# Patient Record
Sex: FEMALE | Race: WHITE | NOT HISPANIC OR LATINO | Employment: OTHER | URBAN - NONMETROPOLITAN AREA
[De-identification: names, ages, dates, MRNs, and addresses within clinical notes are randomized per-mention and may not be internally consistent; named-entity substitution may affect disease eponyms.]

---

## 2017-01-01 ENCOUNTER — APPOINTMENT (EMERGENCY)
Dept: CT IMAGING | Facility: HOSPITAL | Age: 77
DRG: 304 | End: 2017-01-01
Payer: MEDICARE

## 2017-01-01 ENCOUNTER — HOSPITAL ENCOUNTER (OUTPATIENT)
Dept: INFUSION CENTER | Facility: HOSPITAL | Age: 77
Discharge: HOME | End: 2017-11-24
Payer: MEDICARE

## 2017-01-01 ENCOUNTER — HOSPITAL ENCOUNTER (INPATIENT)
Facility: HOSPITAL | Age: 77
LOS: 3 days | Discharge: NURSING, REHAB OR LONG-TERM CARE | DRG: 304 | End: 2017-12-08
Attending: FAMILY MEDICINE | Admitting: FAMILY MEDICINE
Payer: MEDICARE

## 2017-01-01 ENCOUNTER — APPOINTMENT (OUTPATIENT)
Dept: MRI IMAGING | Facility: HOSPITAL | Age: 77
DRG: 092 | End: 2017-01-01
Payer: MEDICARE

## 2017-01-01 ENCOUNTER — APPOINTMENT (OUTPATIENT)
Dept: LAB | Facility: HOSPITAL | Age: 77
End: 2017-01-01
Payer: MEDICARE

## 2017-01-01 ENCOUNTER — APPOINTMENT (INPATIENT)
Dept: PHYSICAL THERAPY | Facility: HOSPITAL | Age: 77
DRG: 871 | End: 2017-01-01
Payer: MEDICARE

## 2017-01-01 ENCOUNTER — ANESTHESIA (OUTPATIENT)
Dept: PERIOP | Facility: HOSPITAL | Age: 77
End: 2017-01-01
Payer: MEDICARE

## 2017-01-01 ENCOUNTER — ALLSCRIPTS OFFICE VISIT (OUTPATIENT)
Dept: OTHER | Facility: OTHER | Age: 77
End: 2017-01-01

## 2017-01-01 ENCOUNTER — APPOINTMENT (EMERGENCY)
Dept: RADIOLOGY | Facility: HOSPITAL | Age: 77
End: 2017-01-01
Payer: MEDICARE

## 2017-01-01 ENCOUNTER — HOSPITAL ENCOUNTER (OUTPATIENT)
Dept: RADIOLOGY | Facility: HOSPITAL | Age: 77
Setting detail: OUTPATIENT SURGERY
Discharge: HOME | End: 2017-11-08
Payer: MEDICARE

## 2017-01-01 ENCOUNTER — APPOINTMENT (INPATIENT)
Dept: RADIOLOGY | Facility: HOSPITAL | Age: 77
DRG: 871 | End: 2017-01-01
Payer: MEDICARE

## 2017-01-01 ENCOUNTER — TRANSCRIBE ORDERS (OUTPATIENT)
Dept: ADMINISTRATIVE | Facility: HOSPITAL | Age: 77
End: 2017-01-01

## 2017-01-01 ENCOUNTER — HOSPITAL ENCOUNTER (OUTPATIENT)
Dept: INFUSION CENTER | Facility: HOSPITAL | Age: 77
Discharge: HOME | End: 2017-12-07
Payer: MEDICARE

## 2017-01-01 ENCOUNTER — APPOINTMENT (INPATIENT)
Dept: NON INVASIVE DIAGNOSTICS | Facility: HOSPITAL | Age: 77
DRG: 871 | End: 2017-01-01
Payer: MEDICARE

## 2017-01-01 ENCOUNTER — APPOINTMENT (OUTPATIENT)
Dept: RADIOLOGY | Facility: HOSPITAL | Age: 77
End: 2017-01-01
Payer: MEDICARE

## 2017-01-01 ENCOUNTER — APPOINTMENT (OUTPATIENT)
Dept: NON INVASIVE DIAGNOSTICS | Facility: HOSPITAL | Age: 77
DRG: 092 | End: 2017-01-01
Payer: MEDICARE

## 2017-01-01 ENCOUNTER — HOSPITAL ENCOUNTER (OUTPATIENT)
Facility: HOSPITAL | Age: 77
Setting detail: OUTPATIENT SURGERY
End: 2017-11-08
Attending: SURGERY | Admitting: SURGERY
Payer: MEDICARE

## 2017-01-01 ENCOUNTER — APPOINTMENT (EMERGENCY)
Dept: RADIOLOGY | Facility: HOSPITAL | Age: 77
DRG: 092 | End: 2017-01-01
Payer: MEDICARE

## 2017-01-01 ENCOUNTER — APPOINTMENT (EMERGENCY)
Dept: RADIOLOGY | Facility: HOSPITAL | Age: 77
DRG: 304 | End: 2017-01-01
Payer: MEDICARE

## 2017-01-01 ENCOUNTER — APPOINTMENT (EMERGENCY)
Dept: CT IMAGING | Facility: HOSPITAL | Age: 77
End: 2017-01-01
Payer: MEDICARE

## 2017-01-01 ENCOUNTER — HOSPITAL ENCOUNTER (INPATIENT)
Facility: HOSPITAL | Age: 77
LOS: 16 days | Discharge: NURSING, REHAB OR LONG-TERM CARE | DRG: 871 | End: 2017-08-28
Attending: INTERNAL MEDICINE | Admitting: INTERNAL MEDICINE
Payer: MEDICARE

## 2017-01-01 ENCOUNTER — ANESTHESIA EVENT (INPATIENT)
Dept: GASTROENTEROLOGY | Facility: HOSPITAL | Age: 77
DRG: 871 | End: 2017-01-01
Payer: MEDICARE

## 2017-01-01 ENCOUNTER — HOSPITAL ENCOUNTER (OUTPATIENT)
Dept: INFUSION CENTER | Facility: HOSPITAL | Age: 77
Discharge: HOME | End: 2017-09-25
Payer: MEDICARE

## 2017-01-01 ENCOUNTER — HOSPITAL ENCOUNTER (OUTPATIENT)
Dept: RADIOLOGY | Facility: HOSPITAL | Age: 77
Discharge: HOME | End: 2017-11-06
Attending: SURGERY
Payer: COMMERCIAL

## 2017-01-01 ENCOUNTER — APPOINTMENT (EMERGENCY)
Dept: CT IMAGING | Facility: HOSPITAL | Age: 77
DRG: 092 | End: 2017-01-01
Payer: MEDICARE

## 2017-01-01 ENCOUNTER — HOSPITAL ENCOUNTER (OUTPATIENT)
Dept: INFUSION CENTER | Facility: HOSPITAL | Age: 77
Discharge: HOME | End: 2017-09-11
Payer: MEDICARE

## 2017-01-01 ENCOUNTER — HOSPITAL ENCOUNTER (INPATIENT)
Facility: HOSPITAL | Age: 77
LOS: 7 days | Discharge: NURSING, REHAB OR LONG-TERM CARE | DRG: 356 | End: 2018-01-05
Attending: INTERNAL MEDICINE | Admitting: HOSPITALIST
Payer: MEDICARE

## 2017-01-01 ENCOUNTER — HOSPITAL ENCOUNTER (OUTPATIENT)
Dept: INFUSION CENTER | Facility: HOSPITAL | Age: 77
Discharge: HOME | End: 2017-10-09
Payer: COMMERCIAL

## 2017-01-01 ENCOUNTER — HOSPITAL ENCOUNTER (EMERGENCY)
Facility: HOSPITAL | Age: 77
End: 2017-12-29
Attending: EMERGENCY MEDICINE
Payer: MEDICARE

## 2017-01-01 ENCOUNTER — ANESTHESIA (OUTPATIENT)
Dept: GASTROENTEROLOGY | Facility: HOSPITAL | Age: 77
End: 2017-01-01

## 2017-01-01 ENCOUNTER — HOSPITAL ENCOUNTER (OUTPATIENT)
Dept: INFUSION CENTER | Facility: HOSPITAL | Age: 77
Discharge: HOME | End: 2017-11-09
Payer: MEDICARE

## 2017-01-01 ENCOUNTER — APPOINTMENT (INPATIENT)
Dept: NEUROLOGY | Facility: HOSPITAL | Age: 77
DRG: 092 | End: 2017-01-01
Payer: MEDICARE

## 2017-01-01 ENCOUNTER — GENERIC CONVERSION - ENCOUNTER (OUTPATIENT)
Dept: OTHER | Facility: OTHER | Age: 77
End: 2017-01-01

## 2017-01-01 ENCOUNTER — ANESTHESIA EVENT (OUTPATIENT)
Dept: PERIOP | Facility: HOSPITAL | Age: 77
End: 2017-01-01
Payer: MEDICARE

## 2017-01-01 ENCOUNTER — ANESTHESIA EVENT (OUTPATIENT)
Dept: GASTROENTEROLOGY | Facility: HOSPITAL | Age: 77
End: 2017-01-01

## 2017-01-01 ENCOUNTER — APPOINTMENT (OUTPATIENT)
Dept: ULTRASOUND IMAGING | Facility: HOSPITAL | Age: 77
DRG: 092 | End: 2017-01-01
Payer: MEDICARE

## 2017-01-01 ENCOUNTER — TELEPHONE (OUTPATIENT)
Dept: INFUSION CENTER | Facility: HOSPITAL | Age: 77
End: 2017-01-01

## 2017-01-01 ENCOUNTER — HOSPITAL ENCOUNTER (EMERGENCY)
Facility: HOSPITAL | Age: 77
Discharge: NURSING, REHAB OR LONG-TERM CARE | End: 2017-09-25
Attending: EMERGENCY MEDICINE | Admitting: EMERGENCY MEDICINE
Payer: MEDICARE

## 2017-01-01 ENCOUNTER — HOSPITAL ENCOUNTER (INPATIENT)
Facility: HOSPITAL | Age: 77
LOS: 5 days | Discharge: NURSING, REHAB OR LONG-TERM CARE | DRG: 092 | End: 2017-10-23
Attending: EMERGENCY MEDICINE | Admitting: FAMILY MEDICINE
Payer: MEDICARE

## 2017-01-01 ENCOUNTER — ANESTHESIA (INPATIENT)
Dept: GASTROENTEROLOGY | Facility: HOSPITAL | Age: 77
DRG: 871 | End: 2017-01-01
Payer: MEDICARE

## 2017-01-01 ENCOUNTER — HOSPITAL ENCOUNTER (OUTPATIENT)
Dept: INFUSION CENTER | Facility: HOSPITAL | Age: 77
Discharge: HOME | End: 2017-12-23
Payer: MEDICARE

## 2017-01-01 ENCOUNTER — HOSPITAL ENCOUNTER (EMERGENCY)
Facility: HOSPITAL | Age: 77
End: 2017-08-12
Attending: EMERGENCY MEDICINE
Payer: MEDICARE

## 2017-01-01 ENCOUNTER — APPOINTMENT (EMERGENCY)
Dept: ULTRASOUND IMAGING | Facility: HOSPITAL | Age: 77
End: 2017-01-01
Payer: MEDICARE

## 2017-01-01 VITALS
SYSTOLIC BLOOD PRESSURE: 138 MMHG | OXYGEN SATURATION: 95 % | BODY MASS INDEX: 43.94 KG/M2 | WEIGHT: 195.99 LBS | HEART RATE: 109 BPM | RESPIRATION RATE: 18 BRPM | DIASTOLIC BLOOD PRESSURE: 84 MMHG | TEMPERATURE: 101.1 F

## 2017-01-01 VITALS
DIASTOLIC BLOOD PRESSURE: 72 MMHG | RESPIRATION RATE: 18 BRPM | TEMPERATURE: 98 F | BODY MASS INDEX: 43.42 KG/M2 | HEART RATE: 72 BPM | SYSTOLIC BLOOD PRESSURE: 154 MMHG | WEIGHT: 193 LBS | OXYGEN SATURATION: 97 %

## 2017-01-01 VITALS
RESPIRATION RATE: 18 BRPM | SYSTOLIC BLOOD PRESSURE: 122 MMHG | DIASTOLIC BLOOD PRESSURE: 78 MMHG | OXYGEN SATURATION: 95 % | HEIGHT: 56 IN | HEART RATE: 69 BPM | WEIGHT: 188.93 LBS | BODY MASS INDEX: 42.5 KG/M2 | TEMPERATURE: 97.6 F

## 2017-01-01 VITALS
RESPIRATION RATE: 16 BRPM | TEMPERATURE: 97.4 F | SYSTOLIC BLOOD PRESSURE: 137 MMHG | HEIGHT: 56 IN | OXYGEN SATURATION: 98 % | WEIGHT: 181.66 LBS | BODY MASS INDEX: 40.86 KG/M2 | HEART RATE: 63 BPM | DIASTOLIC BLOOD PRESSURE: 76 MMHG

## 2017-01-01 VITALS
WEIGHT: 188.93 LBS | TEMPERATURE: 96.6 F | OXYGEN SATURATION: 96 % | DIASTOLIC BLOOD PRESSURE: 77 MMHG | BODY MASS INDEX: 36.9 KG/M2 | SYSTOLIC BLOOD PRESSURE: 157 MMHG | HEART RATE: 67 BPM | RESPIRATION RATE: 18 BRPM

## 2017-01-01 VITALS
TEMPERATURE: 97.1 F | SYSTOLIC BLOOD PRESSURE: 124 MMHG | HEIGHT: 56 IN | DIASTOLIC BLOOD PRESSURE: 52 MMHG | BODY MASS INDEX: 41.36 KG/M2 | RESPIRATION RATE: 18 BRPM | HEART RATE: 57 BPM | WEIGHT: 183.86 LBS

## 2017-01-01 VITALS
HEART RATE: 60 BPM | OXYGEN SATURATION: 99 % | RESPIRATION RATE: 22 BRPM | SYSTOLIC BLOOD PRESSURE: 155 MMHG | DIASTOLIC BLOOD PRESSURE: 67 MMHG | TEMPERATURE: 98.5 F | WEIGHT: 195.11 LBS | HEIGHT: 55 IN | BODY MASS INDEX: 45.15 KG/M2

## 2017-01-01 VITALS
HEART RATE: 82 BPM | SYSTOLIC BLOOD PRESSURE: 130 MMHG | BODY MASS INDEX: 41.81 KG/M2 | WEIGHT: 185.85 LBS | TEMPERATURE: 98.6 F | RESPIRATION RATE: 18 BRPM | DIASTOLIC BLOOD PRESSURE: 88 MMHG | HEIGHT: 56 IN

## 2017-01-01 VITALS
OXYGEN SATURATION: 93 % | DIASTOLIC BLOOD PRESSURE: 70 MMHG | TEMPERATURE: 98.5 F | RESPIRATION RATE: 24 BRPM | SYSTOLIC BLOOD PRESSURE: 140 MMHG | HEIGHT: 56 IN | HEART RATE: 65 BPM | WEIGHT: 196.87 LBS | BODY MASS INDEX: 44.29 KG/M2

## 2017-01-01 VITALS
WEIGHT: 193.12 LBS | SYSTOLIC BLOOD PRESSURE: 133 MMHG | RESPIRATION RATE: 20 BRPM | TEMPERATURE: 98.6 F | OXYGEN SATURATION: 95 % | HEIGHT: 56 IN | DIASTOLIC BLOOD PRESSURE: 60 MMHG | HEART RATE: 70 BPM | BODY MASS INDEX: 43.44 KG/M2

## 2017-01-01 VITALS
OXYGEN SATURATION: 94 % | DIASTOLIC BLOOD PRESSURE: 68 MMHG | BODY MASS INDEX: 42.85 KG/M2 | RESPIRATION RATE: 16 BRPM | HEIGHT: 56 IN | TEMPERATURE: 96.8 F | SYSTOLIC BLOOD PRESSURE: 162 MMHG | HEART RATE: 72 BPM | WEIGHT: 190.48 LBS

## 2017-01-01 VITALS
BODY MASS INDEX: 36.49 KG/M2 | HEIGHT: 60 IN | OXYGEN SATURATION: 93 % | DIASTOLIC BLOOD PRESSURE: 78 MMHG | WEIGHT: 185.85 LBS | RESPIRATION RATE: 18 BRPM | HEART RATE: 92 BPM | SYSTOLIC BLOOD PRESSURE: 160 MMHG | TEMPERATURE: 97.6 F

## 2017-01-01 VITALS
WEIGHT: 174.6 LBS | RESPIRATION RATE: 18 BRPM | BODY MASS INDEX: 40.41 KG/M2 | SYSTOLIC BLOOD PRESSURE: 172 MMHG | HEART RATE: 86 BPM | OXYGEN SATURATION: 98 % | TEMPERATURE: 98.6 F | DIASTOLIC BLOOD PRESSURE: 64 MMHG | HEIGHT: 55 IN

## 2017-01-01 DIAGNOSIS — E11.65 TYPE 2 DIABETES MELLITUS WITH HYPERGLYCEMIA, WITH LONG-TERM CURRENT USE OF INSULIN (HCC): ICD-10-CM

## 2017-01-01 DIAGNOSIS — C22.1 INTRAHEPATIC BILE DUCT CARCINOMA (HCC): ICD-10-CM

## 2017-01-01 DIAGNOSIS — R78.81 BACTEREMIA: ICD-10-CM

## 2017-01-01 DIAGNOSIS — I16.0 HYPERTENSIVE URGENCY: Primary | ICD-10-CM

## 2017-01-01 DIAGNOSIS — C25.9 MALIGNANT NEOPLASM OF PANCREAS (HCC): ICD-10-CM

## 2017-01-01 DIAGNOSIS — C25.9 PANCREATIC CANCER (HCC): ICD-10-CM

## 2017-01-01 DIAGNOSIS — E03.9 UNSPECIFIED HYPOTHYROIDISM: ICD-10-CM

## 2017-01-01 DIAGNOSIS — I82.4Z3 DVT, LOWER EXTREMITY, DISTAL, ACUTE, BILATERAL (HCC): ICD-10-CM

## 2017-01-01 DIAGNOSIS — I10 HYPERTENSION: ICD-10-CM

## 2017-01-01 DIAGNOSIS — I87.8 POOR VENOUS ACCESS: ICD-10-CM

## 2017-01-01 DIAGNOSIS — Z79.01 LONG TERM (CURRENT) USE OF ANTICOAGULANTS: ICD-10-CM

## 2017-01-01 DIAGNOSIS — R47.01 APHASIA: ICD-10-CM

## 2017-01-01 DIAGNOSIS — I10 ESSENTIAL HYPERTENSION, MALIGNANT: ICD-10-CM

## 2017-01-01 DIAGNOSIS — K62.5 RECTAL BLEEDING: Primary | ICD-10-CM

## 2017-01-01 DIAGNOSIS — R73.9 HYPERGLYCEMIA: ICD-10-CM

## 2017-01-01 DIAGNOSIS — R41.82 ALTERED MENTAL STATUS: ICD-10-CM

## 2017-01-01 DIAGNOSIS — I21.4 NSTEMI, INITIAL EPISODE OF CARE (HCC): Primary | ICD-10-CM

## 2017-01-01 DIAGNOSIS — R19.5 HEME POSITIVE STOOL: ICD-10-CM

## 2017-01-01 DIAGNOSIS — C77.2 PANCREATIC CARCINOMA METASTATIC TO INTRA-ABDOMINAL LYMPH NODE (HCC): ICD-10-CM

## 2017-01-01 DIAGNOSIS — C78.7 SECONDARY MALIGNANT NEOPLASM OF LIVER AND INTRAHEPATIC BILE DUCT (HCC): ICD-10-CM

## 2017-01-01 DIAGNOSIS — Z79.4 TYPE 2 DIABETES MELLITUS WITH HYPERGLYCEMIA, WITH LONG-TERM CURRENT USE OF INSULIN (HCC): ICD-10-CM

## 2017-01-01 DIAGNOSIS — N28.9 ACUTE RENAL DISEASE: ICD-10-CM

## 2017-01-01 DIAGNOSIS — G47.33 OSA (OBSTRUCTIVE SLEEP APNEA): ICD-10-CM

## 2017-01-01 DIAGNOSIS — E55.9 VITAMIN D DEFICIENCY DISEASE: ICD-10-CM

## 2017-01-01 DIAGNOSIS — Z79.01 LONG TERM (CURRENT) USE OF ANTICOAGULANTS: Primary | ICD-10-CM

## 2017-01-01 DIAGNOSIS — C25.9 PANCREATIC CANCER METASTASIZED TO LIVER (HCC): ICD-10-CM

## 2017-01-01 DIAGNOSIS — I82.403 ACUTE DEEP VEIN THROMBOSIS (DVT) OF BOTH LOWER EXTREMITIES (HCC): ICD-10-CM

## 2017-01-01 DIAGNOSIS — Z87.19 HISTORY OF MELENA: ICD-10-CM

## 2017-01-01 DIAGNOSIS — R41.82 ALTERED MENTAL STATUS, UNSPECIFIED ALTERED MENTAL STATUS TYPE: ICD-10-CM

## 2017-01-01 DIAGNOSIS — C78.6 SECONDARY MALIGNANT NEOPLASM OF RETROPERITONEUM AND PERITONEUM (HCC): ICD-10-CM

## 2017-01-01 DIAGNOSIS — E13.8 DIABETES MELLITUS OF OTHER TYPE WITH COMPLICATION: ICD-10-CM

## 2017-01-01 DIAGNOSIS — D69.6 THROMBOCYTOPENIA (HCC): ICD-10-CM

## 2017-01-01 DIAGNOSIS — E78.5 HYPERLIPIDEMIA: ICD-10-CM

## 2017-01-01 DIAGNOSIS — C25.0 PANCREATIC MALIGNANCY SYNDROME (HCC): ICD-10-CM

## 2017-01-01 DIAGNOSIS — I26.99 ACUTE PULMONARY EMBOLISM (HCC): ICD-10-CM

## 2017-01-01 DIAGNOSIS — I63.9 CVA (CEREBRAL VASCULAR ACCIDENT) (HCC): Primary | ICD-10-CM

## 2017-01-01 DIAGNOSIS — C25.9 PANCREATIC CARCINOMA METASTATIC TO INTRA-ABDOMINAL LYMPH NODE (HCC): ICD-10-CM

## 2017-01-01 DIAGNOSIS — I26.99 PULMONARY EMBOLISM (HCC): ICD-10-CM

## 2017-01-01 DIAGNOSIS — R10.9 ABDOMINAL PAIN: Primary | ICD-10-CM

## 2017-01-01 DIAGNOSIS — I21.4 NSTEMI (NON-ST ELEVATED MYOCARDIAL INFARCTION) (HCC): Primary | ICD-10-CM

## 2017-01-01 DIAGNOSIS — C78.7 PANCREATIC CANCER METASTASIZED TO LIVER (HCC): ICD-10-CM

## 2017-01-01 LAB
25(OH)D3 SERPL-MCNC: 38.5 NG/ML (ref 30–100)
ALBUMIN SERPL BCP-MCNC: 2.1 G/DL (ref 3.5–5)
ALBUMIN SERPL BCP-MCNC: 2.1 G/DL (ref 3.5–5)
ALBUMIN SERPL BCP-MCNC: 2.3 G/DL (ref 3.5–5)
ALBUMIN SERPL BCP-MCNC: 2.4 G/DL (ref 3.5–5)
ALBUMIN SERPL BCP-MCNC: 2.6 G/DL (ref 3.5–5)
ALBUMIN SERPL BCP-MCNC: 2.7 G/DL (ref 3.5–5)
ALBUMIN SERPL BCP-MCNC: 2.7 G/DL (ref 3.5–5)
ALBUMIN SERPL BCP-MCNC: 2.8 G/DL (ref 3.5–5)
ALBUMIN SERPL BCP-MCNC: 2.8 G/DL (ref 3.5–5)
ALBUMIN SERPL BCP-MCNC: 2.9 G/DL (ref 3.5–5)
ALBUMIN SERPL BCP-MCNC: 3 G/DL (ref 3.5–5)
ALBUMIN SERPL BCP-MCNC: 3.2 G/DL (ref 3.5–5)
ALP SERPL-CCNC: 109 U/L (ref 46–116)
ALP SERPL-CCNC: 111 U/L (ref 46–116)
ALP SERPL-CCNC: 121 U/L (ref 46–116)
ALP SERPL-CCNC: 122 U/L (ref 46–116)
ALP SERPL-CCNC: 135 U/L (ref 46–116)
ALP SERPL-CCNC: 145 U/L (ref 46–116)
ALP SERPL-CCNC: 145 U/L (ref 46–116)
ALP SERPL-CCNC: 150 U/L (ref 46–116)
ALP SERPL-CCNC: 156 U/L (ref 46–116)
ALP SERPL-CCNC: 169 U/L (ref 46–116)
ALP SERPL-CCNC: 174 U/L (ref 46–116)
ALP SERPL-CCNC: 174 U/L (ref 46–116)
ALP SERPL-CCNC: 187 U/L (ref 46–116)
ALP SERPL-CCNC: 205 U/L (ref 46–116)
ALP SERPL-CCNC: 213 U/L (ref 46–116)
ALP SERPL-CCNC: 240 U/L (ref 46–116)
ALP SERPL-CCNC: 243 U/L (ref 46–116)
ALP SERPL-CCNC: 95 U/L (ref 46–116)
ALT SERPL W P-5'-P-CCNC: 15 U/L (ref 12–78)
ALT SERPL W P-5'-P-CCNC: 19 U/L (ref 12–78)
ALT SERPL W P-5'-P-CCNC: 22 U/L (ref 12–78)
ALT SERPL W P-5'-P-CCNC: 23 U/L (ref 12–78)
ALT SERPL W P-5'-P-CCNC: 23 U/L (ref 12–78)
ALT SERPL W P-5'-P-CCNC: 24 U/L (ref 12–78)
ALT SERPL W P-5'-P-CCNC: 28 U/L (ref 12–78)
ALT SERPL W P-5'-P-CCNC: 29 U/L (ref 12–78)
ALT SERPL W P-5'-P-CCNC: 31 U/L (ref 12–78)
ALT SERPL W P-5'-P-CCNC: 32 U/L (ref 12–78)
ALT SERPL W P-5'-P-CCNC: 33 U/L (ref 12–78)
ALT SERPL W P-5'-P-CCNC: 36 U/L (ref 12–78)
ALT SERPL W P-5'-P-CCNC: 37 U/L (ref 12–78)
ALT SERPL W P-5'-P-CCNC: 38 U/L (ref 12–78)
ALT SERPL W P-5'-P-CCNC: 40 U/L (ref 12–78)
ALT SERPL W P-5'-P-CCNC: 63 U/L (ref 12–78)
AMMONIA PLAS-SCNC: 10 UMOL/L (ref 11–35)
AMMONIA PLAS-SCNC: <10 UMOL/L (ref 11–35)
ANION GAP SERPL CALCULATED.3IONS-SCNC: 10 MMOL/L (ref 4–13)
ANION GAP SERPL CALCULATED.3IONS-SCNC: 10 MMOL/L (ref 4–13)
ANION GAP SERPL CALCULATED.3IONS-SCNC: 11 MMOL/L (ref 4–13)
ANION GAP SERPL CALCULATED.3IONS-SCNC: 2 MMOL/L (ref 4–13)
ANION GAP SERPL CALCULATED.3IONS-SCNC: 3 MMOL/L (ref 4–13)
ANION GAP SERPL CALCULATED.3IONS-SCNC: 4 MMOL/L (ref 4–13)
ANION GAP SERPL CALCULATED.3IONS-SCNC: 5 MMOL/L (ref 4–13)
ANION GAP SERPL CALCULATED.3IONS-SCNC: 6 MMOL/L (ref 4–13)
ANION GAP SERPL CALCULATED.3IONS-SCNC: 6 MMOL/L (ref 4–13)
ANION GAP SERPL CALCULATED.3IONS-SCNC: 7 MMOL/L (ref 4–13)
ANION GAP SERPL CALCULATED.3IONS-SCNC: 8 MMOL/L (ref 4–13)
ANION GAP SERPL CALCULATED.3IONS-SCNC: 9 MMOL/L (ref 4–13)
ANISOCYTOSIS BLD QL SMEAR: PRESENT
APTT PPP: 107 SECONDS (ref 23–35)
APTT PPP: 123 SECONDS (ref 23–35)
APTT PPP: 26 SECONDS (ref 23–35)
APTT PPP: 28 SECONDS (ref 23–35)
APTT PPP: 28 SECONDS (ref 23–35)
APTT PPP: 29 SECONDS (ref 23–35)
APTT PPP: 29 SECONDS (ref 23–35)
APTT PPP: 31 SECONDS (ref 23–35)
APTT PPP: 43 SECONDS (ref 23–35)
APTT PPP: 49 SECONDS (ref 23–35)
APTT PPP: 55 SECONDS (ref 23–35)
APTT PPP: 61 SECONDS (ref 23–35)
APTT PPP: 61 SECONDS (ref 23–35)
APTT PPP: 62 SECONDS (ref 23–35)
APTT PPP: 66 SECONDS (ref 23–35)
APTT PPP: 67 SECONDS (ref 23–35)
APTT PPP: 94 SECONDS (ref 23–35)
APTT PPP: 98 SECONDS (ref 23–35)
APTT PPP: >210 SECONDS (ref 23–35)
ARTERIAL PATENCY WRIST A: YES
AST SERPL W P-5'-P-CCNC: 16 U/L (ref 5–45)
AST SERPL W P-5'-P-CCNC: 21 U/L (ref 5–45)
AST SERPL W P-5'-P-CCNC: 22 U/L (ref 5–45)
AST SERPL W P-5'-P-CCNC: 25 U/L (ref 5–45)
AST SERPL W P-5'-P-CCNC: 26 U/L (ref 5–45)
AST SERPL W P-5'-P-CCNC: 28 U/L (ref 5–45)
AST SERPL W P-5'-P-CCNC: 29 U/L (ref 5–45)
AST SERPL W P-5'-P-CCNC: 38 U/L (ref 5–45)
AST SERPL W P-5'-P-CCNC: 38 U/L (ref 5–45)
AST SERPL W P-5'-P-CCNC: 40 U/L (ref 5–45)
AST SERPL W P-5'-P-CCNC: 47 U/L (ref 5–45)
AST SERPL W P-5'-P-CCNC: 50 U/L (ref 5–45)
AST SERPL W P-5'-P-CCNC: 51 U/L (ref 5–45)
AST SERPL W P-5'-P-CCNC: 68 U/L (ref 5–45)
ATRIAL RATE: 101 BPM
ATRIAL RATE: 136 BPM
ATRIAL RATE: 90 BPM
ATRIAL RATE: 90 BPM
BACTERIA BLD CULT: NORMAL
BACTERIA UR CULT: NORMAL
BACTERIA UR CULT: NORMAL
BACTERIA UR QL AUTO: ABNORMAL /HPF
BACTERIA UR QL AUTO: ABNORMAL /HPF
BACTERIA UR QL AUTO: NORMAL /HPF
BACTERIA UR QL AUTO: NORMAL /HPF
BASE EXCESS BLDA CALC-SCNC: 4.9 MMOL/L
BASE EXCESS BLDA CALC-SCNC: 5.1 MMOL/L
BASE EXCESS BLDA CALC-SCNC: 8.7 MMOL/L
BASOPHILS # BLD AUTO: 0.01 THOUSANDS/ΜL (ref 0–0.1)
BASOPHILS # BLD AUTO: 0.02 THOUSANDS/ΜL (ref 0–0.1)
BASOPHILS # BLD AUTO: 0.03 THOUSANDS/ΜL (ref 0–0.1)
BASOPHILS # BLD AUTO: 0.04 THOUSANDS/ΜL (ref 0–0.1)
BASOPHILS # BLD AUTO: 0.05 THOUSANDS/ΜL (ref 0–0.1)
BASOPHILS # BLD MANUAL: 0 THOUSAND/UL (ref 0–0.1)
BASOPHILS # BLD MANUAL: 0 THOUSAND/UL (ref 0–0.1)
BASOPHILS NFR BLD AUTO: 0 % (ref 0–1)
BASOPHILS NFR BLD AUTO: 1 % (ref 0–1)
BASOPHILS NFR MAR MANUAL: 0 % (ref 0–1)
BASOPHILS NFR MAR MANUAL: 0 % (ref 0–1)
BILIRUB SERPL-MCNC: 0.3 MG/DL (ref 0.2–1)
BILIRUB SERPL-MCNC: 0.31 MG/DL (ref 0.2–1)
BILIRUB SERPL-MCNC: 0.38 MG/DL (ref 0.2–1)
BILIRUB SERPL-MCNC: 0.4 MG/DL (ref 0.2–1)
BILIRUB SERPL-MCNC: 0.4 MG/DL (ref 0.2–1)
BILIRUB SERPL-MCNC: 0.42 MG/DL (ref 0.2–1)
BILIRUB SERPL-MCNC: 0.5 MG/DL (ref 0.2–1)
BILIRUB SERPL-MCNC: 0.53 MG/DL (ref 0.2–1)
BILIRUB SERPL-MCNC: 0.58 MG/DL (ref 0.2–1)
BILIRUB SERPL-MCNC: 0.72 MG/DL (ref 0.2–1)
BILIRUB SERPL-MCNC: 0.74 MG/DL (ref 0.2–1)
BILIRUB SERPL-MCNC: 0.94 MG/DL (ref 0.2–1)
BILIRUB SERPL-MCNC: 1.6 MG/DL (ref 0.2–1)
BILIRUB UR QL STRIP: NEGATIVE
BUN SERPL-MCNC: 10 MG/DL (ref 5–25)
BUN SERPL-MCNC: 11 MG/DL (ref 5–25)
BUN SERPL-MCNC: 11 MG/DL (ref 5–25)
BUN SERPL-MCNC: 12 MG/DL (ref 5–25)
BUN SERPL-MCNC: 13 MG/DL (ref 5–25)
BUN SERPL-MCNC: 14 MG/DL (ref 5–25)
BUN SERPL-MCNC: 15 MG/DL (ref 5–25)
BUN SERPL-MCNC: 16 MG/DL (ref 5–25)
BUN SERPL-MCNC: 17 MG/DL (ref 5–25)
BUN SERPL-MCNC: 18 MG/DL (ref 5–25)
BUN SERPL-MCNC: 19 MG/DL (ref 5–25)
BUN SERPL-MCNC: 7 MG/DL (ref 5–25)
BUN SERPL-MCNC: 8 MG/DL (ref 5–25)
BUN SERPL-MCNC: 9 MG/DL (ref 5–25)
BURR CELLS BLD QL SMEAR: PRESENT
C DIFF TOX GENS STL QL NAA+PROBE: NORMAL
CALCIUM SERPL-MCNC: 7.5 MG/DL (ref 8.3–10.1)
CALCIUM SERPL-MCNC: 7.5 MG/DL (ref 8.3–10.1)
CALCIUM SERPL-MCNC: 7.6 MG/DL (ref 8.3–10.1)
CALCIUM SERPL-MCNC: 7.7 MG/DL (ref 8.3–10.1)
CALCIUM SERPL-MCNC: 7.8 MG/DL (ref 8.3–10.1)
CALCIUM SERPL-MCNC: 7.9 MG/DL (ref 8.3–10.1)
CALCIUM SERPL-MCNC: 7.9 MG/DL (ref 8.3–10.1)
CALCIUM SERPL-MCNC: 8 MG/DL (ref 8.3–10.1)
CALCIUM SERPL-MCNC: 8.1 MG/DL (ref 8.3–10.1)
CALCIUM SERPL-MCNC: 8.2 MG/DL (ref 8.3–10.1)
CALCIUM SERPL-MCNC: 8.3 MG/DL (ref 8.3–10.1)
CALCIUM SERPL-MCNC: 8.3 MG/DL (ref 8.3–10.1)
CALCIUM SERPL-MCNC: 8.4 MG/DL (ref 8.3–10.1)
CALCIUM SERPL-MCNC: 8.4 MG/DL (ref 8.3–10.1)
CALCIUM SERPL-MCNC: 8.5 MG/DL (ref 8.3–10.1)
CALCIUM SERPL-MCNC: 8.6 MG/DL (ref 8.3–10.1)
CALCIUM SERPL-MCNC: 8.7 MG/DL (ref 8.3–10.1)
CALCIUM SERPL-MCNC: 8.7 MG/DL (ref 8.3–10.1)
CALCIUM SERPL-MCNC: 8.8 MG/DL (ref 8.3–10.1)
CALCIUM SERPL-MCNC: 8.9 MG/DL (ref 8.3–10.1)
CALCIUM SERPL-MCNC: 8.9 MG/DL (ref 8.3–10.1)
CALCIUM SERPL-MCNC: 9 MG/DL (ref 8.3–10.1)
CALCIUM SERPL-MCNC: 9.1 MG/DL (ref 8.3–10.1)
CHLORIDE SERPL-SCNC: 100 MMOL/L (ref 100–108)
CHLORIDE SERPL-SCNC: 101 MMOL/L (ref 100–108)
CHLORIDE SERPL-SCNC: 102 MMOL/L (ref 100–108)
CHLORIDE SERPL-SCNC: 103 MMOL/L (ref 100–108)
CHLORIDE SERPL-SCNC: 104 MMOL/L (ref 100–108)
CHLORIDE SERPL-SCNC: 105 MMOL/L (ref 100–108)
CHLORIDE SERPL-SCNC: 106 MMOL/L (ref 100–108)
CHLORIDE SERPL-SCNC: 106 MMOL/L (ref 100–108)
CHLORIDE SERPL-SCNC: 95 MMOL/L (ref 100–108)
CHLORIDE SERPL-SCNC: 95 MMOL/L (ref 100–108)
CHLORIDE SERPL-SCNC: 98 MMOL/L (ref 100–108)
CHLORIDE SERPL-SCNC: 99 MMOL/L (ref 100–108)
CHLORIDE SERPL-SCNC: 99 MMOL/L (ref 100–108)
CHOLEST SERPL-MCNC: 133 MG/DL (ref 50–200)
CHOLEST SERPL-MCNC: 145 MG/DL (ref 50–200)
CHOLEST SERPL-MCNC: 177 MG/DL (ref 50–200)
CLARITY UR: ABNORMAL
CLARITY UR: ABNORMAL
CLARITY UR: CLEAR
CLARITY UR: CLEAR
CO2 SERPL-SCNC: 25 MMOL/L (ref 21–32)
CO2 SERPL-SCNC: 25 MMOL/L (ref 21–32)
CO2 SERPL-SCNC: 26 MMOL/L (ref 21–32)
CO2 SERPL-SCNC: 27 MMOL/L (ref 21–32)
CO2 SERPL-SCNC: 27 MMOL/L (ref 21–32)
CO2 SERPL-SCNC: 28 MMOL/L (ref 21–32)
CO2 SERPL-SCNC: 29 MMOL/L (ref 21–32)
CO2 SERPL-SCNC: 30 MMOL/L (ref 21–32)
CO2 SERPL-SCNC: 31 MMOL/L (ref 21–32)
CO2 SERPL-SCNC: 32 MMOL/L (ref 21–32)
CO2 SERPL-SCNC: 33 MMOL/L (ref 21–32)
CO2 SERPL-SCNC: 34 MMOL/L (ref 21–32)
CO2 SERPL-SCNC: 35 MMOL/L (ref 21–32)
CO2 SERPL-SCNC: 36 MMOL/L (ref 21–32)
COLOR UR: YELLOW
CREAT SERPL-MCNC: 0.56 MG/DL (ref 0.6–1.3)
CREAT SERPL-MCNC: 0.62 MG/DL (ref 0.6–1.3)
CREAT SERPL-MCNC: 0.63 MG/DL (ref 0.6–1.3)
CREAT SERPL-MCNC: 0.68 MG/DL (ref 0.6–1.3)
CREAT SERPL-MCNC: 0.69 MG/DL (ref 0.6–1.3)
CREAT SERPL-MCNC: 0.7 MG/DL (ref 0.6–1.3)
CREAT SERPL-MCNC: 0.74 MG/DL (ref 0.6–1.3)
CREAT SERPL-MCNC: 0.75 MG/DL (ref 0.6–1.3)
CREAT SERPL-MCNC: 0.76 MG/DL (ref 0.6–1.3)
CREAT SERPL-MCNC: 0.77 MG/DL (ref 0.6–1.3)
CREAT SERPL-MCNC: 0.78 MG/DL (ref 0.6–1.3)
CREAT SERPL-MCNC: 0.79 MG/DL (ref 0.6–1.3)
CREAT SERPL-MCNC: 0.81 MG/DL (ref 0.6–1.3)
CREAT SERPL-MCNC: 0.82 MG/DL (ref 0.6–1.3)
CREAT SERPL-MCNC: 0.83 MG/DL (ref 0.6–1.3)
CREAT SERPL-MCNC: 0.84 MG/DL (ref 0.6–1.3)
CREAT SERPL-MCNC: 0.85 MG/DL (ref 0.6–1.3)
CREAT SERPL-MCNC: 0.88 MG/DL (ref 0.6–1.3)
CREAT SERPL-MCNC: 0.89 MG/DL (ref 0.6–1.3)
CREAT SERPL-MCNC: 0.9 MG/DL (ref 0.6–1.3)
CREAT SERPL-MCNC: 0.91 MG/DL (ref 0.6–1.3)
CREAT SERPL-MCNC: 0.92 MG/DL (ref 0.6–1.3)
CREAT SERPL-MCNC: 0.96 MG/DL (ref 0.6–1.3)
CREAT SERPL-MCNC: 1 MG/DL (ref 0.6–1.3)
CREAT SERPL-MCNC: 1.01 MG/DL (ref 0.6–1.3)
CREAT SERPL-MCNC: 1.02 MG/DL (ref 0.6–1.3)
CREAT SERPL-MCNC: 1.14 MG/DL (ref 0.6–1.3)
CREAT SERPL-MCNC: 1.2 MG/DL (ref 0.6–1.3)
CREAT SERPL-MCNC: 1.66 MG/DL (ref 0.6–1.3)
CRP SERPL QL: 10.7 MG/L
DACRYOCYTES BLD QL SMEAR: PRESENT
EOSINOPHIL # BLD AUTO: 0.04 THOUSAND/ΜL (ref 0–0.61)
EOSINOPHIL # BLD AUTO: 0.06 THOUSAND/ΜL (ref 0–0.61)
EOSINOPHIL # BLD AUTO: 0.07 THOUSAND/ΜL (ref 0–0.61)
EOSINOPHIL # BLD AUTO: 0.11 THOUSAND/ΜL (ref 0–0.61)
EOSINOPHIL # BLD AUTO: 0.12 THOUSAND/ΜL (ref 0–0.61)
EOSINOPHIL # BLD AUTO: 0.14 THOUSAND/ΜL (ref 0–0.61)
EOSINOPHIL # BLD AUTO: 0.15 THOUSAND/ΜL (ref 0–0.61)
EOSINOPHIL # BLD AUTO: 0.17 THOUSAND/ΜL (ref 0–0.61)
EOSINOPHIL # BLD AUTO: 0.22 THOUSAND/ΜL (ref 0–0.61)
EOSINOPHIL # BLD AUTO: 0.24 THOUSAND/ΜL (ref 0–0.61)
EOSINOPHIL # BLD AUTO: 0.25 THOUSAND/ΜL (ref 0–0.61)
EOSINOPHIL # BLD AUTO: 0.27 THOUSAND/ΜL (ref 0–0.61)
EOSINOPHIL # BLD AUTO: 0.29 THOUSAND/ΜL (ref 0–0.61)
EOSINOPHIL # BLD AUTO: 0.33 THOUSAND/ΜL (ref 0–0.61)
EOSINOPHIL # BLD AUTO: 0.35 THOUSAND/ΜL (ref 0–0.61)
EOSINOPHIL # BLD AUTO: 0.36 THOUSAND/ΜL (ref 0–0.61)
EOSINOPHIL # BLD AUTO: 0.37 THOUSAND/ΜL (ref 0–0.61)
EOSINOPHIL # BLD MANUAL: 0 THOUSAND/UL (ref 0–0.4)
EOSINOPHIL # BLD MANUAL: 0.14 THOUSAND/UL (ref 0–0.4)
EOSINOPHIL NFR BLD AUTO: 0 % (ref 0–6)
EOSINOPHIL NFR BLD AUTO: 1 % (ref 0–6)
EOSINOPHIL NFR BLD AUTO: 1 % (ref 0–6)
EOSINOPHIL NFR BLD AUTO: 2 % (ref 0–6)
EOSINOPHIL NFR BLD AUTO: 3 % (ref 0–6)
EOSINOPHIL NFR BLD AUTO: 4 % (ref 0–6)
EOSINOPHIL NFR BLD MANUAL: 0 % (ref 0–6)
EOSINOPHIL NFR BLD MANUAL: 2 % (ref 0–6)
ERYTHROCYTE [DISTWIDTH] IN BLOOD BY AUTOMATED COUNT: 15.5 % (ref 11.6–15.1)
ERYTHROCYTE [DISTWIDTH] IN BLOOD BY AUTOMATED COUNT: 15.6 % (ref 11.6–15.1)
ERYTHROCYTE [DISTWIDTH] IN BLOOD BY AUTOMATED COUNT: 15.7 % (ref 11.6–15.1)
ERYTHROCYTE [DISTWIDTH] IN BLOOD BY AUTOMATED COUNT: 16.1 % (ref 11.6–15.1)
ERYTHROCYTE [DISTWIDTH] IN BLOOD BY AUTOMATED COUNT: 16.2 % (ref 11.6–15.1)
ERYTHROCYTE [DISTWIDTH] IN BLOOD BY AUTOMATED COUNT: 16.2 % (ref 11.6–15.1)
ERYTHROCYTE [DISTWIDTH] IN BLOOD BY AUTOMATED COUNT: 16.3 % (ref 11.6–15.1)
ERYTHROCYTE [DISTWIDTH] IN BLOOD BY AUTOMATED COUNT: 16.4 % (ref 11.6–15.1)
ERYTHROCYTE [DISTWIDTH] IN BLOOD BY AUTOMATED COUNT: 16.4 % (ref 11.6–15.1)
ERYTHROCYTE [DISTWIDTH] IN BLOOD BY AUTOMATED COUNT: 16.5 % (ref 11.6–15.1)
ERYTHROCYTE [DISTWIDTH] IN BLOOD BY AUTOMATED COUNT: 16.6 % (ref 11.6–15.1)
ERYTHROCYTE [DISTWIDTH] IN BLOOD BY AUTOMATED COUNT: 16.7 % (ref 11.6–15.1)
ERYTHROCYTE [DISTWIDTH] IN BLOOD BY AUTOMATED COUNT: 16.7 % (ref 11.6–15.1)
ERYTHROCYTE [DISTWIDTH] IN BLOOD BY AUTOMATED COUNT: 16.8 % (ref 11.6–15.1)
ERYTHROCYTE [DISTWIDTH] IN BLOOD BY AUTOMATED COUNT: 16.9 % (ref 11.6–15.1)
ERYTHROCYTE [DISTWIDTH] IN BLOOD BY AUTOMATED COUNT: 17 % (ref 11.6–15.1)
ERYTHROCYTE [DISTWIDTH] IN BLOOD BY AUTOMATED COUNT: 17.1 % (ref 11.6–15.1)
ERYTHROCYTE [DISTWIDTH] IN BLOOD BY AUTOMATED COUNT: 17.2 % (ref 11.6–15.1)
ERYTHROCYTE [DISTWIDTH] IN BLOOD BY AUTOMATED COUNT: 17.4 % (ref 11.6–15.1)
ERYTHROCYTE [DISTWIDTH] IN BLOOD BY AUTOMATED COUNT: 17.6 % (ref 11.6–15.1)
ERYTHROCYTE [DISTWIDTH] IN BLOOD BY AUTOMATED COUNT: 18 % (ref 11.6–15.1)
EST. AVERAGE GLUCOSE BLD GHB EST-MCNC: 169 MG/DL
EST. AVERAGE GLUCOSE BLD GHB EST-MCNC: 177 MG/DL
EST. AVERAGE GLUCOSE BLD GHB EST-MCNC: 269 MG/DL
EST. AVERAGE GLUCOSE BLD GHB EST-MCNC: 295 MG/DL
GFR SERPL CREATININE-BSD FRML MDRD: 30 ML/MIN/1.73SQ M
GFR SERPL CREATININE-BSD FRML MDRD: 44 ML/MIN/1.73SQ M
GFR SERPL CREATININE-BSD FRML MDRD: 47 ML/MIN/1.73SQ M
GFR SERPL CREATININE-BSD FRML MDRD: 54 ML/MIN/1.73SQ M
GFR SERPL CREATININE-BSD FRML MDRD: 55 ML/MIN/1.73SQ M
GFR SERPL CREATININE-BSD FRML MDRD: 55 ML/MIN/1.73SQ M
GFR SERPL CREATININE-BSD FRML MDRD: 56.8 ML/MIN/1.73SQ M
GFR SERPL CREATININE-BSD FRML MDRD: 61 ML/MIN/1.73SQ M
GFR SERPL CREATININE-BSD FRML MDRD: 62 ML/MIN/1.73SQ M
GFR SERPL CREATININE-BSD FRML MDRD: 63 ML/MIN/1.73SQ M
GFR SERPL CREATININE-BSD FRML MDRD: 64 ML/MIN/1.73SQ M
GFR SERPL CREATININE-BSD FRML MDRD: 64 ML/MIN/1.73SQ M
GFR SERPL CREATININE-BSD FRML MDRD: 67 ML/MIN/1.73SQ M
GFR SERPL CREATININE-BSD FRML MDRD: 68 ML/MIN/1.73SQ M
GFR SERPL CREATININE-BSD FRML MDRD: 69 ML/MIN/1.73SQ M
GFR SERPL CREATININE-BSD FRML MDRD: 70 ML/MIN/1.73SQ M
GFR SERPL CREATININE-BSD FRML MDRD: 71 ML/MIN/1.73SQ M
GFR SERPL CREATININE-BSD FRML MDRD: 73 ML/MIN/1.73SQ M
GFR SERPL CREATININE-BSD FRML MDRD: 75 ML/MIN/1.73SQ M
GFR SERPL CREATININE-BSD FRML MDRD: 76 ML/MIN/1.73SQ M
GFR SERPL CREATININE-BSD FRML MDRD: 77 ML/MIN/1.73SQ M
GFR SERPL CREATININE-BSD FRML MDRD: 78 ML/MIN/1.73SQ M
GFR SERPL CREATININE-BSD FRML MDRD: 80 ML/MIN/1.73SQ M
GFR SERPL CREATININE-BSD FRML MDRD: 85 ML/MIN/1.73SQ M
GFR SERPL CREATININE-BSD FRML MDRD: 85 ML/MIN/1.73SQ M
GFR SERPL CREATININE-BSD FRML MDRD: 86 ML/MIN/1.73SQ M
GFR SERPL CREATININE-BSD FRML MDRD: 88 ML/MIN/1.73SQ M
GFR SERPL CREATININE-BSD FRML MDRD: 88 ML/MIN/1.73SQ M
GFR SERPL CREATININE-BSD FRML MDRD: 92 ML/MIN/1.73SQ M
GLUCOSE P FAST SERPL-MCNC: 218 MG/DL (ref 65–99)
GLUCOSE P FAST SERPL-MCNC: 279 MG/DL (ref 65–99)
GLUCOSE SERPL-MCNC: 100 MG/DL (ref 65–140)
GLUCOSE SERPL-MCNC: 101 MG/DL (ref 65–140)
GLUCOSE SERPL-MCNC: 104 MG/DL (ref 65–140)
GLUCOSE SERPL-MCNC: 106 MG/DL (ref 65–140)
GLUCOSE SERPL-MCNC: 107 MG/DL (ref 65–140)
GLUCOSE SERPL-MCNC: 110 MG/DL (ref 65–140)
GLUCOSE SERPL-MCNC: 112 MG/DL (ref 65–140)
GLUCOSE SERPL-MCNC: 113 MG/DL (ref 65–140)
GLUCOSE SERPL-MCNC: 117 MG/DL (ref 65–140)
GLUCOSE SERPL-MCNC: 124 MG/DL (ref 65–140)
GLUCOSE SERPL-MCNC: 126 MG/DL (ref 65–140)
GLUCOSE SERPL-MCNC: 127 MG/DL (ref 65–140)
GLUCOSE SERPL-MCNC: 128 MG/DL (ref 65–140)
GLUCOSE SERPL-MCNC: 128 MG/DL (ref 65–140)
GLUCOSE SERPL-MCNC: 129 MG/DL (ref 65–140)
GLUCOSE SERPL-MCNC: 132 MG/DL (ref 65–140)
GLUCOSE SERPL-MCNC: 133 MG/DL (ref 65–140)
GLUCOSE SERPL-MCNC: 134 MG/DL (ref 65–140)
GLUCOSE SERPL-MCNC: 139 MG/DL (ref 65–140)
GLUCOSE SERPL-MCNC: 144 MG/DL (ref 65–140)
GLUCOSE SERPL-MCNC: 145 MG/DL (ref 65–140)
GLUCOSE SERPL-MCNC: 145 MG/DL (ref 65–140)
GLUCOSE SERPL-MCNC: 146 MG/DL (ref 65–140)
GLUCOSE SERPL-MCNC: 147 MG/DL (ref 65–140)
GLUCOSE SERPL-MCNC: 148 MG/DL (ref 65–140)
GLUCOSE SERPL-MCNC: 151 MG/DL (ref 65–140)
GLUCOSE SERPL-MCNC: 151 MG/DL (ref 65–140)
GLUCOSE SERPL-MCNC: 153 MG/DL (ref 65–140)
GLUCOSE SERPL-MCNC: 153 MG/DL (ref 65–140)
GLUCOSE SERPL-MCNC: 154 MG/DL (ref 65–140)
GLUCOSE SERPL-MCNC: 156 MG/DL (ref 65–140)
GLUCOSE SERPL-MCNC: 159 MG/DL (ref 65–140)
GLUCOSE SERPL-MCNC: 159 MG/DL (ref 65–140)
GLUCOSE SERPL-MCNC: 160 MG/DL (ref 65–140)
GLUCOSE SERPL-MCNC: 162 MG/DL (ref 65–140)
GLUCOSE SERPL-MCNC: 162 MG/DL (ref 65–140)
GLUCOSE SERPL-MCNC: 166 MG/DL (ref 65–140)
GLUCOSE SERPL-MCNC: 168 MG/DL (ref 65–140)
GLUCOSE SERPL-MCNC: 168 MG/DL (ref 65–140)
GLUCOSE SERPL-MCNC: 170 MG/DL (ref 65–140)
GLUCOSE SERPL-MCNC: 170 MG/DL (ref 65–140)
GLUCOSE SERPL-MCNC: 177 MG/DL (ref 65–140)
GLUCOSE SERPL-MCNC: 185 MG/DL (ref 65–140)
GLUCOSE SERPL-MCNC: 186 MG/DL (ref 65–140)
GLUCOSE SERPL-MCNC: 189 MG/DL (ref 65–140)
GLUCOSE SERPL-MCNC: 191 MG/DL (ref 65–140)
GLUCOSE SERPL-MCNC: 191 MG/DL (ref 65–140)
GLUCOSE SERPL-MCNC: 193 MG/DL (ref 65–140)
GLUCOSE SERPL-MCNC: 194 MG/DL (ref 65–140)
GLUCOSE SERPL-MCNC: 195 MG/DL (ref 65–140)
GLUCOSE SERPL-MCNC: 196 MG/DL (ref 65–140)
GLUCOSE SERPL-MCNC: 198 MG/DL (ref 65–140)
GLUCOSE SERPL-MCNC: 201 MG/DL (ref 65–140)
GLUCOSE SERPL-MCNC: 204 MG/DL (ref 65–140)
GLUCOSE SERPL-MCNC: 206 MG/DL (ref 65–140)
GLUCOSE SERPL-MCNC: 207 MG/DL (ref 65–140)
GLUCOSE SERPL-MCNC: 208 MG/DL (ref 65–140)
GLUCOSE SERPL-MCNC: 208 MG/DL (ref 65–140)
GLUCOSE SERPL-MCNC: 209 MG/DL (ref 65–140)
GLUCOSE SERPL-MCNC: 209 MG/DL (ref 65–140)
GLUCOSE SERPL-MCNC: 210 MG/DL (ref 65–140)
GLUCOSE SERPL-MCNC: 211 MG/DL (ref 65–140)
GLUCOSE SERPL-MCNC: 215 MG/DL (ref 65–140)
GLUCOSE SERPL-MCNC: 216 MG/DL (ref 65–140)
GLUCOSE SERPL-MCNC: 216 MG/DL (ref 65–140)
GLUCOSE SERPL-MCNC: 218 MG/DL (ref 65–140)
GLUCOSE SERPL-MCNC: 220 MG/DL (ref 65–140)
GLUCOSE SERPL-MCNC: 225 MG/DL (ref 65–140)
GLUCOSE SERPL-MCNC: 225 MG/DL (ref 65–140)
GLUCOSE SERPL-MCNC: 226 MG/DL (ref 65–140)
GLUCOSE SERPL-MCNC: 228 MG/DL (ref 65–140)
GLUCOSE SERPL-MCNC: 229 MG/DL (ref 65–140)
GLUCOSE SERPL-MCNC: 230 MG/DL (ref 65–140)
GLUCOSE SERPL-MCNC: 231 MG/DL (ref 65–140)
GLUCOSE SERPL-MCNC: 231 MG/DL (ref 65–140)
GLUCOSE SERPL-MCNC: 233 MG/DL (ref 65–140)
GLUCOSE SERPL-MCNC: 235 MG/DL (ref 65–140)
GLUCOSE SERPL-MCNC: 238 MG/DL (ref 65–140)
GLUCOSE SERPL-MCNC: 242 MG/DL (ref 65–140)
GLUCOSE SERPL-MCNC: 245 MG/DL (ref 65–140)
GLUCOSE SERPL-MCNC: 246 MG/DL (ref 65–140)
GLUCOSE SERPL-MCNC: 247 MG/DL (ref 65–140)
GLUCOSE SERPL-MCNC: 250 MG/DL (ref 65–140)
GLUCOSE SERPL-MCNC: 252 MG/DL (ref 65–140)
GLUCOSE SERPL-MCNC: 252 MG/DL (ref 65–140)
GLUCOSE SERPL-MCNC: 254 MG/DL (ref 65–140)
GLUCOSE SERPL-MCNC: 255 MG/DL (ref 65–140)
GLUCOSE SERPL-MCNC: 255 MG/DL (ref 65–140)
GLUCOSE SERPL-MCNC: 257 MG/DL (ref 65–140)
GLUCOSE SERPL-MCNC: 260 MG/DL (ref 65–140)
GLUCOSE SERPL-MCNC: 262 MG/DL (ref 65–140)
GLUCOSE SERPL-MCNC: 266 MG/DL (ref 65–140)
GLUCOSE SERPL-MCNC: 267 MG/DL (ref 65–140)
GLUCOSE SERPL-MCNC: 271 MG/DL (ref 65–140)
GLUCOSE SERPL-MCNC: 273 MG/DL (ref 65–140)
GLUCOSE SERPL-MCNC: 274 MG/DL (ref 65–140)
GLUCOSE SERPL-MCNC: 276 MG/DL (ref 65–140)
GLUCOSE SERPL-MCNC: 276 MG/DL (ref 65–140)
GLUCOSE SERPL-MCNC: 277 MG/DL (ref 65–140)
GLUCOSE SERPL-MCNC: 279 MG/DL (ref 65–140)
GLUCOSE SERPL-MCNC: 279 MG/DL (ref 65–140)
GLUCOSE SERPL-MCNC: 281 MG/DL (ref 65–140)
GLUCOSE SERPL-MCNC: 282 MG/DL (ref 65–140)
GLUCOSE SERPL-MCNC: 286 MG/DL (ref 65–140)
GLUCOSE SERPL-MCNC: 286 MG/DL (ref 65–140)
GLUCOSE SERPL-MCNC: 290 MG/DL (ref 65–140)
GLUCOSE SERPL-MCNC: 295 MG/DL (ref 65–140)
GLUCOSE SERPL-MCNC: 297 MG/DL (ref 65–140)
GLUCOSE SERPL-MCNC: 298 MG/DL (ref 65–140)
GLUCOSE SERPL-MCNC: 299 MG/DL (ref 65–140)
GLUCOSE SERPL-MCNC: 303 MG/DL (ref 65–140)
GLUCOSE SERPL-MCNC: 312 MG/DL (ref 65–140)
GLUCOSE SERPL-MCNC: 316 MG/DL (ref 65–140)
GLUCOSE SERPL-MCNC: 318 MG/DL (ref 65–140)
GLUCOSE SERPL-MCNC: 323 MG/DL (ref 65–140)
GLUCOSE SERPL-MCNC: 325 MG/DL (ref 65–140)
GLUCOSE SERPL-MCNC: 328 MG/DL (ref 65–140)
GLUCOSE SERPL-MCNC: 330 MG/DL (ref 65–140)
GLUCOSE SERPL-MCNC: 332 MG/DL (ref 65–140)
GLUCOSE SERPL-MCNC: 334 MG/DL (ref 65–140)
GLUCOSE SERPL-MCNC: 337 MG/DL (ref 65–140)
GLUCOSE SERPL-MCNC: 338 MG/DL (ref 65–140)
GLUCOSE SERPL-MCNC: 342 MG/DL (ref 65–140)
GLUCOSE SERPL-MCNC: 358 MG/DL (ref 65–140)
GLUCOSE SERPL-MCNC: 359 MG/DL (ref 65–140)
GLUCOSE SERPL-MCNC: 360 MG/DL (ref 65–140)
GLUCOSE SERPL-MCNC: 370 MG/DL (ref 65–140)
GLUCOSE SERPL-MCNC: 374 MG/DL (ref 65–140)
GLUCOSE SERPL-MCNC: 391 MG/DL (ref 65–140)
GLUCOSE SERPL-MCNC: 41 MG/DL (ref 65–140)
GLUCOSE SERPL-MCNC: 422 MG/DL (ref 65–140)
GLUCOSE SERPL-MCNC: 434 MG/DL (ref 65–140)
GLUCOSE SERPL-MCNC: 449 MG/DL (ref 65–140)
GLUCOSE SERPL-MCNC: 473 MG/DL (ref 65–140)
GLUCOSE SERPL-MCNC: 534 MG/DL (ref 65–140)
GLUCOSE SERPL-MCNC: 54 MG/DL (ref 65–140)
GLUCOSE SERPL-MCNC: 55 MG/DL (ref 65–140)
GLUCOSE SERPL-MCNC: 56 MG/DL (ref 65–140)
GLUCOSE SERPL-MCNC: 57 MG/DL (ref 65–140)
GLUCOSE SERPL-MCNC: 58 MG/DL (ref 65–140)
GLUCOSE SERPL-MCNC: 61 MG/DL (ref 65–140)
GLUCOSE SERPL-MCNC: 65 MG/DL (ref 65–140)
GLUCOSE SERPL-MCNC: 65 MG/DL (ref 65–140)
GLUCOSE SERPL-MCNC: 66 MG/DL (ref 65–140)
GLUCOSE SERPL-MCNC: 70 MG/DL (ref 65–140)
GLUCOSE SERPL-MCNC: 73 MG/DL (ref 65–140)
GLUCOSE SERPL-MCNC: 74 MG/DL (ref 65–140)
GLUCOSE SERPL-MCNC: 77 MG/DL (ref 65–140)
GLUCOSE SERPL-MCNC: 79 MG/DL (ref 65–140)
GLUCOSE SERPL-MCNC: 83 MG/DL (ref 65–140)
GLUCOSE SERPL-MCNC: 87 MG/DL (ref 65–140)
GLUCOSE SERPL-MCNC: 89 MG/DL (ref 65–140)
GLUCOSE SERPL-MCNC: 92 MG/DL (ref 65–140)
GLUCOSE SERPL-MCNC: 92 MG/DL (ref 65–140)
GLUCOSE SERPL-MCNC: 94 MG/DL (ref 65–140)
GLUCOSE SERPL-MCNC: 94 MG/DL (ref 65–140)
GLUCOSE UR STRIP-MCNC: ABNORMAL MG/DL
GLUCOSE UR STRIP-MCNC: NEGATIVE MG/DL
GRAM STN SPEC: NORMAL
GRAM STN SPEC: NORMAL
HBA1C MFR BLD: 11 % (ref 4.2–6.3)
HBA1C MFR BLD: 11.9 % (ref 4.2–6.3)
HBA1C MFR BLD: 7.5 % (ref 4.2–6.3)
HBA1C MFR BLD: 7.8 % (ref 4.2–6.3)
HCO3 BLDA-SCNC: 29.4 MMOL/L (ref 22–28)
HCO3 BLDA-SCNC: 29.6 MMOL/L (ref 22–28)
HCO3 BLDA-SCNC: 33.9 MMOL/L (ref 22–28)
HCT VFR BLD AUTO: 29.8 % (ref 34.8–46.1)
HCT VFR BLD AUTO: 32.9 % (ref 34.8–46.1)
HCT VFR BLD AUTO: 33.1 % (ref 34.8–46.1)
HCT VFR BLD AUTO: 33.2 % (ref 34.8–46.1)
HCT VFR BLD AUTO: 33.5 % (ref 34.8–46.1)
HCT VFR BLD AUTO: 33.6 % (ref 34.8–46.1)
HCT VFR BLD AUTO: 33.7 % (ref 34.8–46.1)
HCT VFR BLD AUTO: 34 % (ref 34.8–46.1)
HCT VFR BLD AUTO: 34.1 % (ref 34.8–46.1)
HCT VFR BLD AUTO: 34.2 % (ref 34.8–46.1)
HCT VFR BLD AUTO: 34.3 % (ref 34.8–46.1)
HCT VFR BLD AUTO: 34.6 % (ref 34.8–46.1)
HCT VFR BLD AUTO: 35.1 % (ref 34.8–46.1)
HCT VFR BLD AUTO: 35.2 % (ref 34.8–46.1)
HCT VFR BLD AUTO: 35.2 % (ref 34.8–46.1)
HCT VFR BLD AUTO: 35.7 % (ref 34.8–46.1)
HCT VFR BLD AUTO: 35.9 % (ref 34.8–46.1)
HCT VFR BLD AUTO: 36 % (ref 34.8–46.1)
HCT VFR BLD AUTO: 36.5 % (ref 34.8–46.1)
HCT VFR BLD AUTO: 36.8 % (ref 34.8–46.1)
HCT VFR BLD AUTO: 37.7 % (ref 34.8–46.1)
HCT VFR BLD AUTO: 38.5 % (ref 34.8–46.1)
HCT VFR BLD AUTO: 38.6 % (ref 34.8–46.1)
HCT VFR BLD AUTO: 38.6 % (ref 34.8–46.1)
HCT VFR BLD AUTO: 39 % (ref 34.8–46.1)
HCT VFR BLD AUTO: 39.4 % (ref 34.8–46.1)
HCT VFR BLD AUTO: 39.5 % (ref 34.8–46.1)
HCT VFR BLD AUTO: 40 % (ref 34.8–46.1)
HCT VFR BLD AUTO: 41.1 % (ref 34.8–46.1)
HCT VFR BLD AUTO: 42.4 % (ref 34.8–46.1)
HCT VFR BLD AUTO: 42.6 % (ref 34.8–46.1)
HDLC SERPL-MCNC: 45 MG/DL (ref 40–60)
HDLC SERPL-MCNC: 46 MG/DL (ref 40–60)
HGB BLD-MCNC: 10 G/DL (ref 11.5–15.4)
HGB BLD-MCNC: 10.1 G/DL (ref 11.5–15.4)
HGB BLD-MCNC: 10.2 G/DL (ref 11.5–15.4)
HGB BLD-MCNC: 10.2 G/DL (ref 11.5–15.4)
HGB BLD-MCNC: 10.3 G/DL (ref 11.5–15.4)
HGB BLD-MCNC: 10.4 G/DL (ref 11.5–15.4)
HGB BLD-MCNC: 10.5 G/DL (ref 11.5–15.4)
HGB BLD-MCNC: 10.8 G/DL (ref 11.5–15.4)
HGB BLD-MCNC: 10.9 G/DL (ref 11.5–15.4)
HGB BLD-MCNC: 10.9 G/DL (ref 11.5–15.4)
HGB BLD-MCNC: 11 G/DL (ref 11.5–15.4)
HGB BLD-MCNC: 11.1 G/DL (ref 11.5–15.4)
HGB BLD-MCNC: 11.6 G/DL (ref 11.5–15.4)
HGB BLD-MCNC: 11.6 G/DL (ref 11.5–15.4)
HGB BLD-MCNC: 11.7 G/DL (ref 11.5–15.4)
HGB BLD-MCNC: 11.7 G/DL (ref 11.5–15.4)
HGB BLD-MCNC: 11.9 G/DL (ref 11.5–15.4)
HGB BLD-MCNC: 12 G/DL (ref 11.5–15.4)
HGB BLD-MCNC: 12.1 G/DL (ref 11.5–15.4)
HGB BLD-MCNC: 12.1 G/DL (ref 11.5–15.4)
HGB BLD-MCNC: 12.4 G/DL (ref 11.5–15.4)
HGB BLD-MCNC: 13.1 G/DL (ref 11.5–15.4)
HGB BLD-MCNC: 13.1 G/DL (ref 11.5–15.4)
HGB BLD-MCNC: 13.4 G/DL (ref 11.5–15.4)
HGB BLD-MCNC: 9.1 G/DL (ref 11.5–15.4)
HGB UR QL STRIP.AUTO: ABNORMAL
HGB UR QL STRIP.AUTO: ABNORMAL
HGB UR QL STRIP.AUTO: NEGATIVE
HGB UR QL STRIP.AUTO: NEGATIVE
HOLD SPECIMEN: NORMAL
INR PPP: 1.01 (ref 0.86–1.16)
INR PPP: 1.04 (ref 0.86–1.16)
INR PPP: 1.06 (ref 0.86–1.16)
INR PPP: 1.1 (ref 0.86–1.16)
INR PPP: 1.12 (ref 0.86–1.16)
INR PPP: 1.16 (ref 0.86–1.16)
INR PPP: 1.17 (ref 0.86–1.16)
INR PPP: 1.17 (ref 0.86–1.16)
INR PPP: 1.19 (ref 0.86–1.16)
INR PPP: 1.24 (ref 0.86–1.16)
INR PPP: 1.24 (ref 0.86–1.16)
INR PPP: 1.25 (ref 0.86–1.16)
INR PPP: 1.28 (ref 0.86–1.16)
INR PPP: 1.31 (ref 0.86–1.16)
INR PPP: 1.4 (ref 0.86–1.16)
INR PPP: 1.64 (ref 0.86–1.16)
INR PPP: 1.87 (ref 0.86–1.16)
INR PPP: 1.98 (ref 0.86–1.16)
INR PPP: 2.08 (ref 0.86–1.16)
INR PPP: 2.19 (ref 0.86–1.16)
INR PPP: 2.22 (ref 0.86–1.16)
INR PPP: 3.07 (ref 0.86–1.16)
KETONES UR STRIP-MCNC: ABNORMAL MG/DL
KETONES UR STRIP-MCNC: ABNORMAL MG/DL
KETONES UR STRIP-MCNC: NEGATIVE MG/DL
KETONES UR STRIP-MCNC: NEGATIVE MG/DL
LACTATE SERPL-SCNC: 0.9 MMOL/L (ref 0.5–2)
LACTATE SERPL-SCNC: 1 MMOL/L (ref 0.5–2)
LACTATE SERPL-SCNC: 1.6 MMOL/L (ref 0.5–2)
LACTATE SERPL-SCNC: 1.6 MMOL/L (ref 0.5–2)
LACTATE SERPL-SCNC: 1.8 MMOL/L (ref 0.5–2)
LACTATE SERPL-SCNC: 2 MMOL/L (ref 0.5–2)
LACTATE SERPL-SCNC: 2 MMOL/L (ref 0.5–2)
LACTATE SERPL-SCNC: 2.6 MMOL/L (ref 0.5–2)
LACTATE SERPL-SCNC: 3.1 MMOL/L (ref 0.5–2)
LACTATE SERPL-SCNC: 4.2 MMOL/L (ref 0.5–2)
LACTATE SERPL-SCNC: 4.7 MMOL/L (ref 0.5–2)
LDLC SERPL CALC-MCNC: 110 MG/DL (ref 0–100)
LDLC SERPL CALC-MCNC: 79 MG/DL (ref 0–100)
LDLC SERPL DIRECT ASSAY-MCNC: 62 MG/DL (ref 0–100)
LEUKOCYTE ESTERASE UR QL STRIP: ABNORMAL
LEUKOCYTE ESTERASE UR QL STRIP: NEGATIVE
LG PLATELETS BLD QL SMEAR: PRESENT
LIPASE SERPL-CCNC: 97 U/L (ref 73–393)
LYMPHOCYTES # BLD AUTO: 0.73 THOUSANDS/ΜL (ref 0.6–4.47)
LYMPHOCYTES # BLD AUTO: 0.8 THOUSANDS/ΜL (ref 0.6–4.47)
LYMPHOCYTES # BLD AUTO: 0.91 THOUSANDS/ΜL (ref 0.6–4.47)
LYMPHOCYTES # BLD AUTO: 0.92 THOUSANDS/ΜL (ref 0.6–4.47)
LYMPHOCYTES # BLD AUTO: 1.04 THOUSANDS/ΜL (ref 0.6–4.47)
LYMPHOCYTES # BLD AUTO: 1.09 THOUSANDS/ΜL (ref 0.6–4.47)
LYMPHOCYTES # BLD AUTO: 1.11 THOUSANDS/ΜL (ref 0.6–4.47)
LYMPHOCYTES # BLD AUTO: 1.11 THOUSANDS/ΜL (ref 0.6–4.47)
LYMPHOCYTES # BLD AUTO: 1.19 THOUSANDS/ΜL (ref 0.6–4.47)
LYMPHOCYTES # BLD AUTO: 1.31 THOUSANDS/ΜL (ref 0.6–4.47)
LYMPHOCYTES # BLD AUTO: 1.35 THOUSANDS/ΜL (ref 0.6–4.47)
LYMPHOCYTES # BLD AUTO: 1.41 THOUSAND/UL (ref 0.6–4.47)
LYMPHOCYTES # BLD AUTO: 1.45 THOUSANDS/ΜL (ref 0.6–4.47)
LYMPHOCYTES # BLD AUTO: 1.49 THOUSANDS/ΜL (ref 0.6–4.47)
LYMPHOCYTES # BLD AUTO: 1.55 THOUSANDS/ΜL (ref 0.6–4.47)
LYMPHOCYTES # BLD AUTO: 1.57 THOUSANDS/ΜL (ref 0.6–4.47)
LYMPHOCYTES # BLD AUTO: 1.63 THOUSANDS/ΜL (ref 0.6–4.47)
LYMPHOCYTES # BLD AUTO: 1.7 THOUSANDS/ΜL (ref 0.6–4.47)
LYMPHOCYTES # BLD AUTO: 1.76 THOUSAND/UL (ref 0.6–4.47)
LYMPHOCYTES # BLD AUTO: 1.93 THOUSANDS/ΜL (ref 0.6–4.47)
LYMPHOCYTES # BLD AUTO: 1.98 THOUSANDS/ΜL (ref 0.6–4.47)
LYMPHOCYTES # BLD AUTO: 10 % (ref 14–44)
LYMPHOCYTES # BLD AUTO: 2.13 THOUSANDS/ΜL (ref 0.6–4.47)
LYMPHOCYTES # BLD AUTO: 2.33 THOUSANDS/ΜL (ref 0.6–4.47)
LYMPHOCYTES # BLD AUTO: 20 % (ref 14–44)
LYMPHOCYTES NFR BLD AUTO: 11 % (ref 14–44)
LYMPHOCYTES NFR BLD AUTO: 12 % (ref 14–44)
LYMPHOCYTES NFR BLD AUTO: 13 % (ref 14–44)
LYMPHOCYTES NFR BLD AUTO: 13 % (ref 14–44)
LYMPHOCYTES NFR BLD AUTO: 14 % (ref 14–44)
LYMPHOCYTES NFR BLD AUTO: 15 % (ref 14–44)
LYMPHOCYTES NFR BLD AUTO: 17 % (ref 14–44)
LYMPHOCYTES NFR BLD AUTO: 17 % (ref 14–44)
LYMPHOCYTES NFR BLD AUTO: 18 % (ref 14–44)
LYMPHOCYTES NFR BLD AUTO: 20 % (ref 14–44)
LYMPHOCYTES NFR BLD AUTO: 21 % (ref 14–44)
LYMPHOCYTES NFR BLD AUTO: 21 % (ref 14–44)
LYMPHOCYTES NFR BLD AUTO: 22 % (ref 14–44)
LYMPHOCYTES NFR BLD AUTO: 22 % (ref 14–44)
LYMPHOCYTES NFR BLD AUTO: 24 % (ref 14–44)
LYMPHOCYTES NFR BLD AUTO: 26 % (ref 14–44)
LYMPHOCYTES NFR BLD AUTO: 8 % (ref 14–44)
MAGNESIUM SERPL-MCNC: 1.9 MG/DL (ref 1.6–2.6)
MAGNESIUM SERPL-MCNC: 1.9 MG/DL (ref 1.6–2.6)
MAGNESIUM SERPL-MCNC: 2 MG/DL (ref 1.6–2.6)
MAGNESIUM SERPL-MCNC: 2.1 MG/DL (ref 1.6–2.6)
MAGNESIUM SERPL-MCNC: 2.2 MG/DL (ref 1.6–2.6)
MAGNESIUM SERPL-MCNC: 2.2 MG/DL (ref 1.6–2.6)
MAGNESIUM SERPL-MCNC: 2.4 MG/DL (ref 1.6–2.6)
MCH RBC QN AUTO: 27.6 PG (ref 26.8–34.3)
MCH RBC QN AUTO: 27.7 PG (ref 26.8–34.3)
MCH RBC QN AUTO: 27.7 PG (ref 26.8–34.3)
MCH RBC QN AUTO: 27.8 PG (ref 26.8–34.3)
MCH RBC QN AUTO: 28 PG (ref 26.8–34.3)
MCH RBC QN AUTO: 28.1 PG (ref 26.8–34.3)
MCH RBC QN AUTO: 28.4 PG (ref 26.8–34.3)
MCH RBC QN AUTO: 28.7 PG (ref 26.8–34.3)
MCH RBC QN AUTO: 28.9 PG (ref 26.8–34.3)
MCH RBC QN AUTO: 29 PG (ref 26.8–34.3)
MCH RBC QN AUTO: 29 PG (ref 26.8–34.3)
MCH RBC QN AUTO: 29.1 PG (ref 26.8–34.3)
MCH RBC QN AUTO: 29.1 PG (ref 26.8–34.3)
MCH RBC QN AUTO: 29.2 PG (ref 26.8–34.3)
MCH RBC QN AUTO: 29.3 PG (ref 26.8–34.3)
MCH RBC QN AUTO: 29.3 PG (ref 26.8–34.3)
MCH RBC QN AUTO: 29.5 PG (ref 26.8–34.3)
MCH RBC QN AUTO: 29.5 PG (ref 26.8–34.3)
MCH RBC QN AUTO: 29.6 PG (ref 26.8–34.3)
MCH RBC QN AUTO: 29.7 PG (ref 26.8–34.3)
MCH RBC QN AUTO: 29.7 PG (ref 26.8–34.3)
MCH RBC QN AUTO: 29.8 PG (ref 26.8–34.3)
MCH RBC QN AUTO: 29.8 PG (ref 26.8–34.3)
MCH RBC QN AUTO: 29.9 PG (ref 26.8–34.3)
MCH RBC QN AUTO: 30 PG (ref 26.8–34.3)
MCHC RBC AUTO-ENTMCNC: 29.5 G/DL (ref 31.4–37.4)
MCHC RBC AUTO-ENTMCNC: 29.6 G/DL (ref 31.4–37.4)
MCHC RBC AUTO-ENTMCNC: 30.1 G/DL (ref 31.4–37.4)
MCHC RBC AUTO-ENTMCNC: 30.1 G/DL (ref 31.4–37.4)
MCHC RBC AUTO-ENTMCNC: 30.2 G/DL (ref 31.4–37.4)
MCHC RBC AUTO-ENTMCNC: 30.3 G/DL (ref 31.4–37.4)
MCHC RBC AUTO-ENTMCNC: 30.4 G/DL (ref 31.4–37.4)
MCHC RBC AUTO-ENTMCNC: 30.5 G/DL (ref 31.4–37.4)
MCHC RBC AUTO-ENTMCNC: 30.5 G/DL (ref 31.4–37.4)
MCHC RBC AUTO-ENTMCNC: 30.6 G/DL (ref 31.4–37.4)
MCHC RBC AUTO-ENTMCNC: 30.7 G/DL (ref 31.4–37.4)
MCHC RBC AUTO-ENTMCNC: 30.7 G/DL (ref 31.4–37.4)
MCHC RBC AUTO-ENTMCNC: 30.8 G/DL (ref 31.4–37.4)
MCHC RBC AUTO-ENTMCNC: 30.9 G/DL (ref 31.4–37.4)
MCHC RBC AUTO-ENTMCNC: 31 G/DL (ref 31.4–37.4)
MCHC RBC AUTO-ENTMCNC: 31 G/DL (ref 31.4–37.4)
MCHC RBC AUTO-ENTMCNC: 31.3 G/DL (ref 31.4–37.4)
MCHC RBC AUTO-ENTMCNC: 31.5 G/DL (ref 31.4–37.4)
MCHC RBC AUTO-ENTMCNC: 31.6 G/DL (ref 31.4–37.4)
MCHC RBC AUTO-ENTMCNC: 31.6 G/DL (ref 31.4–37.4)
MCHC RBC AUTO-ENTMCNC: 31.9 G/DL (ref 31.4–37.4)
MCHC RBC AUTO-ENTMCNC: 32.1 G/DL (ref 31.4–37.4)
MCHC RBC AUTO-ENTMCNC: 32.4 G/DL (ref 31.4–37.4)
MCHC RBC AUTO-ENTMCNC: 32.6 G/DL (ref 31.4–37.4)
MCV RBC AUTO: 90 FL (ref 82–98)
MCV RBC AUTO: 90 FL (ref 82–98)
MCV RBC AUTO: 91 FL (ref 82–98)
MCV RBC AUTO: 92 FL (ref 82–98)
MCV RBC AUTO: 93 FL (ref 82–98)
MCV RBC AUTO: 94 FL (ref 82–98)
MCV RBC AUTO: 96 FL (ref 82–98)
MCV RBC AUTO: 97 FL (ref 82–98)
MCV RBC AUTO: 98 FL (ref 82–98)
MONOCYTES # BLD AUTO: 0.18 THOUSAND/UL (ref 0–1.22)
MONOCYTES # BLD AUTO: 0.19 THOUSAND/ΜL (ref 0.17–1.22)
MONOCYTES # BLD AUTO: 0.28 THOUSAND/UL (ref 0–1.22)
MONOCYTES # BLD AUTO: 0.28 THOUSAND/ΜL (ref 0.17–1.22)
MONOCYTES # BLD AUTO: 0.36 THOUSAND/ΜL (ref 0.17–1.22)
MONOCYTES # BLD AUTO: 0.38 THOUSAND/ΜL (ref 0.17–1.22)
MONOCYTES # BLD AUTO: 0.39 THOUSAND/ΜL (ref 0.17–1.22)
MONOCYTES # BLD AUTO: 0.41 THOUSAND/ΜL (ref 0.17–1.22)
MONOCYTES # BLD AUTO: 0.42 THOUSAND/ΜL (ref 0.17–1.22)
MONOCYTES # BLD AUTO: 0.45 THOUSAND/ΜL (ref 0.17–1.22)
MONOCYTES # BLD AUTO: 0.47 THOUSAND/ΜL (ref 0.17–1.22)
MONOCYTES # BLD AUTO: 0.48 THOUSAND/ΜL (ref 0.17–1.22)
MONOCYTES # BLD AUTO: 0.49 THOUSAND/ΜL (ref 0.17–1.22)
MONOCYTES # BLD AUTO: 0.51 THOUSAND/ΜL (ref 0.17–1.22)
MONOCYTES # BLD AUTO: 0.55 THOUSAND/ΜL (ref 0.17–1.22)
MONOCYTES # BLD AUTO: 0.57 THOUSAND/ΜL (ref 0.17–1.22)
MONOCYTES # BLD AUTO: 0.57 THOUSAND/ΜL (ref 0.17–1.22)
MONOCYTES # BLD AUTO: 0.58 THOUSAND/ΜL (ref 0.17–1.22)
MONOCYTES # BLD AUTO: 0.63 THOUSAND/ΜL (ref 0.17–1.22)
MONOCYTES # BLD AUTO: 0.71 THOUSAND/ΜL (ref 0.17–1.22)
MONOCYTES NFR BLD AUTO: 10 % (ref 4–12)
MONOCYTES NFR BLD AUTO: 2 % (ref 4–12)
MONOCYTES NFR BLD AUTO: 4 % (ref 4–12)
MONOCYTES NFR BLD AUTO: 5 % (ref 4–12)
MONOCYTES NFR BLD AUTO: 6 % (ref 4–12)
MONOCYTES NFR BLD AUTO: 7 % (ref 4–12)
MONOCYTES NFR BLD AUTO: 7 % (ref 4–12)
MONOCYTES NFR BLD AUTO: 9 % (ref 4–12)
MONOCYTES NFR BLD: 1 % (ref 4–12)
MONOCYTES NFR BLD: 4 % (ref 4–12)
MRSA NOSE QL CULT: NORMAL
MRSA NOSE QL CULT: NORMAL
NEUTROPHILS # BLD AUTO: 3.08 THOUSANDS/ΜL (ref 1.85–7.62)
NEUTROPHILS # BLD AUTO: 3.41 THOUSANDS/ΜL (ref 1.85–7.62)
NEUTROPHILS # BLD AUTO: 4.13 THOUSANDS/ΜL (ref 1.85–7.62)
NEUTROPHILS # BLD AUTO: 4.61 THOUSANDS/ΜL (ref 1.85–7.62)
NEUTROPHILS # BLD AUTO: 4.74 THOUSANDS/ΜL (ref 1.85–7.62)
NEUTROPHILS # BLD AUTO: 4.85 THOUSANDS/ΜL (ref 1.85–7.62)
NEUTROPHILS # BLD AUTO: 4.91 THOUSANDS/ΜL (ref 1.85–7.62)
NEUTROPHILS # BLD AUTO: 5.36 THOUSANDS/ΜL (ref 1.85–7.62)
NEUTROPHILS # BLD AUTO: 5.53 THOUSANDS/ΜL (ref 1.85–7.62)
NEUTROPHILS # BLD AUTO: 5.61 THOUSANDS/ΜL (ref 1.85–7.62)
NEUTROPHILS # BLD AUTO: 5.8 THOUSANDS/ΜL (ref 1.85–7.62)
NEUTROPHILS # BLD AUTO: 5.84 THOUSANDS/ΜL (ref 1.85–7.62)
NEUTROPHILS # BLD AUTO: 6.34 THOUSANDS/ΜL (ref 1.85–7.62)
NEUTROPHILS # BLD AUTO: 6.63 THOUSANDS/ΜL (ref 1.85–7.62)
NEUTROPHILS # BLD AUTO: 6.72 THOUSANDS/ΜL (ref 1.85–7.62)
NEUTROPHILS # BLD AUTO: 7.31 THOUSANDS/ΜL (ref 1.85–7.62)
NEUTROPHILS # BLD AUTO: 7.34 THOUSANDS/ΜL (ref 1.85–7.62)
NEUTROPHILS # BLD AUTO: 7.79 THOUSANDS/ΜL (ref 1.85–7.62)
NEUTROPHILS # BLD AUTO: 7.93 THOUSANDS/ΜL (ref 1.85–7.62)
NEUTROPHILS # BLD AUTO: 8.4 THOUSANDS/ΜL (ref 1.85–7.62)
NEUTROPHILS # BLD AUTO: 8.87 THOUSANDS/ΜL (ref 1.85–7.62)
NEUTROPHILS # BLD MANUAL: 15.62 THOUSAND/UL (ref 1.85–7.62)
NEUTROPHILS # BLD MANUAL: 4.86 THOUSAND/UL (ref 1.85–7.62)
NEUTS BAND NFR BLD MANUAL: 1 % (ref 0–8)
NEUTS BAND NFR BLD MANUAL: 2 % (ref 0–8)
NEUTS HYPERSEG BLD QL SMEAR: PRESENT
NEUTS SEG NFR BLD AUTO: 61 % (ref 43–75)
NEUTS SEG NFR BLD AUTO: 65 % (ref 43–75)
NEUTS SEG NFR BLD AUTO: 66 % (ref 43–75)
NEUTS SEG NFR BLD AUTO: 68 % (ref 43–75)
NEUTS SEG NFR BLD AUTO: 68 % (ref 43–75)
NEUTS SEG NFR BLD AUTO: 69 % (ref 43–75)
NEUTS SEG NFR BLD AUTO: 70 % (ref 43–75)
NEUTS SEG NFR BLD AUTO: 74 % (ref 43–75)
NEUTS SEG NFR BLD AUTO: 74 % (ref 43–75)
NEUTS SEG NFR BLD AUTO: 75 % (ref 43–75)
NEUTS SEG NFR BLD AUTO: 76 % (ref 43–75)
NEUTS SEG NFR BLD AUTO: 76 % (ref 43–75)
NEUTS SEG NFR BLD AUTO: 78 % (ref 43–75)
NEUTS SEG NFR BLD AUTO: 80 % (ref 43–75)
NEUTS SEG NFR BLD AUTO: 82 % (ref 43–75)
NEUTS SEG NFR BLD AUTO: 85 % (ref 43–75)
NEUTS SEG NFR BLD AUTO: 87 % (ref 43–75)
NEUTS SEG NFR BLD AUTO: 89 % (ref 43–75)
NITRITE UR QL STRIP: NEGATIVE
NITRITE UR QL STRIP: POSITIVE
NON VENT CPAP EPAP: 10
NON VENT CPAP EPAP: 10
NON VENT CPAP: ABNORMAL
NON VENT CPAP: ABNORMAL
NON VENT TYPE- NRB: ABNORMAL
NON-SQ EPI CELLS URNS QL MICRO: ABNORMAL /HPF
NON-SQ EPI CELLS URNS QL MICRO: ABNORMAL /HPF
NON-SQ EPI CELLS URNS QL MICRO: NORMAL /HPF
NON-SQ EPI CELLS URNS QL MICRO: NORMAL /HPF
NRBC BLD AUTO-RTO: 0 /100 WBCS
NT-PROBNP SERPL-MCNC: 8087 PG/ML
O2 CT BLDA-SCNC: 14.3 ML/DL (ref 16–23)
O2 CT BLDA-SCNC: 16 ML/DL (ref 16–23)
O2 CT BLDA-SCNC: 16.9 ML/DL (ref 16–23)
OXYHGB MFR BLDA: 95.9 % (ref 94–97)
OXYHGB MFR BLDA: 96 % (ref 94–97)
OXYHGB MFR BLDA: 98.9 % (ref 94–97)
P AXIS: 22 DEGREES
P AXIS: 46 DEGREES
P AXIS: 81 DEGREES
P AXIS: 93 DEGREES
PCO2 BLDA: 42.2 MM HG (ref 36–44)
PCO2 BLDA: 43.9 MM HG (ref 36–44)
PCO2 BLDA: 49.9 MM HG (ref 36–44)
PH BLDA: 7.45 [PH] (ref 7.35–7.45)
PH BLDA: 7.45 [PH] (ref 7.35–7.45)
PH BLDA: 7.46 [PH] (ref 7.35–7.45)
PH UR STRIP.AUTO: 5.5 [PH] (ref 4.5–8)
PH UR STRIP.AUTO: 5.5 [PH] (ref 4.5–8)
PH UR STRIP.AUTO: 7 [PH] (ref 4.5–8)
PH UR STRIP.AUTO: 7 [PH] (ref 4.5–8)
PHOSPHATE SERPL-MCNC: 1.8 MG/DL (ref 2.3–4.1)
PHOSPHATE SERPL-MCNC: 1.9 MG/DL (ref 2.3–4.1)
PHOSPHATE SERPL-MCNC: 2.1 MG/DL (ref 2.3–4.1)
PHOSPHATE SERPL-MCNC: 2.2 MG/DL (ref 2.3–4.1)
PHOSPHATE SERPL-MCNC: 2.8 MG/DL (ref 2.3–4.1)
PHOSPHATE SERPL-MCNC: 3 MG/DL (ref 2.3–4.1)
PHOSPHATE SERPL-MCNC: 3.2 MG/DL (ref 2.3–4.1)
PHOSPHATE SERPL-MCNC: 3.4 MG/DL (ref 2.3–4.1)
PHOSPHATE SERPL-MCNC: 4.1 MG/DL (ref 2.3–4.1)
PLATELET # BLD AUTO: 101 THOUSANDS/UL (ref 149–390)
PLATELET # BLD AUTO: 107 THOUSANDS/UL (ref 149–390)
PLATELET # BLD AUTO: 120 THOUSANDS/UL (ref 149–390)
PLATELET # BLD AUTO: 161 THOUSANDS/UL (ref 149–390)
PLATELET # BLD AUTO: 174 THOUSANDS/UL (ref 149–390)
PLATELET # BLD AUTO: 179 THOUSANDS/UL (ref 149–390)
PLATELET # BLD AUTO: 186 THOUSANDS/UL (ref 149–390)
PLATELET # BLD AUTO: 188 THOUSANDS/UL (ref 149–390)
PLATELET # BLD AUTO: 198 THOUSANDS/UL (ref 149–390)
PLATELET # BLD AUTO: 226 THOUSANDS/UL (ref 149–390)
PLATELET # BLD AUTO: 230 THOUSANDS/UL (ref 149–390)
PLATELET # BLD AUTO: 230 THOUSANDS/UL (ref 149–390)
PLATELET # BLD AUTO: 233 THOUSANDS/UL (ref 149–390)
PLATELET # BLD AUTO: 233 THOUSANDS/UL (ref 149–390)
PLATELET # BLD AUTO: 238 THOUSANDS/UL (ref 149–390)
PLATELET # BLD AUTO: 239 THOUSANDS/UL (ref 149–390)
PLATELET # BLD AUTO: 241 THOUSANDS/UL (ref 149–390)
PLATELET # BLD AUTO: 241 THOUSANDS/UL (ref 149–390)
PLATELET # BLD AUTO: 242 THOUSANDS/UL (ref 149–390)
PLATELET # BLD AUTO: 254 THOUSANDS/UL (ref 149–390)
PLATELET # BLD AUTO: 273 THOUSANDS/UL (ref 149–390)
PLATELET # BLD AUTO: 278 THOUSANDS/UL (ref 149–390)
PLATELET # BLD AUTO: 282 THOUSANDS/UL (ref 149–390)
PLATELET # BLD AUTO: 284 THOUSANDS/UL (ref 149–390)
PLATELET # BLD AUTO: 307 THOUSANDS/UL (ref 149–390)
PLATELET # BLD AUTO: 340 THOUSANDS/UL (ref 149–390)
PLATELET # BLD AUTO: 63 THOUSANDS/UL (ref 149–390)
PLATELET # BLD AUTO: 65 THOUSANDS/UL (ref 149–390)
PLATELET # BLD AUTO: 67 THOUSANDS/UL (ref 149–390)
PLATELET # BLD AUTO: 84 THOUSANDS/UL (ref 149–390)
PLATELET BLD QL SMEAR: ABNORMAL
PLATELET BLD QL SMEAR: ADEQUATE
PMV BLD AUTO: 10 FL (ref 8.9–12.7)
PMV BLD AUTO: 10.1 FL (ref 8.9–12.7)
PMV BLD AUTO: 10.2 FL (ref 8.9–12.7)
PMV BLD AUTO: 10.2 FL (ref 8.9–12.7)
PMV BLD AUTO: 10.3 FL (ref 8.9–12.7)
PMV BLD AUTO: 10.4 FL (ref 8.9–12.7)
PMV BLD AUTO: 10.6 FL (ref 8.9–12.7)
PMV BLD AUTO: 11 FL (ref 8.9–12.7)
PMV BLD AUTO: 11.4 FL (ref 8.9–12.7)
PMV BLD AUTO: 9.2 FL (ref 8.9–12.7)
PMV BLD AUTO: 9.5 FL (ref 8.9–12.7)
PMV BLD AUTO: 9.6 FL (ref 8.9–12.7)
PMV BLD AUTO: 9.6 FL (ref 8.9–12.7)
PMV BLD AUTO: 9.7 FL (ref 8.9–12.7)
PMV BLD AUTO: 9.8 FL (ref 8.9–12.7)
PMV BLD AUTO: 9.9 FL (ref 8.9–12.7)
PO2 BLDA: 253.7 MM HG (ref 75–129)
PO2 BLDA: 88.2 MM HG (ref 75–129)
PO2 BLDA: 94.5 MM HG (ref 75–129)
POLYCHROMASIA BLD QL SMEAR: PRESENT
POTASSIUM SERPL-SCNC: 3.5 MMOL/L (ref 3.5–5.3)
POTASSIUM SERPL-SCNC: 3.6 MMOL/L (ref 3.5–5.3)
POTASSIUM SERPL-SCNC: 3.7 MMOL/L (ref 3.5–5.3)
POTASSIUM SERPL-SCNC: 3.8 MMOL/L (ref 3.5–5.3)
POTASSIUM SERPL-SCNC: 3.9 MMOL/L (ref 3.5–5.3)
POTASSIUM SERPL-SCNC: 4 MMOL/L (ref 3.5–5.3)
POTASSIUM SERPL-SCNC: 4.1 MMOL/L (ref 3.5–5.3)
POTASSIUM SERPL-SCNC: 4.2 MMOL/L (ref 3.5–5.3)
POTASSIUM SERPL-SCNC: 4.3 MMOL/L (ref 3.5–5.3)
POTASSIUM SERPL-SCNC: 4.4 MMOL/L (ref 3.5–5.3)
POTASSIUM SERPL-SCNC: 4.5 MMOL/L (ref 3.5–5.3)
POTASSIUM SERPL-SCNC: 4.5 MMOL/L (ref 3.5–5.3)
POTASSIUM SERPL-SCNC: 4.7 MMOL/L (ref 3.5–5.3)
POTASSIUM SERPL-SCNC: 4.7 MMOL/L (ref 3.5–5.3)
POTASSIUM SERPL-SCNC: 5.4 MMOL/L (ref 3.5–5.3)
PR INTERVAL: 136 MS
PR INTERVAL: 156 MS
PR INTERVAL: 182 MS
PR INTERVAL: 206 MS
PROT SERPL-MCNC: 6.1 G/DL (ref 6.4–8.2)
PROT SERPL-MCNC: 6.2 G/DL (ref 6.4–8.2)
PROT SERPL-MCNC: 6.3 G/DL (ref 6.4–8.2)
PROT SERPL-MCNC: 6.3 G/DL (ref 6.4–8.2)
PROT SERPL-MCNC: 6.4 G/DL (ref 6.4–8.2)
PROT SERPL-MCNC: 6.5 G/DL (ref 6.4–8.2)
PROT SERPL-MCNC: 6.6 G/DL (ref 6.4–8.2)
PROT SERPL-MCNC: 6.7 G/DL (ref 6.4–8.2)
PROT SERPL-MCNC: 7.1 G/DL (ref 6.4–8.2)
PROT SERPL-MCNC: 7.2 G/DL (ref 6.4–8.2)
PROT SERPL-MCNC: 7.2 G/DL (ref 6.4–8.2)
PROT SERPL-MCNC: 7.3 G/DL (ref 6.4–8.2)
PROT SERPL-MCNC: 7.3 G/DL (ref 6.4–8.2)
PROT SERPL-MCNC: 7.4 G/DL (ref 6.4–8.2)
PROT SERPL-MCNC: 8 G/DL (ref 6.4–8.2)
PROT UR STRIP-MCNC: ABNORMAL MG/DL
PROT UR STRIP-MCNC: NEGATIVE MG/DL
PROTHROMBIN TIME: 13.2 SECONDS (ref 12.1–14.4)
PROTHROMBIN TIME: 13.5 SECONDS (ref 12.1–14.4)
PROTHROMBIN TIME: 13.7 SECONDS (ref 12.1–14.4)
PROTHROMBIN TIME: 14.1 SECONDS (ref 12.1–14.4)
PROTHROMBIN TIME: 14.3 SECONDS (ref 12.1–14.4)
PROTHROMBIN TIME: 14.8 SECONDS (ref 12.1–14.4)
PROTHROMBIN TIME: 15 SECONDS (ref 12.1–14.4)
PROTHROMBIN TIME: 15 SECONDS (ref 12.1–14.4)
PROTHROMBIN TIME: 15.2 SECONDS (ref 12.1–14.4)
PROTHROMBIN TIME: 15.7 SECONDS (ref 12.1–14.4)
PROTHROMBIN TIME: 15.7 SECONDS (ref 12.1–14.4)
PROTHROMBIN TIME: 15.8 SECONDS (ref 12.1–14.4)
PROTHROMBIN TIME: 16.1 SECONDS (ref 12.1–14.4)
PROTHROMBIN TIME: 16.4 SECONDS (ref 12.1–14.4)
PROTHROMBIN TIME: 17.2 SECONDS (ref 12.1–14.4)
PROTHROMBIN TIME: 19.5 SECONDS (ref 12.1–14.4)
PROTHROMBIN TIME: 21.5 SECONDS (ref 12.1–14.4)
PROTHROMBIN TIME: 22.5 SECONDS (ref 12.1–14.4)
PROTHROMBIN TIME: 23.6 SECONDS (ref 12.1–14.4)
PROTHROMBIN TIME: 23.6 SECONDS (ref 12–14.3)
PROTHROMBIN TIME: 24.6 SECONDS (ref 12.1–14.4)
PROTHROMBIN TIME: 31.9 SECONDS (ref 12.1–14.4)
QRS AXIS: -56 DEGREES
QRS AXIS: -62 DEGREES
QRSD INTERVAL: 124 MS
QRSD INTERVAL: 128 MS
QRSD INTERVAL: 130 MS
QRSD INTERVAL: 130 MS
QT INTERVAL: 302 MS
QT INTERVAL: 400 MS
QT INTERVAL: 408 MS
QT INTERVAL: 418 MS
QTC INTERVAL: 454 MS
QTC INTERVAL: 499 MS
QTC INTERVAL: 511 MS
QTC INTERVAL: 518 MS
RBC # BLD AUTO: 3.12 MILLION/UL (ref 3.81–5.12)
RBC # BLD AUTO: 3.44 MILLION/UL (ref 3.81–5.12)
RBC # BLD AUTO: 3.46 MILLION/UL (ref 3.81–5.12)
RBC # BLD AUTO: 3.51 MILLION/UL (ref 3.81–5.12)
RBC # BLD AUTO: 3.53 MILLION/UL (ref 3.81–5.12)
RBC # BLD AUTO: 3.57 MILLION/UL (ref 3.81–5.12)
RBC # BLD AUTO: 3.63 MILLION/UL (ref 3.81–5.12)
RBC # BLD AUTO: 3.63 MILLION/UL (ref 3.81–5.12)
RBC # BLD AUTO: 3.67 MILLION/UL (ref 3.81–5.12)
RBC # BLD AUTO: 3.69 MILLION/UL (ref 3.81–5.12)
RBC # BLD AUTO: 3.7 MILLION/UL (ref 3.81–5.12)
RBC # BLD AUTO: 3.72 MILLION/UL (ref 3.81–5.12)
RBC # BLD AUTO: 3.72 MILLION/UL (ref 3.81–5.12)
RBC # BLD AUTO: 3.73 MILLION/UL (ref 3.81–5.12)
RBC # BLD AUTO: 3.75 MILLION/UL (ref 3.81–5.12)
RBC # BLD AUTO: 3.76 MILLION/UL (ref 3.81–5.12)
RBC # BLD AUTO: 3.87 MILLION/UL (ref 3.81–5.12)
RBC # BLD AUTO: 3.93 MILLION/UL (ref 3.81–5.12)
RBC # BLD AUTO: 3.97 MILLION/UL (ref 3.81–5.12)
RBC # BLD AUTO: 4.07 MILLION/UL (ref 3.81–5.12)
RBC # BLD AUTO: 4.18 MILLION/UL (ref 3.81–5.12)
RBC # BLD AUTO: 4.21 MILLION/UL (ref 3.81–5.12)
RBC # BLD AUTO: 4.25 MILLION/UL (ref 3.81–5.12)
RBC # BLD AUTO: 4.29 MILLION/UL (ref 3.81–5.12)
RBC # BLD AUTO: 4.36 MILLION/UL (ref 3.81–5.12)
RBC # BLD AUTO: 4.38 MILLION/UL (ref 3.81–5.12)
RBC # BLD AUTO: 4.49 MILLION/UL (ref 3.81–5.12)
RBC # BLD AUTO: 4.73 MILLION/UL (ref 3.81–5.12)
RBC #/AREA URNS AUTO: ABNORMAL /HPF
RBC #/AREA URNS AUTO: ABNORMAL /HPF
RBC #/AREA URNS AUTO: NORMAL /HPF
RBC #/AREA URNS AUTO: NORMAL /HPF
RBC MORPH BLD: PRESENT
ROULEAUX BLD QL SMEAR: PRESENT
SCHISTOCYTES BLD QL SMEAR: PRESENT
SODIUM SERPL-SCNC: 132 MMOL/L (ref 136–145)
SODIUM SERPL-SCNC: 134 MMOL/L (ref 136–145)
SODIUM SERPL-SCNC: 134 MMOL/L (ref 136–145)
SODIUM SERPL-SCNC: 135 MMOL/L (ref 136–145)
SODIUM SERPL-SCNC: 136 MMOL/L (ref 136–145)
SODIUM SERPL-SCNC: 137 MMOL/L (ref 136–145)
SODIUM SERPL-SCNC: 138 MMOL/L (ref 136–145)
SODIUM SERPL-SCNC: 139 MMOL/L (ref 136–145)
SODIUM SERPL-SCNC: 140 MMOL/L (ref 136–145)
SODIUM SERPL-SCNC: 141 MMOL/L (ref 136–145)
SODIUM SERPL-SCNC: 142 MMOL/L (ref 136–145)
SODIUM SERPL-SCNC: 143 MMOL/L (ref 136–145)
SODIUM SERPL-SCNC: 143 MMOL/L (ref 136–145)
SP GR UR STRIP.AUTO: 1.01 (ref 1–1.03)
SP GR UR STRIP.AUTO: 1.02 (ref 1–1.03)
SP GR UR STRIP.AUTO: <=1.005 (ref 1–1.03)
SP GR UR STRIP.AUTO: >=1.03 (ref 1–1.03)
SPECIMEN SOURCE: ABNORMAL
T WAVE AXIS: 109 DEGREES
T WAVE AXIS: 109 DEGREES
T WAVE AXIS: 112 DEGREES
T WAVE AXIS: 81 DEGREES
TOTAL CELLS COUNTED SPEC: 100
TOTAL CELLS COUNTED SPEC: 100
TRIGL SERPL-MCNC: 105 MG/DL
TRIGL SERPL-MCNC: 107 MG/DL
TROPONIN I SERPL-MCNC: 0.04 NG/ML
TROPONIN I SERPL-MCNC: 0.04 NG/ML
TROPONIN I SERPL-MCNC: 3.71 NG/ML
TROPONIN I SERPL-MCNC: 4.82 NG/ML
TROPONIN I SERPL-MCNC: 5.23 NG/ML
TSH SERPL DL<=0.05 MIU/L-ACNC: 0.77 UIU/ML (ref 0.36–3.74)
TSH SERPL DL<=0.05 MIU/L-ACNC: 1.29 UIU/ML (ref 0.36–3.74)
TSH SERPL DL<=0.05 MIU/L-ACNC: 2.83 UIU/ML (ref 0.36–3.74)
UROBILINOGEN UR QL STRIP.AUTO: 0.2 E.U./DL
VARIANT LYMPHS # BLD AUTO: 5 %
VENT CPAP FIO2: 30 %
VENT CPAP FIO2: 40 %
VENTRICULAR RATE: 101 BPM
VENTRICULAR RATE: 136 BPM
VENTRICULAR RATE: 90 BPM
VENTRICULAR RATE: 90 BPM
WBC # BLD AUTO: 10.13 THOUSAND/UL (ref 4.31–10.16)
WBC # BLD AUTO: 10.14 THOUSAND/UL (ref 4.31–10.16)
WBC # BLD AUTO: 10.58 THOUSAND/UL (ref 4.31–10.16)
WBC # BLD AUTO: 11.63 THOUSAND/UL (ref 4.31–10.16)
WBC # BLD AUTO: 13.74 THOUSAND/UL (ref 4.31–10.16)
WBC # BLD AUTO: 17.55 THOUSAND/UL (ref 4.31–10.16)
WBC # BLD AUTO: 5.02 THOUSAND/UL (ref 4.31–10.16)
WBC # BLD AUTO: 5.07 THOUSAND/UL (ref 4.31–10.16)
WBC # BLD AUTO: 6.15 THOUSAND/UL (ref 4.31–10.16)
WBC # BLD AUTO: 6.28 THOUSAND/UL (ref 4.31–10.16)
WBC # BLD AUTO: 6.3 THOUSAND/UL (ref 4.31–10.16)
WBC # BLD AUTO: 6.5 THOUSAND/UL (ref 4.31–10.16)
WBC # BLD AUTO: 6.64 THOUSAND/UL (ref 4.31–10.16)
WBC # BLD AUTO: 6.97 THOUSAND/UL (ref 4.31–10.16)
WBC # BLD AUTO: 7.03 THOUSAND/UL (ref 4.31–10.16)
WBC # BLD AUTO: 7.04 THOUSAND/UL (ref 4.31–10.16)
WBC # BLD AUTO: 7.05 THOUSAND/UL (ref 4.31–10.16)
WBC # BLD AUTO: 7.05 THOUSAND/UL (ref 4.31–10.16)
WBC # BLD AUTO: 7.8 THOUSAND/UL (ref 4.31–10.16)
WBC # BLD AUTO: 7.84 THOUSAND/UL (ref 4.31–10.16)
WBC # BLD AUTO: 8.2 THOUSAND/UL (ref 4.31–10.16)
WBC # BLD AUTO: 8.36 THOUSAND/UL (ref 4.31–10.16)
WBC # BLD AUTO: 8.43 THOUSAND/UL (ref 4.31–10.16)
WBC # BLD AUTO: 8.83 THOUSAND/UL (ref 4.31–10.16)
WBC # BLD AUTO: 8.94 THOUSAND/UL (ref 4.31–10.16)
WBC # BLD AUTO: 9.04 THOUSAND/UL (ref 4.31–10.16)
WBC # BLD AUTO: 9.09 THOUSAND/UL (ref 4.31–10.16)
WBC # BLD AUTO: 9.12 THOUSAND/UL (ref 4.31–10.16)
WBC # BLD AUTO: 9.75 THOUSAND/UL (ref 4.31–10.16)
WBC # BLD AUTO: 9.87 THOUSAND/UL (ref 4.31–10.16)
WBC #/AREA URNS AUTO: ABNORMAL /HPF
WBC #/AREA URNS AUTO: ABNORMAL /HPF
WBC #/AREA URNS AUTO: NORMAL /HPF
WBC #/AREA URNS AUTO: NORMAL /HPF

## 2017-01-01 PROCEDURE — 97116 GAIT TRAINING THERAPY: CPT

## 2017-01-01 PROCEDURE — 82948 REAGENT STRIP/BLOOD GLUCOSE: CPT

## 2017-01-01 PROCEDURE — 87040 BLOOD CULTURE FOR BACTERIA: CPT | Performed by: FAMILY MEDICINE

## 2017-01-01 PROCEDURE — G8978 MOBILITY CURRENT STATUS: HCPCS

## 2017-01-01 PROCEDURE — 97535 SELF CARE MNGMENT TRAINING: CPT

## 2017-01-01 PROCEDURE — 85610 PROTHROMBIN TIME: CPT | Performed by: HOSPITALIST

## 2017-01-01 PROCEDURE — 94660 CPAP INITIATION&MGMT: CPT

## 2017-01-01 PROCEDURE — 83036 HEMOGLOBIN GLYCOSYLATED A1C: CPT | Performed by: FAMILY MEDICINE

## 2017-01-01 PROCEDURE — 80053 COMPREHEN METABOLIC PANEL: CPT | Performed by: INTERNAL MEDICINE

## 2017-01-01 PROCEDURE — 74177 CT ABD & PELVIS W/CONTRAST: CPT

## 2017-01-01 PROCEDURE — 87086 URINE CULTURE/COLONY COUNT: CPT | Performed by: PHYSICIAN ASSISTANT

## 2017-01-01 PROCEDURE — 85730 THROMBOPLASTIN TIME PARTIAL: CPT | Performed by: EMERGENCY MEDICINE

## 2017-01-01 PROCEDURE — 96365 THER/PROPH/DIAG IV INF INIT: CPT

## 2017-01-01 PROCEDURE — 82140 ASSAY OF AMMONIA: CPT | Performed by: FAMILY MEDICINE

## 2017-01-01 PROCEDURE — G8979 MOBILITY GOAL STATUS: HCPCS

## 2017-01-01 PROCEDURE — 87077 CULTURE AEROBIC IDENTIFY: CPT | Performed by: EMERGENCY MEDICINE

## 2017-01-01 PROCEDURE — 83605 ASSAY OF LACTIC ACID: CPT | Performed by: EMERGENCY MEDICINE

## 2017-01-01 PROCEDURE — C9113 INJ PANTOPRAZOLE SODIUM, VIA: HCPCS | Performed by: HOSPITALIST

## 2017-01-01 PROCEDURE — 85007 BL SMEAR W/DIFF WBC COUNT: CPT | Performed by: EMERGENCY MEDICINE

## 2017-01-01 PROCEDURE — 85014 HEMATOCRIT: CPT | Performed by: INTERNAL MEDICINE

## 2017-01-01 PROCEDURE — A9585 GADOBUTROL INJECTION: HCPCS | Performed by: HOSPITALIST

## 2017-01-01 PROCEDURE — G8998 SWALLOW D/C STATUS: HCPCS | Performed by: SPEECH-LANGUAGE PATHOLOGIST

## 2017-01-01 PROCEDURE — 85027 COMPLETE CBC AUTOMATED: CPT | Performed by: HOSPITALIST

## 2017-01-01 PROCEDURE — 81001 URINALYSIS AUTO W/SCOPE: CPT | Performed by: EMERGENCY MEDICINE

## 2017-01-01 PROCEDURE — 70450 CT HEAD/BRAIN W/O DYE: CPT | Performed by: FAMILY MEDICINE

## 2017-01-01 PROCEDURE — 85610 PROTHROMBIN TIME: CPT

## 2017-01-01 PROCEDURE — 71010 HB CHEST X-RAY 1 VIEW FRONTAL (PORTABLE): CPT

## 2017-01-01 PROCEDURE — 85027 COMPLETE CBC AUTOMATED: CPT | Performed by: INTERNAL MEDICINE

## 2017-01-01 PROCEDURE — 84100 ASSAY OF PHOSPHORUS: CPT | Performed by: INTERNAL MEDICINE

## 2017-01-01 PROCEDURE — 71020 HB CHEST X-RAY 2VW FRONTAL&LATL: CPT

## 2017-01-01 PROCEDURE — 97163 PT EVAL HIGH COMPLEX 45 MIN: CPT

## 2017-01-01 PROCEDURE — 85610 PROTHROMBIN TIME: CPT | Performed by: FAMILY MEDICINE

## 2017-01-01 PROCEDURE — 82140 ASSAY OF AMMONIA: CPT | Performed by: PHYSICIAN ASSISTANT

## 2017-01-01 PROCEDURE — 70544 MR ANGIOGRAPHY HEAD W/O DYE: CPT

## 2017-01-01 PROCEDURE — 99285 EMERGENCY DEPT VISIT HI MDM: CPT

## 2017-01-01 PROCEDURE — 80053 COMPREHEN METABOLIC PANEL: CPT | Performed by: FAMILY MEDICINE

## 2017-01-01 PROCEDURE — 85610 PROTHROMBIN TIME: CPT | Performed by: EMERGENCY MEDICINE

## 2017-01-01 PROCEDURE — 85730 THROMBOPLASTIN TIME PARTIAL: CPT | Performed by: HOSPITALIST

## 2017-01-01 PROCEDURE — 93306 TTE W/DOPPLER COMPLETE: CPT

## 2017-01-01 PROCEDURE — 85018 HEMOGLOBIN: CPT | Performed by: INTERNAL MEDICINE

## 2017-01-01 PROCEDURE — C9113 INJ PANTOPRAZOLE SODIUM, VIA: HCPCS | Performed by: INTERNAL MEDICINE

## 2017-01-01 PROCEDURE — 96367 TX/PROPH/DG ADDL SEQ IV INF: CPT

## 2017-01-01 PROCEDURE — 97530 THERAPEUTIC ACTIVITIES: CPT

## 2017-01-01 PROCEDURE — 97116 GAIT TRAINING THERAPY: CPT | Performed by: PHYSICAL THERAPIST

## 2017-01-01 PROCEDURE — 92610 EVALUATE SWALLOWING FUNCTION: CPT | Performed by: SPEECH-LANGUAGE PATHOLOGIST

## 2017-01-01 PROCEDURE — 80053 COMPREHEN METABOLIC PANEL: CPT | Performed by: EMERGENCY MEDICINE

## 2017-01-01 PROCEDURE — 97110 THERAPEUTIC EXERCISES: CPT

## 2017-01-01 PROCEDURE — 85025 COMPLETE CBC W/AUTO DIFF WBC: CPT | Performed by: PHYSICIAN ASSISTANT

## 2017-01-01 PROCEDURE — 83735 ASSAY OF MAGNESIUM: CPT | Performed by: INTERNAL MEDICINE

## 2017-01-01 PROCEDURE — 94760 N-INVAS EAR/PLS OXIMETRY 1: CPT

## 2017-01-01 PROCEDURE — 93005 ELECTROCARDIOGRAM TRACING: CPT

## 2017-01-01 PROCEDURE — 80053 COMPREHEN METABOLIC PANEL: CPT | Performed by: HOSPITALIST

## 2017-01-01 PROCEDURE — 97163 PT EVAL HIGH COMPLEX 45 MIN: CPT | Performed by: PHYSICAL THERAPIST

## 2017-01-01 PROCEDURE — 96417 CHEMO IV INFUS EACH ADDL SEQ: CPT

## 2017-01-01 PROCEDURE — 96375 TX/PRO/DX INJ NEW DRUG ADDON: CPT

## 2017-01-01 PROCEDURE — 97167 OT EVAL HIGH COMPLEX 60 MIN: CPT

## 2017-01-01 PROCEDURE — 84443 ASSAY THYROID STIM HORMONE: CPT | Performed by: PHYSICIAN ASSISTANT

## 2017-01-01 PROCEDURE — 83690 ASSAY OF LIPASE: CPT | Performed by: EMERGENCY MEDICINE

## 2017-01-01 PROCEDURE — 36600 WITHDRAWAL OF ARTERIAL BLOOD: CPT

## 2017-01-01 PROCEDURE — 84484 ASSAY OF TROPONIN QUANT: CPT | Performed by: INTERNAL MEDICINE

## 2017-01-01 PROCEDURE — 83605 ASSAY OF LACTIC ACID: CPT | Performed by: FAMILY MEDICINE

## 2017-01-01 PROCEDURE — 96413 CHEMO IV INFUSION 1 HR: CPT

## 2017-01-01 PROCEDURE — 70553 MRI BRAIN STEM W/O & W/DYE: CPT

## 2017-01-01 PROCEDURE — 36415 COLL VENOUS BLD VENIPUNCTURE: CPT | Performed by: EMERGENCY MEDICINE

## 2017-01-01 PROCEDURE — 85025 COMPLETE CBC W/AUTO DIFF WBC: CPT

## 2017-01-01 PROCEDURE — 85025 COMPLETE CBC W/AUTO DIFF WBC: CPT | Performed by: INTERNAL MEDICINE

## 2017-01-01 PROCEDURE — 93005 ELECTROCARDIOGRAM TRACING: CPT | Performed by: EMERGENCY MEDICINE

## 2017-01-01 PROCEDURE — 85730 THROMBOPLASTIN TIME PARTIAL: CPT | Performed by: INTERNAL MEDICINE

## 2017-01-01 PROCEDURE — 0DB98ZX EXCISION OF DUODENUM, VIA NATURAL OR ARTIFICIAL OPENING ENDOSCOPIC, DIAGNOSTIC: ICD-10-PCS | Performed by: INTERNAL MEDICINE

## 2017-01-01 PROCEDURE — 80048 BASIC METABOLIC PNL TOTAL CA: CPT | Performed by: INTERNAL MEDICINE

## 2017-01-01 PROCEDURE — 96415 CHEMO IV INFUSION ADDL HR: CPT

## 2017-01-01 PROCEDURE — 80048 BASIC METABOLIC PNL TOTAL CA: CPT | Performed by: HOSPITALIST

## 2017-01-01 PROCEDURE — 83605 ASSAY OF LACTIC ACID: CPT | Performed by: PHYSICIAN ASSISTANT

## 2017-01-01 PROCEDURE — 83605 ASSAY OF LACTIC ACID: CPT | Performed by: INTERNAL MEDICINE

## 2017-01-01 PROCEDURE — 36415 COLL VENOUS BLD VENIPUNCTURE: CPT

## 2017-01-01 PROCEDURE — 93970 EXTREMITY STUDY: CPT

## 2017-01-01 PROCEDURE — 85025 COMPLETE CBC W/AUTO DIFF WBC: CPT | Performed by: HOSPITALIST

## 2017-01-01 PROCEDURE — 94640 AIRWAY INHALATION TREATMENT: CPT

## 2017-01-01 PROCEDURE — 87040 BLOOD CULTURE FOR BACTERIA: CPT | Performed by: EMERGENCY MEDICINE

## 2017-01-01 PROCEDURE — 85027 COMPLETE CBC AUTOMATED: CPT | Performed by: FAMILY MEDICINE

## 2017-01-01 PROCEDURE — 83880 ASSAY OF NATRIURETIC PEPTIDE: CPT | Performed by: PHYSICIAN ASSISTANT

## 2017-01-01 PROCEDURE — G8988 SELF CARE GOAL STATUS: HCPCS

## 2017-01-01 PROCEDURE — 82465 ASSAY BLD/SERUM CHOLESTEROL: CPT

## 2017-01-01 PROCEDURE — 85610 PROTHROMBIN TIME: CPT | Performed by: INTERNAL MEDICINE

## 2017-01-01 PROCEDURE — 83735 ASSAY OF MAGNESIUM: CPT | Performed by: EMERGENCY MEDICINE

## 2017-01-01 PROCEDURE — 80048 BASIC METABOLIC PNL TOTAL CA: CPT | Performed by: FAMILY MEDICINE

## 2017-01-01 PROCEDURE — 77001 FLUOROGUIDE FOR VEIN DEVICE: CPT

## 2017-01-01 PROCEDURE — 84484 ASSAY OF TROPONIN QUANT: CPT | Performed by: EMERGENCY MEDICINE

## 2017-01-01 PROCEDURE — 86140 C-REACTIVE PROTEIN: CPT | Performed by: HOSPITALIST

## 2017-01-01 PROCEDURE — 85025 COMPLETE CBC W/AUTO DIFF WBC: CPT | Performed by: FAMILY MEDICINE

## 2017-01-01 PROCEDURE — 85027 COMPLETE CBC AUTOMATED: CPT | Performed by: EMERGENCY MEDICINE

## 2017-01-01 PROCEDURE — 82805 BLOOD GASES W/O2 SATURATION: CPT | Performed by: NURSE PRACTITIONER

## 2017-01-01 PROCEDURE — 85730 THROMBOPLASTIN TIME PARTIAL: CPT | Performed by: FAMILY MEDICINE

## 2017-01-01 PROCEDURE — 88305 TISSUE EXAM BY PATHOLOGIST: CPT | Performed by: INTERNAL MEDICINE

## 2017-01-01 PROCEDURE — 87493 C DIFF AMPLIFIED PROBE: CPT | Performed by: HOSPITALIST

## 2017-01-01 PROCEDURE — 94762 N-INVAS EAR/PLS OXIMTRY CONT: CPT

## 2017-01-01 PROCEDURE — 93880 EXTRACRANIAL BILAT STUDY: CPT

## 2017-01-01 PROCEDURE — 71250 CT THORAX DX C-: CPT

## 2017-01-01 PROCEDURE — 84443 ASSAY THYROID STIM HORMONE: CPT

## 2017-01-01 PROCEDURE — 71275 CT ANGIOGRAPHY CHEST: CPT

## 2017-01-01 PROCEDURE — G8979 MOBILITY GOAL STATUS: HCPCS | Performed by: PHYSICAL THERAPIST

## 2017-01-01 PROCEDURE — G8997 SWALLOW GOAL STATUS: HCPCS | Performed by: SPEECH-LANGUAGE PATHOLOGIST

## 2017-01-01 PROCEDURE — 83721 ASSAY OF BLOOD LIPOPROTEIN: CPT

## 2017-01-01 PROCEDURE — 83036 HEMOGLOBIN GLYCOSYLATED A1C: CPT

## 2017-01-01 PROCEDURE — G8978 MOBILITY CURRENT STATUS: HCPCS | Performed by: PHYSICAL THERAPIST

## 2017-01-01 PROCEDURE — 80061 LIPID PANEL: CPT | Performed by: HOSPITALIST

## 2017-01-01 PROCEDURE — 70450 CT HEAD/BRAIN W/O DYE: CPT

## 2017-01-01 PROCEDURE — 83735 ASSAY OF MAGNESIUM: CPT | Performed by: HOSPITALIST

## 2017-01-01 PROCEDURE — 93005 ELECTROCARDIOGRAM TRACING: CPT | Performed by: INTERNAL MEDICINE

## 2017-01-01 PROCEDURE — 83605 ASSAY OF LACTIC ACID: CPT | Performed by: NURSE PRACTITIONER

## 2017-01-01 PROCEDURE — 80053 COMPREHEN METABOLIC PANEL: CPT

## 2017-01-01 PROCEDURE — 82805 BLOOD GASES W/O2 SATURATION: CPT | Performed by: FAMILY MEDICINE

## 2017-01-01 PROCEDURE — 84443 ASSAY THYROID STIM HORMONE: CPT | Performed by: INTERNAL MEDICINE

## 2017-01-01 PROCEDURE — G8996 SWALLOW CURRENT STATUS: HCPCS | Performed by: SPEECH-LANGUAGE PATHOLOGIST

## 2017-01-01 PROCEDURE — 81001 URINALYSIS AUTO W/SCOPE: CPT | Performed by: FAMILY MEDICINE

## 2017-01-01 PROCEDURE — 87186 SC STD MICRODIL/AGAR DIL: CPT | Performed by: EMERGENCY MEDICINE

## 2017-01-01 PROCEDURE — 80053 COMPREHEN METABOLIC PANEL: CPT | Performed by: PHYSICIAN ASSISTANT

## 2017-01-01 PROCEDURE — 71020 HB CHEST X-RAY 2VW FRONTAL&LATL: CPT | Performed by: FAMILY MEDICINE

## 2017-01-01 PROCEDURE — 96366 THER/PROPH/DIAG IV INF ADDON: CPT

## 2017-01-01 PROCEDURE — 71010 HB CHEST X-RAY 1 VIEW FRONTAL: CPT

## 2017-01-01 PROCEDURE — 96372 THER/PROPH/DIAG INJ SC/IM: CPT

## 2017-01-01 PROCEDURE — 95816 EEG AWAKE AND DROWSY: CPT

## 2017-01-01 PROCEDURE — 87081 CULTURE SCREEN ONLY: CPT | Performed by: HOSPITALIST

## 2017-01-01 PROCEDURE — 82306 VITAMIN D 25 HYDROXY: CPT

## 2017-01-01 PROCEDURE — 83036 HEMOGLOBIN GLYCOSYLATED A1C: CPT | Performed by: HOSPITALIST

## 2017-01-01 PROCEDURE — 84484 ASSAY OF TROPONIN QUANT: CPT | Performed by: FAMILY MEDICINE

## 2017-01-01 PROCEDURE — 83735 ASSAY OF MAGNESIUM: CPT | Performed by: FAMILY MEDICINE

## 2017-01-01 PROCEDURE — 96361 HYDRATE IV INFUSION ADD-ON: CPT

## 2017-01-01 PROCEDURE — 82272 OCCULT BLD FECES 1-3 TESTS: CPT

## 2017-01-01 PROCEDURE — 93005 ELECTROCARDIOGRAM TRACING: CPT | Performed by: FAMILY MEDICINE

## 2017-01-01 PROCEDURE — G8987 SELF CARE CURRENT STATUS: HCPCS

## 2017-01-01 PROCEDURE — 80048 BASIC METABOLIC PNL TOTAL CA: CPT | Performed by: PHYSICIAN ASSISTANT

## 2017-01-01 PROCEDURE — 88342 IMHCHEM/IMCYTCHM 1ST ANTB: CPT | Performed by: INTERNAL MEDICINE

## 2017-01-01 PROCEDURE — 84100 ASSAY OF PHOSPHORUS: CPT | Performed by: HOSPITALIST

## 2017-01-01 PROCEDURE — 85025 COMPLETE CBC W/AUTO DIFF WBC: CPT | Performed by: EMERGENCY MEDICINE

## 2017-01-01 PROCEDURE — 87086 URINE CULTURE/COLONY COUNT: CPT | Performed by: INTERNAL MEDICINE

## 2017-01-01 PROCEDURE — 80061 LIPID PANEL: CPT | Performed by: FAMILY MEDICINE

## 2017-01-01 PROCEDURE — 96374 THER/PROPH/DIAG INJ IV PUSH: CPT

## 2017-01-01 PROCEDURE — 87040 BLOOD CULTURE FOR BACTERIA: CPT | Performed by: INTERNAL MEDICINE

## 2017-01-01 PROCEDURE — 87081 CULTURE SCREEN ONLY: CPT | Performed by: FAMILY MEDICINE

## 2017-01-01 PROCEDURE — C8929 TTE W OR WO FOL WCON,DOPPLER: HCPCS

## 2017-01-01 PROCEDURE — 83036 HEMOGLOBIN GLYCOSYLATED A1C: CPT | Performed by: INTERNAL MEDICINE

## 2017-01-01 PROCEDURE — C1788 PORT, INDWELLING, IMP: HCPCS | Performed by: SURGERY

## 2017-01-01 PROCEDURE — 84100 ASSAY OF PHOSPHORUS: CPT | Performed by: EMERGENCY MEDICINE

## 2017-01-01 DEVICE — 8F PLASTIC DIGNITY® MID-SIZED CT PORT W/SILICONE FILLED SUTURE HOLES W/ATTACHABLE CHRONOFLEX® POLYURETHANE CATHETER
Type: IMPLANTABLE DEVICE | Site: CHEST  WALL | Status: FUNCTIONAL
Brand: DIGNITY® MID-SIZED CT PORT

## 2017-01-01 RX ORDER — DEXTROSE MONOHYDRATE 25 G/50ML
25 INJECTION, SOLUTION INTRAVENOUS AS NEEDED
Status: DISCONTINUED | OUTPATIENT
Start: 2017-01-01 | End: 2017-01-01 | Stop reason: HOSPADM

## 2017-01-01 RX ORDER — OLANZAPINE 10 MG/1
10 INJECTION, POWDER, LYOPHILIZED, FOR SOLUTION INTRAMUSCULAR ONCE
Status: COMPLETED | OUTPATIENT
Start: 2017-01-01 | End: 2017-01-01

## 2017-01-01 RX ORDER — HYDRALAZINE HYDROCHLORIDE 50 MG/1
50 TABLET, FILM COATED ORAL 3 TIMES DAILY
Qty: 90 TABLET | Refills: 0 | Status: SHIPPED | OUTPATIENT
Start: 2017-01-01

## 2017-01-01 RX ORDER — WARFARIN SODIUM 7.5 MG/1
7.5 TABLET ORAL
Status: DISCONTINUED | OUTPATIENT
Start: 2017-01-01 | End: 2017-01-01 | Stop reason: HOSPADM

## 2017-01-01 RX ORDER — PANTOPRAZOLE SODIUM 40 MG/1
40 TABLET, DELAYED RELEASE ORAL
Status: DISCONTINUED | OUTPATIENT
Start: 2017-01-01 | End: 2017-01-01 | Stop reason: HOSPADM

## 2017-01-01 RX ORDER — ALPRAZOLAM 0.5 MG/1
0.5 TABLET ORAL 3 TIMES DAILY PRN
Qty: 30 TABLET | Refills: 0 | Status: SHIPPED | OUTPATIENT
Start: 2017-01-01 | End: 2017-01-01 | Stop reason: HOSPADM

## 2017-01-01 RX ORDER — PREDNISONE 1 MG/1
3 TABLET ORAL
Status: DISCONTINUED | OUTPATIENT
Start: 2017-01-01 | End: 2017-01-01 | Stop reason: HOSPADM

## 2017-01-01 RX ORDER — DEXTROSE MONOHYDRATE 50 MG/ML
20 INJECTION, SOLUTION INTRAVENOUS CONTINUOUS
Status: DISPENSED | OUTPATIENT
Start: 2017-01-01 | End: 2017-01-01

## 2017-01-01 RX ORDER — FLUOXETINE HYDROCHLORIDE 20 MG/1
20 CAPSULE ORAL DAILY
Status: DISCONTINUED | OUTPATIENT
Start: 2017-01-01 | End: 2017-01-01 | Stop reason: HOSPADM

## 2017-01-01 RX ORDER — SODIUM CHLORIDE 9 MG/ML
20 INJECTION, SOLUTION INTRAVENOUS CONTINUOUS
Status: DISCONTINUED | OUTPATIENT
Start: 2017-01-01 | End: 2017-01-01 | Stop reason: HOSPADM

## 2017-01-01 RX ORDER — OXYBUTYNIN CHLORIDE 5 MG/1
5 TABLET, EXTENDED RELEASE ORAL DAILY
Refills: 0
Start: 2017-01-01 | End: 2017-01-01 | Stop reason: HOSPADM

## 2017-01-01 RX ORDER — ACETAMINOPHEN 650 MG/1
SUPPOSITORY RECTAL
Status: COMPLETED
Start: 2017-01-01 | End: 2017-01-01

## 2017-01-01 RX ORDER — DONEPEZIL HYDROCHLORIDE 5 MG/1
5 TABLET, FILM COATED ORAL
Qty: 30 TABLET | Refills: 0 | Status: SHIPPED | OUTPATIENT
Start: 2017-01-01

## 2017-01-01 RX ORDER — DEXTROSE AND SODIUM CHLORIDE 5; .9 G/100ML; G/100ML
75 INJECTION, SOLUTION INTRAVENOUS CONTINUOUS
Status: DISCONTINUED | OUTPATIENT
Start: 2017-01-01 | End: 2017-01-01

## 2017-01-01 RX ORDER — EPHEDRINE SULFATE 50 MG/ML
INJECTION, SOLUTION INTRAVENOUS AS NEEDED
Status: DISCONTINUED | OUTPATIENT
Start: 2017-01-01 | End: 2017-01-01 | Stop reason: SURG

## 2017-01-01 RX ORDER — DEXTROSE AND SODIUM CHLORIDE 5; .9 G/100ML; G/100ML
125 INJECTION, SOLUTION INTRAVENOUS CONTINUOUS
Status: DISCONTINUED | OUTPATIENT
Start: 2017-01-01 | End: 2017-01-01 | Stop reason: HOSPADM

## 2017-01-01 RX ORDER — FUROSEMIDE 10 MG/ML
20 INJECTION INTRAMUSCULAR; INTRAVENOUS ONCE
Status: COMPLETED | OUTPATIENT
Start: 2017-01-01 | End: 2017-01-01

## 2017-01-01 RX ORDER — FUROSEMIDE 10 MG/ML
40 INJECTION INTRAMUSCULAR; INTRAVENOUS ONCE
Status: COMPLETED | OUTPATIENT
Start: 2017-01-01 | End: 2017-01-01

## 2017-01-01 RX ORDER — WARFARIN SODIUM 5 MG/1
7.5 TABLET ORAL
Refills: 0
Start: 2017-01-01 | End: 2017-01-01 | Stop reason: ALTCHOICE

## 2017-01-01 RX ORDER — IBUPROFEN 800 MG/1
800 TABLET ORAL EVERY 8 HOURS PRN
Status: DISCONTINUED | OUTPATIENT
Start: 2017-01-01 | End: 2017-01-01 | Stop reason: HOSPADM

## 2017-01-01 RX ORDER — BISACODYL 10 MG
10 SUPPOSITORY, RECTAL RECTAL DAILY PRN
Status: DISCONTINUED | OUTPATIENT
Start: 2017-01-01 | End: 2017-01-01 | Stop reason: HOSPADM

## 2017-01-01 RX ORDER — DEXAMETHASONE SODIUM PHOSPHATE 4 MG/ML
4 INJECTION, SOLUTION INTRA-ARTICULAR; INTRALESIONAL; INTRAMUSCULAR; INTRAVENOUS; SOFT TISSUE ONCE AS NEEDED
Status: COMPLETED | OUTPATIENT
Start: 2017-01-01 | End: 2017-01-01

## 2017-01-01 RX ORDER — WARFARIN SODIUM 5 MG/1
5 TABLET ORAL
Status: DISCONTINUED | OUTPATIENT
Start: 2017-01-01 | End: 2017-01-01

## 2017-01-01 RX ORDER — ALBUMIN (HUMAN) 12.5 G/50ML
12.5 SOLUTION INTRAVENOUS ONCE
Status: COMPLETED | OUTPATIENT
Start: 2017-01-01 | End: 2017-01-01

## 2017-01-01 RX ORDER — ACETAMINOPHEN 325 MG/1
650 TABLET ORAL EVERY 6 HOURS PRN
Qty: 30 TABLET | Refills: 0
Start: 2017-01-01

## 2017-01-01 RX ORDER — DOCUSATE SODIUM 100 MG/1
100 CAPSULE, LIQUID FILLED ORAL 2 TIMES DAILY
Status: DISCONTINUED | OUTPATIENT
Start: 2017-01-01 | End: 2017-01-01 | Stop reason: HOSPADM

## 2017-01-01 RX ORDER — FERROUS SULFATE 325(65) MG
325 TABLET ORAL 2 TIMES DAILY WITH MEALS
Status: DISCONTINUED | OUTPATIENT
Start: 2017-01-01 | End: 2017-01-01

## 2017-01-01 RX ORDER — PREDNISONE 1 MG/1
1 TABLET ORAL 3 TIMES DAILY
Status: DISCONTINUED | OUTPATIENT
Start: 2017-01-01 | End: 2018-01-01 | Stop reason: HOSPADM

## 2017-01-01 RX ORDER — DEXTROSE MONOHYDRATE 25 G/50ML
25 INJECTION, SOLUTION INTRAVENOUS ONCE
Status: COMPLETED | OUTPATIENT
Start: 2017-01-01 | End: 2017-01-01

## 2017-01-01 RX ORDER — DEXTROSE MONOHYDRATE 25 G/50ML
INJECTION, SOLUTION INTRAVENOUS
Status: DISPENSED
Start: 2017-01-01 | End: 2017-01-01

## 2017-01-01 RX ORDER — SODIUM CHLORIDE 9 MG/ML
100 INJECTION, SOLUTION INTRAVENOUS CONTINUOUS
Status: DISCONTINUED | OUTPATIENT
Start: 2017-01-01 | End: 2017-01-01

## 2017-01-01 RX ORDER — INSULIN GLARGINE 100 [IU]/ML
60 INJECTION, SOLUTION SUBCUTANEOUS EVERY MORNING
Status: DISCONTINUED | OUTPATIENT
Start: 2017-01-01 | End: 2017-01-01

## 2017-01-01 RX ORDER — INSULIN GLARGINE 100 [IU]/ML
25 INJECTION, SOLUTION SUBCUTANEOUS EVERY 12 HOURS SCHEDULED
Status: DISCONTINUED | OUTPATIENT
Start: 2017-01-01 | End: 2017-01-01

## 2017-01-01 RX ORDER — FLUCONAZOLE 150 MG/1
150 TABLET ORAL ONCE
Status: COMPLETED | OUTPATIENT
Start: 2017-01-01 | End: 2017-01-01

## 2017-01-01 RX ORDER — ASPIRIN 81 MG/1
81 TABLET ORAL DAILY
Refills: 0
Start: 2017-01-01 | End: 2017-01-01 | Stop reason: HOSPADM

## 2017-01-01 RX ORDER — PANTOPRAZOLE SODIUM 40 MG/1
40 INJECTION, POWDER, FOR SOLUTION INTRAVENOUS EVERY 12 HOURS SCHEDULED
Status: DISCONTINUED | OUTPATIENT
Start: 2017-01-01 | End: 2017-01-01

## 2017-01-01 RX ORDER — ASPIRIN 81 MG/1
324 TABLET, CHEWABLE ORAL ONCE
Status: COMPLETED | OUTPATIENT
Start: 2017-01-01 | End: 2017-01-01

## 2017-01-01 RX ORDER — INSULIN GLARGINE 100 [IU]/ML
50 INJECTION, SOLUTION SUBCUTANEOUS EVERY MORNING
Status: DISCONTINUED | OUTPATIENT
Start: 2017-01-01 | End: 2017-01-01

## 2017-01-01 RX ORDER — FLUCONAZOLE 100 MG/1
100 TABLET ORAL ONCE
Status: DISCONTINUED | OUTPATIENT
Start: 2017-01-01 | End: 2017-01-01

## 2017-01-01 RX ORDER — ASPIRIN 81 MG/1
81 TABLET ORAL DAILY
COMMUNITY

## 2017-01-01 RX ORDER — ALBUMIN (HUMAN) 12.5 G/50ML
25 SOLUTION INTRAVENOUS ONCE
Status: COMPLETED | OUTPATIENT
Start: 2017-01-01 | End: 2017-01-01

## 2017-01-01 RX ORDER — SODIUM CHLORIDE, SODIUM LACTATE, POTASSIUM CHLORIDE, CALCIUM CHLORIDE 600; 310; 30; 20 MG/100ML; MG/100ML; MG/100ML; MG/100ML
125 INJECTION, SOLUTION INTRAVENOUS CONTINUOUS
Status: DISCONTINUED | OUTPATIENT
Start: 2017-01-01 | End: 2017-01-01 | Stop reason: HOSPADM

## 2017-01-01 RX ORDER — DEXTROSE MONOHYDRATE 25 G/50ML
50 INJECTION, SOLUTION INTRAVENOUS ONCE
Status: COMPLETED | OUTPATIENT
Start: 2017-01-01 | End: 2017-01-01

## 2017-01-01 RX ORDER — PREDNISONE 1 MG/1
3 TABLET ORAL DAILY
Refills: 0
Start: 2017-01-01

## 2017-01-01 RX ORDER — ALPRAZOLAM 0.5 MG/1
0.5 TABLET ORAL 3 TIMES DAILY PRN
Status: DISCONTINUED | OUTPATIENT
Start: 2017-01-01 | End: 2017-01-01 | Stop reason: HOSPADM

## 2017-01-01 RX ORDER — OXYBUTYNIN CHLORIDE 5 MG/1
5 TABLET, EXTENDED RELEASE ORAL DAILY
Status: ON HOLD | COMMUNITY
End: 2017-01-01 | Stop reason: SDUPTHER

## 2017-01-01 RX ORDER — DONEPEZIL HYDROCHLORIDE 5 MG/1
5 TABLET, FILM COATED ORAL
Status: DISCONTINUED | OUTPATIENT
Start: 2017-01-01 | End: 2017-01-01 | Stop reason: HOSPADM

## 2017-01-01 RX ORDER — CEPHALEXIN 250 MG/1
500 CAPSULE ORAL 4 TIMES DAILY
COMMUNITY
End: 2017-01-01 | Stop reason: ALTCHOICE

## 2017-01-01 RX ORDER — MULTIVITAMIN
1 TABLET ORAL DAILY
COMMUNITY

## 2017-01-01 RX ORDER — INSULIN GLARGINE 100 [IU]/ML
70 INJECTION, SOLUTION SUBCUTANEOUS EVERY MORNING
Status: DISCONTINUED | OUTPATIENT
Start: 2017-01-01 | End: 2017-01-01

## 2017-01-01 RX ORDER — PROPOFOL 10 MG/ML
INJECTION, EMULSION INTRAVENOUS AS NEEDED
Status: DISCONTINUED | OUTPATIENT
Start: 2017-01-01 | End: 2017-01-01 | Stop reason: SURG

## 2017-01-01 RX ORDER — ONDANSETRON 2 MG/ML
4 INJECTION INTRAMUSCULAR; INTRAVENOUS EVERY 8 HOURS PRN
Status: DISCONTINUED | OUTPATIENT
Start: 2017-01-01 | End: 2017-01-01 | Stop reason: HOSPADM

## 2017-01-01 RX ORDER — HEPARIN SODIUM 5000 [USP'U]/ML
5000 INJECTION, SOLUTION INTRAVENOUS; SUBCUTANEOUS ONCE
Status: COMPLETED | OUTPATIENT
Start: 2017-01-01 | End: 2017-01-01

## 2017-01-01 RX ORDER — HYDROCODONE BITARTRATE AND ACETAMINOPHEN 5; 325 MG/1; MG/1
1 TABLET ORAL EVERY 6 HOURS PRN
Qty: 12 TABLET | Refills: 0 | Status: SHIPPED | OUTPATIENT
Start: 2017-01-01 | End: 2017-01-01

## 2017-01-01 RX ORDER — HEPARIN SODIUM 5000 [USP'U]/ML
INJECTION, SOLUTION INTRAVENOUS; SUBCUTANEOUS AS NEEDED
Status: DISCONTINUED | OUTPATIENT
Start: 2017-01-01 | End: 2017-01-01 | Stop reason: HOSPADM

## 2017-01-01 RX ORDER — OLMESARTAN MEDOXOMIL 20 MG/1
40 TABLET ORAL DAILY
Status: DISCONTINUED | OUTPATIENT
Start: 2017-01-01 | End: 2017-01-01

## 2017-01-01 RX ORDER — ACETAMINOPHEN 325 MG/1
650 TABLET ORAL EVERY 6 HOURS PRN
Status: DISCONTINUED | OUTPATIENT
Start: 2017-01-01 | End: 2017-01-01 | Stop reason: HOSPADM

## 2017-01-01 RX ORDER — ASPIRIN 81 MG/1
81 TABLET ORAL DAILY
Status: DISCONTINUED | OUTPATIENT
Start: 2017-01-01 | End: 2018-01-01 | Stop reason: HOSPADM

## 2017-01-01 RX ORDER — ALPRAZOLAM 0.25 MG/1
0.25 TABLET ORAL EVERY 8 HOURS PRN
Status: DISCONTINUED | OUTPATIENT
Start: 2017-01-01 | End: 2018-01-01 | Stop reason: HOSPADM

## 2017-01-01 RX ORDER — ALBUTEROL SULFATE 90 UG/1
2 AEROSOL, METERED RESPIRATORY (INHALATION) EVERY 4 HOURS PRN
Status: DISCONTINUED | OUTPATIENT
Start: 2017-01-01 | End: 2017-01-01 | Stop reason: HOSPADM

## 2017-01-01 RX ORDER — VANCOMYCIN HYDROCHLORIDE 1 G/200ML
12.5 INJECTION, SOLUTION INTRAVENOUS EVERY 12 HOURS
Status: DISCONTINUED | OUTPATIENT
Start: 2017-01-01 | End: 2017-01-01

## 2017-01-01 RX ORDER — BISACODYL 10 MG
10 SUPPOSITORY, RECTAL RECTAL DAILY PRN
COMMUNITY

## 2017-01-01 RX ORDER — INSULIN GLARGINE 100 [IU]/ML
30 INJECTION, SOLUTION SUBCUTANEOUS EVERY MORNING
Qty: 10 ML | Refills: 0 | Status: SHIPPED | OUTPATIENT
Start: 2017-01-01

## 2017-01-01 RX ORDER — DEXTROSE MONOHYDRATE 50 MG/ML
20 INJECTION, SOLUTION INTRAVENOUS CONTINUOUS
Status: DISCONTINUED | OUTPATIENT
Start: 2017-01-01 | End: 2017-01-01 | Stop reason: HOSPADM

## 2017-01-01 RX ORDER — CYPROHEPTADINE HYDROCHLORIDE 4 MG/1
4 TABLET ORAL
Status: DISCONTINUED | OUTPATIENT
Start: 2017-01-01 | End: 2017-01-01 | Stop reason: HOSPADM

## 2017-01-01 RX ORDER — ACETAMINOPHEN 325 MG/1
650 TABLET ORAL ONCE
Status: COMPLETED | OUTPATIENT
Start: 2017-01-01 | End: 2017-01-01

## 2017-01-01 RX ORDER — LORAZEPAM 2 MG/ML
0.5 INJECTION INTRAMUSCULAR ONCE
Status: COMPLETED | OUTPATIENT
Start: 2017-01-01 | End: 2017-01-01

## 2017-01-01 RX ORDER — SODIUM CHLORIDE 9 MG/ML
20 INJECTION, SOLUTION INTRAVENOUS CONTINUOUS
Status: DISPENSED | OUTPATIENT
Start: 2017-01-01 | End: 2017-01-01

## 2017-01-01 RX ORDER — DEXTROSE MONOHYDRATE 50 MG/ML
20 INJECTION, SOLUTION INTRAVENOUS CONTINUOUS
Status: DISCONTINUED | OUTPATIENT
Start: 2017-01-01 | End: 2017-01-01

## 2017-01-01 RX ORDER — ALPRAZOLAM 0.25 MG/1
0.25 TABLET ORAL 3 TIMES DAILY PRN
Qty: 20 TABLET | Refills: 0 | Status: SHIPPED | OUTPATIENT
Start: 2017-01-01 | End: 2018-01-01

## 2017-01-01 RX ORDER — DIAZEPAM 2 MG/1
2 TABLET ORAL 3 TIMES DAILY
Status: DISCONTINUED | OUTPATIENT
Start: 2017-01-01 | End: 2017-01-01

## 2017-01-01 RX ORDER — INSULIN GLARGINE 100 [IU]/ML
38 INJECTION, SOLUTION SUBCUTANEOUS EVERY 12 HOURS SCHEDULED
Status: DISCONTINUED | OUTPATIENT
Start: 2017-01-01 | End: 2017-01-01

## 2017-01-01 RX ORDER — HYDROCODONE BITARTRATE AND ACETAMINOPHEN 5; 325 MG/1; MG/1
1 TABLET ORAL EVERY 6 HOURS PRN
Status: DISCONTINUED | OUTPATIENT
Start: 2017-01-01 | End: 2017-01-01 | Stop reason: HOSPADM

## 2017-01-01 RX ORDER — FLUOXETINE HYDROCHLORIDE 20 MG/1
20 CAPSULE ORAL DAILY
Status: DISCONTINUED | OUTPATIENT
Start: 2017-01-01 | End: 2018-01-01 | Stop reason: HOSPADM

## 2017-01-01 RX ORDER — HEPARIN SODIUM 1000 [USP'U]/ML
4000 INJECTION, SOLUTION INTRAVENOUS; SUBCUTANEOUS AS NEEDED
Status: DISCONTINUED | OUTPATIENT
Start: 2017-01-01 | End: 2017-01-01

## 2017-01-01 RX ORDER — ALPRAZOLAM 0.25 MG/1
0.25 TABLET ORAL 2 TIMES DAILY
Status: DISCONTINUED | OUTPATIENT
Start: 2017-01-01 | End: 2017-01-01 | Stop reason: HOSPADM

## 2017-01-01 RX ORDER — HALOPERIDOL 5 MG/ML
5 INJECTION INTRAMUSCULAR ONCE
Status: COMPLETED | OUTPATIENT
Start: 2017-01-01 | End: 2017-01-01

## 2017-01-01 RX ORDER — SODIUM CHLORIDE, SODIUM LACTATE, POTASSIUM CHLORIDE, CALCIUM CHLORIDE 600; 310; 30; 20 MG/100ML; MG/100ML; MG/100ML; MG/100ML
50 INJECTION, SOLUTION INTRAVENOUS CONTINUOUS
Status: DISCONTINUED | OUTPATIENT
Start: 2017-01-01 | End: 2017-01-01 | Stop reason: HOSPADM

## 2017-01-01 RX ORDER — MIRTAZAPINE 15 MG/1
15 TABLET, FILM COATED ORAL
Refills: 0
Start: 2017-01-01 | End: 2017-01-01

## 2017-01-01 RX ORDER — FLUCONAZOLE 100 MG/1
100 TABLET ORAL EVERY 24 HOURS
Qty: 2 TABLET | Refills: 0
Start: 2017-01-01 | End: 2017-01-01

## 2017-01-01 RX ORDER — ALPRAZOLAM 0.25 MG/1
0.25 TABLET ORAL 3 TIMES DAILY PRN
Status: DISCONTINUED | OUTPATIENT
Start: 2017-01-01 | End: 2017-01-01

## 2017-01-01 RX ORDER — SODIUM CHLORIDE 9 MG/ML
50 INJECTION, SOLUTION INTRAVENOUS CONTINUOUS
Status: DISCONTINUED | OUTPATIENT
Start: 2017-01-01 | End: 2017-01-01 | Stop reason: HOSPADM

## 2017-01-01 RX ORDER — OXAZEPAM 15 MG/1
15 CAPSULE ORAL EVERY 8 HOURS SCHEDULED
Status: DISCONTINUED | OUTPATIENT
Start: 2017-01-01 | End: 2017-01-01

## 2017-01-01 RX ORDER — FERROUS SULFATE 325(65) MG
325 TABLET ORAL 2 TIMES DAILY WITH MEALS
COMMUNITY

## 2017-01-01 RX ORDER — LIDOCAINE HYDROCHLORIDE 10 MG/ML
INJECTION, SOLUTION INFILTRATION; PERINEURAL AS NEEDED
Status: DISCONTINUED | OUTPATIENT
Start: 2017-01-01 | End: 2017-01-01 | Stop reason: SURG

## 2017-01-01 RX ORDER — LABETALOL HYDROCHLORIDE 5 MG/ML
10 INJECTION, SOLUTION INTRAVENOUS ONCE
Status: COMPLETED | OUTPATIENT
Start: 2017-01-01 | End: 2017-01-01

## 2017-01-01 RX ORDER — ASPIRIN 81 MG/1
81 TABLET, CHEWABLE ORAL DAILY
Status: DISCONTINUED | OUTPATIENT
Start: 2017-01-01 | End: 2017-01-01

## 2017-01-01 RX ORDER — ASPIRIN 81 MG/1
81 TABLET ORAL DAILY
Status: DISCONTINUED | OUTPATIENT
Start: 2017-01-01 | End: 2017-01-01

## 2017-01-01 RX ORDER — SACCHAROMYCES BOULARDII 250 MG
250 CAPSULE ORAL 2 TIMES DAILY
Status: DISCONTINUED | OUTPATIENT
Start: 2017-01-01 | End: 2017-01-01 | Stop reason: HOSPADM

## 2017-01-01 RX ORDER — SODIUM CHLORIDE 9 MG/ML
75 INJECTION, SOLUTION INTRAVENOUS CONTINUOUS
Status: DISCONTINUED | OUTPATIENT
Start: 2017-01-01 | End: 2017-01-01

## 2017-01-01 RX ORDER — DOCUSATE SODIUM 100 MG/1
100 CAPSULE, LIQUID FILLED ORAL 2 TIMES DAILY
Status: DISCONTINUED | OUTPATIENT
Start: 2017-01-01 | End: 2018-01-01 | Stop reason: HOSPADM

## 2017-01-01 RX ORDER — ALBUTEROL SULFATE 2.5 MG/3ML
2.5 SOLUTION RESPIRATORY (INHALATION) EVERY 4 HOURS PRN
Status: DISCONTINUED | OUTPATIENT
Start: 2017-01-01 | End: 2017-01-01

## 2017-01-01 RX ORDER — VANCOMYCIN HYDROCHLORIDE 1 G/200ML
10 INJECTION, SOLUTION INTRAVENOUS ONCE
Status: COMPLETED | OUTPATIENT
Start: 2017-01-01 | End: 2017-01-01

## 2017-01-01 RX ORDER — ALBUTEROL SULFATE 2.5 MG/3ML
2.5 SOLUTION RESPIRATORY (INHALATION) EVERY 4 HOURS PRN
COMMUNITY

## 2017-01-01 RX ORDER — ONDANSETRON 2 MG/ML
INJECTION INTRAMUSCULAR; INTRAVENOUS AS NEEDED
Status: DISCONTINUED | OUTPATIENT
Start: 2017-01-01 | End: 2017-01-01 | Stop reason: SURG

## 2017-01-01 RX ORDER — INSULIN GLARGINE 100 [IU]/ML
30 INJECTION, SOLUTION SUBCUTANEOUS EVERY 12 HOURS SCHEDULED
Status: DISCONTINUED | OUTPATIENT
Start: 2017-01-01 | End: 2017-01-01

## 2017-01-01 RX ORDER — FERROUS SULFATE 325(65) MG
325 TABLET ORAL 2 TIMES DAILY WITH MEALS
Status: DISCONTINUED | OUTPATIENT
Start: 2017-01-01 | End: 2018-01-01 | Stop reason: HOSPADM

## 2017-01-01 RX ORDER — HEPARIN SODIUM 10000 [USP'U]/100ML
3-30 INJECTION, SOLUTION INTRAVENOUS
Status: DISCONTINUED | OUTPATIENT
Start: 2017-01-01 | End: 2018-01-01

## 2017-01-01 RX ORDER — ALBUTEROL SULFATE 90 UG/1
2 AEROSOL, METERED RESPIRATORY (INHALATION) EVERY 4 HOURS PRN
Refills: 0 | Status: ON HOLD
Start: 2017-01-01 | End: 2017-01-01 | Stop reason: ALTCHOICE

## 2017-01-01 RX ORDER — FENTANYL CITRATE 50 UG/ML
INJECTION, SOLUTION INTRAMUSCULAR; INTRAVENOUS AS NEEDED
Status: DISCONTINUED | OUTPATIENT
Start: 2017-01-01 | End: 2017-01-01 | Stop reason: SURG

## 2017-01-01 RX ORDER — OXAZEPAM 10 MG
10 CAPSULE ORAL EVERY 8 HOURS SCHEDULED
Qty: 9 CAPSULE | Refills: 0 | Status: SHIPPED | OUTPATIENT
Start: 2017-01-01 | End: 2017-01-01 | Stop reason: HOSPADM

## 2017-01-01 RX ORDER — METOPROLOL TARTRATE 50 MG/1
50 TABLET, FILM COATED ORAL EVERY 12 HOURS SCHEDULED
Status: DISCONTINUED | OUTPATIENT
Start: 2017-01-01 | End: 2018-01-01 | Stop reason: HOSPADM

## 2017-01-01 RX ORDER — INSULIN GLARGINE 100 [IU]/ML
15 INJECTION, SOLUTION SUBCUTANEOUS EVERY MORNING
Status: DISCONTINUED | OUTPATIENT
Start: 2017-01-01 | End: 2017-01-01 | Stop reason: HOSPADM

## 2017-01-01 RX ORDER — HEPARIN SODIUM 10000 [USP'U]/100ML
3-30 INJECTION, SOLUTION INTRAVENOUS
Status: DISCONTINUED | OUTPATIENT
Start: 2017-01-01 | End: 2017-01-01 | Stop reason: HOSPADM

## 2017-01-01 RX ORDER — MINERAL OIL AND PETROLATUM 150; 830 MG/G; MG/G
OINTMENT OPHTHALMIC
Status: DISCONTINUED | OUTPATIENT
Start: 2017-01-01 | End: 2018-01-01 | Stop reason: HOSPADM

## 2017-01-01 RX ORDER — FLUCONAZOLE 2 MG/ML
200 INJECTION, SOLUTION INTRAVENOUS EVERY 24 HOURS
Status: DISCONTINUED | OUTPATIENT
Start: 2017-01-01 | End: 2017-01-01

## 2017-01-01 RX ORDER — PROPOFOL 10 MG/ML
INJECTION, EMULSION INTRAVENOUS CONTINUOUS PRN
Status: DISCONTINUED | OUTPATIENT
Start: 2017-01-01 | End: 2017-01-01 | Stop reason: SURG

## 2017-01-01 RX ORDER — METOPROLOL TARTRATE 50 MG/1
50 TABLET, FILM COATED ORAL EVERY 12 HOURS SCHEDULED
Status: DISCONTINUED | OUTPATIENT
Start: 2017-01-01 | End: 2017-01-01 | Stop reason: HOSPADM

## 2017-01-01 RX ORDER — MIRTAZAPINE 15 MG/1
15 TABLET, FILM COATED ORAL
Status: DISCONTINUED | OUTPATIENT
Start: 2017-01-01 | End: 2017-01-01 | Stop reason: HOSPADM

## 2017-01-01 RX ORDER — INSULIN GLARGINE 100 [IU]/ML
35 INJECTION, SOLUTION SUBCUTANEOUS EVERY MORNING
Status: DISCONTINUED | OUTPATIENT
Start: 2017-01-01 | End: 2017-01-01

## 2017-01-01 RX ORDER — FLUOXETINE 20 MG/1
20 TABLET, FILM COATED ORAL DAILY
COMMUNITY

## 2017-01-01 RX ORDER — SODIUM CHLORIDE 9 MG/ML
20 INJECTION, SOLUTION INTRAVENOUS CONTINUOUS
Status: DISCONTINUED | OUTPATIENT
Start: 2017-01-01 | End: 2017-01-01

## 2017-01-01 RX ORDER — PROMETHAZINE HYDROCHLORIDE 25 MG/ML
25 INJECTION, SOLUTION INTRAMUSCULAR; INTRAVENOUS EVERY 6 HOURS PRN
Status: DISCONTINUED | OUTPATIENT
Start: 2017-01-01 | End: 2018-01-01 | Stop reason: HOSPADM

## 2017-01-01 RX ORDER — SODIUM CHLORIDE 9 MG/ML
125 INJECTION, SOLUTION INTRAVENOUS CONTINUOUS
Status: DISCONTINUED | OUTPATIENT
Start: 2017-01-01 | End: 2017-01-01 | Stop reason: HOSPADM

## 2017-01-01 RX ORDER — PREDNISONE 1 MG/1
1 TABLET ORAL 3 TIMES DAILY
Status: DISCONTINUED | OUTPATIENT
Start: 2017-01-01 | End: 2017-01-01 | Stop reason: HOSPADM

## 2017-01-01 RX ORDER — PRAVASTATIN SODIUM 20 MG
20 TABLET ORAL
Status: DISCONTINUED | OUTPATIENT
Start: 2017-01-01 | End: 2017-01-01 | Stop reason: HOSPADM

## 2017-01-01 RX ORDER — OXAZEPAM 10 MG
10 CAPSULE ORAL EVERY 8 HOURS SCHEDULED
Status: DISCONTINUED | OUTPATIENT
Start: 2017-01-01 | End: 2017-01-01 | Stop reason: HOSPADM

## 2017-01-01 RX ORDER — ALPRAZOLAM 0.25 MG/1
0.25 TABLET ORAL 3 TIMES DAILY PRN
Status: DISCONTINUED | OUTPATIENT
Start: 2017-01-01 | End: 2017-01-01 | Stop reason: HOSPADM

## 2017-01-01 RX ORDER — HYDROCODONE BITARTRATE AND ACETAMINOPHEN 5; 325 MG/1; MG/1
1 TABLET ORAL EVERY 6 HOURS PRN
Status: ON HOLD | COMMUNITY
End: 2017-01-01 | Stop reason: ALTCHOICE

## 2017-01-01 RX ORDER — DEXTROSE MONOHYDRATE 25 G/50ML
INJECTION, SOLUTION INTRAVENOUS
Status: COMPLETED
Start: 2017-01-01 | End: 2017-01-01

## 2017-01-01 RX ORDER — ONDANSETRON 2 MG/ML
4 INJECTION INTRAMUSCULAR; INTRAVENOUS EVERY 6 HOURS PRN
Status: DISCONTINUED | OUTPATIENT
Start: 2017-01-01 | End: 2018-01-01 | Stop reason: HOSPADM

## 2017-01-01 RX ORDER — PANTOPRAZOLE SODIUM 40 MG/1
40 INJECTION, POWDER, FOR SOLUTION INTRAVENOUS EVERY 12 HOURS SCHEDULED
Status: DISCONTINUED | OUTPATIENT
Start: 2017-01-01 | End: 2018-01-01

## 2017-01-01 RX ORDER — POLYVINYL ALCOHOL 14 MG/ML
1 SOLUTION/ DROPS OPHTHALMIC
Status: DISCONTINUED | OUTPATIENT
Start: 2017-01-01 | End: 2017-01-01 | Stop reason: HOSPADM

## 2017-01-01 RX ORDER — SODIUM CHLORIDE 9 MG/ML
INJECTION, SOLUTION INTRAVENOUS AS NEEDED
Status: DISCONTINUED | OUTPATIENT
Start: 2017-01-01 | End: 2017-01-01 | Stop reason: HOSPADM

## 2017-01-01 RX ORDER — FLUCONAZOLE 100 MG/1
100 TABLET ORAL EVERY 24 HOURS
Qty: 1 TABLET | Refills: 0
Start: 2017-01-01 | End: 2017-01-01

## 2017-01-01 RX ORDER — INSULIN GLARGINE 100 [IU]/ML
60 INJECTION, SOLUTION SUBCUTANEOUS EVERY MORNING
Status: DISCONTINUED | OUTPATIENT
Start: 2017-01-01 | End: 2017-01-01 | Stop reason: HOSPADM

## 2017-01-01 RX ORDER — FENTANYL CITRATE/PF 50 MCG/ML
25 SYRINGE (ML) INJECTION
Status: DISCONTINUED | OUTPATIENT
Start: 2017-01-01 | End: 2017-01-01 | Stop reason: HOSPADM

## 2017-01-01 RX ORDER — FLUCONAZOLE 100 MG/1
100 TABLET ORAL EVERY 24 HOURS
Status: DISCONTINUED | OUTPATIENT
Start: 2017-01-01 | End: 2017-01-01 | Stop reason: HOSPADM

## 2017-01-01 RX ORDER — HEPARIN SODIUM 10000 [USP'U]/100ML
3-20 INJECTION, SOLUTION INTRAVENOUS
Status: DISCONTINUED | OUTPATIENT
Start: 2017-01-01 | End: 2017-01-01

## 2017-01-01 RX ORDER — HEPARIN SODIUM 10000 [USP'U]/100ML
3-20 INJECTION, SOLUTION INTRAVENOUS
Status: DISCONTINUED | OUTPATIENT
Start: 2017-01-01 | End: 2017-01-01 | Stop reason: HOSPADM

## 2017-01-01 RX ORDER — HYDRALAZINE HYDROCHLORIDE 50 MG/1
50 TABLET, FILM COATED ORAL 3 TIMES DAILY
Status: DISCONTINUED | OUTPATIENT
Start: 2017-01-01 | End: 2018-01-01 | Stop reason: HOSPADM

## 2017-01-01 RX ORDER — LABETALOL HYDROCHLORIDE 5 MG/ML
10 INJECTION, SOLUTION INTRAVENOUS EVERY 4 HOURS PRN
Status: DISCONTINUED | OUTPATIENT
Start: 2017-01-01 | End: 2017-01-01 | Stop reason: HOSPADM

## 2017-01-01 RX ORDER — BUPIVACAINE HYDROCHLORIDE 5 MG/ML
INJECTION, SOLUTION PERINEURAL AS NEEDED
Status: DISCONTINUED | OUTPATIENT
Start: 2017-01-01 | End: 2017-01-01 | Stop reason: HOSPADM

## 2017-01-01 RX ORDER — POTASSIUM CHLORIDE 20 MEQ/1
20 TABLET, EXTENDED RELEASE ORAL 2 TIMES DAILY WITH MEALS
Status: DISCONTINUED | OUTPATIENT
Start: 2017-01-01 | End: 2017-01-01

## 2017-01-01 RX ORDER — CYPROHEPTADINE HYDROCHLORIDE 4 MG/1
4 TABLET ORAL
Status: DISCONTINUED | OUTPATIENT
Start: 2017-01-01 | End: 2018-01-01 | Stop reason: HOSPADM

## 2017-01-01 RX ORDER — DOCUSATE SODIUM 100 MG/1
100 CAPSULE, LIQUID FILLED ORAL 2 TIMES DAILY PRN
Status: DISCONTINUED | OUTPATIENT
Start: 2017-01-01 | End: 2017-01-01 | Stop reason: HOSPADM

## 2017-01-01 RX ORDER — PREDNISONE 1 MG/1
3 TABLET ORAL DAILY
Status: DISCONTINUED | OUTPATIENT
Start: 2017-01-01 | End: 2017-01-01 | Stop reason: HOSPADM

## 2017-01-01 RX ORDER — MINERAL OIL AND PETROLATUM 150; 830 MG/G; MG/G
OINTMENT OPHTHALMIC
Status: DISCONTINUED | OUTPATIENT
Start: 2017-01-01 | End: 2017-01-01

## 2017-01-01 RX ORDER — INSULIN GLARGINE 100 [IU]/ML
40 INJECTION, SOLUTION SUBCUTANEOUS EVERY MORNING
Status: DISCONTINUED | OUTPATIENT
Start: 2017-01-01 | End: 2017-01-01

## 2017-01-01 RX ORDER — CYPROHEPTADINE HYDROCHLORIDE 4 MG/1
4 TABLET ORAL
COMMUNITY

## 2017-01-01 RX ORDER — METOPROLOL TARTRATE 5 MG/5ML
5 INJECTION INTRAVENOUS ONCE
Status: COMPLETED | OUTPATIENT
Start: 2017-01-01 | End: 2017-01-01

## 2017-01-01 RX ORDER — HYDRALAZINE HYDROCHLORIDE 25 MG/1
50 TABLET, FILM COATED ORAL EVERY 8 HOURS SCHEDULED
Status: DISCONTINUED | OUTPATIENT
Start: 2017-01-01 | End: 2017-01-01 | Stop reason: HOSPADM

## 2017-01-01 RX ORDER — LEVOFLOXACIN 250 MG/1
250 TABLET ORAL EVERY 24 HOURS
Status: DISCONTINUED | OUTPATIENT
Start: 2017-01-01 | End: 2017-01-01

## 2017-01-01 RX ORDER — OLANZAPINE 2.5 MG/1
5 TABLET ORAL 3 TIMES DAILY PRN
Status: DISCONTINUED | OUTPATIENT
Start: 2017-01-01 | End: 2017-01-01 | Stop reason: HOSPADM

## 2017-01-01 RX ORDER — INSULIN GLARGINE 100 [IU]/ML
70 INJECTION, SOLUTION SUBCUTANEOUS EVERY MORNING
Qty: 10 ML | Refills: 0
Start: 2017-01-01 | End: 2017-01-01 | Stop reason: HOSPADM

## 2017-01-01 RX ORDER — OXYBUTYNIN CHLORIDE 5 MG/1
5 TABLET, EXTENDED RELEASE ORAL DAILY
Status: DISCONTINUED | OUTPATIENT
Start: 2017-01-01 | End: 2017-01-01

## 2017-01-01 RX ORDER — POTASSIUM CHLORIDE 750 MG/1
10 CAPSULE, EXTENDED RELEASE ORAL 2 TIMES DAILY
COMMUNITY
End: 2017-01-01 | Stop reason: HOSPADM

## 2017-01-01 RX ORDER — SENNOSIDES 8.6 MG
1 TABLET ORAL DAILY
Status: DISCONTINUED | OUTPATIENT
Start: 2017-01-01 | End: 2018-01-01 | Stop reason: HOSPADM

## 2017-01-01 RX ORDER — OXYBUTYNIN CHLORIDE 5 MG/1
5 TABLET, EXTENDED RELEASE ORAL DAILY
Status: DISCONTINUED | OUTPATIENT
Start: 2017-01-01 | End: 2017-01-01 | Stop reason: HOSPADM

## 2017-01-01 RX ORDER — INSULIN GLARGINE 100 [IU]/ML
40 INJECTION, SOLUTION SUBCUTANEOUS EVERY 12 HOURS SCHEDULED
Status: DISCONTINUED | OUTPATIENT
Start: 2017-01-01 | End: 2017-01-01

## 2017-01-01 RX ORDER — ASPIRIN 81 MG/1
81 TABLET ORAL DAILY
Status: DISCONTINUED | OUTPATIENT
Start: 2017-01-01 | End: 2017-01-01 | Stop reason: HOSPADM

## 2017-01-01 RX ORDER — OXYCODONE HYDROCHLORIDE AND ACETAMINOPHEN 5; 325 MG/1; MG/1
2 TABLET ORAL EVERY 4 HOURS PRN
Status: DISCONTINUED | OUTPATIENT
Start: 2017-01-01 | End: 2017-01-01 | Stop reason: HOSPADM

## 2017-01-01 RX ORDER — LEVOFLOXACIN 500 MG/1
750 TABLET, FILM COATED ORAL EVERY 24 HOURS
COMMUNITY
End: 2017-01-01 | Stop reason: HOSPADM

## 2017-01-01 RX ORDER — INSULIN GLARGINE 100 [IU]/ML
70 INJECTION, SOLUTION SUBCUTANEOUS EVERY MORNING
Status: DISCONTINUED | OUTPATIENT
Start: 2017-01-01 | End: 2017-01-01 | Stop reason: HOSPADM

## 2017-01-01 RX ORDER — FERROUS SULFATE 325(65) MG
325 TABLET ORAL 2 TIMES DAILY WITH MEALS
Status: DISCONTINUED | OUTPATIENT
Start: 2017-01-01 | End: 2017-01-01 | Stop reason: HOSPADM

## 2017-01-01 RX ORDER — ONDANSETRON 2 MG/ML
4 INJECTION INTRAMUSCULAR; INTRAVENOUS ONCE AS NEEDED
Status: COMPLETED | OUTPATIENT
Start: 2017-01-01 | End: 2017-01-01

## 2017-01-01 RX ORDER — INSULIN GLARGINE 100 [IU]/ML
60 INJECTION, SOLUTION SUBCUTANEOUS EVERY MORNING
Qty: 10 ML | Refills: 0 | Status: ON HOLD | OUTPATIENT
Start: 2017-01-01 | End: 2017-01-01

## 2017-01-01 RX ORDER — DONEPEZIL HYDROCHLORIDE 5 MG/1
5 TABLET, FILM COATED ORAL
Status: DISCONTINUED | OUTPATIENT
Start: 2017-01-01 | End: 2018-01-01 | Stop reason: HOSPADM

## 2017-01-01 RX ORDER — SUCCINYLCHOLINE CHLORIDE 20 MG/ML
INJECTION INTRAMUSCULAR; INTRAVENOUS AS NEEDED
Status: DISCONTINUED | OUTPATIENT
Start: 2017-01-01 | End: 2017-01-01 | Stop reason: SURG

## 2017-01-01 RX ORDER — POTASSIUM CHLORIDE 20 MEQ/1
20 TABLET, EXTENDED RELEASE ORAL
Status: DISCONTINUED | OUTPATIENT
Start: 2017-01-01 | End: 2017-01-01

## 2017-01-01 RX ORDER — ACETAMINOPHEN 650 MG/1
650 SUPPOSITORY RECTAL EVERY 4 HOURS PRN
Status: DISCONTINUED | OUTPATIENT
Start: 2017-01-01 | End: 2017-01-01 | Stop reason: HOSPADM

## 2017-01-01 RX ORDER — HEPARIN SODIUM 1000 [USP'U]/ML
2000 INJECTION, SOLUTION INTRAVENOUS; SUBCUTANEOUS AS NEEDED
Status: DISCONTINUED | OUTPATIENT
Start: 2017-01-01 | End: 2017-01-01

## 2017-01-01 RX ORDER — SODIUM CHLORIDE 9 MG/ML
50 INJECTION, SOLUTION INTRAVENOUS CONTINUOUS
Status: DISCONTINUED | OUTPATIENT
Start: 2017-01-01 | End: 2017-01-01

## 2017-01-01 RX ORDER — PANTOPRAZOLE SODIUM 40 MG/1
40 TABLET, DELAYED RELEASE ORAL
Status: DISCONTINUED | OUTPATIENT
Start: 2017-01-01 | End: 2017-01-01

## 2017-01-01 RX ORDER — NITROFURANTOIN 25; 75 MG/1; MG/1
100 CAPSULE ORAL 2 TIMES DAILY
COMMUNITY
End: 2017-01-01 | Stop reason: HOSPADM

## 2017-01-01 RX ORDER — ASPIRIN 325 MG
325 TABLET, DELAYED RELEASE (ENTERIC COATED) ORAL DAILY
Status: DISCONTINUED | OUTPATIENT
Start: 2017-01-01 | End: 2017-01-01 | Stop reason: HOSPADM

## 2017-01-01 RX ORDER — LABETALOL HYDROCHLORIDE 5 MG/ML
20 INJECTION, SOLUTION INTRAVENOUS ONCE
Status: DISCONTINUED | OUTPATIENT
Start: 2017-01-01 | End: 2017-01-01

## 2017-01-01 RX ORDER — METOPROLOL TARTRATE 50 MG/1
50 TABLET, FILM COATED ORAL EVERY 12 HOURS SCHEDULED
Refills: 0
Start: 2017-01-01

## 2017-01-01 RX ORDER — PANTOPRAZOLE SODIUM 40 MG/1
40 TABLET, DELAYED RELEASE ORAL
Refills: 0
Start: 2017-01-01

## 2017-01-01 RX ADMIN — PANTOPRAZOLE SODIUM 40 MG: 40 TABLET, DELAYED RELEASE ORAL at 17:26

## 2017-01-01 RX ADMIN — PREDNISONE 1 MG: 1 TABLET ORAL at 20:36

## 2017-01-01 RX ADMIN — METOPROLOL TARTRATE 50 MG: 50 TABLET ORAL at 21:33

## 2017-01-01 RX ADMIN — INSULIN LISPRO 1 UNITS: 100 INJECTION, SOLUTION INTRAVENOUS; SUBCUTANEOUS at 11:16

## 2017-01-01 RX ADMIN — Medication 325 MG: at 21:38

## 2017-01-01 RX ADMIN — SODIUM CHLORIDE 125 ML/HR: 0.9 INJECTION, SOLUTION INTRAVENOUS at 04:42

## 2017-01-01 RX ADMIN — POTASSIUM CHLORIDE 20 MEQ: 1500 TABLET, EXTENDED RELEASE ORAL at 08:35

## 2017-01-01 RX ADMIN — SODIUM CHLORIDE 1000 ML: 0.9 INJECTION, SOLUTION INTRAVENOUS at 13:49

## 2017-01-01 RX ADMIN — INSULIN GLARGINE 40 UNITS: 100 INJECTION, SOLUTION SUBCUTANEOUS at 22:09

## 2017-01-01 RX ADMIN — INSULIN GLARGINE 30 UNITS: 100 INJECTION, SOLUTION SUBCUTANEOUS at 08:30

## 2017-01-01 RX ADMIN — DONEPEZIL HYDROCHLORIDE 5 MG: 5 TABLET, FILM COATED ORAL at 21:43

## 2017-01-01 RX ADMIN — METOPROLOL TARTRATE 50 MG: 50 TABLET ORAL at 22:12

## 2017-01-01 RX ADMIN — DEXTROSE AND SODIUM CHLORIDE 75 ML/HR: 5; 900 INJECTION, SOLUTION INTRAVENOUS at 03:53

## 2017-01-01 RX ADMIN — INSULIN LISPRO 10 UNITS: 100 INJECTION, SOLUTION INTRAVENOUS; SUBCUTANEOUS at 09:00

## 2017-01-01 RX ADMIN — ALPRAZOLAM 0.25 MG: 0.25 TABLET ORAL at 08:57

## 2017-01-01 RX ADMIN — PREDNISONE 1 MG: 1 TABLET ORAL at 17:51

## 2017-01-01 RX ADMIN — PREDNISONE 1 MG: 1 TABLET ORAL at 20:43

## 2017-01-01 RX ADMIN — PANTOPRAZOLE SODIUM 40 MG: 40 TABLET, DELAYED RELEASE ORAL at 16:56

## 2017-01-01 RX ADMIN — PANTOPRAZOLE SODIUM 40 MG: 40 TABLET, DELAYED RELEASE ORAL at 17:07

## 2017-01-01 RX ADMIN — PANTOPRAZOLE SODIUM 40 MG: 40 TABLET, DELAYED RELEASE ORAL at 17:35

## 2017-01-01 RX ADMIN — PIPERACILLIN SODIUM AND TAZOBACTAM SODIUM 4.5 G: 36; 4.5 INJECTION, POWDER, FOR SOLUTION INTRAVENOUS at 20:59

## 2017-01-01 RX ADMIN — OLMESARTAN MEDOXOMIL 40 MG: 20 TABLET, FILM COATED ORAL at 08:17

## 2017-01-01 RX ADMIN — INSULIN LISPRO 3 UNITS: 100 INJECTION, SOLUTION INTRAVENOUS; SUBCUTANEOUS at 10:13

## 2017-01-01 RX ADMIN — ENOXAPARIN SODIUM 30 MG: 30 INJECTION SUBCUTANEOUS at 09:36

## 2017-01-01 RX ADMIN — OXAZEPAM 10 MG: 10 CAPSULE, GELATIN COATED ORAL at 06:04

## 2017-01-01 RX ADMIN — MIRTAZAPINE 15 MG: 15 TABLET, FILM COATED ORAL at 22:32

## 2017-01-01 RX ADMIN — CEFEPIME HYDROCHLORIDE 1000 MG: 1 INJECTION, POWDER, FOR SOLUTION INTRAMUSCULAR; INTRAVENOUS at 14:31

## 2017-01-01 RX ADMIN — METOPROLOL TARTRATE 25 MG: 25 TABLET ORAL at 08:30

## 2017-01-01 RX ADMIN — INSULIN LISPRO 2 UNITS: 100 INJECTION, SOLUTION INTRAVENOUS; SUBCUTANEOUS at 22:43

## 2017-01-01 RX ADMIN — PANTOPRAZOLE SODIUM 40 MG: 40 INJECTION, POWDER, FOR SOLUTION INTRAVENOUS at 20:49

## 2017-01-01 RX ADMIN — CEFAZOLIN SODIUM 2000 MG: 2 SOLUTION INTRAVENOUS at 12:59

## 2017-01-01 RX ADMIN — PREDNISONE 1 MG: 1 TABLET ORAL at 21:57

## 2017-01-01 RX ADMIN — OXAZEPAM 10 MG: 10 CAPSULE, GELATIN COATED ORAL at 05:37

## 2017-01-01 RX ADMIN — INSULIN LISPRO 12 UNITS: 100 INJECTION, SOLUTION INTRAVENOUS; SUBCUTANEOUS at 00:26

## 2017-01-01 RX ADMIN — SODIUM CHLORIDE 125 ML/HR: 0.9 INJECTION, SOLUTION INTRAVENOUS at 11:57

## 2017-01-01 RX ADMIN — PREDNISONE 1 MG: 1 TABLET ORAL at 22:53

## 2017-01-01 RX ADMIN — ALPRAZOLAM 0.25 MG: 0.25 TABLET ORAL at 19:48

## 2017-01-01 RX ADMIN — PROPOFOL 30 MG: 10 INJECTION, EMULSION INTRAVENOUS at 12:12

## 2017-01-01 RX ADMIN — POTASSIUM CHLORIDE 20 MEQ: 1500 TABLET, EXTENDED RELEASE ORAL at 17:01

## 2017-01-01 RX ADMIN — PREDNISONE 1 MG: 1 TABLET ORAL at 21:32

## 2017-01-01 RX ADMIN — INSULIN LISPRO 6 UNITS: 100 INJECTION, SOLUTION INTRAVENOUS; SUBCUTANEOUS at 17:48

## 2017-01-01 RX ADMIN — INSULIN GLARGINE 70 UNITS: 100 INJECTION, SOLUTION SUBCUTANEOUS at 08:25

## 2017-01-01 RX ADMIN — ACETAMINOPHEN 650 MG: 325 TABLET, FILM COATED ORAL at 18:51

## 2017-01-01 RX ADMIN — ENOXAPARIN SODIUM 90 MG: 100 INJECTION SUBCUTANEOUS at 08:07

## 2017-01-01 RX ADMIN — LORAZEPAM 0.5 MG: 2 INJECTION INTRAMUSCULAR; INTRAVENOUS at 13:36

## 2017-01-01 RX ADMIN — PRAVASTATIN SODIUM 20 MG: 20 TABLET ORAL at 17:01

## 2017-01-01 RX ADMIN — PREDNISONE 1 MG: 1 TABLET ORAL at 09:26

## 2017-01-01 RX ADMIN — ACETAMINOPHEN 650 MG: 325 TABLET, FILM COATED ORAL at 08:18

## 2017-01-01 RX ADMIN — PANTOPRAZOLE SODIUM 40 MG: 40 TABLET, DELAYED RELEASE ORAL at 17:01

## 2017-01-01 RX ADMIN — ALPRAZOLAM 0.25 MG: 0.25 TABLET ORAL at 12:12

## 2017-01-01 RX ADMIN — PRAVASTATIN SODIUM 20 MG: 20 TABLET ORAL at 15:40

## 2017-01-01 RX ADMIN — IOHEXOL 100 ML: 350 INJECTION, SOLUTION INTRAVENOUS at 14:38

## 2017-01-01 RX ADMIN — CEFAZOLIN SODIUM 1000 MG: 1 SOLUTION INTRAVENOUS at 16:56

## 2017-01-01 RX ADMIN — ALPRAZOLAM 0.25 MG: 0.25 TABLET ORAL at 08:24

## 2017-01-01 RX ADMIN — ASPIRIN 81 MG: 81 TABLET, COATED ORAL at 08:46

## 2017-01-01 RX ADMIN — ONDANSETRON 4 MG: 2 INJECTION INTRAMUSCULAR; INTRAVENOUS at 15:33

## 2017-01-01 RX ADMIN — PRAVASTATIN SODIUM 20 MG: 20 TABLET ORAL at 17:12

## 2017-01-01 RX ADMIN — ALBUMIN HUMAN 12.5 G: 0.25 SOLUTION INTRAVENOUS at 00:24

## 2017-01-01 RX ADMIN — ASPIRIN 325 MG: 325 TABLET, DELAYED RELEASE ORAL at 08:22

## 2017-01-01 RX ADMIN — OXAZEPAM 10 MG: 10 CAPSULE, GELATIN COATED ORAL at 05:47

## 2017-01-01 RX ADMIN — INSULIN GLARGINE 30 UNITS: 100 INJECTION, SOLUTION SUBCUTANEOUS at 21:58

## 2017-01-01 RX ADMIN — PRAVASTATIN SODIUM 20 MG: 20 TABLET ORAL at 16:56

## 2017-01-01 RX ADMIN — Medication 250 MG: at 18:04

## 2017-01-01 RX ADMIN — INSULIN LISPRO 4 UNITS: 100 INJECTION, SOLUTION INTRAVENOUS; SUBCUTANEOUS at 23:45

## 2017-01-01 RX ADMIN — DEXAMETHASONE SODIUM PHOSPHATE: 10 INJECTION, SOLUTION INTRAMUSCULAR; INTRAVENOUS at 11:04

## 2017-01-01 RX ADMIN — INSULIN LISPRO 6 UNITS: 100 INJECTION, SOLUTION INTRAVENOUS; SUBCUTANEOUS at 07:19

## 2017-01-01 RX ADMIN — DEXTROSE AND SODIUM CHLORIDE 125 ML/HR: 5; .9 INJECTION, SOLUTION INTRAVENOUS at 14:00

## 2017-01-01 RX ADMIN — INSULIN LISPRO 27 UNITS: 100 INJECTION, SOLUTION INTRAVENOUS; SUBCUTANEOUS at 12:10

## 2017-01-01 RX ADMIN — INSULIN LISPRO 2 UNITS: 100 INJECTION, SOLUTION INTRAVENOUS; SUBCUTANEOUS at 17:51

## 2017-01-01 RX ADMIN — SODIUM CHLORIDE 50 ML/HR: 0.9 INJECTION, SOLUTION INTRAVENOUS at 19:04

## 2017-01-01 RX ADMIN — PREDNISONE 1 MG: 1 TABLET ORAL at 16:00

## 2017-01-01 RX ADMIN — MIRTAZAPINE 15 MG: 15 TABLET, FILM COATED ORAL at 21:57

## 2017-01-01 RX ADMIN — POTASSIUM PHOSPHATE, MONOBASIC AND POTASSIUM PHOSPHATE, DIBASIC 30 MMOL: 224; 236 INJECTION, SOLUTION INTRAVENOUS at 22:47

## 2017-01-01 RX ADMIN — LABETALOL HYDROCHLORIDE 10 MG: 5 INJECTION, SOLUTION INTRAVENOUS at 15:09

## 2017-01-01 RX ADMIN — WARFARIN SODIUM 5 MG: 5 TABLET ORAL at 18:14

## 2017-01-01 RX ADMIN — PANTOPRAZOLE SODIUM 40 MG: 40 INJECTION, POWDER, FOR SOLUTION INTRAVENOUS at 09:02

## 2017-01-01 RX ADMIN — DONEPEZIL HYDROCHLORIDE 5 MG: 5 TABLET, FILM COATED ORAL at 22:12

## 2017-01-01 RX ADMIN — PREDNISONE 1 MG: 1 TABLET ORAL at 23:25

## 2017-01-01 RX ADMIN — IOHEXOL 100 ML: 350 INJECTION, SOLUTION INTRAVENOUS at 16:01

## 2017-01-01 RX ADMIN — PREDNISONE 1 MG: 1 TABLET ORAL at 17:27

## 2017-01-01 RX ADMIN — PANTOPRAZOLE SODIUM 40 MG: 40 TABLET, DELAYED RELEASE ORAL at 06:09

## 2017-01-01 RX ADMIN — CEFEPIME HYDROCHLORIDE 1000 MG: 1 INJECTION, POWDER, FOR SOLUTION INTRAMUSCULAR; INTRAVENOUS at 02:25

## 2017-01-01 RX ADMIN — PANTOPRAZOLE SODIUM 40 MG: 40 TABLET, DELAYED RELEASE ORAL at 06:02

## 2017-01-01 RX ADMIN — CEFAZOLIN SODIUM 1000 MG: 1 SOLUTION INTRAVENOUS at 03:14

## 2017-01-01 RX ADMIN — SODIUM CHLORIDE 20 ML/HR: 0.9 INJECTION, SOLUTION INTRAVENOUS at 10:00

## 2017-01-01 RX ADMIN — ALPRAZOLAM 0.25 MG: 0.25 TABLET ORAL at 17:16

## 2017-01-01 RX ADMIN — ENOXAPARIN SODIUM 60 MG: 60 INJECTION SUBCUTANEOUS at 08:21

## 2017-01-01 RX ADMIN — ASPIRIN 325 MG: 325 TABLET, DELAYED RELEASE ORAL at 08:09

## 2017-01-01 RX ADMIN — INSULIN GLARGINE 40 UNITS: 100 INJECTION, SOLUTION SUBCUTANEOUS at 09:09

## 2017-01-01 RX ADMIN — PANTOPRAZOLE SODIUM 40 MG: 40 TABLET, DELAYED RELEASE ORAL at 17:09

## 2017-01-01 RX ADMIN — METOPROLOL TARTRATE 25 MG: 25 TABLET ORAL at 20:27

## 2017-01-01 RX ADMIN — PIPERACILLIN SODIUM AND TAZOBACTAM SODIUM 4.5 G: 36; 4.5 INJECTION, POWDER, FOR SOLUTION INTRAVENOUS at 03:05

## 2017-01-01 RX ADMIN — INSULIN LISPRO 27 UNITS: 100 INJECTION, SOLUTION INTRAVENOUS; SUBCUTANEOUS at 07:47

## 2017-01-01 RX ADMIN — METOPROLOL TARTRATE 25 MG: 25 TABLET ORAL at 08:18

## 2017-01-01 RX ADMIN — ENOXAPARIN SODIUM 60 MG: 60 INJECTION SUBCUTANEOUS at 21:57

## 2017-01-01 RX ADMIN — PREDNISONE 1 MG: 1 TABLET ORAL at 09:02

## 2017-01-01 RX ADMIN — INSULIN LISPRO 2 UNITS: 100 INJECTION, SOLUTION INTRAVENOUS; SUBCUTANEOUS at 07:47

## 2017-01-01 RX ADMIN — OXAZEPAM 10 MG: 10 CAPSULE, GELATIN COATED ORAL at 13:11

## 2017-01-01 RX ADMIN — PREDNISONE 1 MG: 1 TABLET ORAL at 17:35

## 2017-01-01 RX ADMIN — DEXAMETHASONE SODIUM PHOSPHATE: 10 INJECTION, SOLUTION INTRAMUSCULAR; INTRAVENOUS at 12:11

## 2017-01-01 RX ADMIN — PRAVASTATIN SODIUM 20 MG: 20 TABLET ORAL at 17:30

## 2017-01-01 RX ADMIN — DEXTROSE MONOHYDRATE 50 ML: 500 INJECTION PARENTERAL at 14:20

## 2017-01-01 RX ADMIN — ONDANSETRON 4 MG: 2 INJECTION INTRAMUSCULAR; INTRAVENOUS at 02:55

## 2017-01-01 RX ADMIN — ASPIRIN 81 MG: 81 TABLET, COATED ORAL at 08:53

## 2017-01-01 RX ADMIN — CEFAZOLIN SODIUM 1000 MG: 1 SOLUTION INTRAVENOUS at 01:17

## 2017-01-01 RX ADMIN — METOPROLOL TARTRATE 12.5 MG: 25 TABLET ORAL at 22:59

## 2017-01-01 RX ADMIN — INSULIN LISPRO 6 UNITS: 100 INJECTION, SOLUTION INTRAVENOUS; SUBCUTANEOUS at 17:31

## 2017-01-01 RX ADMIN — PREDNISONE 1 MG: 1 TABLET ORAL at 21:58

## 2017-01-01 RX ADMIN — DEXTROSE 20 ML/HR: 5 SOLUTION INTRAVENOUS at 11:56

## 2017-01-01 RX ADMIN — ENOXAPARIN SODIUM 90 MG: 100 INJECTION SUBCUTANEOUS at 18:23

## 2017-01-01 RX ADMIN — CEFAZOLIN SODIUM 1000 MG: 1 SOLUTION INTRAVENOUS at 02:27

## 2017-01-01 RX ADMIN — INSULIN LISPRO 27 UNITS: 100 INJECTION, SOLUTION INTRAVENOUS; SUBCUTANEOUS at 12:15

## 2017-01-01 RX ADMIN — OXAZEPAM 10 MG: 10 CAPSULE, GELATIN COATED ORAL at 22:31

## 2017-01-01 RX ADMIN — ASPIRIN 81 MG: 81 TABLET, COATED ORAL at 08:30

## 2017-01-01 RX ADMIN — INSULIN LISPRO 4 UNITS: 100 INJECTION, SOLUTION INTRAVENOUS; SUBCUTANEOUS at 21:18

## 2017-01-01 RX ADMIN — INSULIN LISPRO 6 UNITS: 100 INJECTION, SOLUTION INTRAVENOUS; SUBCUTANEOUS at 15:20

## 2017-01-01 RX ADMIN — ENOXAPARIN SODIUM 60 MG: 60 INJECTION SUBCUTANEOUS at 09:08

## 2017-01-01 RX ADMIN — HYDRALAZINE HYDROCHLORIDE 50 MG: 25 TABLET, FILM COATED ORAL at 22:11

## 2017-01-01 RX ADMIN — DIAZEPAM 2 MG: 2 TABLET ORAL at 20:48

## 2017-01-01 RX ADMIN — FLUCONAZOLE 100 MG: 100 TABLET ORAL at 17:27

## 2017-01-01 RX ADMIN — GEMCITABINE 1400 MG: 1 INJECTION, POWDER, LYOPHILIZED, FOR SOLUTION INTRAVENOUS at 12:57

## 2017-01-01 RX ADMIN — INSULIN LISPRO 27 UNITS: 100 INJECTION, SOLUTION INTRAVENOUS; SUBCUTANEOUS at 08:08

## 2017-01-01 RX ADMIN — CEFAZOLIN SODIUM 1000 MG: 1 SOLUTION INTRAVENOUS at 17:50

## 2017-01-01 RX ADMIN — INSULIN GLARGINE 70 UNITS: 100 INJECTION, SOLUTION SUBCUTANEOUS at 08:08

## 2017-01-01 RX ADMIN — DEXTROSE MONOHYDRATE 25 ML: 25 INJECTION, SOLUTION INTRAVENOUS at 18:24

## 2017-01-01 RX ADMIN — Medication 325 MG: at 09:30

## 2017-01-01 RX ADMIN — HYDRALAZINE HYDROCHLORIDE 50 MG: 25 TABLET, FILM COATED ORAL at 17:35

## 2017-01-01 RX ADMIN — INSULIN LISPRO 27 UNITS: 100 INJECTION, SOLUTION INTRAVENOUS; SUBCUTANEOUS at 13:05

## 2017-01-01 RX ADMIN — ASPIRIN 81 MG: 81 TABLET, COATED ORAL at 11:07

## 2017-01-01 RX ADMIN — OXYBUTYNIN CHLORIDE 5 MG: 5 TABLET, EXTENDED RELEASE ORAL at 09:01

## 2017-01-01 RX ADMIN — ENOXAPARIN SODIUM 60 MG: 60 INJECTION SUBCUTANEOUS at 08:31

## 2017-01-01 RX ADMIN — INSULIN GLARGINE 50 UNITS: 100 INJECTION, SOLUTION SUBCUTANEOUS at 08:57

## 2017-01-01 RX ADMIN — INSULIN LISPRO 1 UNITS: 100 INJECTION, SOLUTION INTRAVENOUS; SUBCUTANEOUS at 17:46

## 2017-01-01 RX ADMIN — METOPROLOL TARTRATE 12.5 MG: 25 TABLET ORAL at 08:46

## 2017-01-01 RX ADMIN — INSULIN LISPRO 2 UNITS: 100 INJECTION, SOLUTION INTRAVENOUS; SUBCUTANEOUS at 08:40

## 2017-01-01 RX ADMIN — HEPARIN 300 UNITS: 100 SYRINGE at 14:40

## 2017-01-01 RX ADMIN — VANCOMYCIN HYDROCHLORIDE 1000 MG: 1 INJECTION, SOLUTION INTRAVENOUS at 10:17

## 2017-01-01 RX ADMIN — PREDNISONE 1 MG: 1 TABLET ORAL at 16:55

## 2017-01-01 RX ADMIN — LIDOCAINE HYDROCHLORIDE 30 MG: 10 INJECTION, SOLUTION INFILTRATION; PERINEURAL at 13:01

## 2017-01-01 RX ADMIN — FENTANYL CITRATE 50 MCG: 50 INJECTION, SOLUTION INTRAMUSCULAR; INTRAVENOUS at 13:16

## 2017-01-01 RX ADMIN — PANTOPRAZOLE SODIUM 40 MG: 40 TABLET, DELAYED RELEASE ORAL at 08:00

## 2017-01-01 RX ADMIN — PREDNISONE 1 MG: 1 TABLET ORAL at 09:08

## 2017-01-01 RX ADMIN — PANTOPRAZOLE SODIUM 40 MG: 40 TABLET, DELAYED RELEASE ORAL at 06:31

## 2017-01-01 RX ADMIN — MINERAL OIL AND WHITE PETROLATUM: 150; 830 OINTMENT OPHTHALMIC at 21:02

## 2017-01-01 RX ADMIN — OXAZEPAM 15 MG: 15 CAPSULE, GELATIN COATED ORAL at 05:52

## 2017-01-01 RX ADMIN — PREDNISONE 1 MG: 1 TABLET ORAL at 21:20

## 2017-01-01 RX ADMIN — ASPIRIN 81 MG: 81 TABLET, COATED ORAL at 08:18

## 2017-01-01 RX ADMIN — DEXTROSE 20 ML/HR: 5 SOLUTION INTRAVENOUS at 12:05

## 2017-01-01 RX ADMIN — SODIUM CHLORIDE 75 ML/HR: 0.9 INJECTION, SOLUTION INTRAVENOUS at 22:56

## 2017-01-01 RX ADMIN — DOCUSATE SODIUM 100 MG: 100 CAPSULE, LIQUID FILLED ORAL at 21:38

## 2017-01-01 RX ADMIN — PANTOPRAZOLE SODIUM 40 MG: 40 TABLET, DELAYED RELEASE ORAL at 17:30

## 2017-01-01 RX ADMIN — INSULIN LISPRO 2 UNITS: 100 INJECTION, SOLUTION INTRAVENOUS; SUBCUTANEOUS at 22:09

## 2017-01-01 RX ADMIN — SODIUM CHLORIDE 125 ML/HR: 0.9 INJECTION, SOLUTION INTRAVENOUS at 14:55

## 2017-01-01 RX ADMIN — PANTOPRAZOLE SODIUM 40 MG: 40 TABLET, DELAYED RELEASE ORAL at 16:19

## 2017-01-01 RX ADMIN — OXALIPLATIN 136 MG: 5 INJECTION, SOLUTION, CONCENTRATE INTRAVENOUS at 12:08

## 2017-01-01 RX ADMIN — ACETAMINOPHEN 650 MG: 325 TABLET, FILM COATED ORAL at 15:06

## 2017-01-01 RX ADMIN — ASPIRIN 81 MG: 81 TABLET, COATED ORAL at 09:46

## 2017-01-01 RX ADMIN — INSULIN LISPRO 2 UNITS: 100 INJECTION, SOLUTION INTRAVENOUS; SUBCUTANEOUS at 17:12

## 2017-01-01 RX ADMIN — PANTOPRAZOLE SODIUM 40 MG: 40 TABLET, DELAYED RELEASE ORAL at 17:12

## 2017-01-01 RX ADMIN — PANTOPRAZOLE SODIUM 40 MG: 40 TABLET, DELAYED RELEASE ORAL at 16:54

## 2017-01-01 RX ADMIN — ACETAMINOPHEN 650 MG: 325 TABLET, FILM COATED ORAL at 23:30

## 2017-01-01 RX ADMIN — INSULIN LISPRO 2 UNITS: 100 INJECTION, SOLUTION INTRAVENOUS; SUBCUTANEOUS at 08:19

## 2017-01-01 RX ADMIN — INSULIN GLARGINE 40 UNITS: 100 INJECTION, SOLUTION SUBCUTANEOUS at 08:20

## 2017-01-01 RX ADMIN — DOCUSATE SODIUM 100 MG: 100 CAPSULE, LIQUID FILLED ORAL at 17:35

## 2017-01-01 RX ADMIN — DONEPEZIL HYDROCHLORIDE 5 MG: 5 TABLET, FILM COATED ORAL at 21:32

## 2017-01-01 RX ADMIN — METOPROLOL TARTRATE 50 MG: 50 TABLET ORAL at 11:08

## 2017-01-01 RX ADMIN — PANTOPRAZOLE SODIUM 40 MG: 40 TABLET, DELAYED RELEASE ORAL at 09:38

## 2017-01-01 RX ADMIN — GEMCITABINE 1400 MG: 1 INJECTION, POWDER, LYOPHILIZED, FOR SOLUTION INTRAVENOUS at 11:04

## 2017-01-01 RX ADMIN — SODIUM CHLORIDE 125 ML/HR: 0.9 INJECTION, SOLUTION INTRAVENOUS at 08:11

## 2017-01-01 RX ADMIN — CEFEPIME HYDROCHLORIDE 1000 MG: 1 INJECTION, POWDER, FOR SOLUTION INTRAMUSCULAR; INTRAVENOUS at 16:05

## 2017-01-01 RX ADMIN — INSULIN LISPRO 10 UNITS: 100 INJECTION, SOLUTION INTRAVENOUS; SUBCUTANEOUS at 12:06

## 2017-01-01 RX ADMIN — INSULIN LISPRO 2 UNITS: 100 INJECTION, SOLUTION INTRAVENOUS; SUBCUTANEOUS at 08:30

## 2017-01-01 RX ADMIN — PREDNISONE 3 MG: 1 TABLET ORAL at 09:39

## 2017-01-01 RX ADMIN — PANTOPRAZOLE SODIUM 40 MG: 40 TABLET, DELAYED RELEASE ORAL at 16:29

## 2017-01-01 RX ADMIN — ENOXAPARIN SODIUM 60 MG: 60 INJECTION SUBCUTANEOUS at 08:54

## 2017-01-01 RX ADMIN — INSULIN GLARGINE 15 UNITS: 100 INJECTION, SOLUTION SUBCUTANEOUS at 10:17

## 2017-01-01 RX ADMIN — ACETAMINOPHEN 650 MG: 325 TABLET, FILM COATED ORAL at 17:57

## 2017-01-01 RX ADMIN — INSULIN LISPRO 4 UNITS: 100 INJECTION, SOLUTION INTRAVENOUS; SUBCUTANEOUS at 11:42

## 2017-01-01 RX ADMIN — PREDNISONE 3 MG: 1 TABLET ORAL at 08:14

## 2017-01-01 RX ADMIN — ALPRAZOLAM 0.25 MG: 0.25 TABLET ORAL at 08:36

## 2017-01-01 RX ADMIN — DIAZEPAM 2 MG: 2 TABLET ORAL at 17:01

## 2017-01-01 RX ADMIN — PREDNISONE 1 MG: 1 TABLET ORAL at 16:12

## 2017-01-01 RX ADMIN — SENNOSIDES 8.6 MG: 8.6 TABLET, FILM COATED ORAL at 11:07

## 2017-01-01 RX ADMIN — OXAZEPAM 15 MG: 15 CAPSULE, GELATIN COATED ORAL at 15:27

## 2017-01-01 RX ADMIN — Medication 250 MG: at 17:05

## 2017-01-01 RX ADMIN — METOPROLOL TARTRATE 25 MG: 25 TABLET ORAL at 08:20

## 2017-01-01 RX ADMIN — PRAVASTATIN SODIUM 20 MG: 20 TABLET ORAL at 16:12

## 2017-01-01 RX ADMIN — MIRTAZAPINE 15 MG: 15 TABLET, FILM COATED ORAL at 21:12

## 2017-01-01 RX ADMIN — PREDNISONE 1 MG: 1 TABLET ORAL at 08:31

## 2017-01-01 RX ADMIN — PROPOFOL 30 MG: 10 INJECTION, EMULSION INTRAVENOUS at 12:15

## 2017-01-01 RX ADMIN — INSULIN LISPRO 4 UNITS: 100 INJECTION, SOLUTION INTRAVENOUS; SUBCUTANEOUS at 13:06

## 2017-01-01 RX ADMIN — PREDNISONE 1 MG: 1 TABLET ORAL at 17:09

## 2017-01-01 RX ADMIN — CEFAZOLIN SODIUM 1000 MG: 1 SOLUTION INTRAVENOUS at 01:10

## 2017-01-01 RX ADMIN — PROPOFOL 150 MG: 10 INJECTION, EMULSION INTRAVENOUS at 13:01

## 2017-01-01 RX ADMIN — PANTOPRAZOLE SODIUM 40 MG: 40 TABLET, DELAYED RELEASE ORAL at 05:46

## 2017-01-01 RX ADMIN — WARFARIN SODIUM 7.5 MG: 7.5 TABLET ORAL at 17:26

## 2017-01-01 RX ADMIN — MIRTAZAPINE 15 MG: 15 TABLET, FILM COATED ORAL at 22:31

## 2017-01-01 RX ADMIN — INSULIN GLARGINE 70 UNITS: 100 INJECTION, SOLUTION SUBCUTANEOUS at 09:47

## 2017-01-01 RX ADMIN — PREDNISONE 1 MG: 1 TABLET ORAL at 09:25

## 2017-01-01 RX ADMIN — PANTOPRAZOLE SODIUM 40 MG: 40 TABLET, DELAYED RELEASE ORAL at 05:47

## 2017-01-01 RX ADMIN — INSULIN GLARGINE 35 UNITS: 100 INJECTION, SOLUTION SUBCUTANEOUS at 08:24

## 2017-01-01 RX ADMIN — PIPERACILLIN SODIUM AND TAZOBACTAM SODIUM 4.5 G: 36; 4.5 INJECTION, POWDER, FOR SOLUTION INTRAVENOUS at 08:37

## 2017-01-01 RX ADMIN — PANTOPRAZOLE SODIUM 40 MG: 40 TABLET, DELAYED RELEASE ORAL at 05:52

## 2017-01-01 RX ADMIN — METOPROLOL TARTRATE 25 MG: 25 TABLET ORAL at 21:38

## 2017-01-01 RX ADMIN — HEPARIN SODIUM 8.11 UNITS/KG/HR: 10000 INJECTION, SOLUTION INTRAVENOUS at 23:00

## 2017-01-01 RX ADMIN — INSULIN LISPRO 1 UNITS: 100 INJECTION, SOLUTION INTRAVENOUS; SUBCUTANEOUS at 16:44

## 2017-01-01 RX ADMIN — ACETAMINOPHEN 650 MG: 325 TABLET, FILM COATED ORAL at 05:46

## 2017-01-01 RX ADMIN — METOPROLOL TARTRATE 50 MG: 50 TABLET ORAL at 23:25

## 2017-01-01 RX ADMIN — SODIUM CHLORIDE 500 ML: 0.9 INJECTION, SOLUTION INTRAVENOUS at 10:45

## 2017-01-01 RX ADMIN — FLUOXETINE 20 MG: 20 CAPSULE ORAL at 11:07

## 2017-01-01 RX ADMIN — HEPARIN 300 UNITS: 100 SYRINGE at 16:00

## 2017-01-01 RX ADMIN — INSULIN LISPRO 6 UNITS: 100 INJECTION, SOLUTION INTRAVENOUS; SUBCUTANEOUS at 16:55

## 2017-01-01 RX ADMIN — PANTOPRAZOLE SODIUM 40 MG: 40 TABLET, DELAYED RELEASE ORAL at 17:17

## 2017-01-01 RX ADMIN — ALPRAZOLAM 0.25 MG: 0.25 TABLET ORAL at 18:03

## 2017-01-01 RX ADMIN — PANTOPRAZOLE SODIUM 40 MG: 40 TABLET, DELAYED RELEASE ORAL at 08:12

## 2017-01-01 RX ADMIN — SODIUM CHLORIDE 20 ML/HR: 0.9 INJECTION, SOLUTION INTRAVENOUS at 11:05

## 2017-01-01 RX ADMIN — ASPIRIN 81 MG: 81 TABLET, COATED ORAL at 09:24

## 2017-01-01 RX ADMIN — PREDNISONE 1 MG: 1 TABLET ORAL at 17:32

## 2017-01-01 RX ADMIN — DIAZEPAM 2 MG: 2 TABLET ORAL at 22:09

## 2017-01-01 RX ADMIN — INSULIN LISPRO 2 UNITS: 100 INJECTION, SOLUTION INTRAVENOUS; SUBCUTANEOUS at 22:53

## 2017-01-01 RX ADMIN — PREDNISONE 3 MG: 1 TABLET ORAL at 08:21

## 2017-01-01 RX ADMIN — INSULIN LISPRO 2 UNITS: 100 INJECTION, SOLUTION INTRAVENOUS; SUBCUTANEOUS at 08:09

## 2017-01-01 RX ADMIN — INSULIN LISPRO 6 UNITS: 100 INJECTION, SOLUTION INTRAVENOUS; SUBCUTANEOUS at 11:41

## 2017-01-01 RX ADMIN — ASPIRIN 325 MG: 325 TABLET, DELAYED RELEASE ORAL at 08:24

## 2017-01-01 RX ADMIN — FLUCONAZOLE 200 MG: 2 INJECTION INTRAVENOUS at 16:55

## 2017-01-01 RX ADMIN — INSULIN LISPRO 27 UNITS: 100 INJECTION, SOLUTION INTRAVENOUS; SUBCUTANEOUS at 17:27

## 2017-01-01 RX ADMIN — SODIUM CHLORIDE 20 ML/HR: 0.9 INJECTION, SOLUTION INTRAVENOUS at 09:30

## 2017-01-01 RX ADMIN — OXYBUTYNIN CHLORIDE 5 MG: 5 TABLET, EXTENDED RELEASE ORAL at 08:59

## 2017-01-01 RX ADMIN — INSULIN LISPRO 6 UNITS: 100 INJECTION, SOLUTION INTRAVENOUS; SUBCUTANEOUS at 12:03

## 2017-01-01 RX ADMIN — PREDNISONE 3 MG: 1 TABLET ORAL at 08:24

## 2017-01-01 RX ADMIN — ENOXAPARIN SODIUM 90 MG: 100 INJECTION SUBCUTANEOUS at 09:25

## 2017-01-01 RX ADMIN — DEXTROSE MONOHYDRATE 25 ML: 25 INJECTION, SOLUTION INTRAVENOUS at 03:53

## 2017-01-01 RX ADMIN — Medication 250 MG: at 08:03

## 2017-01-01 RX ADMIN — HEPARIN SODIUM 5000 UNITS: 5000 INJECTION, SOLUTION INTRAVENOUS; SUBCUTANEOUS at 12:15

## 2017-01-01 RX ADMIN — INSULIN GLARGINE 40 UNITS: 100 INJECTION, SOLUTION SUBCUTANEOUS at 12:28

## 2017-01-01 RX ADMIN — HYDRALAZINE HYDROCHLORIDE 50 MG: 25 TABLET, FILM COATED ORAL at 11:07

## 2017-01-01 RX ADMIN — DEXTROSE AND SODIUM CHLORIDE 75 ML/HR: 5; 900 INJECTION, SOLUTION INTRAVENOUS at 17:38

## 2017-01-01 RX ADMIN — INSULIN LISPRO 2 UNITS: 100 INJECTION, SOLUTION INTRAVENOUS; SUBCUTANEOUS at 09:52

## 2017-01-01 RX ADMIN — INSULIN LISPRO 4 UNITS: 100 INJECTION, SOLUTION INTRAVENOUS; SUBCUTANEOUS at 21:58

## 2017-01-01 RX ADMIN — HEPARIN SODIUM AND DEXTROSE 11.8 UNITS/KG/HR: 10000; 5 INJECTION INTRAVENOUS at 14:20

## 2017-01-01 RX ADMIN — INSULIN LISPRO 27 UNITS: 100 INJECTION, SOLUTION INTRAVENOUS; SUBCUTANEOUS at 09:25

## 2017-01-01 RX ADMIN — PANTOPRAZOLE SODIUM 40 MG: 40 TABLET, DELAYED RELEASE ORAL at 15:59

## 2017-01-01 RX ADMIN — ASPIRIN 81 MG: 81 TABLET, COATED ORAL at 08:57

## 2017-01-01 RX ADMIN — INSULIN LISPRO 8 UNITS: 100 INJECTION, SOLUTION INTRAVENOUS; SUBCUTANEOUS at 11:25

## 2017-01-01 RX ADMIN — PANTOPRAZOLE SODIUM 40 MG: 40 TABLET, DELAYED RELEASE ORAL at 06:03

## 2017-01-01 RX ADMIN — ENOXAPARIN SODIUM 60 MG: 60 INJECTION SUBCUTANEOUS at 20:42

## 2017-01-01 RX ADMIN — METOPROLOL TARTRATE 25 MG: 25 TABLET ORAL at 22:31

## 2017-01-01 RX ADMIN — METOPROLOL TARTRATE 25 MG: 25 TABLET ORAL at 08:44

## 2017-01-01 RX ADMIN — PRAVASTATIN SODIUM 20 MG: 20 TABLET ORAL at 17:26

## 2017-01-01 RX ADMIN — PANTOPRAZOLE SODIUM 40 MG: 40 TABLET, DELAYED RELEASE ORAL at 17:53

## 2017-01-01 RX ADMIN — INSULIN GLARGINE 35 UNITS: 100 INJECTION, SOLUTION SUBCUTANEOUS at 08:04

## 2017-01-01 RX ADMIN — Medication 325 MG: at 08:04

## 2017-01-01 RX ADMIN — PANTOPRAZOLE SODIUM 40 MG: 40 TABLET, DELAYED RELEASE ORAL at 07:40

## 2017-01-01 RX ADMIN — INSULIN LISPRO 10 UNITS: 100 INJECTION, SOLUTION INTRAVENOUS; SUBCUTANEOUS at 17:11

## 2017-01-01 RX ADMIN — ALBUMIN HUMAN 25 G: 0.25 SOLUTION INTRAVENOUS at 15:32

## 2017-01-01 RX ADMIN — PIPERACILLIN SODIUM AND TAZOBACTAM SODIUM 4.5 G: 36; 4.5 INJECTION, POWDER, FOR SOLUTION INTRAVENOUS at 03:53

## 2017-01-01 RX ADMIN — ENOXAPARIN SODIUM 90 MG: 100 INJECTION SUBCUTANEOUS at 05:45

## 2017-01-01 RX ADMIN — INSULIN LISPRO 6 UNITS: 100 INJECTION, SOLUTION INTRAVENOUS; SUBCUTANEOUS at 06:43

## 2017-01-01 RX ADMIN — OXALIPLATIN 136 MG: 5 INJECTION, SOLUTION, CONCENTRATE INTRAVENOUS at 13:49

## 2017-01-01 RX ADMIN — INSULIN LISPRO 6 UNITS: 100 INJECTION, SOLUTION INTRAVENOUS; SUBCUTANEOUS at 12:11

## 2017-01-01 RX ADMIN — LABETALOL 20 MG/4 ML (5 MG/ML) INTRAVENOUS SYRINGE 10 MG: at 02:56

## 2017-01-01 RX ADMIN — HEPARIN SODIUM AND DEXTROSE 11 UNITS/KG/HR: 10000; 5 INJECTION INTRAVENOUS at 18:33

## 2017-01-01 RX ADMIN — SODIUM CHLORIDE 20 ML/HR: 0.9 INJECTION, SOLUTION INTRAVENOUS at 10:29

## 2017-01-01 RX ADMIN — PREDNISONE 1 MG: 1 TABLET ORAL at 22:31

## 2017-01-01 RX ADMIN — DIAZEPAM 2 MG: 2 TABLET ORAL at 22:53

## 2017-01-01 RX ADMIN — POLYVINYL ALCOHOL 1 DROP: 14 SOLUTION/ DROPS OPHTHALMIC at 22:07

## 2017-01-01 RX ADMIN — ALPRAZOLAM 0.25 MG: 0.25 TABLET ORAL at 21:12

## 2017-01-01 RX ADMIN — OXYBUTYNIN CHLORIDE 5 MG: 5 TABLET, EXTENDED RELEASE ORAL at 17:16

## 2017-01-01 RX ADMIN — DIAZEPAM 2 MG: 2 TABLET ORAL at 08:30

## 2017-01-01 RX ADMIN — DONEPEZIL HYDROCHLORIDE 5 MG: 5 TABLET, FILM COATED ORAL at 21:02

## 2017-01-01 RX ADMIN — OXYBUTYNIN CHLORIDE 5 MG: 5 TABLET, EXTENDED RELEASE ORAL at 08:20

## 2017-01-01 RX ADMIN — Medication 250 MG: at 08:12

## 2017-01-01 RX ADMIN — SODIUM CHLORIDE 500 ML: 0.9 INJECTION, SOLUTION INTRAVENOUS at 12:21

## 2017-01-01 RX ADMIN — INSULIN LISPRO 6 UNITS: 100 INJECTION, SOLUTION INTRAVENOUS; SUBCUTANEOUS at 11:29

## 2017-01-01 RX ADMIN — ASPIRIN 81 MG: 81 TABLET, COATED ORAL at 08:04

## 2017-01-01 RX ADMIN — ASPIRIN 325 MG: 325 TABLET, DELAYED RELEASE ORAL at 08:03

## 2017-01-01 RX ADMIN — CEFAZOLIN SODIUM 1000 MG: 1 SOLUTION INTRAVENOUS at 01:15

## 2017-01-01 RX ADMIN — ENOXAPARIN SODIUM 60 MG: 60 INJECTION SUBCUTANEOUS at 21:58

## 2017-01-01 RX ADMIN — HEPARIN SODIUM 11.1 UNITS/KG/HR: 10000 INJECTION, SOLUTION INTRAVENOUS at 18:15

## 2017-01-01 RX ADMIN — OXYBUTYNIN CHLORIDE 5 MG: 5 TABLET, EXTENDED RELEASE ORAL at 08:53

## 2017-01-01 RX ADMIN — INSULIN LISPRO 15 UNITS: 100 INJECTION, SOLUTION INTRAVENOUS; SUBCUTANEOUS at 11:40

## 2017-01-01 RX ADMIN — PREDNISONE 3 MG: 1 TABLET ORAL at 08:09

## 2017-01-01 RX ADMIN — ASPIRIN 81 MG: 81 TABLET, COATED ORAL at 10:12

## 2017-01-01 RX ADMIN — ALPRAZOLAM 0.25 MG: 0.25 TABLET ORAL at 09:30

## 2017-01-01 RX ADMIN — ACETAMINOPHEN 650 MG: 325 TABLET, FILM COATED ORAL at 16:33

## 2017-01-01 RX ADMIN — INSULIN GLARGINE 40 UNITS: 100 INJECTION, SOLUTION SUBCUTANEOUS at 08:18

## 2017-01-01 RX ADMIN — METOPROLOL TARTRATE 25 MG: 25 TABLET ORAL at 20:40

## 2017-01-01 RX ADMIN — DIAZEPAM 2 MG: 2 TABLET ORAL at 20:40

## 2017-01-01 RX ADMIN — METOPROLOL TARTRATE 12.5 MG: 25 TABLET ORAL at 22:54

## 2017-01-01 RX ADMIN — PREDNISONE 1 MG: 1 TABLET ORAL at 20:27

## 2017-01-01 RX ADMIN — ASPIRIN 81 MG: 81 TABLET, COATED ORAL at 09:36

## 2017-01-01 RX ADMIN — PREDNISONE 1 MG: 1 TABLET ORAL at 20:52

## 2017-01-01 RX ADMIN — IOHEXOL 100 ML: 350 INJECTION, SOLUTION INTRAVENOUS at 14:39

## 2017-01-01 RX ADMIN — INSULIN LISPRO 8 UNITS: 100 INJECTION, SOLUTION INTRAVENOUS; SUBCUTANEOUS at 21:21

## 2017-01-01 RX ADMIN — DIAZEPAM 2 MG: 2 TABLET ORAL at 17:08

## 2017-01-01 RX ADMIN — DIAZEPAM 2 MG: 2 TABLET ORAL at 20:30

## 2017-01-01 RX ADMIN — PANTOPRAZOLE SODIUM 40 MG: 40 TABLET, DELAYED RELEASE ORAL at 16:12

## 2017-01-01 RX ADMIN — FLUOXETINE 20 MG: 20 CAPSULE ORAL at 08:02

## 2017-01-01 RX ADMIN — PIPERACILLIN SODIUM AND TAZOBACTAM SODIUM 4.5 G: 36; 4.5 INJECTION, POWDER, FOR SOLUTION INTRAVENOUS at 21:25

## 2017-01-01 RX ADMIN — OXAZEPAM 10 MG: 10 CAPSULE, GELATIN COATED ORAL at 05:02

## 2017-01-01 RX ADMIN — WARFARIN SODIUM 5 MG: 5 TABLET ORAL at 17:43

## 2017-01-01 RX ADMIN — ENOXAPARIN SODIUM 90 MG: 100 INJECTION SUBCUTANEOUS at 09:26

## 2017-01-01 RX ADMIN — DIAZEPAM 2 MG: 2 TABLET ORAL at 16:12

## 2017-01-01 RX ADMIN — METOPROLOL TARTRATE 25 MG: 25 TABLET ORAL at 22:09

## 2017-01-01 RX ADMIN — FLUOXETINE 20 MG: 20 CAPSULE ORAL at 10:12

## 2017-01-01 RX ADMIN — PREDNISONE 1 MG: 1 TABLET ORAL at 08:44

## 2017-01-01 RX ADMIN — FLUCONAZOLE 200 MG: 2 INJECTION INTRAVENOUS at 17:30

## 2017-01-01 RX ADMIN — DEXAMETHASONE SODIUM PHOSPHATE: 10 INJECTION, SOLUTION INTRAMUSCULAR; INTRAVENOUS at 09:54

## 2017-01-01 RX ADMIN — PRAVASTATIN SODIUM 20 MG: 20 TABLET ORAL at 16:54

## 2017-01-01 RX ADMIN — FUROSEMIDE 20 MG: 10 INJECTION, SOLUTION INTRAMUSCULAR; INTRAVENOUS at 11:24

## 2017-01-01 RX ADMIN — DIAZEPAM 2 MG: 2 TABLET ORAL at 09:05

## 2017-01-01 RX ADMIN — PRAVASTATIN SODIUM 20 MG: 20 TABLET ORAL at 18:14

## 2017-01-01 RX ADMIN — DIAZEPAM 2 MG: 2 TABLET ORAL at 08:46

## 2017-01-01 RX ADMIN — INSULIN LISPRO 6 UNITS: 100 INJECTION, SOLUTION INTRAVENOUS; SUBCUTANEOUS at 17:52

## 2017-01-01 RX ADMIN — DIAZEPAM 2 MG: 2 TABLET ORAL at 08:20

## 2017-01-01 RX ADMIN — VANCOMYCIN HYDROCHLORIDE 1000 MG: 1 INJECTION, SOLUTION INTRAVENOUS at 11:37

## 2017-01-01 RX ADMIN — ENOXAPARIN SODIUM 90 MG: 100 INJECTION SUBCUTANEOUS at 22:46

## 2017-01-01 RX ADMIN — ENOXAPARIN SODIUM 40 MG: 40 INJECTION SUBCUTANEOUS at 08:15

## 2017-01-01 RX ADMIN — HYDRALAZINE HYDROCHLORIDE 50 MG: 25 TABLET, FILM COATED ORAL at 09:30

## 2017-01-01 RX ADMIN — PANTOPRAZOLE SODIUM 40 MG: 40 INJECTION, POWDER, FOR SOLUTION INTRAVENOUS at 20:30

## 2017-01-01 RX ADMIN — CEFAZOLIN SODIUM 1000 MG: 1 SOLUTION INTRAVENOUS at 17:01

## 2017-01-01 RX ADMIN — HYDRALAZINE HYDROCHLORIDE 50 MG: 25 TABLET, FILM COATED ORAL at 18:22

## 2017-01-01 RX ADMIN — METOPROLOL TARTRATE 25 MG: 25 TABLET ORAL at 20:26

## 2017-01-01 RX ADMIN — INSULIN GLARGINE 40 UNITS: 100 INJECTION, SOLUTION SUBCUTANEOUS at 08:37

## 2017-01-01 RX ADMIN — PANTOPRAZOLE SODIUM 40 MG: 40 TABLET, DELAYED RELEASE ORAL at 17:50

## 2017-01-01 RX ADMIN — HEPARIN SODIUM AND DEXTROSE 18 UNITS/KG/HR: 10000; 5 INJECTION INTRAVENOUS at 23:14

## 2017-01-01 RX ADMIN — ACETAMINOPHEN: 650 SUPPOSITORY RECTAL at 21:07

## 2017-01-01 RX ADMIN — IOHEXOL 100 ML: 350 INJECTION, SOLUTION INTRAVENOUS at 14:48

## 2017-01-01 RX ADMIN — INSULIN LISPRO 2 UNITS: 100 INJECTION, SOLUTION INTRAVENOUS; SUBCUTANEOUS at 11:26

## 2017-01-01 RX ADMIN — PIPERACILLIN SODIUM AND TAZOBACTAM SODIUM 4.5 G: 36; 4.5 INJECTION, POWDER, FOR SOLUTION INTRAVENOUS at 16:54

## 2017-01-01 RX ADMIN — INSULIN LISPRO 10 UNITS: 100 INJECTION, SOLUTION INTRAVENOUS; SUBCUTANEOUS at 20:52

## 2017-01-01 RX ADMIN — PANTOPRAZOLE SODIUM 40 MG: 40 TABLET, DELAYED RELEASE ORAL at 05:22

## 2017-01-01 RX ADMIN — ENOXAPARIN SODIUM 90 MG: 100 INJECTION SUBCUTANEOUS at 22:31

## 2017-01-01 RX ADMIN — OLANZAPINE 10 MG: 10 INJECTION, POWDER, FOR SOLUTION INTRAMUSCULAR at 13:21

## 2017-01-01 RX ADMIN — PANTOPRAZOLE SODIUM 40 MG: 40 TABLET, DELAYED RELEASE ORAL at 05:17

## 2017-01-01 RX ADMIN — PREDNISONE 1 MG: 1 TABLET ORAL at 17:02

## 2017-01-01 RX ADMIN — CYPROHEPTADINE HYDROCHLORIDE 4 MG: 4 TABLET ORAL at 21:03

## 2017-01-01 RX ADMIN — OXYBUTYNIN CHLORIDE 5 MG: 5 TABLET, EXTENDED RELEASE ORAL at 09:02

## 2017-01-01 RX ADMIN — PREDNISONE 1 MG: 1 TABLET ORAL at 23:24

## 2017-01-01 RX ADMIN — Medication 250 MG: at 08:18

## 2017-01-01 RX ADMIN — PREDNISONE 1 MG: 1 TABLET ORAL at 09:30

## 2017-01-01 RX ADMIN — ASPIRIN 81 MG 324 MG: 81 TABLET ORAL at 13:44

## 2017-01-01 RX ADMIN — SODIUM CHLORIDE, SODIUM LACTATE, POTASSIUM CHLORIDE, AND CALCIUM CHLORIDE 125 ML/HR: .6; .31; .03; .02 INJECTION, SOLUTION INTRAVENOUS at 11:54

## 2017-01-01 RX ADMIN — METOPROLOL TARTRATE 25 MG: 25 TABLET ORAL at 08:53

## 2017-01-01 RX ADMIN — PANTOPRAZOLE SODIUM 40 MG: 40 INJECTION, POWDER, FOR SOLUTION INTRAVENOUS at 09:31

## 2017-01-01 RX ADMIN — CEFAZOLIN SODIUM 1000 MG: 1 SOLUTION INTRAVENOUS at 08:47

## 2017-01-01 RX ADMIN — PIPERACILLIN SODIUM AND TAZOBACTAM SODIUM 4.5 G: 36; 4.5 INJECTION, POWDER, FOR SOLUTION INTRAVENOUS at 08:41

## 2017-01-01 RX ADMIN — PANTOPRAZOLE SODIUM 40 MG: 40 TABLET, DELAYED RELEASE ORAL at 06:04

## 2017-01-01 RX ADMIN — HYDRALAZINE HYDROCHLORIDE 50 MG: 25 TABLET, FILM COATED ORAL at 21:32

## 2017-01-01 RX ADMIN — VANCOMYCIN HYDROCHLORIDE 1000 MG: 1 INJECTION, SOLUTION INTRAVENOUS at 00:04

## 2017-01-01 RX ADMIN — INSULIN LISPRO 3 UNITS: 100 INJECTION, SOLUTION INTRAVENOUS; SUBCUTANEOUS at 22:06

## 2017-01-01 RX ADMIN — PROPOFOL 20 MG: 10 INJECTION, EMULSION INTRAVENOUS at 12:20

## 2017-01-01 RX ADMIN — PREDNISONE 3 MG: 1 TABLET ORAL at 10:13

## 2017-01-01 RX ADMIN — INSULIN GLARGINE 70 UNITS: 100 INJECTION, SOLUTION SUBCUTANEOUS at 09:24

## 2017-01-01 RX ADMIN — DOCUSATE SODIUM 100 MG: 100 CAPSULE, LIQUID FILLED ORAL at 08:01

## 2017-01-01 RX ADMIN — PANTOPRAZOLE SODIUM 40 MG: 40 TABLET, DELAYED RELEASE ORAL at 05:15

## 2017-01-01 RX ADMIN — METOPROLOL TARTRATE 50 MG: 50 TABLET ORAL at 08:05

## 2017-01-01 RX ADMIN — OXYBUTYNIN CHLORIDE 5 MG: 5 TABLET, EXTENDED RELEASE ORAL at 08:44

## 2017-01-01 RX ADMIN — WARFARIN SODIUM 7.5 MG: 7.5 TABLET ORAL at 17:12

## 2017-01-01 RX ADMIN — INSULIN LISPRO 2 UNITS: 100 INJECTION, SOLUTION INTRAVENOUS; SUBCUTANEOUS at 22:08

## 2017-01-01 RX ADMIN — HYDROCODONE BITARTRATE AND ACETAMINOPHEN 1 TABLET: 5; 325 TABLET ORAL at 11:36

## 2017-01-01 RX ADMIN — PIPERACILLIN SODIUM AND TAZOBACTAM SODIUM 4.5 G: 36; 4.5 INJECTION, POWDER, FOR SOLUTION INTRAVENOUS at 09:05

## 2017-01-01 RX ADMIN — PANTOPRAZOLE SODIUM 40 MG: 40 INJECTION, POWDER, FOR SOLUTION INTRAVENOUS at 22:07

## 2017-01-01 RX ADMIN — ACETAMINOPHEN 650 MG: 325 TABLET, FILM COATED ORAL at 07:53

## 2017-01-01 RX ADMIN — HYDRALAZINE HYDROCHLORIDE 50 MG: 25 TABLET, FILM COATED ORAL at 06:11

## 2017-01-01 RX ADMIN — METOPROLOL TARTRATE 12.5 MG: 25 TABLET ORAL at 09:07

## 2017-01-01 RX ADMIN — PRAVASTATIN SODIUM 20 MG: 20 TABLET ORAL at 16:30

## 2017-01-01 RX ADMIN — INSULIN GLARGINE 40 UNITS: 100 INJECTION, SOLUTION SUBCUTANEOUS at 08:47

## 2017-01-01 RX ADMIN — Medication 325 MG: at 17:07

## 2017-01-01 RX ADMIN — INSULIN LISPRO 1 UNITS: 100 INJECTION, SOLUTION INTRAVENOUS; SUBCUTANEOUS at 12:11

## 2017-01-01 RX ADMIN — SODIUM CHLORIDE 1000 ML: 0.9 INJECTION, SOLUTION INTRAVENOUS at 13:34

## 2017-01-01 RX ADMIN — HYDRALAZINE HYDROCHLORIDE 50 MG: 25 TABLET, FILM COATED ORAL at 23:25

## 2017-01-01 RX ADMIN — OXYBUTYNIN CHLORIDE 5 MG: 5 TABLET, EXTENDED RELEASE ORAL at 08:36

## 2017-01-01 RX ADMIN — CEFAZOLIN SODIUM 1000 MG: 1 SOLUTION INTRAVENOUS at 18:35

## 2017-01-01 RX ADMIN — INSULIN GLARGINE 70 UNITS: 100 INJECTION, SOLUTION SUBCUTANEOUS at 08:22

## 2017-01-01 RX ADMIN — Medication 250 MG: at 08:25

## 2017-01-01 RX ADMIN — DIAZEPAM 2 MG: 2 TABLET ORAL at 22:03

## 2017-01-01 RX ADMIN — Medication 325 MG: at 17:35

## 2017-01-01 RX ADMIN — Medication 250 MG: at 17:17

## 2017-01-01 RX ADMIN — INSULIN LISPRO 4 UNITS: 100 INJECTION, SOLUTION INTRAVENOUS; SUBCUTANEOUS at 18:35

## 2017-01-01 RX ADMIN — PREDNISONE 1 MG: 1 TABLET ORAL at 08:17

## 2017-01-01 RX ADMIN — HYDRALAZINE HYDROCHLORIDE 50 MG: 25 TABLET, FILM COATED ORAL at 20:59

## 2017-01-01 RX ADMIN — PREDNISONE 1 MG: 1 TABLET ORAL at 15:23

## 2017-01-01 RX ADMIN — VANCOMYCIN HYDROCHLORIDE 1000 MG: 1 INJECTION, SOLUTION INTRAVENOUS at 00:00

## 2017-01-01 RX ADMIN — INSULIN LISPRO 6 UNITS: 100 INJECTION, SOLUTION INTRAVENOUS; SUBCUTANEOUS at 07:53

## 2017-01-01 RX ADMIN — OLMESARTAN MEDOXOMIL 40 MG: 20 TABLET, FILM COATED ORAL at 13:36

## 2017-01-01 RX ADMIN — PREDNISONE 1 MG: 1 TABLET ORAL at 18:14

## 2017-01-01 RX ADMIN — CEFAZOLIN SODIUM 1000 MG: 1 SOLUTION INTRAVENOUS at 09:14

## 2017-01-01 RX ADMIN — INSULIN LISPRO 14 UNITS: 100 INJECTION, SOLUTION INTRAVENOUS; SUBCUTANEOUS at 08:13

## 2017-01-01 RX ADMIN — METOPROLOL TARTRATE 25 MG: 25 TABLET ORAL at 09:38

## 2017-01-01 RX ADMIN — MIRTAZAPINE 15 MG: 15 TABLET, FILM COATED ORAL at 22:41

## 2017-01-01 RX ADMIN — INSULIN LISPRO 2 UNITS: 100 INJECTION, SOLUTION INTRAVENOUS; SUBCUTANEOUS at 21:43

## 2017-01-01 RX ADMIN — Medication 250 MG: at 08:27

## 2017-01-01 RX ADMIN — ONDANSETRON HYDROCHLORIDE 4 MG: 2 INJECTION, SOLUTION INTRAVENOUS at 13:16

## 2017-01-01 RX ADMIN — PREDNISONE 1 MG: 1 TABLET ORAL at 11:07

## 2017-01-01 RX ADMIN — PREDNISONE 1 MG: 1 TABLET ORAL at 22:10

## 2017-01-01 RX ADMIN — OXAZEPAM 10 MG: 10 CAPSULE, GELATIN COATED ORAL at 22:32

## 2017-01-01 RX ADMIN — PROPOFOL 20 MG: 10 INJECTION, EMULSION INTRAVENOUS at 12:18

## 2017-01-01 RX ADMIN — CEFTRIAXONE SODIUM 1000 MG: 1 INJECTION, POWDER, FOR SOLUTION INTRAMUSCULAR; INTRAVENOUS at 20:29

## 2017-01-01 RX ADMIN — FLUOXETINE 20 MG: 20 CAPSULE ORAL at 09:37

## 2017-01-01 RX ADMIN — HALOPERIDOL LACTATE 5 MG: 5 INJECTION, SOLUTION INTRAMUSCULAR at 14:01

## 2017-01-01 RX ADMIN — INSULIN LISPRO 6 UNITS: 100 INJECTION, SOLUTION INTRAVENOUS; SUBCUTANEOUS at 12:01

## 2017-01-01 RX ADMIN — GEMCITABINE 1400 MG: 1 INJECTION, POWDER, LYOPHILIZED, FOR SOLUTION INTRAVENOUS at 11:03

## 2017-01-01 RX ADMIN — PREDNISONE 1 MG: 1 TABLET ORAL at 20:58

## 2017-01-01 RX ADMIN — ASPIRIN 81 MG: 81 TABLET, COATED ORAL at 08:20

## 2017-01-01 RX ADMIN — INSULIN LISPRO 15 UNITS: 100 INJECTION, SOLUTION INTRAVENOUS; SUBCUTANEOUS at 07:39

## 2017-01-01 RX ADMIN — ENOXAPARIN SODIUM 40 MG: 40 INJECTION SUBCUTANEOUS at 08:21

## 2017-01-01 RX ADMIN — OLANZAPINE 10 MG: 10 INJECTION, POWDER, FOR SOLUTION INTRAMUSCULAR at 15:04

## 2017-01-01 RX ADMIN — INSULIN LISPRO 4 UNITS: 100 INJECTION, SOLUTION INTRAVENOUS; SUBCUTANEOUS at 08:05

## 2017-01-01 RX ADMIN — ENOXAPARIN SODIUM 60 MG: 60 INJECTION SUBCUTANEOUS at 08:22

## 2017-01-01 RX ADMIN — DOCUSATE SODIUM 100 MG: 100 CAPSULE, LIQUID FILLED ORAL at 09:36

## 2017-01-01 RX ADMIN — DOCUSATE SODIUM 100 MG: 100 CAPSULE, LIQUID FILLED ORAL at 17:07

## 2017-01-01 RX ADMIN — METOPROLOL TARTRATE 25 MG: 25 TABLET ORAL at 09:25

## 2017-01-01 RX ADMIN — SODIUM CHLORIDE 50 ML/HR: 0.9 INJECTION, SOLUTION INTRAVENOUS at 16:23

## 2017-01-01 RX ADMIN — CYPROHEPTADINE HYDROCHLORIDE 4 MG: 4 TABLET ORAL at 21:42

## 2017-01-01 RX ADMIN — SUCCINYLCHOLINE CHLORIDE 100 MG: 20 INJECTION, SOLUTION INTRAMUSCULAR; INTRAVENOUS at 13:01

## 2017-01-01 RX ADMIN — FLUOXETINE 20 MG: 20 CAPSULE ORAL at 09:30

## 2017-01-01 RX ADMIN — OXYBUTYNIN CHLORIDE 5 MG: 5 TABLET, EXTENDED RELEASE ORAL at 08:46

## 2017-01-01 RX ADMIN — PREDNISONE 1 MG: 1 TABLET ORAL at 22:00

## 2017-01-01 RX ADMIN — PREDNISONE 1 MG: 1 TABLET ORAL at 18:48

## 2017-01-01 RX ADMIN — ENOXAPARIN SODIUM 90 MG: 100 INJECTION SUBCUTANEOUS at 20:26

## 2017-01-01 RX ADMIN — ENOXAPARIN SODIUM 90 MG: 100 INJECTION SUBCUTANEOUS at 17:13

## 2017-01-01 RX ADMIN — METOPROLOL TARTRATE 50 MG: 50 TABLET ORAL at 10:12

## 2017-01-01 RX ADMIN — HYDRALAZINE HYDROCHLORIDE 50 MG: 25 TABLET, FILM COATED ORAL at 13:21

## 2017-01-01 RX ADMIN — CEFAZOLIN SODIUM 1000 MG: 1 SOLUTION INTRAVENOUS at 17:08

## 2017-01-01 RX ADMIN — ENOXAPARIN SODIUM 40 MG: 40 INJECTION SUBCUTANEOUS at 08:09

## 2017-01-01 RX ADMIN — PIPERACILLIN SODIUM AND TAZOBACTAM SODIUM 4.5 G: 36; 4.5 INJECTION, POWDER, FOR SOLUTION INTRAVENOUS at 15:45

## 2017-01-01 RX ADMIN — METOPROLOL TARTRATE 25 MG: 25 TABLET ORAL at 09:47

## 2017-01-01 RX ADMIN — SODIUM CHLORIDE 100 ML/HR: 0.9 INJECTION, SOLUTION INTRAVENOUS at 19:01

## 2017-01-01 RX ADMIN — INSULIN LISPRO 2 UNITS: 100 INJECTION, SOLUTION INTRAVENOUS; SUBCUTANEOUS at 13:08

## 2017-01-01 RX ADMIN — PROPOFOL 80 MG: 10 INJECTION, EMULSION INTRAVENOUS at 12:08

## 2017-01-01 RX ADMIN — INSULIN LISPRO 5 UNITS: 100 INJECTION, SOLUTION INTRAVENOUS; SUBCUTANEOUS at 07:59

## 2017-01-01 RX ADMIN — PRAVASTATIN SODIUM 20 MG: 20 TABLET ORAL at 18:54

## 2017-01-01 RX ADMIN — INSULIN LISPRO 2 UNITS: 100 INJECTION, SOLUTION INTRAVENOUS; SUBCUTANEOUS at 23:26

## 2017-01-01 RX ADMIN — Medication 325 MG: at 17:09

## 2017-01-01 RX ADMIN — PANTOPRAZOLE SODIUM 40 MG: 40 TABLET, DELAYED RELEASE ORAL at 15:19

## 2017-01-01 RX ADMIN — PREDNISONE 1 MG: 1 TABLET ORAL at 18:23

## 2017-01-01 RX ADMIN — METOPROLOL TARTRATE 50 MG: 50 TABLET ORAL at 21:42

## 2017-01-01 RX ADMIN — PIPERACILLIN SODIUM AND TAZOBACTAM SODIUM 4.5 G: 36; 4.5 INJECTION, POWDER, FOR SOLUTION INTRAVENOUS at 08:44

## 2017-01-01 RX ADMIN — FLUCONAZOLE 150 MG: 150 TABLET ORAL at 18:34

## 2017-01-01 RX ADMIN — HEPARIN SODIUM AND DEXTROSE 13 UNITS/KG/HR: 10000; 5 INJECTION INTRAVENOUS at 17:34

## 2017-01-01 RX ADMIN — PREDNISONE 1 MG: 1 TABLET ORAL at 17:14

## 2017-01-01 RX ADMIN — PIPERACILLIN SODIUM AND TAZOBACTAM SODIUM 4.5 G: 36; 4.5 INJECTION, POWDER, FOR SOLUTION INTRAVENOUS at 22:46

## 2017-01-01 RX ADMIN — PREDNISONE 1 MG: 1 TABLET ORAL at 22:41

## 2017-01-01 RX ADMIN — ONDANSETRON 4 MG: 2 INJECTION INTRAMUSCULAR; INTRAVENOUS at 20:58

## 2017-01-01 RX ADMIN — INSULIN LISPRO 4 UNITS: 100 INJECTION, SOLUTION INTRAVENOUS; SUBCUTANEOUS at 21:13

## 2017-01-01 RX ADMIN — INSULIN LISPRO 6 UNITS: 100 INJECTION, SOLUTION INTRAVENOUS; SUBCUTANEOUS at 16:45

## 2017-01-01 RX ADMIN — INSULIN LISPRO 14 UNITS: 100 INJECTION, SOLUTION INTRAVENOUS; SUBCUTANEOUS at 11:37

## 2017-01-01 RX ADMIN — ASPIRIN 81 MG: 81 TABLET, COATED ORAL at 09:25

## 2017-01-01 RX ADMIN — ACETAMINOPHEN 650 MG: 325 TABLET, FILM COATED ORAL at 08:39

## 2017-01-01 RX ADMIN — METOPROLOL TARTRATE 25 MG: 25 TABLET ORAL at 22:59

## 2017-01-01 RX ADMIN — INSULIN LISPRO 3 UNITS: 100 INJECTION, SOLUTION INTRAVENOUS; SUBCUTANEOUS at 12:10

## 2017-01-01 RX ADMIN — ENOXAPARIN SODIUM 40 MG: 40 INJECTION SUBCUTANEOUS at 08:25

## 2017-01-01 RX ADMIN — WARFARIN SODIUM 7.5 MG: 7.5 TABLET ORAL at 17:31

## 2017-01-01 RX ADMIN — PREDNISONE 1 MG: 1 TABLET ORAL at 08:59

## 2017-01-01 RX ADMIN — OXYBUTYNIN CHLORIDE 5 MG: 5 TABLET, EXTENDED RELEASE ORAL at 08:30

## 2017-01-01 RX ADMIN — HEPARIN 3 UNITS: 100 SYRINGE at 14:11

## 2017-01-01 RX ADMIN — OXAZEPAM 10 MG: 10 CAPSULE, GELATIN COATED ORAL at 05:45

## 2017-01-01 RX ADMIN — INSULIN GLARGINE 60 UNITS: 100 INJECTION, SOLUTION SUBCUTANEOUS at 08:59

## 2017-01-01 RX ADMIN — ENOXAPARIN SODIUM 60 MG: 60 INJECTION SUBCUTANEOUS at 22:09

## 2017-01-01 RX ADMIN — ASPIRIN 81 MG: 81 TABLET, COATED ORAL at 08:44

## 2017-01-01 RX ADMIN — SODIUM CHLORIDE: 0.9 INJECTION, SOLUTION INTRAVENOUS at 12:07

## 2017-01-01 RX ADMIN — GADOBUTROL 8 ML: 604.72 INJECTION INTRAVENOUS at 14:47

## 2017-01-01 RX ADMIN — CEFEPIME HYDROCHLORIDE 1000 MG: 1 INJECTION, POWDER, FOR SOLUTION INTRAMUSCULAR; INTRAVENOUS at 14:08

## 2017-01-01 RX ADMIN — Medication 325 MG: at 09:37

## 2017-01-01 RX ADMIN — INSULIN LISPRO 27 UNITS: 100 INJECTION, SOLUTION INTRAVENOUS; SUBCUTANEOUS at 17:30

## 2017-01-01 RX ADMIN — ASPIRIN 81 MG: 81 TABLET, COATED ORAL at 09:08

## 2017-01-01 RX ADMIN — GEMCITABINE 1400 MG: 1 INJECTION, POWDER, LYOPHILIZED, FOR SOLUTION INTRAVENOUS at 11:12

## 2017-01-01 RX ADMIN — Medication 325 MG: at 10:12

## 2017-01-01 RX ADMIN — DIAZEPAM 2 MG: 2 TABLET ORAL at 08:59

## 2017-01-01 RX ADMIN — PRAVASTATIN SODIUM 20 MG: 20 TABLET ORAL at 17:50

## 2017-01-01 RX ADMIN — CEFAZOLIN SODIUM 1000 MG: 1 SOLUTION INTRAVENOUS at 09:37

## 2017-01-01 RX ADMIN — PREDNISONE 1 MG: 1 TABLET ORAL at 08:58

## 2017-01-01 RX ADMIN — DIAZEPAM 2 MG: 2 TABLET ORAL at 17:50

## 2017-01-01 RX ADMIN — OXYBUTYNIN CHLORIDE 5 MG: 5 TABLET, EXTENDED RELEASE ORAL at 09:24

## 2017-01-01 RX ADMIN — SODIUM CHLORIDE 125 ML/HR: 0.9 INJECTION, SOLUTION INTRAVENOUS at 23:36

## 2017-01-01 RX ADMIN — CEFAZOLIN SODIUM 1000 MG: 1 SOLUTION INTRAVENOUS at 09:32

## 2017-01-01 RX ADMIN — ASPIRIN 325 MG: 325 TABLET, DELAYED RELEASE ORAL at 08:12

## 2017-01-01 RX ADMIN — PANTOPRAZOLE SODIUM 40 MG: 40 INJECTION, POWDER, FOR SOLUTION INTRAVENOUS at 23:30

## 2017-01-01 RX ADMIN — INSULIN LISPRO 14 UNITS: 100 INJECTION, SOLUTION INTRAVENOUS; SUBCUTANEOUS at 12:11

## 2017-01-01 RX ADMIN — DEXAMETHASONE SODIUM PHOSPHATE: 10 INJECTION, SOLUTION INTRAMUSCULAR; INTRAVENOUS at 10:41

## 2017-01-01 RX ADMIN — METOPROLOL TARTRATE 25 MG: 25 TABLET ORAL at 21:57

## 2017-01-01 RX ADMIN — INSULIN LISPRO 14 UNITS: 100 INJECTION, SOLUTION INTRAVENOUS; SUBCUTANEOUS at 15:59

## 2017-01-01 RX ADMIN — POTASSIUM PHOSPHATE, MONOBASIC AND POTASSIUM PHOSPHATE, DIBASIC 12 MMOL: 224; 236 INJECTION, SOLUTION INTRAVENOUS at 18:47

## 2017-01-01 RX ADMIN — INSULIN LISPRO 4 UNITS: 100 INJECTION, SOLUTION INTRAVENOUS; SUBCUTANEOUS at 07:39

## 2017-01-01 RX ADMIN — PREDNISONE 1 MG: 1 TABLET ORAL at 08:07

## 2017-01-01 RX ADMIN — ALPRAZOLAM 0.25 MG: 0.25 TABLET ORAL at 20:33

## 2017-01-01 RX ADMIN — DIAZEPAM 2 MG: 2 TABLET ORAL at 08:53

## 2017-01-01 RX ADMIN — ALPRAZOLAM 0.5 MG: 0.5 TABLET ORAL at 20:41

## 2017-01-01 RX ADMIN — DEXTROSE MONOHYDRATE 25 ML: 25 INJECTION, SOLUTION INTRAVENOUS at 22:29

## 2017-01-01 RX ADMIN — INSULIN GLARGINE 70 UNITS: 100 INJECTION, SOLUTION SUBCUTANEOUS at 09:25

## 2017-01-01 RX ADMIN — CEFAZOLIN SODIUM 1000 MG: 1 SOLUTION INTRAVENOUS at 17:00

## 2017-01-01 RX ADMIN — EPHEDRINE SULFATE 5 MG: 50 INJECTION, SOLUTION INTRAMUSCULAR; INTRAVENOUS; SUBCUTANEOUS at 13:46

## 2017-01-01 RX ADMIN — DOCUSATE SODIUM 100 MG: 100 CAPSULE, LIQUID FILLED ORAL at 18:48

## 2017-01-01 RX ADMIN — INSULIN GLARGINE 40 UNITS: 100 INJECTION, SOLUTION SUBCUTANEOUS at 22:53

## 2017-01-01 RX ADMIN — OXALIPLATIN 136 MG: 5 INJECTION, SOLUTION, CONCENTRATE INTRAVENOUS at 11:58

## 2017-01-01 RX ADMIN — PREDNISONE 1 MG: 1 TABLET ORAL at 08:38

## 2017-01-01 RX ADMIN — INSULIN LISPRO 27 UNITS: 100 INJECTION, SOLUTION INTRAVENOUS; SUBCUTANEOUS at 17:13

## 2017-01-01 RX ADMIN — PREDNISONE 3 MG: 1 TABLET ORAL at 08:37

## 2017-01-01 RX ADMIN — EPHEDRINE SULFATE 5 MG: 50 INJECTION, SOLUTION INTRAMUSCULAR; INTRAVENOUS; SUBCUTANEOUS at 13:09

## 2017-01-01 RX ADMIN — SODIUM CHLORIDE 8 UNITS/HR: 9 INJECTION, SOLUTION INTRAVENOUS at 18:00

## 2017-01-01 RX ADMIN — SODIUM CHLORIDE 50 ML/HR: 0.9 INJECTION, SOLUTION INTRAVENOUS at 09:57

## 2017-01-01 RX ADMIN — OXAZEPAM 10 MG: 10 CAPSULE, GELATIN COATED ORAL at 21:58

## 2017-01-01 RX ADMIN — PREDNISONE 1 MG: 1 TABLET ORAL at 22:56

## 2017-01-01 RX ADMIN — METOPROLOL TARTRATE 50 MG: 50 TABLET ORAL at 20:59

## 2017-01-01 RX ADMIN — OXAZEPAM 10 MG: 10 CAPSULE, GELATIN COATED ORAL at 14:16

## 2017-01-01 RX ADMIN — PREDNISONE 3 MG: 1 TABLET ORAL at 08:02

## 2017-01-01 RX ADMIN — OXAZEPAM 15 MG: 15 CAPSULE, GELATIN COATED ORAL at 21:56

## 2017-01-01 RX ADMIN — METOPROLOL TARTRATE 25 MG: 25 TABLET ORAL at 09:24

## 2017-01-01 RX ADMIN — ENOXAPARIN SODIUM 90 MG: 100 INJECTION SUBCUTANEOUS at 14:30

## 2017-01-01 RX ADMIN — HEPARIN SODIUM 8.1 UNITS/KG/HR: 10000 INJECTION, SOLUTION INTRAVENOUS at 17:13

## 2017-01-01 RX ADMIN — ALPRAZOLAM 0.25 MG: 0.25 TABLET ORAL at 08:09

## 2017-01-01 RX ADMIN — DOCUSATE SODIUM 100 MG: 100 CAPSULE, LIQUID FILLED ORAL at 10:12

## 2017-01-01 RX ADMIN — OXAZEPAM 10 MG: 10 CAPSULE, GELATIN COATED ORAL at 21:20

## 2017-01-01 RX ADMIN — INSULIN LISPRO 2 UNITS: 100 INJECTION, SOLUTION INTRAVENOUS; SUBCUTANEOUS at 17:14

## 2017-01-01 RX ADMIN — OXAZEPAM 10 MG: 10 CAPSULE, GELATIN COATED ORAL at 14:05

## 2017-01-01 RX ADMIN — DIAZEPAM 2 MG: 2 TABLET ORAL at 15:40

## 2017-01-01 RX ADMIN — METOPROLOL TARTRATE 25 MG: 25 TABLET ORAL at 08:08

## 2017-01-01 RX ADMIN — PREDNISONE 1 MG: 1 TABLET ORAL at 16:59

## 2017-01-01 RX ADMIN — PIPERACILLIN SODIUM AND TAZOBACTAM SODIUM 4.5 G: 36; 4.5 INJECTION, POWDER, FOR SOLUTION INTRAVENOUS at 16:29

## 2017-01-01 RX ADMIN — PANTOPRAZOLE SODIUM 40 MG: 40 TABLET, DELAYED RELEASE ORAL at 17:08

## 2017-01-01 RX ADMIN — Medication 325 MG: at 11:07

## 2017-01-01 RX ADMIN — ALPRAZOLAM 0.25 MG: 0.25 TABLET ORAL at 22:05

## 2017-01-01 RX ADMIN — WARFARIN SODIUM 5 MG: 5 TABLET ORAL at 17:11

## 2017-01-01 RX ADMIN — INSULIN GLARGINE 40 UNITS: 100 INJECTION, SOLUTION SUBCUTANEOUS at 20:49

## 2017-01-01 RX ADMIN — PREDNISONE 1 MG: 1 TABLET ORAL at 08:56

## 2017-01-01 RX ADMIN — SODIUM CHLORIDE 5 UNITS/HR: 9 INJECTION, SOLUTION INTRAVENOUS at 13:34

## 2017-01-01 RX ADMIN — SODIUM CHLORIDE 125 ML/HR: 0.9 INJECTION, SOLUTION INTRAVENOUS at 02:13

## 2017-01-01 RX ADMIN — PIPERACILLIN SODIUM AND TAZOBACTAM SODIUM 4.5 G: 36; 4.5 INJECTION, POWDER, FOR SOLUTION INTRAVENOUS at 22:33

## 2017-01-01 RX ADMIN — FUROSEMIDE 40 MG: 10 INJECTION, SOLUTION INTRAMUSCULAR; INTRAVENOUS at 18:37

## 2017-01-01 RX ADMIN — ASPIRIN 81 MG: 81 TABLET, COATED ORAL at 08:36

## 2017-01-01 RX ADMIN — PIPERACILLIN SODIUM AND TAZOBACTAM SODIUM 4.5 G: 36; 4.5 INJECTION, POWDER, FOR SOLUTION INTRAVENOUS at 03:18

## 2017-01-01 RX ADMIN — OXYBUTYNIN CHLORIDE 5 MG: 5 TABLET, EXTENDED RELEASE ORAL at 08:18

## 2017-01-01 RX ADMIN — PANTOPRAZOLE SODIUM 40 MG: 40 TABLET, DELAYED RELEASE ORAL at 06:25

## 2017-01-01 RX ADMIN — INSULIN LISPRO 14 UNITS: 100 INJECTION, SOLUTION INTRAVENOUS; SUBCUTANEOUS at 07:51

## 2017-01-01 RX ADMIN — Medication 325 MG: at 18:22

## 2017-01-01 RX ADMIN — HEPARIN SODIUM AND DEXTROSE 18 UNITS/KG/HR: 10000; 5 INJECTION INTRAVENOUS at 18:29

## 2017-01-01 RX ADMIN — INSULIN LISPRO 27 UNITS: 100 INJECTION, SOLUTION INTRAVENOUS; SUBCUTANEOUS at 09:51

## 2017-01-01 RX ADMIN — INSULIN GLARGINE 15 UNITS: 100 INJECTION, SOLUTION SUBCUTANEOUS at 10:11

## 2017-01-01 RX ADMIN — SODIUM CHLORIDE 1000 ML: 0.9 INJECTION, SOLUTION INTRAVENOUS at 11:49

## 2017-01-01 RX ADMIN — INSULIN GLARGINE 25 UNITS: 100 INJECTION, SOLUTION SUBCUTANEOUS at 09:02

## 2017-01-01 RX ADMIN — PREDNISONE 1 MG: 1 TABLET ORAL at 08:46

## 2017-01-01 RX ADMIN — INSULIN LISPRO 2 UNITS: 100 INJECTION, SOLUTION INTRAVENOUS; SUBCUTANEOUS at 18:14

## 2017-01-01 RX ADMIN — INSULIN GLARGINE 60 UNITS: 100 INJECTION, SOLUTION SUBCUTANEOUS at 08:44

## 2017-01-01 RX ADMIN — PANTOPRAZOLE SODIUM 40 MG: 40 TABLET, DELAYED RELEASE ORAL at 06:11

## 2017-01-01 RX ADMIN — DEXTROSE 20 ML/HR: 5 SOLUTION INTRAVENOUS at 12:00

## 2017-01-01 RX ADMIN — PIPERACILLIN SODIUM AND TAZOBACTAM SODIUM 4.5 G: 36; 4.5 INJECTION, POWDER, FOR SOLUTION INTRAVENOUS at 14:34

## 2017-01-01 RX ADMIN — PANTOPRAZOLE SODIUM 40 MG: 40 TABLET, DELAYED RELEASE ORAL at 05:37

## 2017-01-01 RX ADMIN — PANTOPRAZOLE SODIUM 40 MG: 40 INJECTION, POWDER, FOR SOLUTION INTRAVENOUS at 20:58

## 2017-01-01 RX ADMIN — FUROSEMIDE 20 MG: 10 INJECTION, SOLUTION INTRAMUSCULAR; INTRAVENOUS at 01:18

## 2017-01-01 RX ADMIN — PANTOPRAZOLE SODIUM 40 MG: 40 TABLET, DELAYED RELEASE ORAL at 05:45

## 2017-01-01 RX ADMIN — PANTOPRAZOLE SODIUM 40 MG: 40 TABLET, DELAYED RELEASE ORAL at 18:14

## 2017-01-01 RX ADMIN — DIAZEPAM 2 MG: 2 TABLET ORAL at 09:08

## 2017-01-01 RX ADMIN — PRAVASTATIN SODIUM 20 MG: 20 TABLET ORAL at 16:57

## 2017-01-01 RX ADMIN — METOPROLOL TARTRATE 5 MG: 5 INJECTION, SOLUTION INTRAVENOUS at 08:30

## 2017-01-01 RX ADMIN — OXYBUTYNIN CHLORIDE 5 MG: 5 TABLET, EXTENDED RELEASE ORAL at 09:48

## 2017-01-01 RX ADMIN — PANTOPRAZOLE SODIUM 40 MG: 40 INJECTION, POWDER, FOR SOLUTION INTRAVENOUS at 11:08

## 2017-01-01 RX ADMIN — INSULIN GLARGINE 70 UNITS: 100 INJECTION, SOLUTION SUBCUTANEOUS at 08:15

## 2017-01-01 RX ADMIN — DEXTROSE 20 ML/HR: 5 SOLUTION INTRAVENOUS at 13:34

## 2017-01-01 RX ADMIN — INSULIN LISPRO 2 UNITS: 100 INJECTION, SOLUTION INTRAVENOUS; SUBCUTANEOUS at 22:11

## 2017-01-01 RX ADMIN — ASPIRIN 81 MG: 81 TABLET, COATED ORAL at 08:07

## 2017-01-01 RX ADMIN — PREDNISONE 1 MG: 1 TABLET ORAL at 16:30

## 2017-01-01 RX ADMIN — ENOXAPARIN SODIUM 90 MG: 100 INJECTION SUBCUTANEOUS at 09:49

## 2017-01-01 RX ADMIN — PIPERACILLIN SODIUM AND TAZOBACTAM SODIUM 4.5 G: 36; 4.5 INJECTION, POWDER, FOR SOLUTION INTRAVENOUS at 04:00

## 2017-01-01 RX ADMIN — INSULIN GLARGINE 25 UNITS: 100 INJECTION, SOLUTION SUBCUTANEOUS at 20:40

## 2017-01-01 RX ADMIN — HALOPERIDOL LACTATE 5 MG: 5 INJECTION, SOLUTION INTRAMUSCULAR at 12:04

## 2017-01-01 RX ADMIN — PERFLUTREN 1 ML/MIN: 6.52 INJECTION, SUSPENSION INTRAVENOUS at 16:12

## 2017-01-01 RX ADMIN — OXAZEPAM 10 MG: 10 CAPSULE, GELATIN COATED ORAL at 22:41

## 2017-01-01 RX ADMIN — ASPIRIN 81 MG: 81 TABLET, COATED ORAL at 17:35

## 2017-01-01 RX ADMIN — PANTOPRAZOLE SODIUM 40 MG: 40 TABLET, DELAYED RELEASE ORAL at 06:26

## 2017-01-01 RX ADMIN — PREDNISONE 1 MG: 1 TABLET ORAL at 20:26

## 2017-01-01 RX ADMIN — CEFAZOLIN SODIUM 1000 MG: 1 SOLUTION INTRAVENOUS at 08:28

## 2017-01-01 RX ADMIN — PANTOPRAZOLE SODIUM 40 MG: 40 INJECTION, POWDER, FOR SOLUTION INTRAVENOUS at 09:13

## 2017-01-01 RX ADMIN — INSULIN LISPRO 4 UNITS: 100 INJECTION, SOLUTION INTRAVENOUS; SUBCUTANEOUS at 14:26

## 2017-01-01 RX ADMIN — ACETAMINOPHEN 650 MG: 650 SUPPOSITORY RECTAL at 08:14

## 2017-01-01 RX ADMIN — INSULIN GLARGINE 40 UNITS: 100 INJECTION, SOLUTION SUBCUTANEOUS at 23:01

## 2017-01-01 RX ADMIN — PRAVASTATIN SODIUM 20 MG: 20 TABLET ORAL at 16:00

## 2017-01-01 RX ADMIN — INSULIN LISPRO 4 UNITS: 100 INJECTION, SOLUTION INTRAVENOUS; SUBCUTANEOUS at 12:10

## 2017-01-01 RX ADMIN — PANTOPRAZOLE SODIUM 40 MG: 40 TABLET, DELAYED RELEASE ORAL at 05:02

## 2017-01-01 RX ADMIN — ENOXAPARIN SODIUM 90 MG: 100 INJECTION SUBCUTANEOUS at 05:03

## 2017-01-01 RX ADMIN — INSULIN LISPRO 8 UNITS: 100 INJECTION, SOLUTION INTRAVENOUS; SUBCUTANEOUS at 07:51

## 2017-01-01 RX ADMIN — ENOXAPARIN SODIUM 90 MG: 100 INJECTION SUBCUTANEOUS at 21:55

## 2017-01-01 RX ADMIN — INSULIN GLARGINE 40 UNITS: 100 INJECTION, SOLUTION SUBCUTANEOUS at 09:02

## 2017-01-01 RX ADMIN — SODIUM CHLORIDE 50 ML/HR: 0.9 INJECTION, SOLUTION INTRAVENOUS at 13:41

## 2017-01-01 RX ADMIN — CEFAZOLIN SODIUM 1000 MG: 1 SOLUTION INTRAVENOUS at 01:56

## 2017-01-01 RX ADMIN — DIAZEPAM 2 MG: 2 TABLET ORAL at 22:58

## 2017-01-01 RX ADMIN — INSULIN LISPRO 6 UNITS: 100 INJECTION, SOLUTION INTRAVENOUS; SUBCUTANEOUS at 22:32

## 2017-01-01 RX ADMIN — MIRTAZAPINE 15 MG: 15 TABLET, FILM COATED ORAL at 23:36

## 2017-01-01 RX ADMIN — DEXAMETHASONE SODIUM PHOSPHATE 4 MG: 4 INJECTION, SOLUTION INTRAMUSCULAR; INTRAVENOUS at 15:48

## 2017-01-01 RX ADMIN — CYPROHEPTADINE HYDROCHLORIDE 4 MG: 4 TABLET ORAL at 21:33

## 2017-01-01 RX ADMIN — OXALIPLATIN 139 MG: 5 INJECTION, SOLUTION, CONCENTRATE INTRAVENOUS at 12:30

## 2017-01-01 RX ADMIN — INSULIN LISPRO 3 UNITS: 100 INJECTION, SOLUTION INTRAVENOUS; SUBCUTANEOUS at 07:51

## 2017-01-01 RX ADMIN — PREDNISONE 1 MG: 1 TABLET ORAL at 08:21

## 2017-01-01 RX ADMIN — PANTOPRAZOLE SODIUM 40 MG: 40 TABLET, DELAYED RELEASE ORAL at 05:33

## 2017-01-01 RX ADMIN — FENTANYL CITRATE 50 MCG: 50 INJECTION, SOLUTION INTRAMUSCULAR; INTRAVENOUS at 13:23

## 2017-01-01 RX ADMIN — INSULIN LISPRO 2 UNITS: 100 INJECTION, SOLUTION INTRAVENOUS; SUBCUTANEOUS at 16:18

## 2017-01-01 RX ADMIN — Medication 250 MG: at 08:22

## 2017-01-01 RX ADMIN — CYPROHEPTADINE HYDROCHLORIDE 4 MG: 4 TABLET ORAL at 22:07

## 2017-01-01 RX ADMIN — INSULIN LISPRO 27 UNITS: 100 INJECTION, SOLUTION INTRAVENOUS; SUBCUTANEOUS at 18:14

## 2017-01-01 RX ADMIN — OLMESARTAN MEDOXOMIL 40 MG: 20 TABLET, FILM COATED ORAL at 08:56

## 2017-01-01 RX ADMIN — INSULIN LISPRO 2 UNITS: 100 INJECTION, SOLUTION INTRAVENOUS; SUBCUTANEOUS at 11:28

## 2017-01-01 RX ADMIN — SODIUM CHLORIDE 125 ML/HR: 0.9 INJECTION, SOLUTION INTRAVENOUS at 16:24

## 2017-01-01 RX ADMIN — OXYBUTYNIN CHLORIDE 5 MG: 5 TABLET, EXTENDED RELEASE ORAL at 09:25

## 2017-01-01 RX ADMIN — PREDNISONE 1 MG: 1 TABLET ORAL at 09:49

## 2017-01-01 RX ADMIN — INSULIN LISPRO 4 UNITS: 100 INJECTION, SOLUTION INTRAVENOUS; SUBCUTANEOUS at 11:36

## 2017-01-01 RX ADMIN — CEFEPIME HYDROCHLORIDE 1000 MG: 1 INJECTION, POWDER, FOR SOLUTION INTRAMUSCULAR; INTRAVENOUS at 00:27

## 2017-01-01 RX ADMIN — OXYBUTYNIN CHLORIDE 5 MG: 5 TABLET, EXTENDED RELEASE ORAL at 08:13

## 2017-01-01 RX ADMIN — METOPROLOL TARTRATE 25 MG: 25 TABLET ORAL at 22:46

## 2017-01-01 RX ADMIN — INSULIN LISPRO 6 UNITS: 100 INJECTION, SOLUTION INTRAVENOUS; SUBCUTANEOUS at 16:53

## 2017-01-01 RX ADMIN — WARFARIN SODIUM 5 MG: 5 TABLET ORAL at 17:01

## 2017-01-01 RX ADMIN — OXAZEPAM 10 MG: 10 CAPSULE, GELATIN COATED ORAL at 05:46

## 2017-01-01 RX ADMIN — DEXAMETHASONE SODIUM PHOSPHATE: 10 INJECTION, SOLUTION INTRAMUSCULAR; INTRAVENOUS at 10:17

## 2017-01-01 RX ADMIN — DIAZEPAM 2 MG: 2 TABLET ORAL at 16:00

## 2017-01-01 RX ADMIN — METOPROLOL TARTRATE 25 MG: 25 TABLET ORAL at 21:58

## 2017-01-01 RX ADMIN — SODIUM CHLORIDE 20 ML/HR: 9 INJECTION, SOLUTION INTRAVENOUS at 09:15

## 2017-01-01 RX ADMIN — DONEPEZIL HYDROCHLORIDE 5 MG: 5 TABLET, FILM COATED ORAL at 23:25

## 2017-01-01 RX ADMIN — PREDNISONE 3 MG: 1 TABLET ORAL at 08:03

## 2017-01-01 RX ADMIN — PRAVASTATIN SODIUM 20 MG: 20 TABLET ORAL at 17:08

## 2017-01-01 RX ADMIN — INSULIN LISPRO 15 UNITS: 100 INJECTION, SOLUTION INTRAVENOUS; SUBCUTANEOUS at 17:30

## 2017-01-01 RX ADMIN — INSULIN LISPRO 4 UNITS: 100 INJECTION, SOLUTION INTRAVENOUS; SUBCUTANEOUS at 17:06

## 2017-01-01 RX ADMIN — PROPOFOL 40 MCG/KG/MIN: 10 INJECTION, EMULSION INTRAVENOUS at 13:16

## 2017-01-01 RX ADMIN — HYDRALAZINE HYDROCHLORIDE 50 MG: 25 TABLET, FILM COATED ORAL at 07:41

## 2017-01-11 ENCOUNTER — APPOINTMENT (OUTPATIENT)
Dept: LAB | Facility: HOSPITAL | Age: 77
End: 2017-01-11
Payer: MEDICARE

## 2017-01-11 ENCOUNTER — TRANSCRIBE ORDERS (OUTPATIENT)
Dept: ADMINISTRATIVE | Facility: HOSPITAL | Age: 77
End: 2017-01-11

## 2017-01-11 DIAGNOSIS — E03.9 HYPOTHYROIDISM, UNSPECIFIED TYPE: ICD-10-CM

## 2017-01-11 DIAGNOSIS — Z79.01 ENCOUNTER FOR MONITORING COUMADIN THERAPY: ICD-10-CM

## 2017-01-11 DIAGNOSIS — E11.9 CONTROLLED TYPE 2 DIABETES MELLITUS WITHOUT COMPLICATION, WITH LONG-TERM CURRENT USE OF INSULIN (HCC): ICD-10-CM

## 2017-01-11 DIAGNOSIS — E55.9 VITAMIN D DEFICIENCY: ICD-10-CM

## 2017-01-11 DIAGNOSIS — Z51.81 ENCOUNTER FOR MONITORING COUMADIN THERAPY: ICD-10-CM

## 2017-01-11 DIAGNOSIS — E78.5 HYPERLIPIDEMIA, UNSPECIFIED HYPERLIPIDEMIA TYPE: ICD-10-CM

## 2017-01-11 DIAGNOSIS — Z79.4 CONTROLLED TYPE 2 DIABETES MELLITUS WITHOUT COMPLICATION, WITH LONG-TERM CURRENT USE OF INSULIN (HCC): Primary | ICD-10-CM

## 2017-01-11 DIAGNOSIS — I10 ESSENTIAL HYPERTENSION: ICD-10-CM

## 2017-01-11 DIAGNOSIS — Z79.4 CONTROLLED TYPE 2 DIABETES MELLITUS WITHOUT COMPLICATION, WITH LONG-TERM CURRENT USE OF INSULIN (HCC): ICD-10-CM

## 2017-01-11 DIAGNOSIS — E11.9 CONTROLLED TYPE 2 DIABETES MELLITUS WITHOUT COMPLICATION, WITH LONG-TERM CURRENT USE OF INSULIN (HCC): Primary | ICD-10-CM

## 2017-01-11 LAB
25(OH)D3 SERPL-MCNC: 19.6 NG/ML (ref 30–100)
ALBUMIN SERPL BCP-MCNC: 3.2 G/DL (ref 3.5–5)
ALP SERPL-CCNC: 114 U/L (ref 46–116)
ALT SERPL W P-5'-P-CCNC: 53 U/L (ref 12–78)
ANION GAP SERPL CALCULATED.3IONS-SCNC: 11 MMOL/L (ref 4–13)
AST SERPL W P-5'-P-CCNC: 30 U/L (ref 5–45)
BASOPHILS # BLD AUTO: 0.03 THOUSANDS/ΜL (ref 0–0.1)
BASOPHILS NFR BLD AUTO: 0 % (ref 0–1)
BILIRUB SERPL-MCNC: 0.7 MG/DL (ref 0.2–1)
BUN SERPL-MCNC: 14 MG/DL (ref 5–25)
CALCIUM SERPL-MCNC: 8.5 MG/DL (ref 8.3–10.1)
CHLORIDE SERPL-SCNC: 101 MMOL/L (ref 100–108)
CHOLEST SERPL-MCNC: 133 MG/DL (ref 50–200)
CO2 SERPL-SCNC: 29 MMOL/L (ref 21–32)
CREAT SERPL-MCNC: 0.82 MG/DL (ref 0.6–1.3)
EOSINOPHIL # BLD AUTO: 0.21 THOUSAND/ΜL (ref 0–0.61)
EOSINOPHIL NFR BLD AUTO: 2 % (ref 0–6)
ERYTHROCYTE [DISTWIDTH] IN BLOOD BY AUTOMATED COUNT: 16.9 % (ref 11.6–15.1)
EST. AVERAGE GLUCOSE BLD GHB EST-MCNC: 260 MG/DL
GFR SERPL CREATININE-BSD FRML MDRD: >60 ML/MIN/1.73SQ M
GLUCOSE SERPL-MCNC: 220 MG/DL (ref 65–140)
HBA1C MFR BLD: 10.7 % (ref 4.2–6.3)
HCT VFR BLD AUTO: 44.1 % (ref 34.8–46.1)
HDLC SERPL-MCNC: 45 MG/DL (ref 40–60)
HGB BLD-MCNC: 13.7 G/DL (ref 11.5–15.4)
INR PPP: 1.97 (ref 0.86–1.16)
LDLC SERPL CALC-MCNC: 59 MG/DL (ref 0–100)
LYMPHOCYTES # BLD AUTO: 1.38 THOUSANDS/ΜL (ref 0.6–4.47)
LYMPHOCYTES NFR BLD AUTO: 13 % (ref 14–44)
MCH RBC QN AUTO: 28.7 PG (ref 26.8–34.3)
MCHC RBC AUTO-ENTMCNC: 31.1 G/DL (ref 31.4–37.4)
MCV RBC AUTO: 93 FL (ref 82–98)
MONOCYTES # BLD AUTO: 0.37 THOUSAND/ΜL (ref 0.17–1.22)
MONOCYTES NFR BLD AUTO: 4 % (ref 4–12)
NEUTROPHILS # BLD AUTO: 8.49 THOUSANDS/ΜL (ref 1.85–7.62)
NEUTS SEG NFR BLD AUTO: 81 % (ref 43–75)
PLATELET # BLD AUTO: 272 THOUSANDS/UL (ref 149–390)
PMV BLD AUTO: 9.4 FL (ref 8.9–12.7)
POTASSIUM SERPL-SCNC: 4 MMOL/L (ref 3.5–5.3)
PROT SERPL-MCNC: 6.9 G/DL (ref 6.4–8.2)
PROTHROMBIN TIME: 21.6 SECONDS (ref 12–14.3)
RBC # BLD AUTO: 4.77 MILLION/UL (ref 3.81–5.12)
SODIUM SERPL-SCNC: 141 MMOL/L (ref 136–145)
TRIGL SERPL-MCNC: 146 MG/DL
TSH SERPL DL<=0.05 MIU/L-ACNC: 1.09 UIU/ML (ref 0.36–3.74)
WBC # BLD AUTO: 10.48 THOUSAND/UL (ref 4.31–10.16)

## 2017-01-11 PROCEDURE — 85610 PROTHROMBIN TIME: CPT

## 2017-01-11 PROCEDURE — 84443 ASSAY THYROID STIM HORMONE: CPT

## 2017-01-11 PROCEDURE — 83036 HEMOGLOBIN GLYCOSYLATED A1C: CPT

## 2017-01-11 PROCEDURE — 80061 LIPID PANEL: CPT

## 2017-01-11 PROCEDURE — 80053 COMPREHEN METABOLIC PANEL: CPT

## 2017-01-11 PROCEDURE — 36415 COLL VENOUS BLD VENIPUNCTURE: CPT

## 2017-01-11 PROCEDURE — 82306 VITAMIN D 25 HYDROXY: CPT

## 2017-01-11 PROCEDURE — 85025 COMPLETE CBC W/AUTO DIFF WBC: CPT

## 2017-08-12 PROBLEM — I21.4 NSTEMI, INITIAL EPISODE OF CARE (HCC): Status: ACTIVE | Noted: 2017-01-01

## 2017-08-12 PROBLEM — R73.9 HYPERGLYCEMIA: Status: ACTIVE | Noted: 2017-01-01

## 2017-08-12 PROBLEM — A41.9 SEPSIS (HCC): Status: ACTIVE | Noted: 2017-01-01

## 2017-08-13 PROBLEM — R19.5 HEME POSITIVE STOOL: Status: ACTIVE | Noted: 2017-01-01

## 2017-08-14 PROBLEM — E83.39 HYPOPHOSPHATEMIA: Status: ACTIVE | Noted: 2017-01-01

## 2017-08-14 PROBLEM — R78.81 BACTEREMIA: Status: ACTIVE | Noted: 2017-01-01

## 2017-08-14 PROBLEM — K26.9 DUODENAL ULCER: Status: ACTIVE | Noted: 2017-01-01

## 2017-08-24 PROBLEM — C25.0: Status: ACTIVE | Noted: 2017-01-01

## 2017-09-25 NOTE — PROGRESS NOTES
Patient here for Chemotherapy complaining of nausea and abdominal pain 5/10 which has been continuous since yesterday and states worse this am    Also stating she has been experiencing black tarry stools for few days now and had a "very dark stool" this am but did not report to staff at [facility] [place]  Call to staff at facility and advised of same, instructed that Dr Daniela Saenz will be contact and possible ER transfer for further evaluation  Staff in agreement with same  Call to [address] at Dr Daniela Saenz and informed of symptoms  Also encouraging ER evaluation  Patient transported via wheelchair to ER report given to staff  Call to transportation services and advised of ER at this time  AVS not provided due to ER transfer  Molly Wray aware of same

## 2017-10-07 NOTE — PROGRESS NOTES
Assessment  1  Pancreatic cancer (157 9) (C25 9)   2  Liver metastases (197 7) (C78 7)    Plan  Liver metastases    · Drink plenty of fluids ; Status:Complete;   Done: [id number]   Ordered; For:Liver metastases; Ordered By:Sabrina Hirsch;   · Follow-up visit in 1 month Evaluation and Treatment  Follow-up  Status: Hold For -  Scheduling  Requested for: [id number]   Ordered; For: Liver metastases; Ordered By: Fiona Menendez Performed:  Due: [id number]   · (1) CBC/PLT/DIFF; Status:Active; Requested [id number];    Perform:[facility]; DLP:[id number]; Ordered; For:Liver metastases; Ordered By:Sabrina Hirsch;   · (1) CBC/PLT/DIFF; Status: In Progress - Order Generated; Requested for:Recurring  Schedule: 10/6/2017; 10/20/2017; 11/3/2017; 2017; 2017; 12/15/2017;  2017;   ;    Perform:[facility]; CWF:[id number]; Ordered; For:Liver metastases; Ordered By:Sabrina Hirsch;   · (1) COMPREHENSIVE METABOLIC PANEL; Status:Active; Requested SVS:[id number];    Perform:[facility]; AIF:[id number]; Ordered; For:Liver metastases; Ordered By:Sabrina Hirsch;   · (1) COMPREHENSIVE METABOLIC PANEL; Status: In Progress - Order Generated; Requested for:Recurring Schedule: 10/6/2017; 10/20/2017; 11/3/2017; 2017;  2017; 12/15/2017; 2017;   ;    Perform:[facility]; XYW:[id number]; Ordered; For:Liver metastases; Ordered By:Sabrina Hirsch;    Discussion/Summary  Discussion Summary:   In summary, this is a 72-year-old female history of pancreas adenocarcinoma with hepatic metastases, as outlined  started treatment with Gemzar/oxaliplatin  She seems to be tolerating this fairly well so far  This 2nd of her treatments was suspended due to development of melena  This correlated with the institution of oral iron  She was seen in the emergency room and was stable  She returned to the nursing home subsequently  then she has been progressing slowly albeit steadily, with physical therapy and in general feels well and is pleased with her progress  blood work shows a hemoglobin of 11 2, normal MCV iron is suspended  Parental iron could be administered if we document iron deficiency at sometime in the future  of chemotherapy is arranged starting next week  Reimaging in about 2 months  reviewed the above with the patient  She voiced understanding and agreement  Counseling Documentation With Imm: The patient was counseled regarding diagnostic results,-instructions for management,-patient and family education,-impressions  total time of encounter was 40 minutes  Chief Complaint  Chief Complaint Free Text Note Form: Follow-up regarding pancreatic cancer  History of Present Illness  HPI: August 2017patient was admitted to the hospital with severe hyperglycemia  Initially, this was attributed to urinary tract infection  She was treated, diabetic medications were adjusted and she returned home  A few days later she returned to the hospital with severe hyperglycemia  She was found to have positive Hemoccult at that time and was in A  fib  She was found to have a troponin elevation without EKG changes  Echocardiogram showed an ejection fraction 55%  14, 2017  EGD showed a duodenal ulcer with nodularity  Biopsy confirmed the presence of adenocarcinoma  CT of the abdomen and pelvis showed a mass in the pancreatic head, 5 4 cm, as well as multiple hepatic lesions  11, 2017- started Gemzar 800 milligrams/meter squared, oxaliplatin 80 milligrams/meter squared, both given IV Q 2 weeks  Current Therapy: September 11, 2017- started Gemzar 800 milligrams/meter squared, oxaliplatin 80 milligrams/meter squared, both given IV Q 2 weeks  Review of Systems  Complete-Female:   Constitutional: feeling poorly-and-feeling tired  Eyes: No complaints of eye pain, no red eyes, no eyesight problems, no discharge, no dry eyes, no itching of eyes     ENT: no complaints of earache, no loss of hearing, no nose bleeds, no nasal discharge, no sore throat, no hoarseness  Cardiovascular: No complaints of slow heart rate, no fast heart rate, no chest pain, no palpitations, no leg claudication, no lower extremity edema  Respiratory: No complaints of shortness of breath, no wheezing, no cough, no SOB on exertion, no orthopnea, no PND  Gastrointestinal: No complaints of abdominal pain, no constipation, no nausea or vomiting, no diarrhea, no bloody stools  Genitourinary: No complaints of dysuria, no incontinence, no pelvic pain, no dysmenorrhea, no vaginal discharge or bleeding  Musculoskeletal: No complaints of arthralgias, no myalgias, no joint swelling or stiffness, no limb pain or swelling  Integumentary: No complaints of skin rash or lesions, no itching, no skin wounds, no breast pain or lump  Neurological: No complaints of headache, no confusion, no convulsions, no numbness, no dizziness or fainting, no tingling, no limb weakness, no difficulty walking  Psychiatric: Not suicidal, no sleep disturbance, no anxiety or depression, no change in personality, no emotional problems  Endocrine: No complaints of proptosis, no hot flashes, no muscle weakness, no deepening of the voice, no feelings of weakness  Hematologic/Lymphatic: No complaints of swollen glands, no swollen glands in the neck, does not bleed easily, does not bruise easily  Active Problems  1  Abnormal ECG (794 31) (R94 31)   2  Acute hip pain (719 45) (M25 559)   3  Adhesive capsulitis of right shoulder (726 0) (M75 01)   4  Arthritis (716 90) (M19 90)   5  Atrial fibrillation (427 31) (I48 91)   6  Chronic low back pain (724 2,338 29) (M54 5,G89 29)   7  Degenerative joint disease (715 90) (M19 90)   8  Depression with anxiety (300 4) (F41 8)   9  Diabetes mellitus (250 00) (E11 9)   10  Dyslipidemia (272 4) (E78 5)   11  Edema extremities (782 3) (R60 0)   12  Hypertension (401 9) (I10)   13  Liver metastases (197 7) (C78 7)   14   Localized, primary osteoarthritis of shoulder region (715 11) (M19 019)   15  Obstructive sleep apnea (327 23) (G47 33)   16  Osteoporosis (733 00) (M81 0)   17  Palpitations (785 1) (R00 2)   18  Pancreatic cancer (157 9) (C25 9)   19  Rheumatoid arthritis (714 0) (M06 9)   20  Right shoulder pain (719 41) (M25 511)   21  Rotator cuff tendinitis, right (726 10) (M75 81)   22  Trochanteric bursitis (726 5) (M70 60)    Past Medical History  1  History of Pericardial effusion (423 9) (I31 3)    Surgical History  1  History of Hip Replacement   2  History of Knee Replacement    Family History  Mother    1  Family history of diabetes mellitus (V18 0) (Z83 3)  Father    2  Family history of Coronary artery disease, occlusive   3  Family history of myocardial infarction (V17 3) (Z82 49)   4  Family history of sudden cardiac death (SCD) (V17 41) (Z82 41)  Brother    5  Family history of coronary artery disease (V17 3) (Z82 49)   6  Family history of myocardial infarction (V17 3) (Z82 49)  Uncle    7  Family history of myocardial infarction (V17 3) (Z82 49)    Social History   · Denied: History of Alcohol use   ·    · Denied: History of Drug use   · Never a smoker    Current Meds   1  Acetaminophen 325 MG Oral Tablet; TAKE 1 TO 2 TABLETS EVERY 6 HOURS AS   NEEDED; Therapy: (Recorded:01Sep2017) to Recorded   2  Albuterol Sulfate 108 (90 Base) MCG/ACT AERS; Therapy: (Recorded:01Sep2017) to Recorded   3  Aspirin 81 MG TABS; Therapy: (Recorded:01Sep2017) to Recorded   4  Colace 100 MG Oral Capsule; Therapy: (Recorded:01Sep2017) to Recorded   5  Cyproheptadine HCl - 4 MG Oral Tablet; Take 1 tablet daily; Therapy: (Recorded:06Oct2017) to Recorded   6  Ferrous Sulfate 325 (65 Fe) MG Oral Tablet; Therapy: (Recorded:01Sep2017) to Recorded   7  HumaLOG 100 UNIT/ML Subcutaneous Solution; Therapy: (Recorded:01Sep2017) to Recorded   8  Lantus 100 UNIT/ML Subcutaneous Solution; USE AS DIRECTED;    Therapy: (Recorded:47Jbf5478) to Recorded   9  Metoprolol Tartrate 25 MG Oral Tablet; Therapy: (Recorded:01Sep2017) to Recorded   10  Milk of Magnesia SUSP; Therapy: (Recorded:01Sep2017) to Recorded   11  Norco 5-325 MG Oral Tablet; TAKE 1 TABLET EVERY 4 TO 6 HOURS AS NEEDED FOR    PAIN;    Therapy: (Recorded:01Sep2017) to Recorded   12  NovoLOG FlexPen 100 UNIT/ML Subcutaneous Solution Pen-injector; USE AS    DIRECTED; Therapy: (Og Garza) to Recorded   13  Oxazepam 10 MG Oral Capsule; Therapy: (Recorded:01Sep2017) to Recorded   14  PredniSONE 1 MG Oral Tablet; 1 TAB THREE TIMES DAILY; Therapy: (Recorded:01Sep2017) to Recorded   15  Protonix 40 MG Oral Tablet Delayed Release; TAKE 1 TABLET TWICE DAILY 30 MINUTES    BEFORE BREAKFAST AND DINNER; Therapy: (Recorded:01Sep2017) to Recorded   16  Remeron 15 MG Oral Tablet; TAKE 1 TABLET AT BEDTIME; Therapy: (Recorded:01Sep2017) to Recorded   17  Voltaren 1 % Transdermal Gel; apply sparingly to affected area twice daily on as needed    basis for pain; Therapy: [id number] to (Last Rx:13Mar2015) Ordered   18  Xanax 0 5 MG Oral Tablet; Therapy: (Recorded:01Sep2017) to Recorded    Allergies  1  AmLODIPine Besylate TABS   2  Atropine Sulfate SOLN   3  belladonna   4  benazepril   5  Donnatal TABS   6  Hyoscyamine POWD   7  Lotrel CAPS   8  Pamelor CAPS   9  Procardia CAPS   10  Scopolamine HBr CARLTON   11  Simvastatin TABS   12  Sulfa Drugs    Vitals  Vital Signs    Recorded: 65STC7705 11:14AM   Temperature 97 9 F   Heart Rate 71   Respiration 15   Systolic 218   Diastolic 72   Height 4 ft 8 in   Weight 190 lb    BMI Calculated 42 6   BSA Calculated 1 74   O2 Saturation 98   Pain Scale 0     Physical Exam    Constitutional   General appearance: No acute distress, well appearing and well nourished  Eyes   Conjunctiva and lids: No swelling, erythema or discharge      Ears, Nose, Mouth, and Throat   External inspection of ears and nose: Normal     Oropharynx: Normal with no erythema, edema, exudate or lesions  Pulmonary   Auscultation of lungs: Clear to auscultation  Cardiovascular   Auscultation of heart: Normal rate and rhythm, normal S1 and S2, without murmurs  Examination of extremities for edema and/or varicosities: Normal     Abdomen   Abdomen: Non-tender, no masses  Liver and spleen: No hepatomegaly or splenomegaly  Lymphatic   Palpation of lymph nodes in neck: No lymphadenopathy  Musculoskeletal   Gait and station: Normal     Skin   Skin and subcutaneous tissue: Normal without rashes or lesions  Neurologic   Cranial nerves: Cranial nerves 2-12 intact  Psychiatric   Orientation to person, place, and time: Normal          Signatures   Electronically signed by : LIA Alvarez  Oct  6 2017 11:57AM EST                       (Author)

## 2017-10-09 NOTE — PROGRESS NOTES
1110 Hand off report from Jay Pacheco RN from [facility]  1115 Patient states she has a strange sensation middle of chest  States it may be her reflux  States no chest pain, SOB, nausea, or any other complaints  Reported to Vickey Vela RN and ok to proceed today with treament  Patient blood sugar taken at nursing home before meals  Crystal informed of same  Per Dr Wade Laurent do not need to monitor while here for treatment

## 2017-10-09 NOTE — PLAN OF CARE
Problem: INFECTION - ADULT  Goal: Absence or prevention of progression during hospitalization  INTERVENTIONS:  - Assess and monitor for signs and symptoms of infection  - Monitor lab/diagnostic results  - Monitor all insertion sites  - Monitor  nasal secretions for changes in amount and color  - [place] appropriate cooling/warming therapies per order  - Administer medications as ordered  - Instruct and encourage patient and family to use good hand hygiene technique  - Identify and instruct in appropriate isolation precautions for identified infection/condition  Outcome: Progressing

## 2017-10-09 NOTE — PROGRESS NOTES
1121 Flare reaction noted at IV insertion site right forearm and followed vein up arm and forming a "T" shape medial to lateral sides of arm  Decadron/Zofran stopped, NSS started, positive blood return noted  Patient denies any burning, tenderness, or pain  1400 [facility] attempted IV access X2 left arm  7590 [facility] attempted IV access X2 left arm  1151 IV site right forearm flare reaction completely resolved and Decadron/Zofran restarted by Sary Girard RN  2323 IV insertion slight starting to get pink  No burning or pain  1220 Call placed to Tj Fraser RN regarding today's events  She will inform Dr Emily Sr of same  1229 Order will be sent to hold treatment today and patient will be scheduled for a portacath insertion  Rationale explained to patient and she verbalized understanding of same  Sandhya Rosado RN and daughter Giuseppe Haro made aware of same  1301 Patient discharged in stable condition to [facility] by transport

## 2017-10-18 PROBLEM — R41.82 ALTERED MENTAL STATUS: Status: ACTIVE | Noted: 2017-01-01

## 2017-10-18 PROBLEM — R47.01 APHASIA: Status: ACTIVE | Noted: 2017-01-01

## 2017-10-18 NOTE — ED NOTES
Pt brought to ED c/o altered mental status report from [facility]  Reporting that Patient was "more confused than usual"  No distress observed to patient at this time, appropriate safety measures in place       Rita Ellington RN  10/17/17 2055

## 2017-10-18 NOTE — SOCIAL WORK
Cm met with the patient to evaluate the patients prior function and living situation and any barriers to d/c and form a safe d/c plan  Cm also evaluated the patient for any services in the home or needs for services  Pt was admitted here as an [facility]  CM spoke with Charisse Mcdonnell in admissions dept at [facility] who stated pt is not a bedhold but able to return on dc  If stays beyond OBV, will need admission paperwork re signed prior to returning

## 2017-10-18 NOTE — ED PROVIDER NOTES
History  Chief Complaint   Patient presents with    Altered Mental Status     pt has hx of pancreatic cancer with mets to brain, currently being treated with abx for UTI  Family wanted eval when during dinner pt became confused and couldn't use utensils  Patient: Precious Lucio y o /female  YOB: 1940  MRN: [id number]  PCP: Remberto Groves DO  Date of evaluation: 10/17/17          History provided by:  Nursing home  CVA/TIA-like Symptoms   Presenting symptoms comment:  At dinner, pt was noted not to be able to understand how to eat her meal   Unknown time last normal  - best guess is 1600   nursing home gave report to RN relating h/o brain mets from pancreas primary  Being treated for UTI at present, on Levaquin  Onset quality:  Unable to specify  Timing:  Constant  Associated symptoms: no chest pain, no facial pain, no fever, no neck pain and no vomiting        Prior to Admission Medications   Prescriptions Last Dose Informant Patient Reported? Taking?    ALPRAZolam (XANAX) 0 5 mg tablet   No Yes   Sig: Take 1 tablet by mouth 3 (three) times a day as needed for anxiety for up to 3 days   HYDROcodone-acetaminophen (NORCO) 5-325 mg per tablet   Yes Yes   Sig: Take 1 tablet by mouth every 6 (six) hours as needed for pain   Multiple Vitamin (MULTIVITAMIN) tablet   Yes Yes   Sig: Take 1 tablet by mouth daily   Probiotic Product (BACID PO)   Yes Yes   Sig: Take by mouth   acetaminophen (TYLENOL) 325 mg tablet   No Yes   Sig: Take 2 tablets by mouth every 6 (six) hours as needed for mild pain, headaches or fever   albuterol (PROVENTIL HFA,VENTOLIN HFA) 90 mcg/act inhaler   No Yes   Sig: Inhale 2 puffs every 4 (four) hours as needed for wheezing   aspirin (ECOTRIN LOW STRENGTH) 81 mg EC tablet   No Yes   Sig: Take 1 tablet by mouth daily   bisacodyl (BISCOLAX) 10 mg suppository   Yes Yes   Sig: Insert 10 mg into the rectum daily   cyproheptadine (PERIACTIN) 4 mg tablet   Yes Yes   Sig: Take 4 mg by mouth daily at bedtime   docusate sodium (COLACE) 100 mg capsule   Yes Yes   Sig: Take 100 mg by mouth 2 (two) times a day as needed for constipation     ferrous sulfate 325 (65 Fe) mg tablet   Yes No   Sig: Take 325 mg by mouth 2 (two) times a day with meals     insulin glargine (LANTUS) 100 units/mL subcutaneous injection   No Yes   Sig: Inject 70 Units under the skin every morning   insulin lispro (HumaLOG) 100 units/mL injection   No Yes   Sig: Inject 27 Units under the skin 3 (three) times a day with meals   Patient taking differently: Inject 14 Units under the skin 3 (three) times a day with meals     levofloxacin (LEVAQUIN) 500 mg tablet   Yes Yes   Sig: Take 750 mg by mouth every 24 hours   magnesium hydroxide (MILK OF MAGNESIA) 800 MG/5ML suspension   Yes Yes   Sig: Take 30 mL by mouth daily as needed for constipation   metoprolol tartrate (LOPRESSOR) 25 mg tablet   No Yes   Sig: Take 1 tablet by mouth every 12 (twelve) hours   mirtazapine (REMERON) 15 mg tablet   No Yes   Sig: Take 1 tablet by mouth daily at bedtime   oxazepam (SERAX) 10 mg capsule   No No   Sig: Take 1 capsule by mouth every 8 (eight) hours for 3 days   oxybutynin (DITROPAN-XL) 5 mg 24 hr tablet   No No   Sig: Take 1 tablet by mouth daily   oxybutynin (DITROPAN-XL) 5 mg 24 hr tablet   Yes Yes   Sig: Take 5 mg by mouth daily   pantoprazole (PROTONIX) 40 mg tablet   No Yes   Sig: Take 1 tablet by mouth 2 (two) times a day before meals   predniSONE 1 mg tablet   Yes Yes   Sig: Take 1 mg by mouth 3 (three) times a day Indications: Rheumatoid Arthritis    sodium phosphate (FLEET) enema   Yes Yes   Sig: Insert 1 enema into the rectum once follow package directions      Facility-Administered Medications: None       Past Medical History:   Diagnosis Date    Arthritis     Atrial fibrillation (HCC)     Cancer (HCC)     Pancreatic    Depression with anxiety     Diabetes mellitus ([facility] )     DJD (degenerative joint disease)     Hyperlipidemia     Hypertension     Obstructive sleep apnea     Osteoporosis     Sleep apnea        Past Surgical History:   Procedure Laterality Date    ESOPHAGOGASTRODUODENOSCOPY N/A 8/14/2017    Procedure: ESOPHAGOGASTRODUODENOSCOPY (EGD); Surgeon: Sonny Scott MD;  Location: [facility]; Service: Gastroenterology    KNEE SURGERY Bilateral     REVISION TOTAL HIP ARTHROPLASTY Right        History reviewed  No pertinent family history  I have reviewed and agree with the history as documented  Social History   Substance Use Topics    Smoking status: Former Smoker     Types: Cigarettes     Quit date: 9/11/1999    Smokeless tobacco: Never Used    Alcohol use No        Review of Systems   Unable to perform ROS: Mental status change   Constitutional: Negative for fever  Cardiovascular: Negative for chest pain  Gastrointestinal: Negative for vomiting  Musculoskeletal: Negative for neck pain  Physical Exam  ED Triage Vitals [10/17/17 1840]   Temperature Pulse Respirations Blood Pressure SpO2   97 8 °F (36 6 °C) 87 17 139/63 92 %      Temp Source Heart Rate Source Patient Position - Orthostatic VS BP Location FiO2 (%)   Temporal Monitor Sitting Right arm --      Pain Score       No Pain           Physical Exam   Constitutional: She is oriented to person, place, and time  She appears well-developed and well-nourished  HENT:   Mouth/Throat: Oropharynx is clear and moist and mucous membranes are normal    Voice normal   Eyes: EOM are normal  Pupils are equal, round, and reactive to light  Cardiovascular: Normal rate and regular rhythm  Pulmonary/Chest: Effort normal    Abdominal: Soft  Bowel sounds are normal    Neurological: She is alert and oriented to person, place, and time  She has normal strength  No cranial nerve deficit  GCS eye subscore is 4  GCS verbal subscore is 5  GCS motor subscore is 6  Unable to understand commands to complete stroke scale     Skin: Skin is warm and dry    Psychiatric: She has a normal mood and affect  Her speech is normal and behavior is normal    Nursing note and vitals reviewed  ED Medications  Medications - No data to display    Diagnostic Studies  Labs Reviewed   CBC AND DIFFERENTIAL - Abnormal        Result Value Ref Range Status    MCHC 30 4 (*) 31 4 - 37 4 g/dL Final    RDW 15 6 (*) 11 6 - 15 1 % Final    WBC 9 87  4 31 - 10 16 Thousand/uL Final    RBC 4 29  3 81 - 5 12 Million/uL Final    Hemoglobin 12 0  11 5 - 15 4 g/dL Final    Hematocrit 39 5  34 8 - 46 1 % Final    MCV 92  82 - 98 fL Final    MCH 28 0  26 8 - 34 3 pg Final    MPV 10 1  8 9 - 12 7 fL Final    Platelets 663  069 - 390 Thousands/uL Final    Neutrophils Relative 75  43 - 75 % Final    Lymphocytes Relative 17  14 - 44 % Final    Monocytes Relative 6  4 - 12 % Final    Eosinophils Relative 2  0 - 6 % Final    Basophils Relative 0  0 - 1 % Final    Neutrophils Absolute 7 34  1 85 - 7 62 Thousands/µL Final    Lymphocytes Absolute 1 70  0 60 - 4 47 Thousands/µL Final    Monocytes Absolute 0 57  0 17 - 1 22 Thousand/µL Final    Eosinophils Absolute 0 22  0 00 - 0 61 Thousand/µL Final    Basophils Absolute 0 04  0 00 - 0 10 Thousands/µL Final   COMPREHENSIVE METABOLIC PANEL - Abnormal     Glucose 230 (*) 65 - 140 mg/dL Final    Comment:   If the patient is fasting, the ADA then defines impaired fasting glucose as > 100 mg/dL and diabetes as > or equal to 123 mg/dL  Specimen collection should occur prior to Sulfasalazine administration due to the potential for falsely depressed results  Specimen collection should occur prior to Sulfapyridine administration due to the potential for falsely elevated results      Albumin 2 7 (*) 3 5 - 5 0 g/dL Final    Sodium 140  136 - 145 mmol/L Final    Potassium 4 2  3 5 - 5 3 mmol/L Final    Chloride 101  100 - 108 mmol/L Final    CO2 29  21 - 32 mmol/L Final    Anion Gap 10  4 - 13 mmol/L Final    BUN 17  5 - 25 mg/dL Final    Creatinine 0 90  0 60 - 1 30 mg/dL Final    Comment: Standardized to IDMS reference method    Calcium 9 1  8 3 - 10 1 mg/dL Final    AST 26  5 - 45 U/L Final    Comment:   Specimen collection should occur prior to Sulfasalazine administration due to the potential for falsely depressed results  ALT 19  12 - 78 U/L Final    Comment:   Specimen collection should occur prior to Sulfasalazine administration due to the potential for falsely depressed results  Alkaline Phosphatase 109  46 - 116 U/L Final    Total Protein 7 2  6 4 - 8 2 g/dL Final    Total Bilirubin 0 30  0 20 - 1 00 mg/dL Final    eGFR 63  ml/min/1 73sq m Final    Narrative:     National Kidney Disease Education Program recommendations are as follows:  GFR calculation is accurate only with a steady state creatinine  Chronic Kidney disease less than 60 ml/min/1 73 sq  meters  Kidney failure less than 15 ml/min/1 73 sq  meters  UA W REFLEX TO MICROSCOPIC WITH REFLEX TO CULTURE - Abnormal     Protein, UA 30 (1+) (*) Negative mg/dl Final    Ketones, UA 15 (1+) (*) Negative mg/dl Final    Color, UA Yellow   Final    Clarity, UA Clear   Final    Specific Gravity, UA >=1 030  1 003 - 1 030 Final    pH, UA 5 5  4 5 - 8 0 Final    Leukocytes, UA Negative  Negative Final    Nitrite, UA Negative  Negative Final    Glucose, UA Negative  Negative mg/dl Final    Urobilinogen, UA 0 2  0 2, 1 0 E U /dl E U /dl Final    Bilirubin, UA Negative  Negative Final    Blood, UA Negative  Negative, Trace-Intact Final   POCT GLUCOSE - Abnormal     POC Glucose 204 (*) 65 - 140 mg/dl Final   PROTIME-INR - Normal    Protime 13 5  12 1 - 14 4 seconds Final    INR 1 04  0 86 - 1 16 Final   APTT - Normal    PTT 29  23 - 35 seconds Final    Narrative:      Therapeutic Heparin Range = 60-90 seconds   MAGNESIUM - Normal    Magnesium 2 1  1 6 - 2 6 mg/dL Final   PHOSPHORUS - Normal    Phosphorus 4 1  2 3 - 4 1 mg/dL Final   TROPONIN I - Normal    Troponin I 0 04  <=0 04 ng/mL Final    Narrative: Siemens Chemistry analyzer 99% cutoff is > 0 04 ng/mL in network labs    o cTnI 99% cutoff is useful only when applied to patients in the clinical setting of myocardial ischemia  o cTnI 99% cutoff should be interpreted in the context of clinical history, ECG findings and possibly cardiac imaging to establish correct diagnosis  o cTnI 99% cutoff may be suggestive but clearly not indicative of a coronary event without the clinical setting of myocardial ischemia  URINE MICROSCOPIC - Normal    RBC, UA None Seen  None Seen, 0-5 /hpf Final    WBC, UA None Seen  None Seen, 0-5, 5-55, 5-65 /hpf Final    Epithelial Cells Occasional  None Seen, Occasional /hpf Final    Bacteria, UA Occasional  None Seen, Occasional /hpf Final       CT head without contrast   Final Result      No acute intracranial abnormality  Microangiopathic changes  Workstation performed: [id number]         XR chest 1 view    (Results Pending)       Procedures  Procedures      Phone Contacts  ED Phone Contact    ED Course  ED Course as of Oct 18 0049   Tue Oct 17, 2017   2018 CT results noted  I am calling [facility] back to clarify the history  Our triage note includes the history, reportedly given by , that this patient has brain mets  None seen on our CT today  No such diagnosis appears on the list from      2024 Pt now consents for another try at IV and bloodwork  Earlier she had refused  2229 Pt agreeable to admission  2232 Still has trouble expressing herself but appears to understand staff much better      2256 Eosinophils Absolute: 0 22         HEART Risk Score    Flowsheet Row Most Recent Value   History  0 Filed at: 10/17/2017 2313   ECG  1 Filed at: 10/17/2017 2313   Age  2 Filed at: 10/17/2017 2313   Risk Factors  2 Filed at: 10/17/2017 2313   Troponin  0 Filed at: 10/17/2017 2313   Heart Score Risk Calculator   History  0 Filed at: 10/17/2017 2313   ECG  1 Filed at: 10/17/2017 2313   Age  2 Filed at: 10/17/2017 2313 Risk Factors  2 Filed at: 10/17/2017 2313   Troponin  0 Filed at: 10/17/2017 2313   HEART Score  5 Filed at: 10/17/2017 2313   HEART Score  5 Filed at: 10/17/2017 2313                            MDM  Number of Diagnoses or Management Options  CVA (cerebral vascular accident) [facility]): new and requires workup     Amount and/or Complexity of Data Reviewed  Tests in the radiology section of CPT®: ordered and reviewed  Tests in the medicine section of CPT®: ordered and reviewed  Obtain history from someone other than the patient: yes  Independent visualization of images, tracings, or specimens: yes    Risk of Complications, Morbidity, and/or Mortality  Presenting problems: high    Patient Progress  Patient progress: improved    CritCare Time    Disposition  Final diagnoses:   CVA (cerebral vascular accident) [facility])     ED Disposition     ED Disposition Condition Comment    Admit  Case was discussed with Loly Hoffman DO, [facility]   and the patient's admission status was agreed to be Admission Status: full adm to the service of Dr Loly Hoffman DO, [facility]             Follow-up Information    None       Current Discharge Medication List      CONTINUE these medications which have NOT CHANGED    Details   acetaminophen (TYLENOL) 325 mg tablet Take 2 tablets by mouth every 6 (six) hours as needed for mild pain, headaches or fever  Qty: 30 tablet, Refills: 0      albuterol (PROVENTIL HFA,VENTOLIN HFA) 90 mcg/act inhaler Inhale 2 puffs every 4 (four) hours as needed for wheezing  Refills: 0      ALPRAZolam (XANAX) 0 5 mg tablet Take 1 tablet by mouth 3 (three) times a day as needed for anxiety for up to 3 days  Qty: 30 tablet, Refills: 0      aspirin (ECOTRIN LOW STRENGTH) 81 mg EC tablet Take 1 tablet by mouth daily  Refills: 0      bisacodyl (BISCOLAX) 10 mg suppository Insert 10 mg into the rectum daily      cyproheptadine (PERIACTIN) 4 mg tablet Take 4 mg by mouth daily at bedtime      docusate sodium (COLACE) 100 mg capsule Take 100 mg by mouth 2 (two) times a day as needed for constipation  HYDROcodone-acetaminophen (NORCO) 5-325 mg per tablet Take 1 tablet by mouth every 6 (six) hours as needed for pain      insulin glargine (LANTUS) 100 units/mL subcutaneous injection Inject 70 Units under the skin every morning  Qty: 10 mL, Refills: 0      insulin lispro (HumaLOG) 100 units/mL injection Inject 27 Units under the skin 3 (three) times a day with meals  Refills: 0      levofloxacin (LEVAQUIN) 500 mg tablet Take 750 mg by mouth every 24 hours      magnesium hydroxide (MILK OF MAGNESIA) 800 MG/5ML suspension Take 30 mL by mouth daily as needed for constipation      metoprolol tartrate (LOPRESSOR) 25 mg tablet Take 1 tablet by mouth every 12 (twelve) hours  Refills: 0      mirtazapine (REMERON) 15 mg tablet Take 1 tablet by mouth daily at bedtime  Refills: 0      Multiple Vitamin (MULTIVITAMIN) tablet Take 1 tablet by mouth daily      !! oxybutynin (DITROPAN-XL) 5 mg 24 hr tablet Take 5 mg by mouth daily      pantoprazole (PROTONIX) 40 mg tablet Take 1 tablet by mouth 2 (two) times a day before meals  Refills: 0      predniSONE 1 mg tablet Take 1 mg by mouth 3 (three) times a day Indications: Rheumatoid Arthritis  Probiotic Product (BACID PO) Take by mouth      sodium phosphate (FLEET) enema Insert 1 enema into the rectum once follow package directions      ferrous sulfate 325 (65 Fe) mg tablet Take 325 mg by mouth 2 (two) times a day with meals        oxazepam (SERAX) 10 mg capsule Take 1 capsule by mouth every 8 (eight) hours for 3 days  Qty: 9 capsule, Refills: 0      !! oxybutynin (DITROPAN-XL) 5 mg 24 hr tablet Take 1 tablet by mouth daily  Refills: 0       !! - Potential duplicate medications found  Please discuss with provider  No discharge procedures on file      ED Provider  Electronically Signed by       Bonita Serrano MD  10/18/17 0787

## 2017-10-18 NOTE — PROGRESS NOTES
The patient was admitted under observation status, however the patient is still confused  She will be changed to inpatient status and will require a 2 midnight staff for workup of her symptomatology

## 2017-10-18 NOTE — CASE MANAGEMENT
Initial Clinical Review    Admission: Date/Time/Statement:    OBSERVATION 10/17/17 @ 2308   INPATIENT ADMISSION 10/18/17 @ 1254    Orders Placed This Encounter   Procedures    Place in Observation (expected length of stay for this patient is less than two midnights)     Standing Status:   Standing     Number of Occurrences:   1     Order Specific Question:   Admitting Physician     Answer:   Lena Najera [[id number]]     Order Specific Question:   Level of Care     Answer:   Med Surg [16]     10/18/17 1254 Release Massimo Mills PA-C (auto-released) MUAP LUZPQ: [id number]   10/18/17 1254 Complete Manus KETAN Mills-ALBERTO    Order Details     Frequency Duration Priority Order Class   Once 1  occurrence Routine Hospital Performed   Order Questions     Question Answer Comment   Admitting Physician Priyanka Free    Level of Care Med Surg    Estimated length of stay More than 2 Midnights    Certification I certify that inpatient services are medically necessary for this patient for a duration of greater than two midnights  See H&P and MD Progress Notes for additional information about the patient's course of treatment  ED: Date/Time/Mode of Arrival:   ED Arrival Information     Expected Arrival Acuity Means of Arrival Escorted By Service Admission Type    - 10/17/2017 18:34 Urgent Ambulance [facility] General Medicine Urgent    Arrival Complaint    alt mental status      Chief Complaint:   Chief Complaint   Patient presents with    Altered Mental Status     pt has hx of pancreatic cancer with mets to brain, currently being treated with abx for UTI  Family wanted eval when during dinner pt became confused and couldn't use utensils  History of Illness:   Ms Philomena Cowart is a pleasantly confused 78year old female sent to the hospital today from SNF due to change in mental status   The SNF staff noted that at dinner time she knew it was time to eat dinner but couldn't seem to figure out how to eat dinner and she is normally alert and oriented  She was last seen normal at 1300  CN 2-12 GI, patient with expressive aphasia and forgetful memory  She has improving receptive aphasia  Uncooperative for coordination testing  ED Vital Signs:   ED Triage Vitals [10/17/17 1840]   Temperature Pulse Respirations Blood Pressure SpO2   97 8 °F (36 6 °C) 87 17 139/63 92 %      Temp Source Heart Rate Source Patient Position - Orthostatic VS BP Location FiO2 (%)   Temporal Monitor Sitting Right arm --      Pain Score       No Pain        Wt Readings from Last 1 Encounters:   10/18/17 84 3 kg (185 lb 13 6 oz)   Vital Signs (abnormal):   /76, 208/102, 188/92  Abnormal Labs/Diagnostic Test Results:   U/A+PROT, KETONES  GLUC 279 ALB 2 8  CT HEAD=No acute intracranial abnormality  Microangiopathic changes  ECG 12 Lead Documentation  Date/Time: 10/17/2017 9:12 PM  Performed by: Emily Call by: May Daniel   Indications / Diagnosis:  Cva  ECG reviewed by me, the ED Provider: yes    Patient location:  ED  Previous ECG:     Previous ECG:  Compared to current    Comparison ECG info:  21 Aug 2017    Similarity:  No change  Interpretation:     Interpretation: abnormal    Rate:     ECG rate:  90    ECG rate assessment: normal    Rhythm:     Rhythm: sinus rhythm    Ectopy:     Ectopy: none    Conduction:     Conduction: abnormal      Abnormal conduction: complete LBBB    Comments:      LBB is old  ED Treatment:   Medication Administration from 10/17/2017 1817 to 10/18/2017 0045     None      Past Medical/Surgical History:    Active Ambulatory Problems     Diagnosis Date Noted    UTI (urinary tract infection) 08/04/2016    Hypertension 08/04/2016    Hyperlipidemia 08/04/2016    Diabetes mellitus with hyperglycemia ([facility] ) 08/04/2016    A-fib ([facility] ) 08/04/2016    Chronic back pain 08/04/2016    Depression 08/04/2016    SHANDA (obstructive sleep apnea) 08/04/2016    RA (rheumatoid arthritis) ([facility] ) 08/04/2016    NSTEMI, initial episode of care ([place] [address] ) 08/12/2017    Hyperglycemia 08/12/2017    Sepsis ([place] [address] ) 08/12/2017    Heme positive stool 08/13/2017    Bacteremia 08/14/2017    Hypophosphatemia 08/14/2017    Duodenal ulcer 08/14/2017    Pancreatic malignancy syndrome ([place] [address] ) 08/24/2017     Resolved Ambulatory Problems     Diagnosis Date Noted    Hyperglycemia 08/04/2016     Past Medical History:   Diagnosis Date    Arthritis     Atrial fibrillation ([place] [address] )     Cancer ([place] [address] )     Coronary artery disease     Depression with anxiety     Diabetes mellitus ([place] [address] )     DJD (degenerative joint disease)     Hyperlipidemia     Hypertension     Obstructive sleep apnea     Osteoporosis     Sleep apnea    Admitting Diagnosis: Altered mental status [R41 82]  CVA (cerebral vascular accident) ([place] [address] ) [I63 9]  Age/Sex: 78 y o  female  Assessment/Plan:   1  Expressive and receptive aphasia  2  Pancreatic cancer  3  UTI being treated with Levaquin x 6 days, will d/c  4  PAF, off of anticoagulation due to GI bleed  5  RA  6  Duodenal ulcer/mass  7  DM, insulin dependent  Plan:  1  Stroke work up with carotid u/s and MRI  2  Increase aspirin to 325mg  3  Hold sedating medications  4  Cont bid protonix  5  Cont daily prednisone  6  Cont lantus at 1/2 strength and hold premeal insulin until we see how she does eating  7  SSI    PER MD 10/18/17  The patient was admitted under observation status, however the patient is still confused  She will be changed to inpatient status and will require a 2 midnight staff for workup of her symptomatology       Admission Orders:  TELEMETRY  PT/OT/ST EVAL & TX  ACCUCHECKS WITH COVERAGE SCALE  NEURO CHECKS: Every hour x 4 hours; then every 2 hours x 4 hours, then every 4 hours x 72 hours;  then every 8 hours if stable  INCENTIVE SPIROMETRY  CVA EDUCATION  CONSULT NEURO  Scheduled Meds:   aspirin 325 mg Oral Daily   insulin glargine 35 Units Subcutaneous QAM   insulin lispro 1-5 Units Subcutaneous TID AC   insulin lispro 1-6 Units Subcutaneous HS   insulin lispro 2-12 Units Subcutaneous TID AC   pantoprazole 40 mg Oral BID AC   predniSONE 3 mg Oral Daily   saccharomyces boulardii 250 mg Oral BID     Continuous Infusions:   sodium chloride 125 mL/hr Last Rate: 125 mL/hr (10/18/17 6242)     PRN Meds:   acetaminophen    albuterol    bisacodyl    docusate sodium    influenza vaccine

## 2017-10-18 NOTE — OCCUPATIONAL THERAPY NOTE
Occupational Therapy Evaluation     Patient Name: Chemo Santana  FSUSP'[id number] Date: 10/18/2017  Problem List  Patient Active Problem List   Diagnosis    UTI (urinary tract infection)    Hyperlipidemia    Diabetes mellitus with hyperglycemia (HCC)    A-fib (HCC)    Chronic back pain    Depression    SHANDA (obstructive sleep apnea)    RA [facility]rheumatoid arthritis) ([facility] )    NSTEMI, initial episode of care ([place] [address] )    Hyperglycemia    Sepsis ([place] [address] )    Heme positive stool    Bacteremia    Hypophosphatemia    Duodenal ulcer    Pancreatic malignancy syndrome (HCC)    Altered mental status    Aphasia     Past Medical History  Past Medical History:   Diagnosis Date    Arthritis     Atrial fibrillation ([place] [address] )     Cancer ([place] [address] )     Pancreatic    Coronary artery disease     Depression with anxiety     Diabetes mellitus ([place] [address] )     DJD (degenerative joint disease)     Hyperlipidemia     Hypertension     Obstructive sleep apnea     Osteoporosis     Sleep apnea      Past Surgical History  Past Surgical History:   Procedure Laterality Date    ESOPHAGOGASTRODUODENOSCOPY N/A 8/14/2017    Procedure: ESOPHAGOGASTRODUODENOSCOPY (EGD); Surgeon: Ajay Navarrete MD;  [facility]: [facility]; Service: Gastroenterology    KNEE SURGERY Bilateral     REVISION TOTAL HIP ARTHROPLASTY Right            10/18/17 1026   Note Type   Note type Eval/Treat   Restrictions/Precautions   Weight Bearing Precautions Per Order No   Other Precautions Contact/isolation; Bed Alarm;Multiple lines;Telemetry; Fall Risk   Pain Assessment   Pain Assessment No/denies pain   Home Living   Type of Home SNF  [facility] )   Prior Function   Level of [place] Needs assistance with ADLs and functional mobility; Needs assistance with IADLs   Lives With Facility staff   Receives Help From Personal care attendant   ADL Assistance Needs assistance   IADLs Needs assistance   Comments pt requires (A) c mobility as well with RW and (A) for all ADLs/IADLs   Psychosocial   Psychosocial (WDL) X   Patient Behaviors/Mood Anxious;Crying   ADL   Where Assessed (bathroom)   LB Dressing Assistance 4  Minimal Assistance   LB Dressing Deficit Pull up over hips   Toileting Assistance  4  Minimal Assistance   Toileting Deficit Clothing management up   Additional Comments pt required min (A) to don pants after utilizing toilet; pt with (I) for perineal hygiene   Bed Mobility   Supine to Sit 4  Minimal assistance   Additional items Assist x 2;Verbal cues   Sit to Supine 4  Minimal assistance   Additional items Assist x 2;Verbal cues   Transfers   Sit to Stand 5  Supervision   Additional items Verbal cues  (RW)   Stand to Sit 5  Supervision   Additional items Verbal cues  (RW)   Toilet transfer 5  Supervision   Additional items Standard toilet  (grab bars)   Additional Comments pt performed transfers with continuous physical and verbal cueing to complete and initiate tasks    Functional Mobility   Functional Mobility 5  Supervision   Additional Comments pt performed FM c RW at (S) level during session in hallway; pt required (A) to direct RW several times due to guiding walker into obstacles during session   Additional items Rolling walker   Balance   Static Sitting Good   Dynamic Sitting Good   Static Standing Fair +   Dynamic Standing Fair +   Ambulatory Fair   Activity Tolerance   Activity Tolerance Patient limited by fatigue   RUE Assessment   RUE Assessment X  (3+/5 grossly; limited ROM at should   flex)   LUE Assessment   LUE Assessment X  (4/5 grossly; WFL AROM)   Hand Function   Gross Motor Coordination Functional   Fine Motor Coordination Functional   Sensation   Light Touch No apparent deficits   Sharp/Dull No apparent deficits   Cognition   Overall Cognitive Status Impaired   Arousal/Participation Responsive   Attention Attends with cues to redirect   Orientation Level Oriented to person   Memory Decreased long term memory;Decreased short term memory Following Commands Follows one step commands with increased time or repetition   Assessment   Limitation Decreased ADL status; Decreased UE strength;Decreased cognition;Decreased endurance;Decreased Safe judgement during ADL;Decreased self-care trans;Decreased high-level ADLs   Assessment pt presents at evaluation significantly confused and performing at an overall (S) level with mobility and ADLs; pt requires consistent verbal and physical cueing during tasks in order to initiate and carry out tasks due to confusion and attention   Goals   Short Term Goal  pt will perform UE strengthening exercises    Long Term Goal #1 pt will perform UB bathing and grooming tasks at (S) level with decreased cues   Long Term Goal #2 pt will perform FM c RW at (S) level with decreased cues    Long Term Goal pt will perform all functional transfers at (S) level    Plan   Treatment Interventions ADL retraining;Functional transfer training;UE strengthening/ROM; Endurance training;Patient/family training; Activityengagement;Cognitive reorientation   Goal Expiration Date 11/01/17   OT Frequency 3-5x/wk   Recommendation   OT Discharge Recommendation 24 hour supervision/assist   OT - OK to Discharge No   Barthel Index   Feeding 5   Bathing 0   Grooming Score 0   Dressing Score 5   Bladder Score 10   Bowels Score 10   Toilet Use Score 5   Transfers (Bed/Chair) Score 10   Mobility (Level Surface) Score 10   Stairs Score 0   Barthel Index Score 55     Pt will benefit from continued OT services in order to maximize (I) c ADL performance, FM c RW, and improve overall endurance/strength required to complete functional tasks in preparation for d/c  Pt left supine in bed at end of session; all needs within reach; all lines intact; scds connected and turned on

## 2017-10-18 NOTE — PLAN OF CARE
Problem: DISCHARGE PLANNING - CARE MANAGEMENT  Goal: Discharge to post-acute care or home with appropriate resources  INTERVENTIONS:  - Conduct assessment to determine patient/family and health care team treatment goals, and need for post-acute services based on payer coverage, community resources, and patient preferences, and barriers to discharge  - Address psychosocial, clinical, and financial barriers to discharge as identified in assessment in conjunction with the patient/family and health care team  - Arrange appropriate level of post-acute services according to patient's   needs and preference and payer coverage in collaboration with the physician and health care team  - Communicate with and update the patient/family, physician, and health care team regarding progress on the discharge plan  - Arrange appropriate transportation to post-acute venues  Outcome: Progressing  -return to [facility] SNF on dc

## 2017-10-18 NOTE — PLAN OF CARE

## 2017-10-18 NOTE — PROGRESS NOTES
Patient refused to take any PO medications  I explained to patient the benefits and uses of the medications  She still refused

## 2017-10-18 NOTE — PROGRESS NOTES
Blood pressure 208/102 manually    [facility] made aware and stated that we are allowing permissive hypertension  No other orders given at this time

## 2017-10-18 NOTE — PHYSICAL THERAPY NOTE
PT Evaluation     10/18/17 1101   Note Type   Note type Eval/Treat   Pain Assessment   Pain Assessment No/denies pain   Pain Score No Pain   Home Living   Type of Home SNF   Additional Comments pt only oriented to person  states she was working with therapy at [facility] and was at [facility] for [address] assistance with ADLs and functional mobility  (assist with ambulation with RW)   Lives With Facility staff   Receives Help From Personal care attendant  (staff at [facility])   ADL Assistance Needs assistance   IADLs Needs assistance   Restrictions/Precautions   Other Precautions Contact/isolation; Bed Alarm;Multiple lines;Telemetry; Fall Risk   General   Family/Caregiver Present No   Cognition   Arousal/Participation Alert   Orientation Level Oriented to person   Following Commands Follows one step commands inconsistently   RLE Assessment   RLE Assessment WFL  (4/5)   LLE Assessment   LLE Assessment X  (limited hip flex 3/5, knee and ankle 4-/5)   Coordination   Sensation WFL   Light Touch   RLE Light Touch Grossly intact   LLE Light Touch Grossly intact   Bed Mobility   Supine to Sit 4  Minimal assistance   Additional items Assist x 2;Verbal cues   Sit to Supine 4  Minimal assistance   Additional items Assist x 2   Additional Comments pt required repeated verbal cues for direction  Pt presents with difficulty maintaining attention on task and is easily distracted  Transfers   Sit to Stand (CGA with RW)   Additional items Verbal cues   Stand to Sit (CGA with RW)   Additional items Verbal cues   Stand pivot 4  Minimal assistance   Additional items Assist x 1;Verbal cues  (with RW, requires assistance for direction of RW at times)   Additional Comments Pt with limp and complaint of LE weakness during ambulation with limited ambulation tolerance, decreased attention to task, gets easily distracted and demonstrated veering to the left during ambulation with RW      Ambulation/Elevation Gait pattern Antalgic;Decreased R stance  (limp, complaint of LE weakness)   Gait Assistance 4  Minimal assist   Additional items Assist x 1   Assistive Device Rolling walker   Distance 150ft with RW min(A)x1, requires verbal and manual cues for direction and verbal cues to keep focused on task   Balance   Static Sitting Good   Dynamic Sitting Good   Static Standing Fair  (with RW)   Dynamic Standing Fair  (with RW)   Ambulatory Fair  (with RW)   Endurance Deficit   Endurance Deficit Yes   Endurance Deficit Description limited ambulation tolerance due to fatigue and LE weakness   Activity Tolerance   Activity Tolerance Patient limited by fatigue   Assessment   Prognosis Good   Problem List Decreased strength;Decreased endurance; Impaired balance;Decreased mobility; Decreased cognition; Impaired judgement;Decreased safety awareness   Assessment Pt is a 78year old female presenting with decreased cognition and difficulty maintaining attention to task  Pt presents with decreased L LE strength balance endurance and functional mobility  Pt is requiring min(A)x1-2 with RW  Pt is in need of continued activity in PT to improve strength balance endurance and all functional mobility  Pt will require return to SNF for continued rehab  Goals   Patient Goals To feel better   LTG Expiration Date 11/01/17   Long Term Goal #1 (S) with bed mobility and transfers with RW   Long Term Goal #2 Pt will ambulate 250ft with RW (S) no LOB no gait deviation   Plan   Treatment/Interventions Functional transfer training;LE strengthening/ROM; Therapeutic exercise; Endurance training;Patient/family training;Bed mobility;Gait training   PT Frequency 5x/wk  (and one time weekend)   Recommendation   Recommendation Short-term skilled PT;Long-term skilled PT   PT - OK to Discharge No  (when medically stable back to SNF)   Pt with SCD's on when PT entered room  SCD's reapplied with pt supine in bed with alarm on and bell in reach   Pt not appropriate for OOB due to awaiting teleneuro consult

## 2017-10-18 NOTE — H&P
H&P Exam - Rico Reardon 78 y o  female MRN: [id number]    Unit/Bed#: 195-77 Encounter: [id number]    Assessment:  1  Expressive and receptive aphasia  2  Pancreatic cancer  3  UTI being treated with Levaquin x 6 days, will d/c  4  PAF, off of anticoagulation due to GI bleed  5  RA  6  Duodenal ulcer/mass  7  DM, insulin dependent    Plan:  1  Stroke work up with carotid u/s and MRI  2  Increase aspirin to 325mg  3  Hold sedating medications  4  Cont bid protonix  5  Cont daily prednisone  6  Cont lantus at 1/2 strength and hold premeal insulin until we see how she does eating  7  SSI    History of Present Illness   Ms Gregg Wesley is a pleasantly confused 78year old female sent to the hospital today from SNF due to change in mental status  The SNF staff noted that at dinner time she knew it was time to eat dinner but couldn't seem to figure out how to eat dinner and she is normally alert and oriented  She was last seen normal at 1300  Review of Systems   Unable to perform ROS: Acuity of condition       Historical Information   Past Medical History:   Diagnosis Date    Arthritis     [facility] )     [facility] )     Pancreatic    Coronary artery disease     Depression with anxiety     [facility] )     DJD (degenerative joint disease)     Hyperlipidemia     Hypertension     Obstructive sleep apnea     Osteoporosis     Sleep apnea      Past Surgical History:   Procedure Laterality Date    ESOPHAGOGASTRODUODENOSCOPY N/A 8/14/2017    Procedure: ESOPHAGOGASTRODUODENOSCOPY (EGD); Surgeon: Fernando Conner MD;  [facility];   Service: Gastroenterology    KNEE SURGERY Bilateral     REVISION TOTAL HIP ARTHROPLASTY Right      Social History   History   Alcohol Use No     History   Drug Use No     History   Smoking Status    Former Smoker    Types: Cigarettes    Quit date: 9/11/1999   Smokeless Tobacco    Never Used     Family History:   Family History   Problem Relation Age of Onset    Family history unknown: Yes       Meds/Allergies   PTA meds:   Prior to Admission Medications   Prescriptions Last Dose Informant Patient Reported? Taking? ALPRAZolam (XANAX) 0 5 mg tablet   No Yes   Sig: Take 1 tablet by mouth 3 (three) times a day as needed for anxiety for up to 3 days   HYDROcodone-acetaminophen (NORCO) 5-325 mg per tablet Unknown at Unknown time  Yes No   Sig: Take 1 tablet by mouth every 6 (six) hours as needed for pain   Multiple Vitamin (MULTIVITAMIN) tablet 10/17/2017 at Unknown time  Yes Yes   Sig: Take 1 tablet by mouth daily   Probiotic Product (BACID PO) Unknown at Unknown time  Yes No   Sig: Take by mouth   acetaminophen (TYLENOL) 325 mg tablet More than a month at Unknown time  No No   Sig: Take 2 tablets by mouth every 6 (six) hours as needed for mild pain, headaches or fever   albuterol (PROVENTIL HFA,VENTOLIN HFA) 90 mcg/act inhaler Unknown at Unknown time  No No   Sig: Inhale 2 puffs every 4 (four) hours as needed for wheezing   aspirin (ECOTRIN LOW STRENGTH) 81 mg EC tablet 10/17/2017 at Unknown time  No Yes   Sig: Take 1 tablet by mouth daily   bisacodyl (BISCOLAX) 10 mg suppository Unknown at Unknown time  Yes No   Sig: Insert 10 mg into the rectum daily as needed     cyproheptadine (PERIACTIN) 4 mg tablet 10/17/2017 at Unknown time  Yes Yes   Sig: Take 4 mg by mouth daily at bedtime   docusate sodium (COLACE) 100 mg capsule Unknown at Unknown time  Yes No   Sig: Take 100 mg by mouth 2 (two) times a day as needed for constipation     ferrous sulfate 325 (65 Fe) mg tablet 10/18/2017 at Unknown time  Yes Yes   Sig: Take 325 mg by mouth 2 (two) times a day with meals     insulin glargine (LANTUS) 100 units/mL subcutaneous injection 10/17/2017 at Unknown time  No Yes   Sig: Inject 70 Units under the skin every morning   insulin lispro (HumaLOG) 100 units/mL injection 10/17/2017 at Unknown time  No Yes   Sig: Inject 27 Units under the skin 3 (three) times a day with meals   Patient taking differently: Inject 14 Units under the skin 3 (three) times a day with meals     levofloxacin (LEVAQUIN) 500 mg tablet 10/17/2017 at Unknown time  Yes Yes   Sig: Take 750 mg by mouth every 24 hours     magnesium hydroxide (MILK OF MAGNESIA) 800 MG/5ML suspension Unknown at Unknown time  Yes No   Sig: Take 30 mL by mouth daily as needed for constipation   metoprolol tartrate (LOPRESSOR) 25 mg tablet 10/17/2017 at Unknown time  No Yes   Sig: Take 1 tablet by mouth every 12 (twelve) hours   mirtazapine (REMERON) 15 mg tablet 10/17/2017 at Unknown time  No Yes   Sig: Take 1 tablet by mouth daily at bedtime   oxazepam (SERAX) 10 mg capsule   No No   Sig: Take 1 capsule by mouth every 8 (eight) hours for 3 days   oxybutynin (DITROPAN-XL) 5 mg 24 hr tablet 10/17/2017 at Unknown time  No Yes   Sig: Take 1 tablet by mouth daily   pantoprazole (PROTONIX) 40 mg tablet 10/17/2017 at Unknown time  No Yes   Sig: Take 1 tablet by mouth 2 (two) times a day before meals   predniSONE 1 mg tablet 10/17/2017 at Unknown time  Yes Yes   Sig: Take 1 mg by mouth 3 (three) times a day Indications: Rheumatoid Arthritis    sodium phosphate (FLEET) enema Unknown at Unknown time  Yes No   Sig: Insert 1 enema into the rectum once follow package directions      Facility-Administered Medications: None     Allergies   Allergen Reactions    Amlodipine     Belladonna Alkaloids     Benazepril     Donnatal [Pb-Hyoscy-Atropine-Scopolamine]     Hyoscyamine     Lotrel [Amlodipine Besy-Benazepril Hcl]     Pamelor [Nortriptyline]     Procardia [Nifedipine]     Scopolamine     Simvastatin     Sulfa Antibiotics        Objective   First Vitals:   Blood Pressure: 139/63 (10/17/17 1840)  Pulse: 87 (10/17/17 1840)  Temperature: 97 8 °F (36 6 °C) (10/17/17 1840)  Temp Source: Temporal (10/17/17 1840)  Respirations: 17 (10/17/17 1840)  Height: 5' (152 4 cm) (10/18/17 0100)  Weight - Scale: 84 3 kg (185 lb 13 6 oz) (10/18/17 0100)  SpO2: 92 % (10/17/17 1840)    Current Vitals:   Blood Pressure: (!) 202/94 (10/18/17 0305)  Pulse: 99 (10/18/17 0305)  Temperature: 99 3 °F (37 4 °C) (10/18/17 0305)  Temp Source: Temporal (10/18/17 0305)  Respirations: 18 (10/18/17 0305)  Height: 5' (152 4 cm) (10/18/17 0100)  Weight - Scale: 84 3 kg (185 lb 13 6 oz) (10/18/17 0100)  SpO2: 94 % (10/18/17 0305)      Intake/Output Summary (Last 24 hours) at 10/18/17 0404  Last data filed at 10/18/17 0158   Gross per 24 hour   Intake                0 ml   Output              125 ml   Net             -125 ml       Invasive Devices     Peripheral Intravenous Line            Peripheral IV 10/17/17 Right Antecubital less than 1 day                Physical Exam   Constitutional: She appears well-developed and well-nourished  No distress  HENT:   Head: Normocephalic and atraumatic  Right Ear: External ear normal    Left Ear: External ear normal    Mouth/Throat: Oropharynx is clear and moist  No oropharyngeal exudate  Eyes: Conjunctivae and EOM are normal  Pupils are equal, round, and reactive to light  Right eye exhibits no discharge  Left eye exhibits no discharge  No scleral icterus  Neck: Normal range of motion  Neck supple  No JVD present  No tracheal deviation present  Thyromegaly present  Cardiovascular: Normal rate, regular rhythm, normal heart sounds and intact distal pulses  Exam reveals no gallop and no friction rub  No murmur heard  Pulmonary/Chest: Effort normal and breath sounds normal  No stridor  No respiratory distress  She has no wheezes  She has no rales  She exhibits no tenderness  Abdominal: Soft  Bowel sounds are normal  She exhibits no distension and no mass  There is tenderness  There is no rebound and no guarding  Musculoskeletal: Normal range of motion  She exhibits no edema, tenderness or deformity  Lymphadenopathy:     She has no cervical adenopathy  Neurological: She is alert  She has normal reflexes   She displays normal reflexes  No cranial nerve deficit  She exhibits normal muscle tone  CN 2-12 GI, patient with expressive aphasia and forgetful memory  She has improving receptive aphasia  Uncooperative for coordination testing  Skin: Skin is warm and dry  No rash noted  She is not diaphoretic  No erythema  No pallor  Psychiatric:   Cannot assess due to current mental status       Lab Results: Results for Dalton Wu (MRN [id number]) as of 10/18/2017 05:42   Ref   Range 10/17/2017 20:31 10/17/2017 21:04 10/17/2017 21:13 10/18/2017 04:49   eGFR Latest Units: ml/min/1 73sq m   63    Sodium Latest Ref Range: 136 - 145 mmol/L   140    Potassium Latest Ref Range: 3 5 - 5 3 mmol/L   4 2    Chloride Latest Ref Range: 100 - 108 mmol/L   101    CO2 Latest Ref Range: 21 - 32 mmol/L   29    Anion Gap Latest Ref Range: 4 - 13 mmol/L   10    BUN Latest Ref Range: 5 - 25 mg/dL   17    Creatinine Latest Ref Range: 0 60 - 1 30 mg/dL   0 90    Glucose Latest Ref Range: 65 - 140 mg/dL   230 (H)    Calcium Latest Ref Range: 8 3 - 10 1 mg/dL   9 1    AST Latest Ref Range: 5 - 45 U/L   26    ALT Latest Ref Range: 12 - 78 U/L   19    Alkaline Phosphatase Latest Ref Range: 46 - 116 U/L   109    Total Protein Latest Ref Range: 6 4 - 8 2 g/dL   7 2    Albumin Latest Ref Range: 3 5 - 5 0 g/dL   2 7 (L)    Total Bilirubin Latest Ref Range: 0 20 - 1 00 mg/dL   0 30    Phosphorus Latest Ref Range: 2 3 - 4 1 mg/dL   4 1    Magnesium Latest Ref Range: 1 6 - 2 6 mg/dL   2 1    Troponin I Latest Ref Range: <=0 04 ng/mL 0 04      WBC Latest Ref Range: 4 31 - 10 16 Thousand/uL 9 87      RBC Latest Ref Range: 3 81 - 5 12 Million/uL 4 29      Hemoglobin Latest Ref Range: 11 5 - 15 4 g/dL 12 0      Hematocrit Latest Ref Range: 34 8 - 46 1 % 39 5      MCV Latest Ref Range: 82 - 98 fL 92      MCH Latest Ref Range: 26 8 - 34 3 pg 28 0      MCHC Latest Ref Range: 31 4 - 37 4 g/dL 30 4 (L)      RDW Latest Ref Range: 11 6 - 15 1 % 15 6 (H)      Platelets Latest Ref Range: 149 - 390 Thousands/uL 239      MPV Latest Ref Range: 8 9 - 12 7 fL 10 1      Neutrophils Relative Latest Ref Range: 43 - 75 % 75      Lymphocytes Relative Latest Ref Range: 14 - 44 % 17      Monocytes Relative Latest Ref Range: 4 - 12 % 6      Eosinophils Relative Latest Ref Range: 0 - 6 % 2      Basophils Relative Latest Ref Range: 0 - 1 % 0      Neutrophils Absolute Latest Ref Range: 1 85 - 7 62 Thousands/µL 7 34      Lymphocytes Absolute Latest Ref Range: 0 60 - 4 47 Thousands/µL 1 70      Monocytes Absolute Latest Ref Range: 0 17 - 1 22 Thousand/µL 0 57      Eosinophils Absolute Latest Ref Range: 0 00 - 0 61 Thousand/µL 0 22      Basophils Absolute Latest Ref Range: 0 00 - 0 10 Thousands/µL 0 04      Protime Latest Ref Range: 12 1 - 14 4 seconds 13 5      INR Latest Ref Range: 0 86 - 1 16  1 04      PTT Latest Ref Range: 23 - 35 seconds 29      Epithelial Cells Latest Ref Range: None Seen, Occasional /hpf  Occasional     Color, UA Unknown  Yellow     Clarity, UA Unknown  Clear     Specific Belk, UA Latest Ref Range: 1 003 - 1 030   >=1 030     Glucose, UA Latest Ref Range: Negative mg/dl  Negative     Ketones, UA Latest Ref Range: Negative mg/dl  15 (1+) (A)     Blood, UA Latest Ref Range: Negative, Trace-Intact   Negative     Nitrite, UA Latest Ref Range: Negative   Negative     Leukocytes, UA Latest Ref Range: Negative   Negative     pH, UA Latest Ref Range: 4 5 - 8 0   5 5     Protein, UA Latest Ref Range: Negative mg/dl  30 (1+) (A)     Bilirubin, UA Latest Ref Range: Negative   Negative     Urobilinogen, UA Latest Ref Range: 0 2, 1 0 E U /dl E U /dl  0 2     RBC, UA Latest Ref Range: None Seen, 0-5 /hpf  None Seen     WBC, UA Latest Ref Range: None Seen, 0-5, 5-55, 5-65 /hpf  None Seen     Bacteria, UA Latest Ref Range: None Seen, Occasional /hpf  Occasional     MRSA CULTURE Unknown    Rpt ((NONE))     Imaging: Head CT IMPRESSION:     No acute intracranial abnormality  Microangiopathic changes  CXR: left sided pleural effusion, cardiomegaly per my review  Radiologist interpretation pending  EKG, Pathology, and Other Studies: NSR with chronic LBBB and no acute ST-T changes    Code Status: Prior DNR per nursing home records  Advance Directive and Living Will: Yes    Power of :    POLST:      Counseling / Coordination of Care: Total floor / unit time spent today 48 minutes

## 2017-10-18 NOTE — ED PROCEDURE NOTE
PROCEDURE  ECG 12 Lead Documentation  Date/Time: 10/17/2017 9:12 PM  Performed by: Dipika Molina by: Julissa Barnes     Indications / Diagnosis:  Cva  ECG reviewed by me, the ED Provider: yes    Patient location:  ED  Previous ECG:     Previous ECG:  Compared to current    Comparison ECG info:  21 Aug 2017    Similarity:  No change  Interpretation:     Interpretation: abnormal    Rate:     ECG rate:  90    ECG rate assessment: normal    Rhythm:     Rhythm: sinus rhythm    Ectopy:     Ectopy: none    Conduction:     Conduction: abnormal      Abnormal conduction: complete LBBB    Comments:      LBB is old

## 2017-10-18 NOTE — PLAN OF CARE
Problem: Potential for Falls  Goal: Patient will remain free of falls  INTERVENTIONS:  - Assess patient frequently for physical needs  -  Identify cognitive and physical deficits and behaviors that affect risk of falls    -  [place] fall precautions as indicated by assessment   - Educate patient/family on patient safety including physical limitations  - Instruct patient to call for assistance with activity based on assessment  - Modify environment to reduce risk of injury  - Consider OT/PT consult to assist with strengthening/mobility   Outcome: Not Progressing      Problem: Prexisting or High Potential for Compromised Skin Integrity  Goal: Skin integrity is maintained or improved  INTERVENTIONS:  - Identify patients at risk for skin breakdown  - Assess and monitor skin integrity  - Assess and monitor nutrition and hydration status  - Monitor labs (i e  albumin)  - Assess for incontinence   - Turn and reposition patient  - Assist with mobility/ambulation  - Relieve pressure over bony prominences  - Avoid friction and shearing  - Provide appropriate hygiene as needed including keeping skin clean and dry  - Evaluate need for skin moisturizer/barrier cream  - Collaborate with interdisciplinary team (i e  Nutrition, Rehabilitation, etc )   - Patient/family teaching   Outcome: Progressing No

## 2017-10-18 NOTE — ED NOTES
Meal tray provided and oral fluids  Patient drinking no distress  Appropriate safety measures in place, pt re-educated to call bell system, pt verbalizes understanding    Call bell in reach     Lynden Sever, RN  10/17/17 1830

## 2017-10-18 NOTE — NUTRITION
10/18/17 145   Assessment   Timepoint Initial  (consult/pathway/speech)   Labs   List Completed Labs Blood sugar  (, 299, alb 2 8  meds:  lantus, humalog, prednisone)   Feeding Route   PO With assist   Adequacy of Intake   Nutrition Modality PO  (CCD3)   Intake Meals 0-25%   Estimated Calorie Intake 0-25%   Estimated Protein Intake  0-25%   Estimated Fluid Intake 0-25%   Estimated calorie intake compared to estimated need po intake poor, not meeting needs; appeared confused at meal and didn't know what to do per nursing progress note   Nutrition Prognosis   Potential Poor   Nutrition Concerns Elderly;Blood sugar control; Other (Comment)  (pancreatic cancer)   Comorbid Concerns Other (Comment)  (DM)   Nutrition Precautions (speech unable to assess and to follow up tomorrow)   Nutrition Considerations (monitor for education needs)   PES Statement   Problem Intake   Energy Balance (1) Predicted suboptimal energy intake NI-1 4   Related to Confusion;Swallowing difficulty; Other (comment)  (patient change in mental status/h/o cancer)   As evidenced by: Intake < 25%   Patient Nutrition Goals   Goal avoid weight loss;meet PO needs;glucose in range   Goal Status initiated   Timeframe to complete goal by next f/u   Recommendations/Interventions   Summary Unable to assess or speak with patient; off floor for STACEY  Spoke with nursing  She didn't eat well today  Typcially patient alway ate good when previously admitted  She appeared more confused as what to do with meals per nursing  She does have h/o pancreatic cancer noted, was receiving chemo  Weight decreased 3 6% x 1 month and 6 5% x 4 months, not significant  Will await speech evaluation and follow up with patient regarding appetite  Interventions Diet: order adjustment;Supplement initiate  (recommend diet change to CCD2 with glucerna daily   Monitor intake and make further recs )   Nutrition Recommendations Other (specify)  (Change diet to CCD2 and add glucerna once daily,  RD protocol not ordered )   Nutrition Complexity Risk   Nutrition complexity level High risk   Nutrition review: 10/19/17  (check po/speech eval)   Follow up date 10/23/17

## 2017-10-19 PROBLEM — R19.5 HEME POSITIVE STOOL: Status: RESOLVED | Noted: 2017-01-01 | Resolved: 2017-01-01

## 2017-10-19 PROBLEM — G93.40 ENCEPHALOPATHY: Status: ACTIVE | Noted: 2017-01-01

## 2017-10-19 PROBLEM — R73.9 HYPERGLYCEMIA: Status: RESOLVED | Noted: 2017-01-01 | Resolved: 2017-01-01

## 2017-10-19 PROBLEM — E83.39 HYPOPHOSPHATEMIA: Status: RESOLVED | Noted: 2017-01-01 | Resolved: 2017-01-01

## 2017-10-19 PROBLEM — I21.4 NSTEMI, INITIAL EPISODE OF CARE (HCC): Status: RESOLVED | Noted: 2017-01-01 | Resolved: 2017-01-01

## 2017-10-19 PROBLEM — R78.81 BACTEREMIA: Status: RESOLVED | Noted: 2017-01-01 | Resolved: 2017-01-01

## 2017-10-19 PROBLEM — A41.9 SEPSIS (HCC): Status: RESOLVED | Noted: 2017-01-01 | Resolved: 2017-01-01

## 2017-10-19 NOTE — PHYSICAL THERAPY NOTE
PT Treatment Note      10/19/17 4967   Restrictions/Precautions   Other Precautions (Contact/isolation; Bed Alarm;Multiple lines;Telemetry; Fall Ri)   Cognition   Overall Cognitive Status Impaired   Arousal/Participation Responsive   Following Commands Follows one step commands with increased time or repetition   Subjective   Subjective Refused ambulation  Pt  trying to use phone to call family and a friend  Assisted with call  Pt  agreeable to therapeutic exercises in bed  (appeared anxious and waiting for her friend to visit)   Bed Mobility   Additional Comments declined OOB   Endurance Deficit   Endurance Deficit Yes   Activity Tolerance   Activity Tolerance Patient limited by fatigue   Exercises   Quad Sets Supine;20 reps   Heelslides Supine;20 reps;AAROM;AROM; Bilateral   Hip Flexion Supine;AROM; Bilateral  (SLR 2 x 10)   Hip Abduction Supine;20 reps;AAROM;AROM; Bilateral   Hip Adduction Supine;20 reps;AAROM;AROM; Bilateral   Ankle Pumps Supine;20 reps;AROM; Bilateral   Assessment   Prognosis Good   Problem List Decreased strength;Decreased endurance; Impaired balance;Decreased mobility; Decreased safety awareness;Decreased cognition; Impaired judgement   Assessment Pt  seen for therapeutic exercises as above  Tolerated exercises well  Cues to stay on task  Pt is in need of continued activity in PT to improve strength balance endurance and all functional mobility  Pt will require return to SNF for continued rehab  Plan   Treatment/Interventions Functional transfer training;LE strengthening/ROM; Therapeutic exercise; Endurance training;Bed mobility;Gait training   Recommendation   Recommendation Short-term skilled PT   Pt  In bed with call bell within reach, scd's connected and turned on and alarm on at end of PT session

## 2017-10-19 NOTE — OCCUPATIONAL THERAPY NOTE
Occupational Therapy         Patient Name: Kendall Esteves  [id number] Date: 10/19/2017      Attempted OT treatment this AM; pt very confused and refused therapy; will reattempt as able

## 2017-10-19 NOTE — SPEECH THERAPY NOTE
Speech/Language Pathology  Assessment    Patient Name: Roel CLEMENS Date: 10/19/2017     Problem List  Patient Active Problem List   Diagnosis    UTI (urinary tract infection)    Hyperlipidemia    Diabetes mellitus with hyperglycemia (HCC)    A-fib (HCC)    Chronic back pain    Depression    SHANDA (obstructive sleep apnea)    RA [facility]umatoid arthritis) [facility] )    NSTEMI, initial episode of care ([place] [id number] )    Hyperglycemia    Sepsis ([facility] )    Heme positive stool    Bacteremia    Hypophosphatemia    Duodenal ulcer    Pancreatic malignancy syndrome (HCC)    Altered mental status    Aphasia     Past Medical History  Past Medical History:   Diagnosis Date    Arthritis     Atrial fibrillation ([facility] )     Cancer ([facility] )     Pancreatic    Coronary artery disease     Depression with anxiety     Diabetes mellitus ([facility] )     DJD (degenerative joint disease)     Hyperlipidemia     Hypertension     Obstructive sleep apnea     Osteoporosis     Sleep apnea      Past Surgical History  Past Surgical History:   Procedure Laterality Date    ESOPHAGOGASTRODUODENOSCOPY N/A 8/14/2017    Procedure: ESOPHAGOGASTRODUODENOSCOPY (EGD); Surgeon: Robbie Escalera MD;  Location:  GI LAB; Service: Gastroenterology    KNEE SURGERY Bilateral     REVISION TOTAL HIP ARTHROPLASTY Right       10/19/17 1220   Swallow Information   Current Risks for Dysphagia & Aspiration Mental status change   Current Symptoms/Concerns Decreased oral intake;Dehydration   Current Diet Dysphagia advance; Thin liquid   Baseline Diet Dyphagia advanced; Thin liquids   Baseline Assessment   Behavior/Cognition Alert; Cooperative;Confused; Distractible   Speech/Language Status Verbal speech is WFL  Patient can answer questions and follow simple directions     Patient Positioning Upright in bed   Swallow Mechanism Exam   Labial Symmetry Abnormal symmetry left   Labial Strength WFL   Labial ROM WFL   Labial Sensation WFL   Facial Symmetry [facility] Facial Strength WFL   Facial ROM WFL   Facial Sensation WFL   Lingual Symmetry WFL   Lingual Strength WFL   Lingual ROM WFL   Lingual Sensation WFL   Velum WFL   Gag WFL   Mandible WFL   Dentition Dentures top; Adequate  (bottom about 7 teeth)   Volitional Cough Strong   Tracheostomy No   Consistencies Assessed and Performance   Materials Admnistered Soft/Level 3;Mechanical Soft/Level 2;Puree/Level 1; Thin liquid   Oral Stage WFL   Phargngeal Stage WFL   Swallow Mechanics WFL;Swallow initation; Appears prompt;Good Larygneal rise   Esophageal Concerns No s/s reported   Summary   Swallow Summary Patient presents today alert and cooperative  She recently has not been eating about 50%  She needed help with her lunch tray set up, but is able to feed herself  She was able to use a spoon and eat her soup  She was able to hold her sandwich, and use a straw or a cup sip with her thin liquids  Oral phase of the swallow is within functional limits with all textures  With thin liquids, she could use a straw or take sips from a cup  No difficulty with oral control and transfer  Swallow initiation is prompt, and there is adequate laryngeal elevation  No overt s/s with any food textures or with thin liquids  Patient is safe and WFL on current level 3 diet and thin liquids  Recommendations   Risk for Aspiration None   Recommendations Consider oral diet   Diet Solid Recommendation Level 3 Dysphagia/ advanced/ soft to chew   Diet Liquid Recommendation Thin liquid   Recommended Form of Meds As desired; As tolerated   General Precautions Minimize distractions;Upright as possible for all oral intake;Supervision with meals   Results Reviewed with RN;PT/Family/Caregiver

## 2017-10-19 NOTE — PROGRESS NOTES
[facility] Progress Note  Patient: Linzy Opitz 78 y o  female   MRN: [id number]  PCP: Rachna Brandt, DO  Unit/Bed#: 735-98 Encounter: [id number]  Date Of Visit: 10/19/17    Assessment:    Principal Problem:    Encephalopathy  Active Problems:    UTI (urinary tract infection)    Diabetes mellitus with hyperglycemia (HCC)    A-fib (HCC)    SHANDA (obstructive sleep apnea)    RA (rheumatoid arthritis) ([facility] )    Altered mental status    Aphasia      Plan:    · Encephalopathy/Altered mental status: MRI negative for acute findings  Possible due to Levaquin which has been stopped on admission  The patient's confusion is mildly improved today  Neurology consulted and recommended EEG which is pending  Chest x-ray negative, UA is negative for UTI  Continue to monitor  · History of UTI: the patient was treated with Levaquin x 6 days  This was discontinued on admission  Repeat UA is negative  Urine culture ordered  · Diabetes mellitus with hyperglycemia: on admission Lantus was decreased and pre meal insulin held due to NPO status  The patient is currently eating  Will restart her previous insulin regimen with insulin sliding scale  · Atrial fibrillation: Off of anticoagulation due to GI bleed  · Duodenal ulcer: continue Protonix BID  · Rheumatoid arthritis: prednisone 3 mg PO daily  VTE Pharmacologic Prophylaxis:   Pharmacologic: Enoxaparin (Lovenox)  Mechanical VTE Prophylaxis in Place: Yes      Discussions with Specialists or Other Care Team Provider: Nursing, CM, and attending      Time Spent for Care: 30 minutes  More than 50% of total time spent on counseling and coordination of care as described above      Current Length of Stay: 1 day(s)    Current Patient Status: Inpatient   Certification Statement: The patient will continue to require additional inpatient hospital stay due to continued monitoring and workup for encephalopathy      Code Status: Level 3 - DNAR and DNI      Subjective: The patient was seen and examined  The patient's confusion is mildly improved  She denies any complaints  Objective:     Vitals:   Temp (24hrs), Av 8 °F (36 6 °C), Min:97 °F (36 1 °C), Max:98 5 °F (36 9 °C)    HR:  [87-95] 88  Resp:  [18] 18  BP: (132-217)/(77-95) 140/90  SpO2:  [90 %-92 %] 91 %  Body mass index is 36 3 kg/m²  Input and Output Summary (last 24 hours): Intake/Output Summary (Last 24 hours) at 10/19/17 1446  Last data filed at 10/19/17 0805   Gross per 24 hour   Intake          1449 17 ml   Output              200 ml   Net          1249 17 ml       Physical Exam:     Physical Exam   Constitutional: Vital signs are normal  She appears well-developed and well-nourished  She is active and cooperative  Cardiovascular: Normal rate, regular rhythm and normal heart sounds  Pulmonary/Chest: Effort normal and breath sounds normal  She has no wheezes  She has no rhonchi  She has no rales  Abdominal: Soft  Normal appearance and bowel sounds are normal  There is no tenderness  Neurological: She is alert  No cranial nerve deficit  The patient appears to be oriented x 3, she answers these questions appropriately but continues to be confused about not living at home  She is uncooperative at times with my examination  Skin: Skin is warm, dry and intact         Additional Data:     Labs:      Results from last 7 days  Lab Units 10/19/17  0947   WBC Thousand/uL 8 94   HEMOGLOBIN g/dL 12 1   HEMATOCRIT % 39 4   PLATELETS Thousands/uL 241   NEUTROS PCT % 82*   LYMPHS PCT % 12*   MONOS PCT % 4   EOS PCT % 2       Results from last 7 days  Lab Units 10/19/17  0947 10/18/17  0507   SODIUM mmol/L 136 139   POTASSIUM mmol/L 3 8 3 8   CHLORIDE mmol/L 101 101   CO2 mmol/L 26 30   BUN mg/dL 9 15   CREATININE mg/dL 0 77 0 82   CALCIUM mg/dL 8 7 9 0   TOTAL PROTEIN g/dL  --  7 2   BILIRUBIN TOTAL mg/dL  --  0 50   ALK PHOS U/L  --  111   ALT U/L  --  22   AST U/L  --  21   GLUCOSE RANDOM mg/dL 312* 279*       Results from last 7 days  Lab Units 10/18/17  0507   INR  1 10       * I Have Reviewed All Lab Data Listed Above  * Additional Pertinent Lab Tests Reviewed: All Labs Within Last 24 Hours Reviewed    Imaging:    Imaging Reports Reviewed Today Include: MRI brain, MRA head  Imaging Personally Reviewed by Myself Includes:  none    Recent Cultures (last 7 days):           Last 24 Hours Medication List:     aspirin 325 mg Oral Daily   [START ON 10/20/2017] insulin glargine 70 Units Subcutaneous QAM   insulin lispro 1-6 Units Subcutaneous HS   insulin lispro 1-6 Units Subcutaneous TID AC   insulin lispro 14 Units Subcutaneous TID With Meals   pantoprazole 40 mg Oral BID AC   predniSONE 3 mg Oral Daily   saccharomyces boulardii 250 mg Oral BID        Today, Patient Was Seen By: Gabriella Diaz PA-C    ** Please Note: This note has been constructed using a voice recognition system   **

## 2017-10-20 NOTE — PHYSICAL THERAPY NOTE
PT treatment note      10/20/17 0954   Subjective   Subjective States she will walk to the bathroom but no farther  c/o weakness BLE's and states she feels shaky inside her body   Bed Mobility   Supine to Sit 4  Minimal assistance   Additional items Assist x 1;HOB elevated; Bedrails; Increased time required;Verbal cues;LE management   Transfers   Sit to Stand 4  Minimal assistance   Additional items Assist x 1;Bedrails;Verbal cues;Armrests   Stand to Sit 4  Minimal assistance   Additional items Assist x 1; Armrests; Verbal cues   Stand pivot 4  Minimal assistance   Additional items Verbal cues   Toilet transfer 5  Supervision   Additional items Verbal cues  (asssit with pericare and brief)   Ambulation/Elevation   Gait Assistance 4  Minimal assist   Additional items Assist x 1   Assistive Device Rolling walker   Distance 15' x 2   Balance   Static Sitting Good   Dynamic Sitting Good   Static Standing Fair   Dynamic Standing Fair   Ambulatory Fair  (with RW)   Endurance Deficit   Endurance Deficit Yes   Activity Tolerance   Activity Tolerance Patient limited by fatigue   Exercises   Hip Flexion 20 reps;AAROM; Bilateral;Sitting   Hip Abduction 20 reps;AAROM;AROM; Bilateral;Sitting   Hip Adduction Sitting;20 reps;AAROM; Bilateral   Knee AROM Long Arc Quad Sitting;20 reps;AAROM; Bilateral   Ankle Pumps Sitting;20 reps;AAROM; Bilateral   Assessment   Prognosis Good   Problem List Decreased strength;Decreased endurance; Impaired balance;Decreased mobility; Decreased safety awareness; Impaired judgement   Assessment Pt  performing functional mobility at (min/S) level of function  Needs encouragement to particpate  Limited ambulation to bathroom then to chair as pt  c/o weakness and felt shaky  Mild unsteady gait  Pt is in need of continued activity in PT to improve strength balance endurance and all functional mobility  Pt will require return to SNF for continued rehab      Plan   Treatment/Interventions Functional transfer training;LE strengthening/ROM; Therapeutic exercise; Endurance training;Bed mobility;Gait training   Progress Progressing toward goals   Recommendation   Recommendation Short-term skilled PT   Pt  OOB with call bell within reach, scd's connected and turned on and alarm on at end of PT session

## 2017-10-20 NOTE — SOCIAL WORK
Pt is a possible dc on Saturday 10/21 back to [facility]  Sami Zuniga in admissions dept at [facility] aware and will get the readmission paperwork signed tomorrow

## 2017-10-20 NOTE — PLAN OF CARE
Problem: Potential for Falls  Goal: Patient will remain free of falls  INTERVENTIONS:  - Assess patient frequently for physical needs  -  Identify cognitive and physical deficits and behaviors that affect risk of falls    -  [place] fall precautions as indicated by assessment   - Educate patient/family on patient safety including physical limitations  - Instruct patient to call for assistance with activity based on assessment  - Modify environment to reduce risk of injury  - Consider OT/PT consult to assist with strengthening/mobility   Outcome: Progressing      Problem: Prexisting or High Potential for Compromised Skin Integrity  Goal: Skin integrity is maintained or improved  INTERVENTIONS:  - Identify patients at risk for skin breakdown  - Assess and monitor skin integrity  - Assess and monitor nutrition and hydration status  - Monitor labs (i e  albumin)  - Assess for incontinence   - Turn and reposition patient  - Assist with mobility/ambulation  - Relieve pressure over bony prominences  - Avoid friction and shearing  - Provide appropriate hygiene as needed including keeping skin clean and dry  - Evaluate need for skin moisturizer/barrier cream  - Collaborate with interdisciplinary team (i e  Nutrition, Rehabilitation, etc )   - Patient/family teaching   Outcome: Progressing      Problem: DISCHARGE PLANNING - CARE MANAGEMENT  Goal: Discharge to post-acute care or home with appropriate resources  INTERVENTIONS:  - Conduct assessment to determine patient/family and health care team treatment goals, and need for post-acute services based on payer coverage, community resources, and patient preferences, and barriers to discharge  - Address psychosocial, clinical, and financial barriers to discharge as identified in assessment in conjunction with the patient/family and health care team  - Arrange appropriate level of post-acute services according to patient's   needs and preference and payer coverage in collaboration with the physician and health care team  - Communicate with and update the patient/family, physician, and health care team regarding progress on the discharge plan  - Arrange appropriate transportation to post-acute venues   Outcome: Progressing      Problem: Nutrition/Hydration-ADULT  Goal: Nutrient/Hydration intake appropriate for improving, restoring or maintaining nutritional needs  Monitor and assess patient's nutrition/hydration status for malnutrition (ex- brittle hair, bruises, dry skin, pale skin and conjunctiva, muscle wasting, smooth red tongue, and disorientation)  Collaborate with interdisciplinary team and initiate plan and interventions as ordered  Monitor patient's weight and dietary intake as ordered or per policy  Utilize nutrition screening tool and intervene per policy  Determine patient's food preferences and provide high-protein, high-caloric foods as appropriate       INTERVENTIONS:  - Monitor oral intake, urinary output, labs, and treatment plans  - Assess nutrition and hydration status and recommend course of action  - Evaluate amount of meals eaten  - Assist patient with eating if necessary   - Allow adequate time for meals  - Recommend/ encourage appropriate diets, oral nutritional supplements, and vitamin/mineral supplements  - Order, calculate, and assess calorie counts as needed  - Recommend, monitor, and adjust tube feedings and TPN/PPN based on assessed needs  - Assess need for intravenous fluids  - Provide specific nutrition/hydration education as appropriate  - Include patient/family/caregiver in decisions related to nutrition   Outcome: Progressing

## 2017-10-20 NOTE — PROGRESS NOTES
[facility] Progress Note  Patient: Phuong Villagomez 78 y o  female   MRN: [id number]  PCP: Trinidad Gómez DO  Unit/Bed#: 891-15 Encounter: [id number]  Date Of Visit: 10/20/17    Assessment:    Principal Problem:    Encephalopathy  Active Problems:    UTI (urinary tract infection)    Diabetes mellitus with hyperglycemia (HCC)    A-fib (HCC)    SHANDA (obstructive sleep apnea)    RA (rheumatoid arthritis) ([facility] [address] )    Altered mental status    Aphasia      Plan:    · Encephalopathy/Altered mental status: Check urine culture  MRI negative for acute findings  Possible due to Levaquin which has been stopped on admission    Neurology consulted and recommended EEG which is pending  Chest x-ray negative, UA is negative for UTI  Continue to monitor  · History of UTI: the patient was treated with Levaquin x 6 days  This was discontinued on admission  Repeat UA is negative  Urine culture ordered  · Diabetes mellitus with hyperglycemia: on admission Lantus was decreased and pre meal insulin held due to NPO status  The patient is currently eating  Will restart her previous insulin regimen with insulin sliding scale  · Atrial fibrillation: Off of anticoagulation due to GI bleed  · Duodenal ulcer: continue Protonix BID  · Rheumatoid arthritis: prednisone 3 mg PO daily  · Deconditioning: PT eval, will need continued rehab at discharge  VTE Pharmacologic Prophylaxis:   Pharmacologic: Enoxaparin (Lovenox)  Mechanical VTE Prophylaxis in Place: Yes      Discussions with Specialists or Other Care Team Provider: Nursing, CM, and attending      Time Spent for Care: 30 minutes  More than 50% of total time spent on counseling and coordination of care as described above      Current Length of Stay: 2 day(s)    Current Patient Status: Inpatient   Certification Statement: The patient will continue to require additional inpatient hospital stay due to continued monitoring and workup for encephalopathy      Code Status: Level 3 - DNAR and DNI      Subjective:   Spoke with the patient at length today  Some answers which seemed appropriate were clearly made up after talking with staff  More confused post EEG according to nursing  Objective:     Vitals:   Temp (24hrs), Av 1 °F (36 2 °C), Min:96 7 °F (35 9 °C), Max:97 8 °F (36 6 °C)    HR:  [] 70  Resp:  [18] 18  BP: (140-207)/(68-94) 165/90  SpO2:  [90 %-93 %] 90 %  Body mass index is 36 3 kg/m²  Input and Output Summary (last 24 hours): Intake/Output Summary (Last 24 hours) at 10/20/17 1400  Last data filed at 10/20/17 1245   Gross per 24 hour   Intake          2304 58 ml   Output              200 ml   Net          2104 58 ml       Physical Exam:     Physical Exam   Constitutional: Vital signs are normal  She appears well-developed and well-nourished  She is active and cooperative  Cardiovascular: Normal rate, regular rhythm and normal heart sounds  Pulmonary/Chest: Effort normal and breath sounds normal  She has no wheezes  She has no rhonchi  She has no rales  Abdominal: Soft  Normal appearance and bowel sounds are normal  There is no tenderness  Neurological: She is alert  No cranial nerve deficit  Pt answers most questions correctly, tells me she lives at home  She is currently at [facility] for rehab  Skin: Skin is warm, dry and intact         Additional Data:     Labs:      Results from last 7 days  Lab Units 10/20/17  0602   WBC Thousand/uL 9 04   HEMOGLOBIN g/dL 11 7   HEMATOCRIT % 38 6   PLATELETS Thousands/uL 230   NEUTROS PCT % 74   LYMPHS PCT % 17   MONOS PCT % 5   EOS PCT % 4       Results from last 7 days  Lab Units 10/20/17  0602   SODIUM mmol/L 140   POTASSIUM mmol/L 3 6   CHLORIDE mmol/L 105   CO2 mmol/L 26   BUN mg/dL 10   CREATININE mg/dL 0 74   CALCIUM mg/dL 8 6   TOTAL PROTEIN g/dL 6 7   BILIRUBIN TOTAL mg/dL 0 40   ALK PHOS U/L 95   ALT U/L 23   AST U/L 29   GLUCOSE RANDOM mg/dL 215*       Results from last 7 days  Lab Units 10/18/17  0507   INR  1 10       * I Have Reviewed All Lab Data Listed Above  * Additional Pertinent Lab Tests Reviewed: All Labs Within Last 24 Hours Reviewed    Imaging:    Imaging Reports Reviewed Today Include: MRI brain, MRA head  Imaging Personally Reviewed by Myself Includes:  none    Recent Cultures (last 7 days):           Last 24 Hours Medication List:     aspirin 325 mg Oral Daily   enoxaparin 40 mg Subcutaneous Q24H [address]   insulin glargine 70 Units Subcutaneous QAM   insulin lispro 1-6 Units Subcutaneous HS   insulin lispro 1-6 Units Subcutaneous TID AC   insulin lispro 14 Units Subcutaneous TID With Meals   pantoprazole 40 mg Oral BID AC   predniSONE 3 mg Oral Daily   saccharomyces boulardii 250 mg Oral BID        Today, Patient Was Seen By: Eugenio Reece MD    ** Please Note: This note has been constructed using a voice recognition system   **

## 2017-10-20 NOTE — SOCIAL WORK
The patient will be for possible d/c to [facility] 10/21/2017 and I called Kelton Rudd at [facility] and she is aware

## 2017-10-21 NOTE — SOCIAL WORK
I called [facility] and they are aware that the patient will not be d/c to them today we are awaiting a urine culture results

## 2017-10-21 NOTE — PROGRESS NOTES
[facility] Progress Note  Patient: Vin Tripp 78 y o  female   MRN: [id number]  PCP: Ryan Dorado DO  Unit/Bed#: 731-37 Encounter: [id number]  Date Of Visit: 10/21/17    Assessment:    Principal Problem:    Encephalopathy  Active Problems:    UTI (urinary tract infection)    Diabetes mellitus with hyperglycemia (HCC)    A-fib (HCC)    SHANDA (obstructive sleep apnea)    RA (rheumatoid arthritis) ([facility] [address] )    Altered mental status    Aphasia      Plan:    · Encephalopathy/Altered mental status: Check urine culture - doesn't appear c/w uti  Will add prn zyprexa for agitation/confusion  MRI negative for acute findings  Possible due to Levaquin which has been stopped on admission  Neurology consulted and recommended EEG, read is pending  D/c IVF  · History of UTI: the patient was treated with Levaquin x 6 days  This was discontinued on admission  Repeat UA is negative  · Diabetes mellitus with hyperglycemia: on admission Lantus was decreased and decreased meal humalog given low blood sugar this am   Cont to monitor achs  · Atrial fibrillation: Off of anticoagulation due to GI bleed  · Duodenal ulcer: continue Protonix BID  · Rheumatoid arthritis: prednisone 3 mg PO daily  · Deconditioning: PT eval, will need continued rehab at discharge  VTE Pharmacologic Prophylaxis:   Pharmacologic: Enoxaparin (Lovenox)  Mechanical VTE Prophylaxis in Place: Yes      Discussions with Specialists or Other Care Team Provider: Nursing, CM, and attending      Time Spent for Care: 30 minutes  More than 50% of total time spent on counseling and coordination of care as described above  Current Length of Stay: 3 day(s)    Current Patient Status: Inpatient   Certification Statement: The patient will continue to require additional inpatient hospital stay due to continued monitoring and workup for encephalopathy      Code Status: Level 3 - DNAR and DNI      Subjective:   Pt remains mildly confused  Refused zyprexa earlier this morning, will reconsider  OOB to chair  Objective:     Vitals:   Temp (24hrs), Av 9 °F (36 6 °C), Min:97 5 °F (36 4 °C), Max:98 3 °F (36 8 °C)    HR:  [78-86] 86  Resp:  [18] 18  BP: (140-190)/(74-82) 187/79  SpO2:  [91 %-94 %] 91 %  Body mass index is 36 3 kg/m²  Input and Output Summary (last 24 hours): Intake/Output Summary (Last 24 hours) at 10/21/17 1226  Last data filed at 10/21/17 0900   Gross per 24 hour   Intake             1800 ml   Output              400 ml   Net             1400 ml       Physical Exam:     Physical Exam   Constitutional: Vital signs are normal  She appears well-developed and well-nourished  She is active and cooperative  Cardiovascular: Normal rate, regular rhythm and normal heart sounds  Pulmonary/Chest: Effort normal and breath sounds normal  She has no wheezes  She has no rhonchi  She has no rales  Abdominal: Soft  Normal appearance and bowel sounds are normal  There is no tenderness  Neurological: She is alert  No cranial nerve deficit  Pt confused at times  Not sure if its earlier dementia vs delirium  Skin: Skin is warm, dry and intact  Additional Data:     Labs:      Results from last 7 days  Lab Units 10/20/17  0602   WBC Thousand/uL 9 04   HEMOGLOBIN g/dL 11 7   HEMATOCRIT % 38 6   PLATELETS Thousands/uL 230   NEUTROS PCT % 74   LYMPHS PCT % 17   MONOS PCT % 5   EOS PCT % 4       Results from last 7 days  Lab Units 10/20/17  0602   SODIUM mmol/L 140   POTASSIUM mmol/L 3 6   CHLORIDE mmol/L 105   CO2 mmol/L 26   BUN mg/dL 10   CREATININE mg/dL 0 74   CALCIUM mg/dL 8 6   TOTAL PROTEIN g/dL 6 7   BILIRUBIN TOTAL mg/dL 0 40   ALK PHOS U/L 95   ALT U/L 23   AST U/L 29   GLUCOSE RANDOM mg/dL 215*       Results from last 7 days  Lab Units 10/18/17  0507   INR  1 10       * I Have Reviewed All Lab Data Listed Above  * Additional Pertinent Lab Tests Reviewed:  All Labs Within Last 24 Hours Reviewed    Imaging:    Imaging Reports Reviewed Today Include: MRI brain, MRA head  Imaging Personally Reviewed by Myself Includes:  none    Recent Cultures (last 7 days):       Results from last 7 days  Lab Units 10/20/17  1241   URINE CULTURE  60,000-69,000 cfu/ml        Last 24 Hours Medication List:     aspirin 325 mg Oral Daily   enoxaparin 40 mg Subcutaneous Q24H SHIRA   insulin glargine 70 Units Subcutaneous QAM   insulin lispro 1-6 Units Subcutaneous HS   insulin lispro 1-6 Units Subcutaneous TID AC   insulin lispro 12 Units Subcutaneous TID With Meals   pantoprazole 40 mg Oral BID AC   predniSONE 3 mg Oral Daily   saccharomyces boulardii 250 mg Oral BID        Today, Patient Was Seen By: Ryan Green MD    ** Please Note: This note has been constructed using a voice recognition system   **

## 2017-10-21 NOTE — PLAN OF CARE
Problem: Potential for Falls  Goal: Patient will remain free of falls  INTERVENTIONS:  - Assess patient frequently for physical needs  -  Identify cognitive and physical deficits and behaviors that affect risk of falls    -  [place] fall precautions as indicated by assessment   - Educate patient/family on patient safety including physical limitations  - Instruct patient to call for assistance with activity based on assessment  - Modify environment to reduce risk of injury  - Consider OT/PT consult to assist with strengthening/mobility   Outcome: Progressing      Problem: Prexisting or High Potential for Compromised Skin Integrity  Goal: Skin integrity is maintained or improved  INTERVENTIONS:  - Identify patients at risk for skin breakdown  - Assess and monitor skin integrity  - Assess and monitor nutrition and hydration status  - Monitor labs (i e  albumin)  - Assess for incontinence   - Turn and reposition patient  - Assist with mobility/ambulation  - Relieve pressure over bony prominences  - Avoid friction and shearing  - Provide appropriate hygiene as needed including keeping skin clean and dry  - Evaluate need for skin moisturizer/barrier cream  - Collaborate with interdisciplinary team (i e  Nutrition, Rehabilitation, etc )   - Patient/family teaching   Outcome: Progressing      Problem: DISCHARGE PLANNING - CARE MANAGEMENT  Goal: Discharge to post-acute care or home with appropriate resources  INTERVENTIONS:  - Conduct assessment to determine patient/family and health care team treatment goals, and need for post-acute services based on payer coverage, community resources, and patient preferences, and barriers to discharge  - Address psychosocial, clinical, and financial barriers to discharge as identified in assessment in conjunction with the patient/family and health care team  - Arrange appropriate level of post-acute services according to patient's   needs and preference and payer coverage in collaboration with the physician and health care team  - Communicate with and update the patient/family, physician, and health care team regarding progress on the discharge plan  - Arrange appropriate transportation to post-acute venues   Outcome: Progressing      Problem: Nutrition/Hydration-ADULT  Goal: Nutrient/Hydration intake appropriate for improving, restoring or maintaining nutritional needs  Monitor and assess patient's nutrition/hydration status for malnutrition (ex- brittle hair, bruises, dry skin, pale skin and conjunctiva, muscle wasting, smooth red tongue, and disorientation)  Collaborate with interdisciplinary team and initiate plan and interventions as ordered  Monitor patient's weight and dietary intake as ordered or per policy  Utilize nutrition screening tool and intervene per policy  Determine patient's food preferences and provide high-protein, high-caloric foods as appropriate       INTERVENTIONS:  - Monitor oral intake, urinary output, labs, and treatment plans  - Assess nutrition and hydration status and recommend course of action  - Evaluate amount of meals eaten  - Assist patient with eating if necessary   - Allow adequate time for meals  - Recommend/ encourage appropriate diets, oral nutritional supplements, and vitamin/mineral supplements  - Order, calculate, and assess calorie counts as needed  - Recommend, monitor, and adjust tube feedings and TPN/PPN based on assessed needs  - Assess need for intravenous fluids  - Provide specific nutrition/hydration education as appropriate  - Include patient/family/caregiver in decisions related to nutrition   Outcome: Progressing

## 2017-10-21 NOTE — SOCIAL WORK
The patients daughter Ling Lenz called me back and she is aware that the patient may go to the rehab tomorrow and that we are awaiting a urine culture result  I spoke with Bertha Hinton and she is aware that the patient will return tomorrow

## 2017-10-22 NOTE — PROGRESS NOTES
[facility] Internal Medicine Progress Note  Patient: Dulce Dent 78 y o  female   MRN: [id number]  PCP: Remberto Groves DO  Unit/Bed#: 412-01 Encounter: [id number]  Date Of Visit: 10/22/17    Assessment:    Principal Problem:    Encephalopathy  Active Problems:    UTI (urinary tract infection)    Diabetes mellitus with hyperglycemia (HCC)    A-fib (HCC)    SHANDA (obstructive sleep apnea)    RA (rheumatoid arthritis) ([facility] [address] )    Altered mental status    Aphasia      Plan:    · Acute encephalopathy, present on admission, resolved, EEG pending, altered mental status likely due to Levaquin  · Recent UTI, patient off of antibiotics, repeat UA negative  · Diabetes mellitus type 2, patient has had some hypoglycemia, insulin regimen has been decreased, continue with current regimen monitor Accu-Cheks AC and HS  · Atrial fibrillation, patient not on anticoagulation due to previous GI bleed  · Duodenal ulcer, continue with twice daily proton pump inhibitor  · History of rheumatoid arthritis, patient on prednisone      VTE Pharmacologic Prophylaxis:   Pharmacologic: Enoxaparin (Lovenox)  Mechanical VTE Prophylaxis in Place: Yes    Discussions with Specialists or Other Care Team Provider/Patient/Family:  Plan of care discussed with case management team, will plan on discharge back to nursing facility tomorrow    Time Spent for Care: 20 minutes  More than 50% of total time spent on counseling and coordination of care as described above  Current Length of Stay: 4 day(s)    Current Patient Status: Inpatient     Code Status: Level 3 - DNAR and DNI      Subjective:   No pain, patient tells me she does not feel anxious and overall is fine with going back to her nursing home  Objective:     Vitals:   Temp (24hrs), Av 3 °F (36 3 °C), Min:96 9 °F (36 1 °C), Max:98 1 °F (36 7 °C)    HR:  [80-82] 82  Resp:  [18] 18  BP: (146-164)/(89-96) 146/92  SpO2:  [92 %-94 %] 94 %  Body mass index is 36 3 kg/m²       Input and Output Summary (last 24 hours): Intake/Output Summary (Last 24 hours) at 10/22/17 1445  Last data filed at 10/22/17 1341   Gross per 24 hour   Intake             3090 ml   Output              250 ml   Net             2840 ml       Physical Exam:     Physical Exam   Constitutional: She is oriented to person, place, and time  She appears well-developed and well-nourished  No distress  HENT:   Head: Normocephalic and atraumatic  Mouth/Throat: Oropharynx is clear and moist  No oropharyngeal exudate  Pulmonary/Chest: Effort normal and breath sounds normal  No respiratory distress  She has no wheezes  Abdominal: Soft  Bowel sounds are normal  She exhibits no distension  There is no tenderness  Neurological: She is alert and oriented to person, place, and time  No cranial nerve deficit  Coordination normal    Skin: Skin is warm and dry  No rash noted  She is not diaphoretic  No erythema  Nursing note and vitals reviewed  Additional Data:     Labs:      Results from last 7 days  Lab Units 10/20/17  0602   WBC Thousand/uL 9 04   HEMOGLOBIN g/dL 11 7   HEMATOCRIT % 38 6   PLATELETS Thousands/uL 230   NEUTROS PCT % 74   LYMPHS PCT % 17   MONOS PCT % 5   EOS PCT % 4       Results from last 7 days  Lab Units 10/20/17  0602   SODIUM mmol/L 140   POTASSIUM mmol/L 3 6   CHLORIDE mmol/L 105   CO2 mmol/L 26   BUN mg/dL 10   CREATININE mg/dL 0 74   CALCIUM mg/dL 8 6   TOTAL PROTEIN g/dL 6 7   BILIRUBIN TOTAL mg/dL 0 40   ALK PHOS U/L 95   ALT U/L 23   AST U/L 29   GLUCOSE RANDOM mg/dL 215*       Results from last 7 days  Lab Units 10/18/17  0507   INR  1 10       * I Have Reviewed All Lab Data Listed Above  * Additional Pertinent Lab Tests Reviewed:  [address] Admission Reviewed      Recent Cultures (last 7 days):       Results from last 7 days  Lab Units 10/20/17  1241   URINE CULTURE  60,000-69,000 cfu/ml        Last 24 Hours Medication List:     ALPRAZolam 0 25 mg Oral BID   aspirin 325 mg Oral Daily   enoxaparin 40 mg Subcutaneous Q24H SHIRA   insulin glargine 70 Units Subcutaneous QAM   insulin lispro 1-6 Units Subcutaneous HS   insulin lispro 1-6 Units Subcutaneous TID AC   insulin lispro 12 Units Subcutaneous TID With Meals   pantoprazole 40 mg Oral BID AC   predniSONE 3 mg Oral Daily   saccharomyces boulardii 250 mg Oral BID        Today, Patient Was Seen By: Johnathan Morillo DO

## 2017-10-22 NOTE — SOCIAL WORK
I called the patients daughter(Christine) and I left her a message that the patient will return to [facility] tomorrow and I called Deirdre Chen is aware of the return too the d/c should be faxed to [phone]

## 2017-10-23 PROBLEM — R41.82 ALTERED MENTAL STATUS: Status: RESOLVED | Noted: 2017-01-01 | Resolved: 2017-01-01

## 2017-10-23 PROBLEM — R47.01 APHASIA: Status: RESOLVED | Noted: 2017-01-01 | Resolved: 2017-01-01

## 2017-10-23 NOTE — NURSING NOTE
Patient left by stretcher with [facility] transfer team  Patient left, vitals stable and patient was in no acute distress

## 2017-10-23 NOTE — SOCIAL WORK
DC instructions to be faxed over to [facility] to [phone]

## 2017-10-23 NOTE — NURSING NOTE
Report phoned to [facility] and spoke to Mayito, oncoming caregiver at [facility]  Patient  Left in no acute distress

## 2017-10-23 NOTE — SOCIAL WORK
Pt to be dc'd back to [facility] on this date  [facility] ([facility]) to pick pt up at 2pm  Cm notified pt's daughter Sahra Lopez and went over IMM with her over the phone  Cm also notified Brigitte Huynh in admissions dept at [facility]

## 2017-10-23 NOTE — DISCHARGE SUMMARY
Discharge Summary - [facility] Internal Medicine    Patient Information: Rianna Adame 78 y o  female MRN: [id number]  Unit/Bed#: 014-31 Encounter: [id number]    Discharging Physician / Practitioner: Harshal Mccormick DO  PCP: Odalys Thompson DO  Admission Date: 10/17/2017  Discharge Date: 10/23/17    Reason for Admission: Ms Unruly Owens is a pleasantly confused 78year old female sent to the hospital today from SNF due to change in mental status  The SNF staff noted that at dinner time she knew it was time to eat dinner but couldn't seem to figure out how to eat dinner and she is normally alert and oriented  She was last seen normal at 1300  Discharge Diagnoses:     Principal Problem:    Encephalopathy  Active Problems:    Diabetes mellitus with hyperglycemia (HCC)    A-fib (HCC)    SHANDA (obstructive sleep apnea)    RA (rheumatoid arthritis) ([address] )  Resolved Problems:    UTI (urinary tract infection)    Altered mental status    Aphasia      Hospital Course:     Rianna Adame is a 78 y o  female patient who originally presented to the hospital on 10/17/2017 due to altered mental status, concern for possible acute CVA, stroke was ruled out, patient continued have mental status change, patient's mental status gradually improved throughout hospital course, likely diagnosis is adverse effect to Levaquin  Patient had recent complete antibiotic course for acute urinary tract infection, no further antibiotics required, repeat urine studies negative  Patient with history of diabetes mellitus type 2, patient had some hypoglycemia during her hospital stay, this could have been contributing to her altered mental status, patient's insulin regimen has been decreased  Please refer to medication reconciliation form for specific medication dosage      Condition at Discharge: good     Discharge Day Visit / Exam:     Subjective:  Patient denies any acute pain, she notes that she is anxious at times  Vitals: Blood Pressure: 160/78 (10/23/17 0810)  Pulse: 92 (10/23/17 0810)  Temperature: 97 6 °F (36 4 °C) (10/23/17 0810)  Temp Source: Temporal (10/23/17 0810)  Respirations: 18 (10/23/17 0810)  Height: 5' (152 4 cm) (10/18/17 0100)  Weight - Scale: 84 3 kg (185 lb 13 6 oz) (10/18/17 0100)  SpO2: 93 % (10/23/17 0810)  Exam:   Physical Exam   Constitutional: She is oriented to person, place, and time  She appears well-developed and well-nourished  No distress  HENT:   Head: Normocephalic and atraumatic  Mouth/Throat: Oropharynx is clear and moist  No oropharyngeal exudate  Eyes: Conjunctivae and EOM are normal  Pupils are equal, round, and reactive to light  Right eye exhibits no discharge  Left eye exhibits no discharge  No scleral icterus  Cardiovascular: Normal rate and regular rhythm  Pulmonary/Chest: Effort normal and breath sounds normal  No respiratory distress  She has no wheezes  Abdominal: Soft  Bowel sounds are normal  She exhibits no distension  There is no tenderness  Musculoskeletal: Normal range of motion  Neurological: She is alert and oriented to person, place, and time  No cranial nerve deficit  Coordination normal    Skin: Skin is warm and dry  No rash noted  She is not diaphoretic  No erythema  Nursing note and vitals reviewed  Discharge instructions/Information to patient and family:   See after visit summary for information provided to patient and family  Provisions for Follow-Up Care:  See after visit summary for information related to follow-up care and any pertinent home health orders  Disposition:     [facility] at [address] [place] [address] to [facility] SNF:   · Not Applicable to this Patient - Not Applicable to this Patient    Planned Readmission: no     Discharge Statement:  I spent 35 minutes discharging the patient  This time was spent on the day of discharge  I had direct contact with the patient on the day of discharge   Greater than 50% of the total time was spent examining patient, answering all patient questions, arranging and discussing plan of care with patient as well as directly providing post-discharge instructions  Additional time then spent on discharge activities  Discharge Medications:  See after visit summary for reconciled discharge medications provided to patient and family        ** Please Note: This note has been constructed using a voice recognition system **

## 2017-10-24 NOTE — PROCEDURES
ELECTROENCEPHALOGRAM (EEG)      Patient Name:  Julio Lott  MRN: [id number]   :  1940 File #: Aisha 16 - 1   Age: 78 y o  Encounter #: [id number]   Date performed: 10/20/2017           Report date: 10/24/2017          Study type: Routine EEG    ICD 10 diagnosis: Transient alteration of awareness R40 4    Start time: 11:28 End time: 11:58     -------------------------------------------------------------------------------------------------------------------   Patient History: This recording was observed in a 78 y o  female to determine whether transient alteration of consciousness was due to seizure    Medications include: no AEDs    -------------------------------------------------------------------------------------------------------------------   Description of Procedure:  · 32 channel digital recording with electrodes placed according to the International 10-20 system with additional T1/T2 electrodes, EOG, EKG, and simultaneous video  The recording was technically satisfactory  -------------------------------------------------------------------------------------------------------------------   EEG Description:    The recording was performed with the patient awake and drowsy  She was oriented to self and location  During wakefulness, there were long runs of well regulated, low amplitude, posteriorly dominant, symmetric 9 cps alpha rhythm that attenuated with eye opening  There were symmetric low amplitude, frontally dominant beta activities  With drowsiness, alpha activity attenuated and was replaced by diffusely distributed theta activities  Deeper sleep was not attained  There was nearly persistent low amplitude and slow respiratory artifact      -------------------------------------------------------------------------------------------------------------------   Activation Procedures:  Hyperventilation was performed for 3-4 minutes and did not produce any changes       Stepped photic stimulation between 1-20 cps was performed and induced symmetric photic driving  Other findings: The single lead ECG demonstrated normal sinus rhythm    -------------------------------------------------------------------------------------------------------------------   EEG Interpretation:   This Routine EEG recorded during wakefulness and drowsiness is normal     Dipika Burkett MD   [address] [facility]

## 2017-11-08 NOTE — INTERIM OP NOTE
INSERTION VENOUS PORT (PORT-A-CATH)  Postoperative Note  PATIENT NAME: Annette Alvarez  : 1940  MRN: [id number]  [facility] OR ROOM 02    Surgery Date: 2017    Preop Diagnosis:  Malignant neoplasm of pancreas ([facility] [address] ) [C25 9]    Post-Op Diagnosis Codes:     * Malignant neoplasm of pancreas ([facility] [address] ) [C25 9]    Procedure(s) (LRB):  INSERTION VENOUS PORT (PORT-A-CATH) (N/A)    Surgeon(s) and Role:      Joey James DO - Primary    Specimens:  * No specimens in log *    Estimated Blood Loss:   Minimal    Anesthesia Type:   General     Findings:    Patent right IJ with successful placement of U/S guided right IJ mediport  Complications:   None    SIGNATURE: Dulce Osuna DO   DATE: 2017   TIME: 2:10 PM

## 2017-11-08 NOTE — ANESTHESIA PREPROCEDURE EVALUATION
Review of Systems/Medical History  Patient summary reviewed  Chart reviewed  No history of anesthetic complications     Cardiovascular  Hyperlipidemia, Hypertension , CAD, ,    Pulmonary  Negative pulmonary ROS Sleep apnea , ,        GI/Hepatic    Pancreatic problem,        Negative  ROS        Endo/Other  Diabetes type 2 Insulin, Arthritis     GYN       Hematology    Immunocompromised state ,    Musculoskeletal  Obesity , Rheumatoid arthritis ,        Neurology  Negative neurology ROS      Psychology   Depression ,            Physical Exam    Airway    Mallampati score: I  TM Distance: >3 FB  Neck ROM: full     Dental   upper dentures,     Cardiovascular  Cardiovascular exam normal    Pulmonary  Pulmonary exam normal     Other Findings        Anesthesia Plan  ASA Score- 4       Anesthesia Type- general with ASA Monitors  Additional Monitors:   Airway Plan:     Comment: Plan discussed  Consent obtained          Induction- intravenous  Informed Consent- Anesthetic plan and risks discussed with patient  I personally reviewed this patient with the CRNA  Discussed and agreed on the Anesthesia Plan with the CRNA  Jade Lizama

## 2017-11-08 NOTE — OP NOTE
OPERATIVE REPORT  PATIENT NAME: Jennifer Shipman    :  1940  MRN: [id number]  Pt Location: [facility] OR ROOM 02    SURGERY DATE: 2017    Surgeon(s) and Role: DO Lc Moore Primary    Preop Diagnosis:  Malignant neoplasm of pancreas ([address] ) [C25 9]    Post-Op Diagnosis Codes:     * Malignant neoplasm of pancreas ([address] ) [C25 9]    Procedure(s) (LRB):  INSERTION VENOUS PORT (PORT-A-CATH) (N/A)    Specimen(s):  * No specimens in log *    Estimated Blood Loss:   Minimal    Drains:       Anesthesia Type:   General    Operative Indications:  Malignant neoplasm of pancreas ([address] ) [C25 9]  77F in need of venous access for chemotherapy  Patient agreed to have a MediPort placed  The procedure self including also she risk and benefits were discussed at great length  The patient verbalized understand these risks and is willing to proceed, consent was signed  Operative Findings:  Patent right internal jugular vein  Successful placement of a ultrasound-guided right IJ MediPort    Complications:   None    Procedure and Technique:  The patient was brought to the operating arena and placed in supine position operating table  All regular monitoring devices were connected  The patient underwent general anesthesia with LMA intubation without complication  The right arm was then tucked and all pressure points padded  Using an ultrasound machine the right IJ was identified and marked with a surgical marking pen  Patient received perioperative antibiotic's  Bilateral lower extremity sequential compressive devices in place for DVT prophylaxis  The patient was then prepped and draped in usual sterile fashion  A timeout was performed to verify the correct patient, procedure, site, and positioning  Under ultrasonic guidance the right internal jugular vein was identified and a needle was inserted and dark venous blood was then aspirated   A guidewire was then inserted through the needle into the right IJ and advanced without any resistance  The needle was then removed  It was confirmed with anesthesia there is no arrhythmias or PVCs noted  The wire was then secured to the drapes  Attention was then turned to the chest  Approximately 2 fingerbreadths below the right clavicle a 3cm incision was made with a 15 blade scalpel  The Incision was taken down through the dermal tissue and subcutaneous site using electrocautery  Until the pre-pectoral fascia was encountered  Using Tomas retractors the inferior portion of the incision was elevated and a pocket was created so that the Mediport fit comfortably  Hemostasis was secured  The Length of the catheter was then measured  Using a C-arm it was approximated where the catheter with end at the caval atrial junction  It was measured to be approximately 26 cm in length  A tunneler was then used to create a passage through the subcutaneous tissue leading from the incision and ending at the insertion of the guidewire into the right IJ  The catheter was then brought through the newly created tunnel  The catheter was then clamped at the proximal portion  A dilator/sheath was inserted over the wire and the wire was removed  The catheter was then inserted through the sheath gently using DeBakey forceps as the sheath was then peeled away  Placement of the catheter was then confirmed using C-arm  The proximal portion of the catheter was then secured to the [address]  [place]  The Mediport was then flushed with heparinized saline, and flushed easily  Mediport was then placed into the pocket and secured to the prepectoral fascia using 2-0 proline sutures  Hemostasis was secured  The neck incision was closed with a 4-0 Monocryl and the incision overlying the pocket was closed in layers with 3-0 Vicryl for the dermal layer and a running 4-0 Monocryl for the skin  The skin was approximated and sealed using surgical glue       The patient tolerated procedure well was taken to the postanesthesia care unit in stable condition  All lap, needle, and history counts were correct       I was present for the entire procedure and A qualified resident physician was not available    Patient Disposition:  PACU     SIGNATURE: Cj Ledesma DO  DATE: November 8, 2017  TIME: 3:19 PM

## 2017-11-08 NOTE — ANESTHESIA POSTPROCEDURE EVALUATION
Post-Op Assessment Note      /59 (11/08/17 1535)    Temp      Pulse 65 (11/08/17 1535)   Resp (!) 31 (11/08/17 1535)    SpO2 98 % (11/08/17 1535)        Pt c/o R sided abdominal pain radiating across the midline  Says "she doesn't  Feel right"  Given medication for nausea  I am unsure of the etiology of her abdominal   Pain but may be related to her underlying pancreatic pathology  Would need more tests to determine this

## 2017-11-08 NOTE — DISCHARGE INSTRUCTIONS
Follow up with Dr Amarilis Lowry in 2 weeks as per appointment    Regular diet as tolerated    Low intensity activity a tolerated  May shower on Thursday  NO tub baths  Patient may take motrin or tylenol for pain    If develop fever, worsening pain, redness or drainage from incisions then call the office or go to the ER

## 2017-11-08 NOTE — ANESTHESIA POSTPROCEDURE EVALUATION
Post-Op Assessment Note      CV Status:  Stable    Mental Status:  Alert    Hydration Status:  Stable    PONV Controlled:  None    Airway Patency:  Patent    Post Op Vitals Reviewed: Yes          Staff: CRNA           BP  138/77   Temp     Pulse  59   Resp   24   SpO2   97

## 2017-11-09 NOTE — PLAN OF CARE
Problem: Potential for Falls  Goal: Patient will remain free of falls  INTERVENTIONS:  - Assess patient frequently for physical needs  -  Identify cognitive and physical deficits and behaviors that affect risk of falls    -  [place] fall precautions as indicated by assessment   - Educate patient/family on patient safety including physical limitations  - Instruct patient to call for assistance with activity based on assessment  - Modify environment to reduce risk of injury  - Consider OT/PT consult to assist with strengthening/mobility   Outcome: Progressing

## 2017-11-24 NOTE — PLAN OF CARE
Problem: Potential for Falls  Goal: Patient will remain free of falls  INTERVENTIONS:  - Assess patient frequently for physical needs  -  Identify cognitive and physical deficits and behaviors that affect risk of falls    -  [place] fall precautions as indicated by assessment   - Educate patient/family on patient safety including physical limitations  - Instruct patient to call for assistance with activity based on assessment  - Modify environment to reduce risk of injury  - Consider OT/PT consult to assist with strengthening/mobility   Outcome: Progressing
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
## 2017-11-24 NOTE — PLAN OF CARE

## 2017-12-05 NOTE — H&P
H&P Exam - Letty Richter 78 y o  female MRN: [id number]    Unit/Bed#: 401-01 Encounter: [id number]    Assessment:    Altered mental status / Encephalopathy / Possible underlying dementia   Will monitor patient on telemetry  Neuro checks q 4 hours  Initiate ischemic stroke pathway  If no improvement will obtain MRI tomorrow   She had an echo recently along with MRA and carotid eval so will no repeat     Paroxysmal Atrial Fibrillation  No AC 2/2 history of G I bleed  Currently in sinus rhythm   Continue metoprolol and monitor on Tele  Trend Troponin x 2      Type 2 DM with hyperglycemia  Continue lantus, meal time insulin, initiatie insulin sliding scale with acc-checks AC and HS     History of Duodenal Ulcers  Protonix PO BID    Rheumatoid Arthritis  Continue Prednisone 3 mg Daily    Pancreatic Cancer with liver Mets  Per CT some mets have enlarged in size, the mass encases greater than 75% of hepatic artery  Case discussed with YAZAN SYED on call, no acute intervention  Will consult Surgical Onc for input       This maybe progressive dementia as patient has no focal neurologic symptoms and per family history the patient does get confused  She also had a similar episode that was attributed to Levaquin and/or UTI    I suspect her lethargy is due to haldol administered in ED  If she does not improve will obtain MRI of brain  LOS > 2 midnights  DNR III  Lovenox         History of Present Illness   Source of history is obtained via a review of medical records, discussed with the our staff, and discussion with patient's daughter  Patient is a 53-year-old female with history of diabetes mellitus, atrial fibrillation, presents today with a chief complaint of confusion  The patient resides in a nursing home and has been refusing her oxygen therapy, she was found in another resident's bed  She is currently receiving chemotherapy for pancreatic cancer    On exam she is lethargic but able to tell me she lives in a nursing home, that she's in a hospital and her daughter's name  Review of Systems   Unable to perform ROS: Mental status change       Historical Information   Past Medical History:   Diagnosis Date    Arthritis     Atrial fibrillation ([facility] [address] )     Cancer ([facility] [address] )     Pancreatic    Coronary artery disease     Depression with anxiety     [facility]     DJD (degenerative joint disease)     Hyperlipidemia     Hypertension     Obstructive sleep apnea     Osteoporosis     Sleep apnea      Past Surgical History:   Procedure Laterality Date    ESOPHAGOGASTRODUODENOSCOPY N/A 8/14/2017    Procedure: ESOPHAGOGASTRODUODENOSCOPY (EGD); Surgeon: Dilan Maldonado MD;  Location:  GI LAB; Service: Gastroenterology    JOINT REPLACEMENT Left     hip    KNEE SURGERY Bilateral     IL INSJ TUNNELED CTR VAD W/SUBQ PORT AGE 5 YR/> N/A 11/8/2017    Procedure: INSERTION VENOUS PORT (PORT-A-CATH);   Surgeon: Veronica Estevez DO;  Location: [facility] MAIN OR;  Service: General    REVISION TOTAL HIP ARTHROPLASTY Right      Social History   History   Alcohol Use No     History   Drug Use No     History   Smoking Status    Former Smoker    Types: Cigarettes    Quit date: 9/11/1999   Smokeless Tobacco    Never Used     Family History: non-contributory    Meds/Allergies   all medications and allergies reviewed  Allergies   Allergen Reactions    Amlodipine     Belladonna Alkaloids     Benazepril     Donnatal [Pb-Hyoscy-Atropine-Scopolamine]     Hyoscyamine     Lotrel [Amlodipine Besy-Benazepril Hcl]     Pamelor [Nortriptyline]     Procardia [Nifedipine]     Scopolamine     Simvastatin     Sulfa Antibiotics        Objective   First Vitals:   Blood Pressure: (!) 229/107 (12/05/17 1044)  Pulse: 99 (12/05/17 1044)  Temperature: (!) 97 4 °F (36 3 °C) (12/05/17 1044)  Temp Source: Temporal (12/05/17 1044)  Respirations: 20 (12/05/17 1044)  Height: 4' 7" (139 7 cm) (12/05/17 1044)  Weight - Scale: 82 3 kg (181 lb 8 oz) (12/05/17 1044)  SpO2: (!) 89 % (12/05/17 1044)    Current Vitals:   Blood Pressure: (!) 172/71 (12/05/17 1526)  Pulse: 78 (12/05/17 1526)  Temperature: (!) 97 4 °F (36 3 °C) (12/05/17 1044)  Temp Source: Temporal (12/05/17 1044)  Respirations: 20 (12/05/17 1526)  Height: 4' 7" (139 7 cm) (12/05/17 1044)  Weight - Scale: 82 3 kg (181 lb 8 oz) (12/05/17 1044)  SpO2: 98 % (12/05/17 1526)      Intake/Output Summary (Last 24 hours) at 12/05/17 1715  Last data filed at 12/05/17 1321   Gross per 24 hour   Intake             1000 ml   Output                0 ml   Net             1000 ml       Invasive Devices     Peripheral Intravenous Line            Peripheral IV 12/05/17 Right Forearm less than 1 day                Physical Exam    Lab Results:   Lab Results   Component Value Date    WBC 6 50 12/05/2017    HGB 13 1 12/05/2017    HCT 42 6 12/05/2017    MCV 90 12/05/2017     (L) 12/05/2017       Imaging:   CT abdomen pelvis with contrast   Final Result         1  Persistent pancreatic head mass/neoplasm with liver metastatic disease  Some of the lesions in the liver have increased in size as described above  The pancreatic mass in the pancreatic head encases greater than 50% of the portal vein in addition to    compressing the portal vein  However, there is no evidence of pancreatic mass extension to the superior mesenteric artery  The pancreatic mass encases greater than 75% of the hepatic artery  2  New soft tissue nodules along the left side of the omentum and also along the right paracolic gutter suggesting progression of metastatic disease  3  Colonic diverticulosis  4  No evidence of bowel obstruction  Workstation performed: [id number]         CT head without contrast   Final Result      No acute intracranial abnormality  Microangiopathic changes  Workstation performed: [id number]         X-ray chest 2 views   Final Result      Small left pleural effusion                      Workstation performed: [id number]             EKG, Pathology, and Other Studies:   NSR    Code Status: Prior  Advance Directive and Living Will: Yes    Power of :    POLST:      Counseling / Coordination of Care:

## 2017-12-05 NOTE — ED PROCEDURE NOTE
Procedure  ECG 12 Lead Documentation  Date/Time: 12/5/2017 2:21 PM  Performed by: Francis Pereyra  Authorized by: Francis Pereyra     Indications / Diagnosis:  Altered mental status  ECG reviewed by me, the ED Provider: yes    Patient location:  ED  Previous ECG:     Previous ECG:  Compared to current    Comparison ECG info:  No change from 10/17/17    Similarity:  No change    Comparison to cardiac monitor: Yes    Interpretation:     Interpretation: non-specific    Rate:     ECG rate:  90    ECG rate assessment: normal    Rhythm:     Rhythm: sinus rhythm    Ectopy:     Ectopy: none    QRS:     QRS axis:  Left  ST segments:     ST segments:  Normal  T waves:     T waves: normal

## 2017-12-05 NOTE — ED NOTES
Patient uncooperative in CT, imaging not performed  Carolina Mobley PA-C aware        Heriberto Bear RN  12/05/17 2063

## 2017-12-05 NOTE — ED PROVIDER NOTES
History  Chief Complaint   Patient presents with    Altered Mental Status     Altered mental status noticed today by staff at [facility]     Patient presents to the emergency department today via emergency medical services  We did receive a phone call from the nursing home stating this female was noted to have a change in mental status per the staff  This states she is normally compliant however they noticed that she was in somebody else's bed today  She was refusing oxygen therapy  She is receiving chemotherapy for pancreatic cancer cording to the nursing home staff  At bedside upon questioning the patient patient states she is sick however is unable to characterize this feeling  She denies headache chest pain shortness of breath or abdominal pain  It is noted that she is experiencing abdominal tenderness on examination  Patient states she is unable to talk however does talk in complete sentences  Patient did make the comment "my heaven hurts" however is unable to quantify this statement any further  Patient states she wishes to die however is unable to speak further about the statement as well  Prior to Admission Medications   Prescriptions Last Dose Informant Patient Reported? Taking?    ALPRAZolam (XANAX) 0 25 mg tablet   No Yes   Sig: Take 1 tablet by mouth 3 (three) times a day as needed for anxiety for up to 10 days   Patient taking differently: Take 0 25 mg by mouth every 8 (eight) hours as needed for anxiety     ARTIFICIAL TEAR SOLUTION OP   Yes Yes   Sig: Apply to eye   FLUoxetine (PROzac) 20 MG tablet   Yes Yes   Sig: Take 20 mg by mouth daily   Multiple Vitamin (MULTIVITAMIN) tablet   Yes Yes   Sig: Take 1 tablet by mouth daily   Nutritional Supplements (HI-AZEB) LIQD   Yes Yes   Sig: Take 60 mL by mouth 2 (two) times a day   acetaminophen (TYLENOL) 325 mg tablet   No Yes   Sig: Take 2 tablets by mouth every 6 (six) hours as needed for mild pain, headaches or fever   albuterol (2 5 mg/3 mL) 0 083 % nebulizer solution   Yes Yes   Sig: Take 2 5 mg by nebulization every 4 (four) hours as needed for wheezing   aspirin (ECOTRIN LOW STRENGTH) 81 mg EC tablet   Yes Yes   Sig: Take 81 mg by mouth daily   bisacodyl (BISCOLAX) 10 mg suppository   Yes Yes   Sig: Insert 10 mg into the rectum daily as needed     bisacodyl (FLEET) 10 MG/30ML ENEM   Yes Yes   Sig: Insert 10 mg into the rectum as needed for constipation   cyproheptadine (PERIACTIN) 4 mg tablet   Yes No   Sig: Take 4 mg by mouth daily at bedtime   docusate sodium (COLACE) 100 mg capsule   Yes Yes   Sig: Take 100 mg by mouth 2 (two) times a day as needed for constipation     ferrous sulfate 325 (65 Fe) mg tablet   Yes Yes   Sig: Take 325 mg by mouth 2 (two) times a day with meals     insulin glargine (LANTUS) 100 units/mL subcutaneous injection   No Yes   Sig: Inject 60 Units under the skin every morning   insulin lispro (HumaLOG) 100 units/mL injection   No Yes   Sig: Inject 12 Units under the skin 3 (three) times a day before meals   Patient taking differently: Inject 0-12 Units under the skin 4 (four) times a day (before meals and at bedtime)     magnesium hydroxide (MILK OF MAGNESIA) 400 mg/5 mL oral suspension   Yes Yes   Sig: Take 30 mL by mouth as needed for constipation   metoprolol tartrate (LOPRESSOR) 25 mg tablet   No Yes   Sig: Take 1 tablet by mouth every 12 (twelve) hours   nitrofurantoin (MACROBID) 100 mg capsule   Yes Yes   Sig: Take 100 mg by mouth 2 (two) times a day   pantoprazole (PROTONIX) 40 mg tablet   No Yes   Sig: Take 1 tablet by mouth 2 (two) times a day before meals   potassium chloride (MICRO-K) 10 MEQ CR capsule   Yes Yes   Sig: Take 10 mEq by mouth 2 (two) times a day   predniSONE 1 mg tablet   No Yes   Sig: Take 3 tablets by mouth daily   Patient taking differently: Take 1 mg by mouth 3 (three) times a day        Facility-Administered Medications: None       Past Medical History:   Diagnosis Date    Arthritis  Atrial fibrillation (HCC)     Cancer [facility])     Pancreatic    Coronary artery disease     Depression with anxiety     Diabetes mellitus ([address] )     DJD (degenerative joint disease)     Hyperlipidemia     Hypertension     Obstructive sleep apnea     Osteoporosis     Sleep apnea        Past Surgical History:   Procedure Laterality Date    ESOPHAGOGASTRODUODENOSCOPY N/A 8/14/2017    Procedure: ESOPHAGOGASTRODUODENOSCOPY (EGD); Surgeon: Ricardo Maki MD;  Location: [facility]; Service: Gastroenterology    JOINT REPLACEMENT Left     hip    KNEE SURGERY Bilateral     OR INSJ TUNNELED CTR VAD W/SUBQ PORT AGE 5 YR/> N/A 11/8/2017    Procedure: INSERTION VENOUS PORT (PORT-A-CATH); Surgeon: Nathan Ramirez DO;  Location: [facility];  Service: General    REVISION TOTAL HIP ARTHROPLASTY Right        Family History   Problem Relation Age of Onset    Family history unknown: Yes     I have reviewed and agree with the history as documented      Social History   Substance Use Topics    Smoking status: Former Smoker     Types: Cigarettes     Quit date: 9/11/1999    Smokeless tobacco: Never Used    Alcohol use No        Review of Systems    Physical Exam  ED Triage Vitals   Temperature Pulse Respirations Blood Pressure SpO2   12/05/17 1044 12/05/17 1044 12/05/17 1044 12/05/17 1044 12/05/17 1044   (!) 97 4 °F (36 3 °C) 99 20 (!) 229/107 (!) 89 %      Temp Source Heart Rate Source Patient Position - Orthostatic VS BP Location FiO2 (%)   12/05/17 1044 12/05/17 1044 12/05/17 1044 12/05/17 1044 --   Temporal Monitor Lying Left arm       Pain Score       12/05/17 1035       Worst Possible Pain           Orthostatic Vital Signs  Vitals:    12/05/17 1300 12/05/17 1330 12/05/17 1515 12/05/17 1526   BP: (!) 175/99 (!) 186/74 (!) 188/90 (!) 172/71   Pulse: 91 72 70 78   Patient Position - Orthostatic VS:  Lying Lying Lying       Physical Exam    ED Medications  Medications   sodium chloride 0 9 % bolus 1,000 mL (0 mL Intravenous Stopped 12/5/17 1321)   haloperidol lactate (HALDOL) injection 5 mg (5 mg Intravenous Given 12/5/17 1204)   OLANZapine (ZyPREXA) IM injection 10 mg (10 mg Intramuscular Given 12/5/17 1321)   iohexol (OMNIPAQUE) 350 MG/ML injection (SINGLE-DOSE) 100 mL (100 mL Intravenous Given 12/5/17 1439)   OLANZapine (ZyPREXA) IM injection 10 mg (10 mg Intramuscular Given 12/5/17 1504)   labetalol (NORMODYNE) injection 10 mg (10 mg Intravenous Given 12/5/17 1509)       Diagnostic Studies  Results Reviewed     Procedure Component Value Units Date/Time    Urine Microscopic [[id number]]  (Normal) Collected:  12/05/17 1459    Lab Status:  Final result Specimen:  Urine from Urine, Clean Catch Updated:  12/05/17 1515     RBC, UA None Seen /hpf      WBC, UA None Seen /hpf      Epithelial Cells Occasional /hpf      Bacteria, UA Occasional /hpf     UA w Reflex to Microscopic w Reflex to Culture [[id number]]  (Abnormal) Collected:  12/05/17 1459    Lab Status:  Final result Specimen:  Urine from Urine, Clean Catch Updated:  12/05/17 1510     Color, UA Yellow     Clarity, UA Clear     Specific Gravity, UA 1 010     pH, UA 7 0     Leukocytes, UA Negative     Nitrite, UA Negative     Protein, UA Trace (A) mg/dl      Glucose, UA Negative mg/dl      Ketones, UA Negative mg/dl      Urobilinogen, UA 0 2 E U /dl      Bilirubin, UA Negative     Blood, UA Negative    TSH [[id number]]  (Normal) Collected:  12/05/17 1045    Lab Status:  Final result Specimen:  Blood from Arm, Right Updated:  12/05/17 1154     TSH 3RD GENERATON 1 293 uIU/mL     Narrative:         Patients undergoing fluorescein dye angiography may retain small amounts of fluorescein in the body for 48-72 hours post procedure  Samples containing fluorescein can produce falsely depressed TSH values  If the patient had this procedure,a specimen should be resubmitted post fluorescein clearance            The recommended reference ranges for TSH during pregnancy are as follows:  First trimester 0 1 to 2 5 uIU/mL  Second trimester  0 2 to 3 0 uIU/mL  Third trimester 0 3 to 3 0 uIU/m      B-type natriuretic peptide [[id number]]  (Abnormal) Collected:  12/05/17 1045    Lab Status:  Final result Specimen:  Blood from Arm, Right Updated:  12/05/17 1154     NT-proBNP 8,087 (H) pg/mL     Troponin I [[id number]]  (Normal) Collected:  12/05/17 1045    Lab Status:  Final result Specimen:  Blood from Arm, Right Updated:  12/05/17 1120     Troponin I 0 04 ng/mL     Narrative:         [employer] Chemistry analyzer 99% cutoff is > 0 04 ng/mL in network labs    o cTnI 99% cutoff is useful only when applied to patients in the clinical setting of myocardial ischemia  o cTnI 99% cutoff should be interpreted in the context of clinical history, ECG findings and possibly cardiac imaging to establish correct diagnosis  o cTnI 99% cutoff may be suggestive but clearly not indicative of a coronary event without the clinical setting of myocardial ischemia  Lactic Acid x2 [[id number]]  (Normal) Collected:  12/05/17 1045    Lab Status:  Final result Specimen:  Blood from Arm, Right Updated:  12/05/17 1119     LACTIC ACID 1 0 mmol/L     Narrative:         Result may be elevated if tourniquet was used during collection      Comprehensive metabolic panel [[id number]]  (Abnormal) Collected:  12/05/17 1045    Lab Status:  Final result Specimen:  Blood from Arm, Right Updated:  12/05/17 1116     Sodium 142 mmol/L      Potassium 4 1 mmol/L      Chloride 102 mmol/L      CO2 31 mmol/L      Anion Gap 9 mmol/L      BUN 9 mg/dL      Creatinine 0 77 mg/dL      Glucose 145 (H) mg/dL      Calcium 8 6 mg/dL      AST 47 (H) U/L      ALT 36 U/L      Alkaline Phosphatase 174 (H) U/L      Total Protein 7 3 g/dL      Albumin 2 8 (L) g/dL      Total Bilirubin 0 50 mg/dL      eGFR 76 ml/min/1 73sq m     Narrative:         National Kidney Disease Education Program recommendations are as follows:  GFR calculation is accurate only with a steady state creatinine  Chronic Kidney disease less than 60 ml/min/1 73 sq  meters  Kidney failure less than 15 ml/min/1 73 sq  meters  APTT [[id number]]  (Normal) Collected:  12/05/17 1045    Lab Status:  Final result Specimen:  Blood from Arm, Right Updated:  12/05/17 1110     PTT 28 seconds     Narrative: Therapeutic Heparin Range = 60-90 seconds    Protime-INR [[id number]]  (Normal) Collected:  12/05/17 1045    Lab Status:  Final result Specimen:  Blood from Arm, Right Updated:  12/05/17 1110     Protime 13 7 seconds      INR 1 06    CBC and differential [[id number]]  (Abnormal) Collected:  12/05/17 1045    Lab Status:  Final result Specimen:  Blood from Arm, Right Updated:  12/05/17 1103     WBC 6 50 Thousand/uL      RBC 4 73 Million/uL      Hemoglobin 13 1 g/dL      Hematocrit 42 6 %      MCV 90 fL      MCH 27 7 pg      MCHC 30 8 (L) g/dL      RDW 16 3 (H) %      MPV 10 1 fL      Platelets 313 (L) Thousands/uL      Neutrophils Relative 75 %      Lymphocytes Relative 14 %      Monocytes Relative 9 %      Eosinophils Relative 1 %      Basophils Relative 1 %      Neutrophils Absolute 4 91 Thousands/µL      Lymphocytes Absolute 0 92 Thousands/µL      Monocytes Absolute 0 57 Thousand/µL      Eosinophils Absolute 0 07 Thousand/µL      Basophils Absolute 0 03 Thousands/µL     Blood culture #1 [[id number]] Collected:  12/05/17 1045    Lab Status: In process Specimen:  Blood from Arm, Right Updated:  12/05/17 1059    Blood culture #2 [[id number]] Collected:  12/05/17 1050    Lab Status: In process Specimen:  Blood from Arm, Right Updated:  12/05/17 1059                 CT abdomen pelvis with contrast   Final Result by Waldemar Mora MD (12/05 1522)         1  Persistent pancreatic head mass/neoplasm with liver metastatic disease  Some of the lesions in the liver have increased in size as described above   The pancreatic mass in the pancreatic head encases greater than 50% of the portal vein in addition to    compressing the portal vein  However, there is no evidence of pancreatic mass extension to the superior mesenteric artery  The pancreatic mass encases greater than 75% of the hepatic artery  2  New soft tissue nodules along the left side of the omentum and also along the right paracolic gutter suggesting progression of metastatic disease  3  Colonic diverticulosis  4  No evidence of bowel obstruction  Workstation performed: [id number]         CT head without contrast   Final Result by Anne-Marie Fitzgerald MD (12/05 1508)      No acute intracranial abnormality  Microangiopathic changes  Workstation performed: [id number]         X-ray chest 2 views   Final Result by PANCHO Hinson MD (12/05 1202)      Small left pleural effusion            Workstation performed: [id number]                    Procedures  Procedures       Phone Contacts  ED Phone Contact    ED Course  ED Course as of Dec 05 1551   Tue Dec 05, 2017   1142 Stable from 1 month ago Troponin I: 0 04   1142 LACTIC ACID: 1 0   1142 INR: 1 06   1142 WBC: 6 50   1142 Hemoglobin: 13 1   1142 eGFR: 76   1142 Sodium: 142   1142 Potassium: 4 1   1142 CO2: 31   1142 Anion Gap: 9   1142 BUN: 9   1309 Narrative       CHEST    INDICATION: Headache, cough, vomiting    COMPARISON: November 8, 2017    VIEWS:  AP semierect and lateral     IMAGES:  2    FINDINGS: The patient is rotated to the left   Submaximal inspiration  The cardiomediastinal silhouette is unchanged   Power port overlies right chest wall with catheter tip at the atrial caval junction  Small left pleural effusion, similar to prior   No developing pulmonary consolidation or pneumothorax  Bony thorax unremarkable  Severe degenerative changes, right glenohumeral joint  Impression       Small left pleural effusion             1419 It is of note that the patient did have a time delay to CAT scan due to her generalized resistance to care    1509 Impression       No acute intracranial abnormality   Microangiopathic changes  1516 Leukocytes, UA: Negative   1517 Bacteria, UA: Occasional   1517 Epithelial Cells: Occasional   1526 Impression         1  Persistent pancreatic head mass/neoplasm with liver metastatic disease  Some of the lesions in the liver have increased in size as described above  The pancreatic mass in the pancreatic head encases greater than 50% of the portal vein in addition to   compressing the portal vein  However, there is no evidence of pancreatic mass extension to the superior mesenteric artery  The pancreatic mass encases greater than 75% of the hepatic artery  2  New soft tissue nodules along the left side of the omentum and also along the right paracolic gutter suggesting progression of metastatic disease  3  Colonic diverticulosis  4  No evidence of bowel obstruction  [phone] Will page slim      1534 100% SPO2 on 2L/Min    1540 Code status DNR on pts chart [facility]             HEART Risk Score    Flowsheet Row Most Recent Value   History  0 Filed at: 12/05/2017 1423   ECG  0 Filed at: 12/05/2017 1423   Age  2 Filed at: 12/05/2017 1423   Risk Factors  1 Filed at: 12/05/2017 1423   Troponin  0 Filed at: 12/05/2017 1423   Heart Score Risk Calculator   History  0 Filed at: 12/05/2017 1423   ECG  0 Filed at: 12/05/2017 1423   Age  2 Filed at: 12/05/2017 1423   Risk Factors  1 Filed at: 12/05/2017 1423   Troponin  0 Filed at: 12/05/2017 1423   HEART Score  3 Filed at: 12/05/2017 1423   HEART Score  3 Filed at: 12/05/2017 1423                            MDM  CritCare Time    Disposition  Final diagnoses:   Hypertensive urgency   Pancreatic cancer ([facility] [address] )   Altered mental status     Time reflects when diagnosis was documented in both MDM as applicable and the Disposition within this note     Time User Action Codes Description Comment    12/5/2017  3:27 PM Sunday Lime Add [I16 0] Hypertensive urgency     12/5/2017  3:28 PM Joselyn GARCIA Add [C25 9] Pancreatic cancer ([address] )     12/5/2017  3:29 PM Aviva Irby Add [R41 50] Altered mental status       ED Disposition     ED Disposition Condition Comment    Admit  Case was discussed with Dr Billie Castillo and the patient's admission status was agreed to be Admission Status: inpatient status to the service of Dr Billie Castillo   Follow-up Information    None       Patient's Medications   Discharge Prescriptions    No medications on file     No discharge procedures on file      ED Provider  Electronically Signed by           Roderick Archer PA-C  12/05/17 0795

## 2017-12-05 NOTE — ED NOTES
Mar Pacheco PA-C does not want sepsis alert at this time  Patient returning from radiology at this time        Katie Lemus RN  12/05/17 3935

## 2017-12-06 NOTE — PROGRESS NOTES
[place] [address]  Spoke with Dr Carmen Clements  1/2 amg D50 given and IV fluids changed  Will continue to monitor

## 2017-12-06 NOTE — PLAN OF CARE
Problem: Potential for Falls  Goal: Patient will remain free of falls  INTERVENTIONS:  - Assess patient frequently for physical needs  -  Identify cognitive and physical deficits and behaviors that affect risk of falls    -  [place] fall precautions as indicated by assessment   - Educate patient/family on patient safety including physical limitations  - Instruct patient to call for assistance with activity based on assessment  - Modify environment to reduce risk of injury  - Consider OT/PT consult to assist with strengthening/mobility   Outcome: Progressing      Problem: Prexisting or High Potential for Compromised Skin Integrity  Goal: Skin integrity is maintained or improved  INTERVENTIONS:  - Identify patients at risk for skin breakdown  - Assess and monitor skin integrity  - Assess and monitor nutrition and hydration status  - Monitor labs (i e  albumin)  - Assess for incontinence   - Turn and reposition patient  - Assist with mobility/ambulation  - Relieve pressure over bony prominences  - Avoid friction and shearing  - Provide appropriate hygiene as needed including keeping skin clean and dry  - Evaluate need for skin moisturizer/barrier cream  - Collaborate with interdisciplinary team (i e  Nutrition, Rehabilitation, etc )   - Patient/family teaching   Outcome: Progressing

## 2017-12-06 NOTE — PHYSICIAN ADVISOR
This patient is a 78 y o  y/o female who is admitted to the hospital for Altered Mental Status (Altered mental status noticed today by staff at [facility])   The patient presented to the ED on 12/5/17 at 1030 and was admitted to the hospital on 12/5/2017 1030  History of Present Illness includes:     Patient is a nursing home resident was found on other residents bed  The patient has a history of pancreatic cancer is receiving chemotherapy  She was noted to be lethargic  Vital signs in the ER are as follows   ED Triage Vitals   Temperature Pulse Respirations Blood Pressure SpO2   12/05/17 1044 12/05/17 1044 12/05/17 1044 12/05/17 1044 12/05/17 1044   (!) 97 4 °F (36 3 °C) 99 20 (!) 229/107 (!) 89 %      Temp Source Heart Rate Source Patient Position - Orthostatic VS BP Location FiO2 (%)   12/05/17 1044 12/05/17 1044 12/05/17 1044 12/05/17 1044 --   Temporal Monitor Lying Left arm       Pain Score       12/05/17 1035       Worst Possible Pain         On exam, the abdomen is soft and nontender  There is no rebound or guarding    The patient is being admitted for acute encephalopathy questionable underlying dementia, PAF  Plan of care includes neuro checks q 4 hours, ischemic stroke pathway, consideration of MRI if there is no improvement, telemetry monitoring, serial cardiac enzymes, surgical Oncology consultation, repeat labs  This patient is appropriate for inpatient admission as her length of stay is expected to be least 2 midnights  The patient has accelerated hypertension requiring further modification of her blood pressure medications  Her insulin dosage is also being modified secondary to hypoglycemia  This patient is not yet stable for discharge    Continued hospitalization is necessary to ensure stabilization of her clinical status

## 2017-12-06 NOTE — PROGRESS NOTES
Progress Note - Vin Tripp 78 y o  female MRN: [id number]    Unit/Bed#: 425-01 Encounter: [id number]      Assessment/Plan:    1  Metabolic encephalopathy / probable underlying dementia  1  Partly due to hypoglycemia, insulin orders have been discontinued, will resume once glucose is appropriate at a lower dose  2  Will start Aricept  3  I discussed the patient's mental status with her daughter who states she is close to her baseline, she also states that she has been having worsening confusion and forgetfulness over the past 1-2 years  2  Hypertensive urgency present on admission:  She has an allergy to Calcium channel blockers and ACE-I  Increase metoprolol to 50 mg p o  B i d  PRN Hydralazine  3  Paroxysmal atrial fibrillation:  Continue metoprolol, troponins negative  4  Type 2 diabetes mellitus with hypoglycemia:  Insulin has been discontinued, will monitor times 24 hours, will consider discharge in Januvia  5  History of duodenal ulcers:  Continue Protonix p  o  b i d   6  Rheumatoid arthritis:  Continue prednisone 3 mg daily  7  Pancreatic Cancer with Liver Mets: will discuss case with Surgical Oncology  Patient establish with Dr Danika Mora     Subjective:   Seen and examined at bedside, she is resting calmly, she is able to tell me her previous address, her children's names, and other events in the past   She does get confused when trying to recall recent events  Daughter states that over the past 1-2 years she has been more forgetful and does get intermittently confused usually at night     Objective:     Vitals:   Vitals:    12/06/17 0938   BP: (!) 180/64   Pulse: 72   Resp:    Temp:    SpO2:      Body mass index is 40 58 kg/m²      Intake/Output Summary (Last 24 hours) at 12/06/17 0944  Last data filed at 12/06/17 0841   Gross per 24 hour   Intake             2230 ml   Output              350 ml   Net             1880 ml       Physical Exam:   BP (!) 180/64   Pulse 72   Temp 97 8 °F (36 6 °C) (Temporal)   Resp 18   Ht 4' 7" (1 397 m)   Wt 79 2 kg (174 lb 9 7 oz)   SpO2 96%   BMI 40 58 kg/m²   General appearance: alert, appears stated age and cooperative  Lungs: clear to auscultation bilaterally  Heart: regular rate and rhythm, S1, S2 normal, no murmur, click, rub or gallop  Abdomen: soft, non-tender; bowel sounds normal; no masses,  no organomegaly  Extremities: extremities normal, atraumatic, no cyanosis or edema  Pulses: 2+ and symmetric  Neurologic: Non Focal CN 2-12 intact      Invasive Devices     Peripheral Intravenous Line            Peripheral IV 12/05/17 Right Forearm less than 1 day                  Results from last 7 days  Lab Units 12/06/17  0459 12/05/17  1045   WBC Thousand/uL 6 15 6 50   HEMOGLOBIN g/dL 11 0* 13 1   HEMATOCRIT % 36 5 42 6   PLATELETS Thousands/uL 101* 120*         Results from last 7 days  Lab Units 12/06/17  0459 12/05/17  1045   SODIUM mmol/L 143 142   POTASSIUM mmol/L 3 7 4 1   CHLORIDE mmol/L 106 102   CO2 mmol/L 30 31   BUN mg/dL 8 9   CREATININE mg/dL 0 77 0 77   CALCIUM mg/dL 8 0* 8 6   TOTAL PROTEIN g/dL  --  7 3   BILIRUBIN TOTAL mg/dL  --  0 50   ALK PHOS U/L  --  174*   ALT U/L  --  36   AST U/L  --  47*   GLUCOSE RANDOM mg/dL 100 145*       Medication Administration - last 24 hours from 12/05/2017 0944 to 12/06/2017 0944       Date/Time Order Dose Route Action Action by     12/05/2017 1321 sodium chloride 0 9 % bolus 1,000 mL 0 mL Intravenous Stopped SOO Henderson     12/05/2017 1149 sodium chloride 0 9 % bolus 1,000 mL 1,000 mL Intravenous Irisæmis 37 Oriental Griffin Acuña, RN     12/05/2017 1204 haloperidol lactate (HALDOL) injection 5 mg 5 mg Intravenous Given SOO Henderson     12/05/2017 1321 OLANZapine (ZyPREXA) IM injection 10 mg 10 mg Intramuscular Given SOO Henderson     12/05/2017 1439 iohexol (OMNIPAQUE) 350 MG/ML injection (SINGLE-DOSE) 100 mL 100 mL Intravenous Given Priscilla Leon     12/05/2017 1509 labetalol (Gabby Dates) injection 20 mg   Intravenous Canceled Entry Wily Luque, SOO     12/05/2017 1504 OLANZapine (ZyPREXA) IM injection 10 mg 10 mg Intramuscular Given Wily Luque RN     12/05/2017 1509 labetalol (NORMODYNE) injection 10 mg 10 mg Intravenous Given Wily Luque, SOO     12/05/2017 1825 insulin lispro (HumaLOG) 100 units/mL subcutaneous injection 12 Units 12 Units Subcutaneous Not Given Catracho Abarca RN     12/06/2017 4275 metoprolol tartrate (LOPRESSOR) tablet 25 mg 25 mg Oral Given Zeina Garcia, RN     12/05/2017 2138 metoprolol tartrate (LOPRESSOR) tablet 25 mg 25 mg Oral Given Tracy Rice RN     12/06/2017 0938 pantoprazole (PROTONIX) EC tablet 40 mg 40 mg Oral Given Zeina Garcia RN     12/06/2017 1789 predniSONE tablet 3 mg 3 mg Oral Given Zeina Garcia RN     12/05/2017 2134 artificial tear (LUBRIFRESH P M ) ophthalmic ointment   Both Eyes Not Given Tracy Rice, RN     12/06/2017 8094 aspirin (ECOTRIN LOW STRENGTH) EC tablet 81 mg 81 mg Oral Given Zeina Garcia RN     12/06/2017 0260 FLUoxetine (PROzac) capsule 20 mg 20 mg Oral Given Zeina Garcia RN     12/05/2017 2134 cyproheptadine (PERIACTIN) tablet 4 mg 4 mg Oral Not Given Tracy Rice RN     12/06/2017 9557 ferrous sulfate tablet 325 mg 325 mg Oral Given Zeina Garcia RN     12/05/2017 2138 ferrous sulfate tablet 325 mg 325 mg Oral Given Tracy Rice, RN     12/06/2017 0936 docusate sodium (COLACE) capsule 100 mg 100 mg Oral Given Zeina Garcia RN     12/05/2017 2138 docusate sodium (COLACE) capsule 100 mg 100 mg Oral Given Tracy Rice RN     12/06/2017 0737 insulin lispro (HumaLOG) 100 units/mL subcutaneous injection 1-6 Units 1 Units Subcutaneous Not Given Zeina Garcia RN     12/05/2017 1825 insulin lispro (HumaLOG) 100 units/mL subcutaneous injection 1-6 Units 1 Units Subcutaneous Not Given Catracho Abarca RN     12/06/2017 0936 enoxaparin (LOVENOX) subcutaneous injection 30 mg 30 mg Subcutaneous Given Zeina Garcia RN 12/06/2017 0343 sodium chloride 0 9 % infusion 0 mL/hr Intravenous Stopped Giovanni Bower RN     12/05/2017 1901 sodium chloride 0 9 % infusion 100 mL/hr Intravenous Dorothyet 37 Morgan SOO Powell     12/05/2017 1824 dextrose 50 % IV solution 25 mL 25 mL Intravenous Given Morgan Powell RN     12/06/2017 0353 dextrose 50 % IV solution 25 mL 25 mL Intravenous Given Giovanni Bower RN     12/06/2017 0353 dextrose 5 % and sodium chloride 0 9 % infusion 75 mL/hr Intravenous New Bag Giovanni Bower RN            Lab, Imaging and other studies: I have personally reviewed pertinent reports      VTE Pharmacologic Prophylaxis: none 2/2 Duodenal ulcers  VTE Mechanical Prophylaxis: sequential compression device     Kaushik Dominguez MD  12/6/2017,9:44 AM

## 2017-12-06 NOTE — SPEECH THERAPY NOTE
Speech/Language Pathology  Assessment    Patient Name: Astrid Jose  TZUMJ'TEQUILA Date: 12/6/2017     Problem List  Patient Active Problem List   Diagnosis    Hyperlipidemia    Diabetes mellitus with hyperglycemia (HCC)    A-fib (HCC)    Chronic back pain    Depression    SHANDA (obstructive sleep apnea)    [facility] )    Duodenal ulcer    Pancreatic malignancy syndrome ([place] [address] )    Encephalopathy     Past Medical History  Past Medical History:   Diagnosis Date    Anemia     Arthritis     Atrial fibrillation ([place] [address] )     Back pain     Bacteremia     Cancer ([place] [address] )     Pancreatic    Coronary artery disease     Depression with anxiety     Diabetes mellitus ([place] [address] )     DJD (degenerative joint disease)     Duodenal ulcer     Encephalopathy     Gait abnormality     Hyperlipidemia     Hypertension     Muscle weakness (generalized)     Non-STEMI (non-ST elevated myocardial infarction) ([place] [address] )     Obstructive sleep apnea     Osteoporosis     Sepsis ([place] [address] )     Sleep apnea     UTI (urinary tract infection)      Past Surgical History  Past Surgical History:   Procedure Laterality Date    ESOPHAGOGASTRODUODENOSCOPY N/A 8/14/2017    Procedure: ESOPHAGOGASTRODUODENOSCOPY (EGD); Surgeon: Jabari Mead MD;  [facility]; Service: Gastroenterology    JOINT REPLACEMENT Left     hip    KNEE SURGERY Bilateral     [place] INSJ TUNNELED CTR VAD W/SUBQ PORT AGE 5 YR/> N/A 11/8/2017    Procedure: INSERTION VENOUS PORT (PORT-A-CATH); Surgeon: Any Guzman DO;  Location: [facility];  Service: General    REVISION TOTAL HIP ARTHROPLASTY Right         12/06/17 1144   Swallow Information   Current Risks for Dysphagia & Aspiration Mental status change   Current Diet Regular; Thin liquid   Baseline Diet Regular; Thin liquids   Baseline Assessment   Behavior/Cognition Alert; Cooperative; Interactive;Confused   Speech/Language Status WFL   Patient Positioning Upright in chair   Swallow Mechanism Exam Labial Symmetry WFL   Labial Strength WFL   Labial ROM WFL   Labial Sensation WFL   Facial Symmetry WFL   Facial Strength WFL   Facial ROM WFL   Facial Sensation WFL   Lingual Symmetry WFL   Lingual Strength WFL   Lingual ROM WFL   Lingual Sensation WFL   Velum WFL   Gag WFL   Mandible WFL   Dentition Adequate   Volitional Cough Strong   Consistencies Assessed and Performance   Materials Admnistered Puree/Level 1;Regular/Solid; Thin liquid   Oral Stage WFL   Phargngeal Stage WFL   Swallow Mechanics WFL;Swallow initation; Appears prompt;Good Larygneal rise   Esophageal Concerns No s/s reported   Summary   Swallow Summary Patient [facility] from [facility] after episode of mental status change  Patient is oriented to name and place  She has no reported h/o dysphagia  Patient is cooperative with trials of puree and solid foods and thin liquids via straw  She has no oral s/s or overt s/s or suspect of pharyngeal dysphagia  Patient is able to feed herself once her tray is set up  Recommendations   Risk for Aspiration None   Recommendations Consider oral diet   Diet Solid Recommendation Regular consistency   Diet Liquid Recommendation Thin liquid   Recommended Form of Meds As desired; As tolerated   General Precautions Feed only when alert;Upright as possible for all oral intake;Supervision with meals   Results Reviewed with RN;PT/Family/Caregiver

## 2017-12-06 NOTE — OCCUPATIONAL THERAPY NOTE
Occupational Therapy Evaluation     Patient Name: Jennifer Shipman  UXTQT'L Date: 12/6/2017  Problem List  Patient Active Problem List   Diagnosis    Hyperlipidemia    Diabetes mellitus with hyperglycemia ([place] [address] )    A-fib (HCC)    Chronic back pain    Depression    SHANDA (obstructive sleep apnea)    RA (rheumatoid arthritis) ([place] [address] )    Duodenal ulcer    Pancreatic malignancy syndrome ([place] [address] )    Encephalopathy     Past Medical History  Past Medical History:   Diagnosis Date    Anemia     Arthritis     Atrial fibrillation ([place] [address] )     Back pain     Bacteremia     Cancer ([place] [address] )     Pancreatic    Coronary artery disease     Depression with anxiety     Diabetes mellitus ([place] [address] )     DJD (degenerative joint disease)     Duodenal ulcer     Encephalopathy     Gait abnormality     Hyperlipidemia     Hypertension     Muscle weakness (generalized)     Non-STEMI (non-ST elevated myocardial infarction) ([place] [address] )     Obstructive sleep apnea     Osteoporosis     Sepsis ([place] [address] )     Sleep apnea     UTI (urinary tract infection)      Past Surgical History  Past Surgical History:   Procedure Laterality Date    ESOPHAGOGASTRODUODENOSCOPY N/A 8/14/2017    Procedure: ESOPHAGOGASTRODUODENOSCOPY (EGD); Surgeon: Carmen Gunderson MD;  Location:  GI LAB; Service: Gastroenterology    JOINT REPLACEMENT Left     hip    KNEE SURGERY Bilateral     [place] INSJ TUNNELED CTR VAD W/SUBQ PORT AGE 5 YR/> N/A 11/8/2017    Procedure: INSERTION VENOUS PORT (PORT-A-CATH); Surgeon: Jean Vail DO;  [facility];  Service: General    REVISION TOTAL HIP ARTHROPLASTY Right              12/06/17 1006   Note Type   Note type Eval/Treat   Restrictions/Precautions   Weight Bearing Precautions Per Order No   Other Precautions Contact/isolation; Chair Alarm; Bed Alarm;Multiple lines;Telemetry; Fall Risk;O2   Pain Assessment   Pain Assessment No/denies pain   Home Living   Type of Home SNF   Prior Function   Level of [place] Needs assistance with ADLs and functional mobility; Needs assistance with IADLs   Lives With Facility staff   Receives Help From Personal care attendant   ADL Assistance Needs assistance   IADLs Needs assistance   Psychosocial   Psychosocial (WDL) WDL   ADL   Where Assessed Edge of bed   LB Dressing Assistance 1  Total Assistance   LB Dressing Deficit Don/doff R sock; Don/doff L sock   Bed Mobility   Supine to Sit 5  Supervision   Additional items Bedrails   Sit to Supine (seated in chair at end of session)   Transfers   Sit to Stand 5  Supervision   Additional items Verbal cues  (RW)   Stand to Sit 5  Supervision   Additional items Verbal cues  (RW)   Additional Comments pt requires verbal and physical cues for initiating and continuing tasks throughout session   Functional Mobility   Functional Mobility 5  Supervision   Additional Comments pt requires (A) to guide walker in hallway as well as direction for safety awareness   Additional items Rolling walker   Balance   Static Sitting Good   Dynamic Sitting Good   Static Standing Fair +   Dynamic Standing Fair   Ambulatory Fair   Activity Tolerance   Activity Tolerance Patient limited by fatigue   RUE Assessment   RUE Assessment WFL   LUE Assessment   LUE Assessment WFL   Hand Function   Gross Motor Coordination Functional   Fine Motor Coordination Functional   Sensation   Light Touch No apparent deficits   Sharp/Dull No apparent deficits   Cognition   Overall Cognitive Status Impaired   Arousal/Participation Alert   Attention Attends with cues to redirect   Orientation Level Oriented to person;Oriented to place   Memory Decreased long term memory;Decreased short term memory   Following Commands Follows one step commands inconsistently   Assessment   Limitation Decreased ADL status; Decreased UE strength;Decreased Safe judgement during ADL;Decreased cognition;Decreased endurance;Decreased high-level ADLs; Decreased self-care trans   Assessment pt presents at evaluation with main limitation of cognition, attention, as well as decrease in ADL performance and FM endurance; pt will benefit from continued OT intervention prior to return to SNF    Goals   Short Term Goal  pt will perform UE strengthening exercises    Long Term Goal #1 pt will perform UB bathing and grooming tasks at min (A) level with decreased verbal cues   Long Term Goal #2 pt will perform FM c RW at mod (I) level with decreased physical and verbal cues   Long Term Goal pt will perform toilet transfers and tasks at (S) level   Plan   Treatment Interventions ADL retraining;Functional transfer training;UE strengthening/ROM; Endurance training;Patient/family training;Cognitive reorientation; Activityengagement   Goal Expiration Date 12/20/17   OT Frequency 3-5x/wk   Recommendation   OT Discharge Recommendation 24 hour supervision/assist   OT - OK to Discharge No   Barthel Index   Feeding 10   Bathing 0   Grooming Score 0   Dressing Score 5   Bladder Score 10   Bowels Score 10   Toilet Use Score 5   Transfers (Bed/Chair) Score 10   Mobility (Level Surface) Score 10   Stairs Score 0   Barthel Index Score 60     Pt will benefit from continued OT services in order to maximize (I) c ADL performance, FM c RW, and improve overall endurance/strength required to complete functional tasks in preparation for d/c  Pt left seated in chair at end of session; all needs within reach; all lines intact; scds connected and turned on

## 2017-12-06 NOTE — CASE MANAGEMENT
Initial Clinical Review    Admission: Date/Time/Statement: 12/5/17 @ 1551     Orders Placed This Encounter   Procedures    Inpatient Admission (expected length of stay for this patient is greater than two midnights)     Standing Status:   Standing     Number of Occurrences:   1     Order Specific Question:   Admitting Physician     Answer:   Jordy Catalan     Order Specific Question:   Level of Care     Answer:   Med Surg [16]     Order Specific Question:   Estimated length of stay     Answer:   More than 2 Midnights     Order Specific Question:   Certification     Answer:   I certify that inpatient services are medically necessary for this patient for a duration of greater than two midnights  See H&P and MD Progress Notes for additional information about the patient's course of treatment  ED: Date/Time/Mode of Arrival:   ED Arrival Information     Expected Arrival Acuity Means of Arrival Escorted By Service Admission Type    - 12/5/2017 10:30 Urgent Ambulance [facility] General Medicine Urgent    Arrival Complaint    mental status change      Chief Complaint:   Chief Complaint   Patient presents with    Altered Mental Status     Altered mental status noticed today by staff at [facility]   History of Illness:   Patient presents to the emergency department today via emergency medical services  We did receive a phone call from the nursing home stating this female was noted to have a change in mental status per the staff  This states she is normally compliant however they noticed that she was in somebody else's bed today  She was refusing oxygen therapy  She is receiving chemotherapy for pancreatic cancer cording to the nursing home staff  At bedside upon questioning the patient patient states she is sick however is unable to characterize this feeling  She denies headache chest pain shortness of breath or abdominal pain  It is noted that she is experiencing abdominal tenderness on examination   Patient states she is unable to talk however does talk in complete sentences  Patient did make the comment "my heaven hurts" however is unable to quantify this statement any further  Patient states she wishes to die however is unable to speak further about the statement as well  ED Vital Signs:   ED Triage Vitals   Temperature Pulse Respirations Blood Pressure SpO2   12/05/17 1044 12/05/17 1044 12/05/17 1044 12/05/17 1044 12/05/17 1044   (!) 97 4 °F (36 3 °C) 99 20 (!) 229/107 (!) 89 %      Temp Source Heart Rate Source Patient Position - Orthostatic VS BP Location FiO2 (%)   12/05/17 1044 12/05/17 1044 12/05/17 1044 12/05/17 1044 --   Temporal Monitor Lying Left arm       Pain Score       12/05/17 1035       Worst Possible Pain        Wt Readings from Last 1 Encounters:   12/05/17 79 2 kg (174 lb 9 7 oz)   Vital Signs (abnormal):   /74  Abnormal Labs/Diagnostic Test Results:    GLUC 145 AST 47 ALK PHOS 174 ALB 2 8 BNP 8087   U/A+PROT  CXR=Small left pleural effusion  CT HEAD=No acute intracranial abnormality  Microangiopathic changes  CT A/P=1  Persistent pancreatic head mass/neoplasm with liver metastatic disease  Some of the lesions in the liver have increased in size as described above  The pancreatic mass in the pancreatic head encases greater than 50% of the portal vein in addition to   compressing the portal vein  However, there is no evidence of pancreatic mass extension to the superior mesenteric artery  The pancreatic mass encases greater than 75% of the hepatic artery  2  New soft tissue nodules along the left side of the omentum and also along the right paracolic gutter suggesting progression of metastatic disease  3  Colonic diverticulosis  4  No evidence of bowel obstruction    ECG 12 Lead Documentation  Date/Time: 12/5/2017 2:21 PM  Performed by: Gauri Martínez  Authorized by: Gauri Martínez   Indications / Diagnosis:  Altered mental status  ECG reviewed by me, the ED Provider: yes Patient location:  ED  Previous ECG:     Previous ECG:  Compared to current    Comparison ECG info:  No change from 10/17/17    Similarity:  No change    Comparison to cardiac monitor: Yes    Interpretation:     Interpretation: non-specific    Rate:     ECG rate:  90    ECG rate assessment: normal    Rhythm:     Rhythm: sinus rhythm    Ectopy:     Ectopy: none    QRS:     QRS axis:  Left  ST segments:     ST segments:  Normal  T waves:     T waves: normal    ED Treatment:   Medication Administration from 12/05/2017 1022 to 12/05/2017 1656       Date/Time Order Dose Route Action Action by Comments     12/05/2017 1321 sodium chloride 0 9 % bolus 1,000 mL 0 mL Intravenous Stopped Daria Acuña RN      12/05/2017 1149 sodium chloride 0 9 % bolus 1,000 mL 1,000 mL Intravenous Dorothyet 37 Brandee Acuña RN      12/05/2017 1204 haloperidol lactate (HALDOL) injection 5 mg 5 mg Intravenous Given Daria Acuña RN Pushed IV by Ryan Aceves      12/05/2017 1321 OLANZapine (ZyPREXA) IM injection 10 mg 10 mg Intramuscular Given Daria Acuña RN Given IV push by Dorys Montaño PA-C     12/05/2017 1439 iohexol (OMNIPAQUE) 350 MG/ML injection (SINGLE-DOSE) 100 mL 100 mL Intravenous Given Nataliya Crystal      12/05/2017 1509 labetalol (NORMODYNE) injection 20 mg   Intravenous Canceled Entry Daria Acuña RN      12/05/2017 1504 OLANZapine (ZyPREXA) IM injection 10 mg 10 mg Intramuscular Given Daria Acuña RN Given IV push by Doyrs Montaño PA-C     12/05/2017 1509 labetalol (NORMODYNE) injection 10 mg 10 mg Intravenous Given Daria Acuña RN       Past Medical/Surgical History:    Active Ambulatory Problems     Diagnosis Date Noted    Hyperlipidemia 08/04/2016    Diabetes mellitus with hyperglycemia ([facility] ) 08/04/2016    A-fib ([facility] ) 08/04/2016    Chronic back pain 08/04/2016    Depression 08/04/2016    SHANDA (obstructive sleep apnea) 08/04/2016    RA (rheumatoid arthritis) ([facility] ) 08/04/2016    Duodenal ulcer 08/14/2017    Pancreatic malignancy syndrome ([facility] ) 08/24/2017    Encephalopathy 10/19/2017     Resolved Ambulatory Problems     Diagnosis Date Noted    Hyperglycemia 08/04/2016    UTI (urinary tract infection) 08/04/2016    Hypertension 08/04/2016    NSTEMI, initial episode of care ([place] [address] ) 08/12/2017    Hyperglycemia 08/12/2017    Sepsis ([place] [address] ) 08/12/2017    Heme positive stool 08/13/2017    Bacteremia 08/14/2017    Hypophosphatemia 08/14/2017    Altered mental status 10/18/2017    Aphasia 10/18/2017     Past Medical History:   Diagnosis Date    Anemia     Arthritis     Atrial fibrillation ([place] [address] )     Back pain     Bacteremia     Cancer ([place] [address] )     Coronary artery disease     Depression with anxiety     Diabetes mellitus ([place] [address] )     DJD (degenerative joint disease)     Duodenal ulcer     Encephalopathy     Gait abnormality     Hyperlipidemia     Hypertension     Muscle weakness (generalized)     Non-STEMI (non-ST elevated myocardial infarction) (HCC)     Obstructive sleep apnea     Osteoporosis     Sepsis ([place] [address] )     Sleep apnea     UTI (urinary tract infection)    Admitting Diagnosis: Pancreatic cancer ([place] [address] ) [C25 9]  Altered mental status [R41 82]  Hypertensive urgency [I16 0]  Age/Sex: 78 y o  female  Assessment/Plan:   Altered mental status / Encephalopathy / Possible underlying dementia   Will monitor patient on telemetry  Neuro checks q 4 hours  Initiate ischemic stroke pathway  If no improvement will obtain MRI tomorrow   She had an echo recently along with MRA and carotid eval so will no repeat   Paroxysmal Atrial Fibrillation  No AC 2/2 history of G I bleed  Currently in sinus rhythm   Continue metoprolol and monitor on Tele  Trend Troponin x 2   Type 2 DM with hyperglycemia  Continue lantus, meal time insulin, initiatie insulin sliding scale with acc-checks AC and HS   History of Duodenal Ulcers  Protonix PO BID  Rheumatoid Arthritis  Continue Prednisone 3 mg Daily  Pancreatic Cancer with liver Mets  Per CT some mets have enlarged in size, the mass encases greater than 75% of hepatic artery  Case discussed with YAZAN SYED on call, no acute intervention  Will consult Surgical Onc for input   This maybe progressive dementia as patient has no focal neurologic symptoms and per family history the patient does get confused  She also had a similar episode that was attributed to Levaquin and/or UTI  I suspect her lethargy is due to haldol administered in ED  If she does not improve will obtain MRI of brain       LOS > 2 midnights    Admission Orders:  TELEMETRY  PT/OT/ST EVAL & TX  NEURO CHECKS:Every hour x 4 hours; then every 2 hours x 4 hours, then every 4 hours x 72 hours;  then every 8 hours if stable  CVA EDUCATION  BLE VENODYNES  CONSULT NUTRITION  CONSULT SURGICAL ONCOLOGY  INCENTIVE SPIROMETRY  ACCUCHECKS WITH COVERAGE SCALE  Scheduled Meds:   aspirin 81 mg Oral Daily   cyproheptadine 4 mg Oral HS   docusate sodium 100 mg Oral BID   donepezil 5 mg Oral HS   ferrous sulfate 325 mg Oral BID With Meals   FLUoxetine 20 mg Oral Daily   insulin lispro 1-6 Units Subcutaneous TID AC   metoprolol tartrate 50 mg Oral Q12H SHIRA   pantoprazole 40 mg Oral BID AC   polyvinyl alcohol 1 drop Both Eyes HS   predniSONE 3 mg Oral Daily With Breakfast     Continuous Infusions:   dextrose 5 % and sodium chloride 0 9 % 75 mL/hr Last Rate: 75 mL/hr (12/06/17 0353)     PRN Meds:   ALPRAZolam    dextrose    magnesium hydroxide

## 2017-12-06 NOTE — CONSULTS
Consultation - Surgical Oncology   Annette Alvarez 78 y o  female MRN: [id number]  Unit/Bed#: 425-01 Encounter: [id number]      Assessment/Plan        History of Present Illness   Physician Requesting Consult: Halie Velásquez MD  Reason for Consult / Principal Problem: pancreatic mass  Hx and PE limited by: patient dementia  HPI: Annette Alvarez is a 78y o  year old female who presents with a pancreatic head mass found on recent CT scan  It is worrisome for malignancy  Additionally, she was found to have what appears to be lesions in the liver, possible metastatic disease  We have been consulted for evaluation and treatment  She is a poor historian as she has dementia  She does not report abdominal pain  She does not report history of jaundice or steatorrhea  She does not report unintended weight loss  Consults    Review of Systems   Constitutional: Negative  HENT: Negative  Eyes: Negative  Respiratory: Negative  Cardiovascular: Negative  Gastrointestinal: Negative  Endocrine: Negative  Genitourinary: Negative  Skin: Negative  Psychiatric/Behavioral: Positive for confusion  All other systems reviewed and are negative        Historical Information   Past Medical History:   Diagnosis Date    Anemia     Arthritis     Atrial fibrillation ([facility] )     Back pain     Bacteremia     Cancer ([facility] )     Pancreatic    Coronary artery disease     Depression with anxiety     Diabetes mellitus (HCC)     DJD (degenerative joint disease)     Duodenal ulcer     Encephalopathy     Gait abnormality     Hyperlipidemia     Hypertension     Muscle weakness (generalized)     Non-STEMI (non-ST elevated myocardial infarction) (HCC)     Obstructive sleep apnea     Osteoporosis     Sepsis ([facility] )     Sleep apnea     UTI (urinary tract infection)      Past Surgical History:   Procedure Laterality Date    ESOPHAGOGASTRODUODENOSCOPY N/A 8/14/2017    Procedure: ESOPHAGOGASTRODUODENOSCOPY (EGD); Surgeon: Nader Naylor MD;  Location:  GI LAB; Service: Gastroenterology    JOINT REPLACEMENT Left     hip    KNEE SURGERY Bilateral     FL INSJ TUNNELED CTR VAD W/SUBQ PORT AGE 5 YR/> N/A 11/8/2017    Procedure: INSERTION VENOUS PORT (PORT-A-CATH); Surgeon: Allegra Sánchez DO;  Location: [facility];  Service: General    REVISION TOTAL HIP ARTHROPLASTY Right      Social History   History   Alcohol Use No     History   Drug Use No     History   Smoking Status    Former Smoker    Types: Cigarettes    Quit date: 9/11/1999   Smokeless Tobacco    Never Used     Family History   Problem Relation Age of Onset    Family history unknown: Yes       Meds/Allergies   all current active meds have been reviewed    Allergies   Allergen Reactions    Amlodipine     Belladonna Alkaloids     Benazepril     Donnatal [Pb-Hyoscy-Atropine-Scopolamine]     Hyoscyamine     Lotrel [Amlodipine Besy-Benazepril Hcl]     Pamelor [Nortriptyline]     Procardia [Nifedipine]     Scopolamine     Simvastatin     Sulfa Antibiotics        Objective     Intake/Output Summary (Last 24 hours) at 12/06/17 1135  Last data filed at 12/06/17 0841   Gross per 24 hour   Intake             2230 ml   Output              350 ml   Net             1880 ml       Invasive Devices     Peripheral Intravenous Line            Peripheral IV 12/05/17 Right Forearm 1 day                Physical Exam   Constitutional: She appears well-developed  HENT:   Head: Normocephalic and atraumatic  Eyes: Pupils are equal, round, and reactive to light  Neck: Normal range of motion  Neck supple  Cardiovascular: Normal rate and regular rhythm  Pulmonary/Chest: Effort normal and breath sounds normal    Abdominal: Soft  She exhibits no distension and no mass  There is no tenderness  There is no rebound and no guarding  Musculoskeletal: Normal range of motion  Neurological: She is alert  Skin: Skin is warm     Psychiatric:   Patient awake, alert, reactive and responsive, but demented  Lab Results:   CMP:   Lab Results   Component Value Date     12/06/2017    K 3 7 12/06/2017     12/06/2017    CO2 30 12/06/2017    ANIONGAP 7 12/06/2017    BUN 8 12/06/2017    CREATININE 0 77 12/06/2017    GLUCOSE 100 12/06/2017    CALCIUM 8 0 (L) 12/06/2017    EGFR 76 12/06/2017     Imaging Studies: I have personally reviewed pertinent reports  Pathology, and Other Studies: CT ABDOMEN AND PELVIS WITH IV CONTRAST     INDICATION:  Tenderness     COMPARISON: September 25, 2017     TECHNIQUE:  CT examination of the abdomen and pelvis was performed  Reformatted images were created in axial, sagittal, and coronal planes        Radiation dose length product (DLP) for this visit:  1663 94 mGy-cm   This examination, like all CT scans performed in the [facility], was performed utilizing techniques to minimize radiation dose exposure, including the use of   iterative reconstruction and automated exposure control      IV Contrast:  100 mL of iohexol (OMNIPAQUE)       Enteric Contrast:  Enteric contrast was not administered      FINDINGS:     ABDOMEN     LOWER CHEST:  Bibasilar subsegmental atelectasis      LIVER/BILIARY TREE:  Hepatic steatosis  Persistent numerous hepatic hypodensities suggesting metastatic disease as previously suggested  Right inferior liver lobe lesion appears to have mildly increased in size measuring 2 5 cm (axial image 37),   previously measuring 2 3 cm      GALLBLADDER:  No calcified gallstones  No pericholecystic inflammatory change      SPLEEN:  Unremarkable      PANCREAS:  Similar-appearing heterogeneous pancreatic head mass causing pancreatic ductal dilation is unchanged in size and measures approximately 58 mm x 56 mm  Persistent pancreatic ductal dilation      ADRENAL GLANDS:  Unremarkable      KIDNEYS/URETERS:  One or more sharply circumscribed subcentimeter renal hypodensities are noted   These lesions are too small to accurately characterize, but are statistically most likely to represent benign cortical renal cyst(s)  According to the   guidelines published in the [employer]ibell Salk Paper of the ACR Incidental Findings Committee (Radiology 2010), no further workup of these lesions is recommended      STOMACH AND BOWEL:  Colonic diverticulosis      APPENDIX:  No findings to suggest appendicitis      ABDOMINOPELVIC CAVITY:  No ascites or free intraperitoneal air  There is new soft tissue nodules along the left side of the omentum and also along the right paracolic gutter suggesting progression of metastatic disease      VESSELS:  Atherosclerotic changes are present  No evidence of aneurysm  The pancreatic mass in the pancreatic head encases greater than 50% of the portal vein in addition to compressing the portal vein  However, there is no evidence of pancreatic mass   extension to the superior mesenteric artery  The pancreatic mass encases the hepatic artery 360 degrees      PELVIS     REPRODUCTIVE ORGANS:  Unchanged      URINARY BLADDER:  Unremarkable      ABDOMINAL WALL/INGUINAL REGIONS:  Unremarkable      OSSEOUS STRUCTURES:  No acute fracture or destructive osseous lesion  Persistent total right hip arthroplasty  Lumbar spine degenerative changes are not significant change      IMPRESSION:        1  Persistent pancreatic head mass/neoplasm with liver metastatic disease  Some of the lesions in the liver have increased in size as described above  The pancreatic mass in the pancreatic head encases greater than 50% of the portal vein in addition to   compressing the portal vein  However, there is no evidence of pancreatic mass extension to the superior mesenteric artery  The pancreatic mass encases greater than 75% of the hepatic artery  2  New soft tissue nodules along the left side of the omentum and also along the right paracolic gutter suggesting progression of metastatic disease  3  Colonic diverticulosis    4  No evidence of bowel obstruction         Workstation performed: [id number]       Code Status: Level 3 - DNAR and DNI  Advance Directive and Living Will: Yes    Power of :    POLST:      Counseling / Coordination of Care  Total floor / unit time spent today 45 minutes  Greater than 50% of total time was spent with the patient and / or family counseling and / or coordination of care  A description of the counseling / coordination of care:     Assessment:  Pancreas mass, with imaging suggestive of metastatic disease to the liver  Plan:  Recommend interventional Radiology liver biopsy to evaluate for metastatic disease if the patient's family is considering treatment  However, if they are not interested in chemotherapy, then, hospice treatment should be considered    Case discussed directly with primary team

## 2017-12-06 NOTE — SOCIAL WORK
Pt resides at [facility]   Pt is not a bed hold but she is def welcome to go back there on discharge

## 2017-12-06 NOTE — PROGRESS NOTES
The patient had recurrent hypoglycemia overnight  The patient was given a half of an amp of D50  I changed her IV fluids to D5 normal saline at 75 mL/hr  I discontinued all her standing insulin dosing    Her insulin will need to be re-introduced based on her blood glucose trend by the rounding team

## 2017-12-06 NOTE — PHYSICAL THERAPY NOTE
PT Evaluation     12/06/17 1032   Note Type   Note type Eval/Treat   Pain Assessment   Pain Assessment No/denies pain   Pain Score No Pain   Home Living   Type of Home SNF   Additional Comments pt is a resident of [address] [facility]  pt ambulates with and without RW and requires assistance with ADL's   Prior Function   Level of [place] Needs assistance with ADLs and functional mobility  (ambulation with RW)   Lives With Facility staff   Receives Help From Personal care attendant  (staff at [facility])   ADL Assistance Needs assistance   IADLs Needs assistance   Restrictions/Precautions   Other Precautions Bed Alarm; Chair Alarm;Contact/isolation;Telemetry; Fall Risk;O2;Multiple lines   General   Family/Caregiver Present No   Cognition   Arousal/Participation Alert   Orientation Level Oriented to person;Oriented to place   Following Commands Follows one step commands with increased time or repetition   RLE Assessment   RLE Assessment WFL  (4-/5)   LLE Assessment   LLE Assessment WFL  (4-/5)   Coordination   Sensation WFL   Light Touch   RLE Light Touch Grossly intact   LLE Light Touch Grossly intact   Bed Mobility   Supine to Sit 5  Supervision   Additional items Bedrails   Sit to Supine (seated in chair at bedside, alarm on, bell in reach)   Additional Comments Pt requiring verbal cues for safety and direction throughout session  Pt easily distracted by background noise and activity requiring verbal cues to redirect attention  Transfers   Sit to Stand (CGA with RW)   Additional items Verbal cues   Stand to Sit (CGA with RW)   Additional items Verbal cues  (verbal cues for safety and direction)   Stand pivot (CGA with RW)   Additional Comments pt with fair to good stability during transfers and ambulation with RW   Pt requiring verbal cues for safety especially with proper use of RW   Ambulation/Elevation   Gait pattern WNL  (with RW)   Gait Assistance (CGA)   Assistive Device Rolling walker   Distance 175ft with RW CGA no LOB but requires verbal cues for safety and direction   Balance   Static Sitting Good   Dynamic Sitting Good   Static Standing Fair +  (with RW)   Dynamic Standing Fair  (with RW)   Ambulatory Fair  (with RW)   Endurance Deficit   Endurance Deficit Yes   Endurance Deficit Description limited ambulation tolerance due to fatigue   Activity Tolerance   Activity Tolerance Patient limited by fatigue   Assessment   Prognosis Good   Problem List Decreased strength;Decreased endurance; Impaired balance;Decreased mobility; Decreased safety awareness; Impaired judgement;Decreased cognition   Assessment Pt is a 78year old female presenting with confusion and decreased safety awareness requiring verbal cues for attention redirection and safety  Pt demonstrates LE weakness and limited endurance due to fatigue  Pt is performing all functional mobility at a CGA to (S) level with use of RW  Pt would benefit from continued activity in PT to improve impairments and functional deficits  Anticipate pt will return to SNF when medically stable for discharge  Goals   Patient Goals To feel better and return home   LTG Expiration Date 12/20/17   Long Term Goal #1 (I) with all bed mobility, Supervision with all transfers with RW   Long Term Goal #2 Pt will ambulate 250ft with RW (S) no LOB or instability   Plan   Treatment/Interventions Functional transfer training;LE strengthening/ROM; Therapeutic exercise; Endurance training;Patient/family training;Bed mobility;Gait training   PT Frequency (3-5x/wk)   Recommendation   Recommendation Long-term skilled nursing home placement  (return to SNF)   PT - OK to Discharge Yes  (when medically stable)   pt with SCD's on when PT entered room  SCD's reapplied and turned on with pt seated in chair at bedside, alarm on and bell in reach

## 2017-12-07 NOTE — PHYSICAL THERAPY NOTE
PT treatment Note      12/07/17 1115   Pain Assessment   Pain Score No Pain   Restrictions/Precautions   Other Precautions (Contact/isolation; Chair Alarm; Bed Alarm;Multiple lines;Telem)   Cognition   Overall Cognitive Status Impaired   Arousal/Participation Alert   Attention Attends with cues to redirect   Following Commands Follows one step commands inconsistently   Subjective   Subjective Agreeable to therapy   Transfers   Additional Comments see OT note for transfers   Ambulation/Elevation   Gait pattern WNL   Gait Assistance (CGA)   Additional items Assist x 1;Verbal cues   Assistive Device Rolling walker   Distance 150'   Balance   Static Sitting Good   Dynamic Sitting Good   Static Standing Fair +   Dynamic Standing Fair   Ambulatory Fair  (with Rw)   Endurance Deficit   Endurance Deficit Yes   Activity Tolerance   Activity Tolerance Patient limited by fatigue   Assessment   Prognosis Good   Problem List Decreased strength;Decreased endurance; Impaired balance;Decreased mobility   Assessment Pt is performing all functional mobility at a CGA to (S) level with use of RW  Pt would benefit from continued activity in PT to improve impairments and functional deficits   Plan   Treatment/Interventions Functional transfer training;LE strengthening/ROM; Therapeutic exercise; Endurance training;Bed mobility;Gait training   Progress Progressing toward goals   Recommendation   Recommendation Long-term skilled nursing home placement   Pt  OOB with call bell within reach and alarm on at end of PT session

## 2017-12-07 NOTE — PROGRESS NOTES
I will discontinue the continuous pulse oximetry monitoring, because the patient is repeatedly pulling off the finger oxygen sensor for the continuous pulse oximetry monitor  We will do frequent spots checks of the patient's pulse oximetry to monitor her for hypoxia

## 2017-12-07 NOTE — PROGRESS NOTES
Pt repeatedly removed her pulse ox probe  Advosed  of this and he stated it was all right to keep it off  Will continue to monitor and assess pt

## 2017-12-07 NOTE — NUTRITION
12/07/17 1158   Assessment   Timepoint Initial  (consult: stroke; BMI 40 58)   Labs   List Completed Labs (, 330, alb 2 4; meds- reviewed)   Feeding Route   PO Independent   Adequacy of Intake   Nutrition Modality PO  (CCD 2)   Intake Meals %   Estimated Calorie Intake %   Estimated Protein Intake  %   Estimated Fluid Intake %   Estimated calorie intake compared to estimated need pt appears to be meeting estimated needs at this time   Nutrition Prognosis   Potential Good   Comorbid Concerns (pancreatic cancer with mets to liver on chemotherapy, T2DM, probable dementia)   Nutrition Considerations (education inappropriate at this time)   PES Statement   Problem Clinical   Weight (3) Overweight/obesity NC-3 3   Overweight/ obesity specific to Obese, Class I (3)   Related to Energy intake > Energy output over time   As evidenced by: BMI   Patient Nutrition Goals   Goal meet PO needs   Goal Status initiated   Timeframe to complete goal by next f/u   Recommendations/Interventions   Summary continue with current diet order, will monitor pt status for potential diet education, however pt presents with confusion and probable dementia      Interventions Diet: continued as ordered   Nutrition Recommendations Continue diet as ordered   Nutrition Complexity Risk   Nutrition complexity level Moderate risk   Nutrition review: 12/11/17   Follow up date 12/14/17

## 2017-12-07 NOTE — OCCUPATIONAL THERAPY NOTE
Occupational Therapy Progress Note      Patient Name: Mike Guallpa    DPGBU'V Date: 12/7/2017    Problem List:   Patient Active Problem List   Diagnosis    Hyperlipidemia    Diabetes mellitus with hyperglycemia ([place] [address] )    A-fib ([place] [address] )    Chronic back pain    Depression    SHANDA (obstructive sleep apnea)    RA (rheumatoid arthritis) ([place] [address] )    Duodenal ulcer    Pancreatic malignancy syndrome ([place] [address] )    Encephalopathy                12/07/17 1101   Restrictions/Precautions   Weight Bearing Precautions Per Order No   Other Precautions Chair Alarm;Multiple lines;Telemetry; Fall Risk   Pain Assessment   Pain Assessment No/denies pain   Bed Mobility   Additional Comments OOB in chair at start and end of session   Transfers   Sit to Stand 5  Supervision   Additional items Verbal cues  (RW)   Stand to Sit 5  Supervision   Additional items Verbal cues  (RW)   Functional Mobility   Functional Mobility 5  Supervision   Additional Comments verbal and physical cues for guidance with RW safety    Additional items Rolling walker   Cognition   Overall Cognitive Status Impaired   Arousal/Participation Alert   Attention Attends with cues to redirect   Orientation Level Oriented to person;Disoriented to time;Disoriented to situation;Disoriented to place   Memory Decreased long term memory;Decreased short term memory   Following Commands Follows one step commands with increased time or repetition   Assessment   Assessment continued OT intervention   Recommendation   OT Discharge Recommendation 24 hour supervision/assist     Pt left seated in chair at end of session; all needs within reach; all lines intact; scds connected and turned on

## 2017-12-07 NOTE — PROGRESS NOTES
Pt pulled IV out  Several attempts made by 2 different Rns to place a new IV but one could not be placed  Dr lauraied

## 2017-12-07 NOTE — PROGRESS NOTES
Progress Note - Astrid Jose 78 y o  female MRN: [id number]    Unit/Bed#: 425-01 Encounter: [id number]      Assessment/Plan:    1  Metabolic encephalopathy / probable underlying mention  1  Patient is pleasantly confused, will continue Aricept,  2  Will discuss with daughter today regarding discharge to dementia unit,  2  Hypertensive urgency present on admission:  Repeat blood pressure manually after is much improved, will continue metoprolol has been increased, add hydralazine 50 mg p  o  every 8 hours  3  Type 2 diabetes mellitus with hypoglycemia:  Will resume Lantus today at 15 units in the morning, DC dextrose infusion  4  Pancreatic cancer with liver Mets:  Discussed with surgical oncology, no surgical intervention for now given vascular involvement  Will discuss with daughter    Subjective:   Seen and examined at bedside, she is presently confused  Objective:     Vitals:   Vitals:    12/07/17 0717   BP: (!) 211/75   Pulse: 75   Resp: 18   Temp: (!) 97 2 °F (36 2 °C)   SpO2: 96%     Body mass index is 40 58 kg/m²      Intake/Output Summary (Last 24 hours) at 12/07/17 0929  Last data filed at 12/07/17 0856   Gross per 24 hour   Intake           1747 5 ml   Output              100 ml   Net           1647 5 ml       Physical Exam:   BP (!) 211/75   Pulse 75   Temp (!) 97 2 °F (36 2 °C) (Temporal)   Resp 18   Ht 4' 7" (1 397 m)   Wt 79 2 kg (174 lb 9 7 oz)   SpO2 96%   BMI 40 58 kg/m²   General appearance: alert, appears stated age and cooperative  Head: Normocephalic, without obvious abnormality, atraumatic  Lungs: clear to auscultation bilaterally  Heart: regular rate and rhythm, S1, S2 normal, no murmur, click, rub or gallop  Abdomen: soft, non-tender; bowel sounds normal; no masses,  no organomegaly  Extremities: extremities normal, atraumatic, no cyanosis or edema  Pulses: 2+ and symmetric  Neurologic: Grossly normal     Invasive Devices          No matching active lines, drains, or airways Results from last 7 days  Lab Units 12/07/17  0638 12/06/17  0459 12/05/17  1045   WBC Thousand/uL 6 28 6 15 6 50   HEMOGLOBIN g/dL 11 6 11 0* 13 1   HEMATOCRIT % 38 6 36 5 42 6   PLATELETS Thousands/uL 107* 101* 120*         Results from last 7 days  Lab Units 12/07/17  0638 12/06/17  0459 12/05/17  1045   SODIUM mmol/L 140 143 142   POTASSIUM mmol/L 4 4 3 7 4 1   CHLORIDE mmol/L 105 106 102   CO2 mmol/L 29 30 31   BUN mg/dL 8 8 9   CREATININE mg/dL 0 84 0 77 0 77   CALCIUM mg/dL 7 9* 8 0* 8 6   TOTAL PROTEIN g/dL 6 3*  --  7 3   BILIRUBIN TOTAL mg/dL 0 50  --  0 50   ALK PHOS U/L 145*  --  174*   ALT U/L 28  --  36   AST U/L 38  --  47*   GLUCOSE RANDOM mg/dL 318* 100 145*       Medication Administration - last 24 hours from 12/06/2017 0929 to 12/07/2017 0242       Date/Time Order Dose Route Action Action by     12/06/2017 0938 metoprolol tartrate (LOPRESSOR) tablet 25 mg 25 mg Oral Given Margarita Antonio RN     12/07/2017 0609 pantoprazole (PROTONIX) EC tablet 40 mg 40 mg Oral Given Cleve Traylor, RN     12/06/2017 1709 pantoprazole (PROTONIX) EC tablet 40 mg 40 mg Oral Given Margarita Antonio RN     12/06/2017 0938 pantoprazole (PROTONIX) EC tablet 40 mg 40 mg Oral Given Margarita Antonio RN     12/07/2017 0802 predniSONE tablet 3 mg 3 mg Oral Given Maurice Shah RN     12/06/2017 5740 predniSONE tablet 3 mg 3 mg Oral Given Margarita Antonio RN     12/07/2017 0804 aspirin (ECOTRIN LOW STRENGTH) EC tablet 81 mg 81 mg Oral Given Maurice Shah RN     12/06/2017 0936 aspirin (ECOTRIN LOW STRENGTH) EC tablet 81 mg 81 mg Oral Given Margarita Antonio RN     12/07/2017 0802 FLUoxetine (PROzac) capsule 20 mg 20 mg Oral Given Maurice Shah, SOO     12/06/2017 6637 FLUoxetine (PROzac) capsule 20 mg 20 mg Oral Given Margarita Antonio RN     12/06/2017 1742 cyproheptadine (PERIACTIN) tablet 4 mg 4 mg Oral Given Cleve Traylor RN     12/07/2017 0804 ferrous sulfate tablet 325 mg 325 mg Oral Given Maurice Shah RN     12/06/2017 6199 ferrous sulfate tablet 325 mg 325 mg Oral Given [facility], RN     12/06/2017 7932 ferrous sulfate tablet 325 mg 325 mg Oral Given [facility], RN     12/07/2017 0801 docusate sodium (COLACE) capsule 100 mg 100 mg Oral Given [place] [facility], RN     12/06/2017 1848 docusate sodium (COLACE) capsule 100 mg 100 mg Oral Given [facility], RN     12/06/2017 0656 docusate sodium (COLACE) capsule 100 mg 100 mg Oral Given [facility], RN     12/06/2017 1712 insulin lispro (HumaLOG) 100 units/mL subcutaneous injection 1-6 Units 2 Units Subcutaneous Given [facility], RN     12/06/2017 1148 insulin lispro (HumaLOG) 100 units/mL subcutaneous injection 1-6 Units 1 Units Subcutaneous Not Given [facility], RN     12/06/2017 0936 enoxaparin (LOVENOX) subcutaneous injection 30 mg 30 mg Subcutaneous Given [facility], RN     12/06/2017 1738 dextrose 5 % and sodium chloride 0 9 % infusion 75 mL/hr Intravenous [address] [facility], RN     12/06/2017 1735 dextrose 5 % and sodium chloride 0 9 % infusion 0 mL/hr Intravenous Stopped [facility], RN     12/06/2017 2143 polyvinyl alcohol (LIQUIFILM TEARS) 1 4 % ophthalmic solution 1 drop 1 drop Both Eyes Not Given Julito Emerson, RN     12/06/2017 2143 donepezil (ARICEPT) tablet 5 mg 5 mg Oral Given Julito Emerson RN     12/07/2017 0805 metoprolol tartrate (LOPRESSOR) tablet 50 mg 50 mg Oral Given Tosha Rushing, RN     12/06/2017 2142 metoprolol tartrate (LOPRESSOR) tablet 50 mg 50 mg Oral Given Julito Emerson RN     12/06/2017 1401 haloperidol lactate (HALDOL) injection 5 mg 5 mg Intramuscular Given Kita Barros, RN     12/07/2017 0759 insulin lispro (HumaLOG) 100 units/mL subcutaneous injection 1-6 Units 5 Units Subcutaneous Given Tosha Rushing, RN     12/06/2017 2143 insulin lispro (HumaLOG) 100 units/mL subcutaneous injection 1-5 Units 2 Units Subcutaneous Given Julito Emerson RN     12/07/2017 0741 hydrALAZINE (APRESOLINE) tablet 50 mg 50 mg Oral Given Tosha Rushing RN            Lab, Imaging and other studies: I have personally reviewed pertinent reports      VTE Pharmacologic Prophylaxis: none 2/2 duodenal ulcer  VTE Mechanical Prophylaxis: sequential compression device     Judy Faith MD  12/7/2017,9:29 AM

## 2017-12-07 NOTE — PROGRESS NOTES
BP improved after manual check  Suspect patient was agitated, will continue with metoprolol and hydralazine   Daughter updated and will be in to visit today  Tentative [place] to [place] tomorrow

## 2017-12-07 NOTE — MALNUTRITION/BMI
This medical record reflects one or more clinical indicators suggestive of  morbid obesity  Please indicate the one diagnosis below which you feel best reflects the clinical picture  Morbid Obesity    BMI Findings:  40-44 9    Body mass index is 40 58 kg/m²  See Nutrition note dated 12/07/2017 for additional details  Completed nutrition assessment is viewable in the nutrition documentation

## 2017-12-08 NOTE — OCCUPATIONAL THERAPY NOTE
OT Note     12/08/17 1016   Restrictions/Precautions   Other Precautions Cognitive; Fall Risk; Chair Alarm; Bed Alarm;O2   Bed Mobility   Supine to Sit 3  Moderate assistance   Additional items LE management;Verbal cues; Assist x 1   Transfers   Sit to Stand 4  Minimal assistance   Additional items Bedrails; Impulsive   Stand to Sit 5  Supervision   Additional items Armrests   Stand pivot 5  Supervision   Additional items Verbal cues  (RW)   Functional Mobility   Functional Mobility 5  Supervision   Additional Comments Completes txfr EOB to recliner at bedside   Additional items Rolling walker   Activity Tolerance   Activity Tolerance Patient tolerated treatment well   Assessment   Assessment Presents supine, requesting OOB for a m  meal   Pt txfred supine to ait EOB then to recliner at bedside  A m  meal placed on over the bed table, requires A set up all items  Joan feeds self utilizing std utensils and beverage containers  Pt  noted to have removed O2 cannula, O2 sat 87%, declines to have cannula in place  Nsg  made aware  Chair alarm actibated  Call bell and phone in reach     Recommendation   OT Discharge Recommendation 24 hour supervision/assist

## 2017-12-08 NOTE — SOCIAL WORK
Pt being dc'd back to [facility]  CM noitified Petey Ledesma in admissions dept at [facility] as well as pt's daughter Jordyn Shearer   Med stat ambulance to pick pt up at 12:45-1:00pm

## 2017-12-08 NOTE — NURSING NOTE
Patient discharged back to [facility]  Report called to [phone] and spoke to [facility]

## 2017-12-08 NOTE — DISCHARGE INSTRUCTIONS
1) Follow up with PCP within 1 week to titrate blood pressure medications and assess need for additional insulin therapy

## 2017-12-08 NOTE — PHYSICAL THERAPY NOTE
PT treatment Note      12/08/17 1126   Pain Assessment   Pain Score No Pain   Cognition   Overall Cognitive Status Impaired   Following Commands Follows one step commands with increased time or repetition   Subjective   Subjective My legs are stiff  Bed Mobility   Additional Comments OOB in chair at start and end of session   Transfers   Sit to Stand 5  Supervision   Additional items Verbal cues   Stand to Sit 5  Supervision   Additional items Verbal cues   Stand pivot 5  Supervision   Ambulation/Elevation   Gait pattern WNL   Gait Assistance 5  Supervision   Assistive Device Rolling walker   Distance 150'  x 2     SpO2 86% during ambulation on RA  Applied 2 L  O2  SpO2 increased to 93% at rest pt  removed her O2 on her own prior to PT session and refused to keep on  Nursing was notified by OT of the removal of O2 by the pt  Balance   Static Sitting Good   Dynamic Sitting Good   Static Standing Fair +   Dynamic Standing Fair +   Ambulatory Fair +  (with Rw)   Endurance Deficit   Endurance Deficit Yes   Activity Tolerance   Activity Tolerance Patient limited by fatigue   Assessment   Prognosis Good   Problem List Decreased strength;Decreased endurance; Impaired balance;Decreased mobility; Decreased cognition   Assessment Pt is performing all functional mobility at a CGA to (S) level with use of RW  No LOB  Pt would benefit from continued activity in PT to improve impairments and functional deficits   Plan   Treatment/Interventions Functional transfer training;LE strengthening/ROM; Therapeutic exercise; Endurance training;Bed mobility;Gait training   Progress Progressing toward goals   Recommendation   Recommendation Long-term skilled nursing home placement  (return to snf)   Pt  Completed LE seated exercise program with good tolerance  Pt  OOB with call bell within reach and alarm on at end of PT session

## 2017-12-08 NOTE — DISCHARGE SUMMARY
Discharge Summary - Candy Reed 78 y o  female MRN: [id number]    Unit/Bed#: 425-01 Encounter: [id number]    Admission Date: 12/5/2017     Admitting Diagnosis: Pancreatic cancer ([facility] [address] ) [C25 9]  Altered mental status [R41 82]  Hypertensive urgency [I16 0]    HPI: Source of history is obtained via a review of medical records, discussed with the our staff, and discussion with patient's daughter  Patient is a 53-year-old female with history of diabetes mellitus, atrial fibrillation, presents today with a chief complaint of confusion  The patient resides in a nursing home and has been refusing her oxygen therapy, she was found in another resident's bed  She is currently receiving chemotherapy for pancreatic cancer  On exam she is lethargic but able to tell me she lives in a nursing home, that she's in a hospital and her daughter's name  Procedures Performed:   Orders Placed This Encounter   Procedures    ED ECG Documentation Only       Hospital Course:  Patient was admitted to medical floor  She was placed on ischemic stroke pathway  She recently had echocardiogram and an MRA along with carotid eval so this was not repeated  I suspect that her lethargy was secondary to Haldol given the emergency room  I presume she is being treated for a urinary tract infection in the nursing home as she was on Macrobid  The patient was noted to be hypoglycemic and insulin regimen was adjusted, on discharge she was discharged on 30 units of Lantus and 5 units of mealtime Humalog  She will need to follow up with her PCP tomorrow assess need for additional insulin therapy  He was also noted to have hypertensive urgency, given her allergy to Ace inhibitors and calcium channel blockers, hydralazine was initiated and titrated to 50 mg p o  3 times a day  Metoprolol was also increased from 25-50 mg p o  b i d  She will also need to follow up with her PCP to further titrate additional antihypertensive medications    After speaking with the patient's daughter, I felt that this likely represented progressive dementia given history of worsening cognitive decline over the last 1-2 years  I started the patient on Aricept and daughter was available at bedside on hospital day 2, she confirmed significant improvement of mental status  I am not sure this was directly treated with Aricept, may be that her hypoglycemic episodes lead to a change in mental status  Patient does have a history of pancreatic cancer, a CT of the abdomen was obtained on admission and showed that the pancreatic mass encased greater than 75% hepatic artery  Patient was seen by Surgical Oncology stated is no current surgical intervention given advancement of disease  She will follow up with Hem/Onc as outpatient     Significant Findings, Care, Treatment and Services Provided:   CT A/P  1  Persistent pancreatic head mass/neoplasm with liver metastatic disease  Some of the lesions in the liver have increased in size as described above  The pancreatic mass in the pancreatic head encases greater than 50% of the portal vein in addition to   compressing the portal vein  However, there is no evidence of pancreatic mass extension to the superior mesenteric artery  The pancreatic mass encases greater than 75% of the hepatic artery  2  New soft tissue nodules along the left side of the omentum and also along the right paracolic gutter suggesting progression of metastatic disease  3  Colonic diverticulosis  4  No evidence of bowel obstruction    Complications: none    Discharge Diagnosis:   Hypertensive urgency  Type 2 DM with hypoglycemia  Probably underlying dementia     Condition at Discharge: fair     Discharge instructions/Information to patient and family:   See after visit summary for information provided to patient and family  Provisions for Follow-Up Care:  See after visit summary for information related to follow-up care and any pertinent home health orders  Disposition: [facility]    Planned Readmission: No    Discharge Statement   I spent 45 minutes discharging the patient  This time was spent on the day of discharge  I had direct contact with the patient on the day of discharge  Additional documentation is required if more than 30 minutes were spent on discharge  Discharge Medications:  See after visit summary for reconciled discharge medications provided to patient and family

## 2017-12-13 NOTE — PROGRESS NOTES
Assessment    1  Admission for fitting of port-a-cath (V58 81) (Z45 2)   2  Pancreatic cancer (157 9) (C25 9)    Plan   Admission for fitting of port-a-cath    · Follow-up visit in 1 month Evaluation and Treatment  Follow-up  Status: Hold For -Scheduling  Requested for: [id number]   Ordered; For: Admission for fitting of port-a-cath; Ordered By: Ethel Parry Performed:  Due: [id number]  Pancreatic cancer    · (1) CBC/PLT/DIFF; Status:Active; Requested for:30Obn6212;    Perform:[facility]; Due:57Laq3665; Ordered; For:Pancreatic cancer; Ordered By:Charlotte Mendes;   · (1) CBC/PLT/DIFF; Status:Active; Requested [id number];    Perform:[facility]; XDY:50VHA1007; Ordered; For:Pancreatic cancer; Ordered By:Charlotte Mendes;   · (1) CBC/PLT/DIFF; Status:Active; Requested for:46Gex3275;    Perform:[facility]; Due:43Pbu2990; Ordered; For:Pancreatic cancer; Ordered By:Charlotte Mendes;   · (1) CBC/PLT/DIFF; Status:Active; Requested for:11Xjl6532;    Perform:[facility]; KCK:93PDA4811; Ordered;cancer; Ordered By:Charlotte Mendes;   · (1) CBC/PLT/DIFF; Status: In Progress - Order Generated; [facility]/2017; 2017; 2017; 12/15/2017; 2017; 2018 ; Perform:[facility]; [facility]:17TVV2962; Ordered; For:Pancreatic cancer; Ordered By:Charlotte Mendes;   · (1) COMPREHENSIVE METABOLIC PANEL; Status:Active; Requested for:54Bde9675;    Perform:[facility]; Due:74Dxn7840; Ordered; For:Pancreatic cancer; Ordered By:Charlotte Mendes;   · (1) COMPREHENSIVE METABOLIC PANEL; Status:Active; Requested NBY:95BKK0550;    Perform:[facility]; JFD:43JFP0639; Ordered; For:Pancreatic cancer; Ordered By:Charlotte Mendes;   · (1) COMPREHENSIVE METABOLIC PANEL; Status:Active; Requested for:74Exa0956;    Perform:[facility]; Due:27Cjp8587; Ordered; For:Pancreatic cancer; Ordered By:Charlotte Mendes;   · (1) COMPREHENSIVE METABOLIC PANEL; Status:Active; Requested for:33Res1069;    Perform:[facility]; IHI:48SLF7603; Ordered;cancer; Ordered By:Charlotte Mendes;   · (1) COMPREHENSIVE METABOLIC PANEL; Status: In Progress - Order Generated; Requested for:Recurring Schedule: 11/3/2017; 11/17/2017; 12/1/2017; 12/15/2017;12/29/2017; 1/12/2018 ; Perform:[facility]; TVX:58QZF5079; Ordered;cancer; Ordered By:Charlotte Mendes; Pancreatic cancer (157 9) (C25 9)      1 - Russel Marti DO General Surgery Co-Management  *  Status: Active  Requested for: 13XSL7406 Ordered; For: Admission for fitting of port-a-cath;  Ordered By: Hannah Penny  Performed:   Due: 58IPP0660   Discussion/Summary  Discussion Summary:   In summary, this is a 74-year-old female history of pancreas adenocarcinoma with hepatic metastases, as outlined  started treatment with Gemzar/oxaliplatin  She tolerated well  Her 2nd treatment was suspended due to development of melena  This correlated with the institution of oral iron  She was seen in the emergency room and was stable  3rd treatment she had 2 flare reactions in 2 different IV sites on 10/9/17 infusion  Treatment was held following above events  Port a cath placement had been scheduled but then patient was hospitalized due to altered mental status, that was found to likely be related to side effect from Levaquin that was implemented to treat UTI   have arranged rescheduled appointment for port a cath placement  describes some intermittent pain  However she only sparingly asks for PRN tylenol  voiced understanding and agreement in the above discussion  She is aware to contact us with questions/ symptoms in the interim  Counseling Documentation With Imm: The patient was counseled regarding diagnostic results,-- instructions for management,-- patient and family education,-- impressions  total time of encounter was minutes        History of Present Illness  HPI: August 2017âpatient was admitted to the hospital with severe hyperglycemia  Initially, this was attributed to urinary tract infection  She was treated, diabetic medications were adjusted and she returned home  A few days later she returned to the hospital with severe hyperglycemia  She was found to have positive Hemoccult at that time and was in A  fib  She was found to have a troponin elevation without EKG changes  Echocardiogram showed an ejection fraction 55%  14, 2017  EGD showed a duodenal ulcer with nodularity  Biopsy confirmed the presence of adenocarcinoma  CT of the abdomen and pelvis showed a mass in the pancreatic head, 5 4 cm, as well as multiple hepatic lesions  11, 2017- started Gemzar 800 milligrams/meter squared, oxaliplatin 80 milligrams/meter squared, both given IV Q 2 weeks  Current Therapy: September 11, 2017- started Gemzar 800 milligrams/meter squared, oxaliplatin 80 milligrams/meter squared, both given IV Q 2 weeks  Interval History: some pain in neck, back, abdomen, all intermittent; sometimes asks for Tylenol PRN      Review of Systems  Complete-Female:  Constitutional: No fever, no chills, feels well, no tiredness, no recent weight gain or weight loss  Eyes: No complaints of eye pain, no red eyes, no eyesight problems, no discharge, no dry eyes, no itching of eyes  ENT: no complaints of earache, no loss of hearing, no nose bleeds, no nasal discharge, no sore throat, no hoarseness  Cardiovascular: No complaints of slow heart rate, no fast heart rate, no chest pain, no palpitations, no leg claudication, no lower extremity edema  Respiratory: No complaints of shortness of breath, no wheezing, no cough, no SOB on exertion, no orthopnea, no PND  Gastrointestinal: No complaints of abdominal pain, no constipation, no nausea or vomiting, no diarrhea, no bloody stools  Genitourinary: No complaints of dysuria, no incontinence, no pelvic pain, no dysmenorrhea, no vaginal discharge or bleeding  Musculoskeletal: as noted in HPI  Integumentary: No complaints of skin rash or lesions, no itching, no skin wounds, no breast pain or lump  Neurological: No complaints of headache, no confusion, no convulsions, no numbness, no dizziness or fainting, no tingling, no limb weakness, no difficulty walking  Psychiatric: no anxiety-- and-- no depression  Endocrine: no muscle weakness  no feelings of weakness  Hematologic/Lymphatic: No complaints of swollen glands, no swollen glands in the neck, does not bleed easily, does not bruise easily  ROS Reviewed:   ROS reviewed  Active Problems     1  Abnormal ECG (794 31) (R94 31)   2  Acute hip pain (719 45) (M25 559)   3  Adhesive capsulitis of right shoulder (726 0) (M75 01)   4  Arthritis (716 90) (M19 90)   5  Atrial fibrillation (427 31) (I48 91)   6  Chronic low back pain (724 2,338 29) (M54 5,G89 29)   7  Degenerative joint disease (715 90) (M19 90)   8  Depression with anxiety (300 4) (F41 8)   9  Diabetes mellitus (250 00) (E11 9)   10  Dyslipidemia (272 4) (E78 5)   11  Edema extremities (782 3) (R60 0)   12  Hypertension (401 9) (I10)   13  Liver metastases (197 7) (C78 7)   14  Localized, primary osteoarthritis of shoulder region (715 11) (M19 019)   15  Obstructive sleep apnea (327 23) (G47 33)   16  Osteoporosis (733 00) (M81 0)   17  Palpitations (785 1) (R00 2)   18  Rheumatoid arthritis (714 0) (M06 9)   19  Right shoulder pain (719 41) (M25 511)   20  Rotator cuff tendinitis, right (726 10) (M75 81)   21  Trochanteric bursitis (726 5) (M70 60)  Pancreatic cancer (157 9) (C25 9)       Past Medical History  1  History of Pericardial effusion (423 9) (I31 3)    Surgical History  1  History of Hip Replacement   2  History of Knee Replacement  Surgical History Reviewed: The surgical history was reviewed and updated today  Family History  Mother    1  Family history of diabetes mellitus (V18 0) (Z83 3)  Father    2   Family history of Coronary artery disease, occlusive   3  Family history of myocardial infarction (V17 3) (Z82 49)   4  Family history of sudden cardiac death (SCD) (V17 41) (Z82 41)  Brother    5  Family history of coronary artery disease (V17 3) (Z82 49)   6  Family history of myocardial infarction (V17 3) (Z82 49)  Uncle    7  Family history of myocardial infarction (V17 3) (Z82 49)  Family History Reviewed: The family history was reviewed and updated today  Social History     · Denied: History of Alcohol use   ·    · Denied: History of Drug use   · Never a smoker  Social History Reviewed: The social history was reviewed and updated today  The social history was reviewed and is unchanged  Current Meds   1  Acetaminophen 325 MG Oral Tablet; TAKE 1 TO 2 TABLETS EVERY 6 HOURS AS NEEDED; Therapy: (Recorded:01Sep2017) to Recorded   2  Albuterol Sulfate 108 (90 Base) MCG/ACT AERS; Therapy: (Recorded:01Sep2017) to Recorded   3  Aspirin 81 MG TABS; Therapy: (Recorded:01Sep2017) to Recorded   4  Bacid CAPS; Therapy: (0664 313 06 34) to Recorded   5  Bisacodyl 10 MG/30ML ENEM; Therapy: (0664 313 06 34) to Recorded   6  Bisco-Lax 10 MG SUPP; Therapy: (0664 313 06 34) to Recorded   7  Colace 100 MG Oral Capsule; Therapy: (Recorded:01Sep2017) to Recorded   8  Cyproheptadine HCl - 4 MG Oral Tablet; Take 1 tablet daily; Therapy: (Recorded:06Oct2017) to Recorded   9  Docusate Sodium 100 MG Oral Capsule; Therapy: (0664 313 06 34) to Recorded   10  Ferrous Sulfate 325 (65 Fe) MG Oral Tablet; Therapy: (Recorded:01Sep2017) to Recorded   11  HumaLOG 100 UNIT/ML Subcutaneous Solution; Therapy: (Recorded:01Sep2017) to Recorded   12  Lantus 100 UNIT/ML Subcutaneous Solution; USE AS DIRECTED; Therapy: (Recorded:29Mby8123) to Recorded   13  Metoprolol Tartrate 25 MG Oral Tablet; Therapy: (Recorded:01Sep2017) to Recorded   14  Milk of Magnesia SUSP; Therapy: (Recorded:01Sep2017) to Recorded   15   Multivitamins TABS; Therapy: ([phone]) to Recorded   16  Norco 5-325 MG Oral Tablet; TAKE 1 TABLET EVERY 4 TO 6 HOURS AS NEEDED FOR  PAIN;  Therapy: (Recorded:01Sep2017) to Recorded   17  Oxazepam 10 MG Oral Capsule; Therapy: (Recorded:01Sep2017) to Recorded   18  PredniSONE 1 MG Oral Tablet; 1 TAB THREE TIMES DAILY; Therapy: (Recorded:01Sep2017) to Recorded   19  Protonix 40 MG Oral Tablet Delayed Release; TAKE 1 TABLET TWICE DAILY 30 MINUTES  BEFORE BREAKFAST AND DINNER; Therapy: (Recorded:01Sep2017) to Recorded   20  PROzac 20 MG Oral Capsule; Therapy: ([phone]) to Recorded   21  Remeron 15 MG Oral Tablet; TAKE 1 TABLET AT BEDTIME; Therapy: (Recorded:01Sep2017) to Recorded   22  Voltaren 1 % Transdermal Gel; apply sparingly to affected area twice daily on as needed  basis for pain; Therapy: 13GNQ1978 to (Last Rx:13Mar2015) Ordered   23  Xanax 0 5 MG Oral Tablet; Therapy: (Recorded:01Sep2017) to Recorded  Medication List Reviewed: The medication list was reviewed and updated today  Allergies  1  AmLODIPine Besylate TABS   2  Atropine Sulfate SOLN   3  belladonna   4  benazepril   5  Donnatal TABS   6  Hyoscyamine POWD   7  Lotrel CAPS   8  Pamelor CAPS   9  Procardia CAPS   10  Scopolamine HBr CARLTON   11  Simvastatin TABS   12  Sulfa Drugs    Vitals  Vital Signs    Recorded: 23NYD3702 01:40PM   Temperature 98 1 F   Heart Rate 68   Respiration 18   Systolic 022   Diastolic 78   Height 4 ft 8 in   Weight 181 lb 12 8 oz   BMI Calculated 40 76   BSA Calculated 1 7   O2 Saturation 89   Pain Scale 6       Physical Exam   Constitutional  General appearance: No acute distress, well appearing and well nourished  overweight  Eyes  Conjunctiva and lids: No swelling, erythema or discharge  Ears, Nose, Mouth, and Throat  External inspection of ears and nose: Normal    Oropharynx: Normal with no erythema, edema, exudate or lesions     Pulmonary  Respiratory effort: No increased work of breathing or signs of respiratory distress  Auscultation of lungs: Clear to auscultation  Cardiovascular  Auscultation of heart: Normal rate and rhythm, normal S1 and S2, without murmurs  Examination of extremities for edema and/or varicosities: Normal    Abdomen  Abdomen: Non-tender, no masses  Lymphatic  Palpation of lymph nodes in neck: No lymphadenopathy  Musculoskeletal  Gait and station: Abnormal  -- in wheelchair  Skin  Skin and subcutaneous tissue: Normal without rashes or lesions  Neurologic  Cranial nerves: Cranial nerves 2-12 intact  Psychiatric  Orientation to person, place, and time: Normal          Future Appointments    Date/Time Provider Specialty Site   12/12/2017 11:45 AM LIA Delgado , [facility]       Signatures   Electronically signed by : Loreta Navarro, [facility]; Dec 12 2017 11:16AM EST                       (Author)    Electronically signed by : LIA Herrera  Dec 12 2017  4:57PM EST

## 2017-12-21 NOTE — PLAN OF CARE
Problem: Potential for Falls  Goal: Patient will remain free of falls  INTERVENTIONS:  - Assess patient frequently for physical needs  -  Identify cognitive and physical deficits and behaviors that affect risk of falls    -  [place] fall precautions as indicated by assessment   - Educate patient/family on patient safety including physical limitations  - Instruct patient to call for assistance with activity based on assessment  - Modify environment to reduce risk of injury  - Consider OT/PT consult to assist with strengthening/mobility   Outcome: Progressing

## 2017-12-21 NOTE — PROGRESS NOTES
Pt arrived on unit  States she's been tiered and has had slight nausea that comes and goes  Okay for treatment today

## 2017-12-29 PROBLEM — I26.99 ACUTE PULMONARY EMBOLISM (HCC): Status: ACTIVE | Noted: 2017-01-01

## 2017-12-29 PROBLEM — K62.5 RECTAL BLEEDING: Status: ACTIVE | Noted: 2017-01-01

## 2017-12-29 PROBLEM — I82.403 ACUTE DEEP VEIN THROMBOSIS (DVT) OF BOTH LOWER EXTREMITIES (HCC): Status: ACTIVE | Noted: 2017-01-01

## 2017-12-29 PROBLEM — C25.9 PRIMARY PANCREATIC CANCER WITH METASTASIS TO OTHER SITE (HCC): Status: ACTIVE | Noted: 2017-01-01

## 2017-12-29 NOTE — ED PROVIDER NOTES
History  Chief Complaint   Patient presents with    Rectal Bleeding     woke up this morning had a bowel movement with bright red stool has nausea hx of hemroids     Pt sent from [facility] with hx of having BM this AM and followed by "large: amt of bright red blood  ( approx 9:30)  Pt denies pain  No straining  Does relate hx of hemmroids in past " but never this much bleeding"  Pt denies trauma  Has had nausea off and on since last Chemo last week-none at present  HAs mild discomfort mid abd    Pt denoes CP or SOB  No fever/chills  Also has note right leg swelling since last week-no pain  No dizziness/lightheadedness  Pt is not on blood thinners  PMH of pancreatic cancer  DM;Afib; Chronic pain  Records show duod ulcer hx - pt unaware        History provided by:  Patient  Rectal Bleeding - Minor   Quality:  Bright red  Amount:  Copious  Chronicity:  New  Context: defecation and spontaneously    Context: not anal penetration, not constipation, not diarrhea, not foreign body, not rectal injury and not rectal pain    Similar prior episodes: no    Relieved by:  None tried  Associated symptoms: abdominal pain    Associated symptoms: no dizziness, no epistaxis, no fever, no hematemesis, no light-headedness, no loss of consciousness and no vomiting    Risk factors: no anticoagulant use, no hx of colorectal cancer, no hx of IBD and no liver disease        Prior to Admission Medications   Prescriptions Last Dose Informant Patient Reported? Taking?    ALPRAZolam (XANAX) 0 25 mg tablet   No No   Sig: Take 1 tablet by mouth 3 (three) times a day as needed for anxiety for up to 10 days   Patient taking differently: Take 0 25 mg by mouth every 8 (eight) hours as needed for anxiety     ARTIFICIAL TEAR SOLUTION OP 12/29/2017 at Unknown time  Yes Yes   Sig: Apply to eye   FLUoxetine (PROzac) 20 MG tablet 12/29/2017 at Unknown time  Yes Yes   Sig: Take 20 mg by mouth daily   Multiple Vitamin (MULTIVITAMIN) tablet 12/29/2017 at Unknown time  Yes Yes   Sig: Take 1 tablet by mouth daily   Nutritional Supplements (HI-AZEB) LIQD 12/29/2017 at Unknown time  Yes Yes   Sig: Take 60 mL by mouth 2 (two) times a day   acetaminophen (TYLENOL) 325 mg tablet 12/29/2017 at Unknown time  No Yes   Sig: Take 2 tablets by mouth every 6 (six) hours as needed for mild pain, headaches or fever   albuterol (2 5 mg/3 mL) 0 083 % nebulizer solution 12/29/2017 at Unknown time  Yes Yes   Sig: Take 2 5 mg by nebulization every 4 (four) hours as needed for wheezing   aspirin (ECOTRIN LOW STRENGTH) 81 mg EC tablet 12/29/2017 at Unknown time  Yes Yes   Sig: Take 81 mg by mouth daily   bisacodyl (BISCOLAX) 10 mg suppository 12/29/2017 at Unknown time  Yes Yes   Sig: Insert 10 mg into the rectum daily as needed     bisacodyl (FLEET) 10 MG/30ML ENEM 12/29/2017 at Unknown time  Yes Yes   Sig: Insert 10 mg into the rectum as needed for constipation   cyproheptadine (PERIACTIN) 4 mg tablet 12/29/2017 at Unknown time  Yes Yes   Sig: Take 4 mg by mouth daily at bedtime   docusate sodium (COLACE) 100 mg capsule 12/29/2017 at Unknown time  Yes Yes   Sig: Take 100 mg by mouth 2 (two) times a day as needed for constipation     donepezil (ARICEPT) 5 mg tablet 12/29/2017 at Unknown time  No Yes   Sig: Take 1 tablet by mouth daily at bedtime   ferrous sulfate 325 (65 Fe) mg tablet 12/29/2017 at Unknown time  Yes Yes   Sig: Take 325 mg by mouth 2 (two) times a day with meals     hydrALAZINE (APRESOLINE) 50 mg tablet 12/29/2017 at Unknown time  No Yes   Sig: Take 1 tablet by mouth 3 (three) times a day   insulin glargine (LANTUS) 100 units/mL subcutaneous injection 12/29/2017 at Unknown time  No Yes   Sig: Inject 30 Units under the skin every morning   insulin lispro (HumaLOG) 100 units/mL injection 12/29/2017 at Unknown time  No Yes   Sig: Inject 5 Units under the skin 3 (three) times a day before meals   magnesium hydroxide (MILK OF MAGNESIA) 400 mg/5 mL oral suspension 12/29/2017 at Unknown time  Yes Yes   Sig: Take 30 mL by mouth as needed for constipation   metoprolol tartrate (LOPRESSOR) 50 mg tablet 12/29/2017 at Unknown time  No Yes   Sig: Take 1 tablet by mouth every 12 (twelve) hours   pantoprazole (PROTONIX) 40 mg tablet 12/29/2017 at Unknown time  No Yes   Sig: Take 1 tablet by mouth 2 (two) times a day before meals   predniSONE 1 mg tablet 12/29/2017 at Unknown time  No Yes   Sig: Take 3 tablets by mouth daily   Patient taking differently: Take 1 mg by mouth 3 (three) times a day        Facility-Administered Medications: None       Past Medical History:   Diagnosis Date    Anemia     Arthritis     Atrial fibrillation (HCC)     Back pain     Bacteremia     Cancer ([place] [address] )     Pancreatic    Coronary artery disease     Depression with anxiety     Diabetes mellitus ([place] [address] )     DJD (degenerative joint disease)     Duodenal ulcer     Encephalopathy     Gait abnormality     Hyperlipidemia     Hypertension     Muscle weakness (generalized)     Non-STEMI (non-ST elevated myocardial infarction) ([place] [address] )     Obstructive sleep apnea     Osteoporosis     Pancreas cancer ([place] [address] )     Sepsis ([place] [address] )     Sleep apnea     UTI (urinary tract infection)        Past Surgical History:   Procedure Laterality Date    ESOPHAGOGASTRODUODENOSCOPY N/A 8/14/2017    Procedure: ESOPHAGOGASTRODUODENOSCOPY (EGD); Surgeon: Camron Marcos MD;  Location:  GI LAB; Service: Gastroenterology    JOINT REPLACEMENT Left     hip    KNEE SURGERY Bilateral     ND INSJ TUNNELED CTR VAD W/SUBQ PORT AGE 5 YR/> N/A 11/8/2017    Procedure: INSERTION VENOUS PORT (PORT-A-CATH); Surgeon: Kandice Brunner, DO;  Location: [facility];  Service: General    REVISION TOTAL HIP ARTHROPLASTY Right        Family History   Problem Relation Age of Onset    Family history unknown: Yes     I have reviewed and agree with the history as documented      Social History   Substance Use Topics    Smoking status: Former Smoker     Types: Cigarettes     Quit date: 9/11/1999    Smokeless tobacco: Never Used    Alcohol use No        Review of Systems   Constitutional: Negative for activity change, appetite change, chills, diaphoresis and fever  HENT: Negative  Negative for congestion, drooling, facial swelling, nosebleeds, trouble swallowing and voice change  Eyes: Negative  Negative for pain, redness and visual disturbance  Respiratory: Negative  Negative for choking, chest tightness and shortness of breath  Cardiovascular: Positive for leg swelling (right)  Negative for chest pain and palpitations  Gastrointestinal: Positive for abdominal pain, anal bleeding, hematochezia and nausea (occ)  Negative for blood in stool, constipation, diarrhea, hematemesis, rectal pain and vomiting  Genitourinary: Negative for difficulty urinating, dysuria, flank pain, hematuria and pelvic pain  Musculoskeletal: Negative for neck pain and neck stiffness  Arthralgias: chronic  Skin: Negative  Negative for pallor, rash and wound  Neurological: Negative  Negative for dizziness, tremors, loss of consciousness, syncope, facial asymmetry, speech difficulty, light-headedness and headaches  Hematological: Does not bruise/bleed easily  Psychiatric/Behavioral: Negative  Negative for behavioral problems, confusion, hallucinations and self-injury  All other systems reviewed and are negative        Physical Exam  ED Triage Vitals [12/29/17 1110]   Temperature Pulse Respirations Blood Pressure SpO2   98 5 °F (36 9 °C) 60 20 143/60 100 %      Temp Source Heart Rate Source Patient Position - Orthostatic VS BP Location FiO2 (%)   Temporal Monitor Sitting Left arm 2      Pain Score       No Pain           Orthostatic Vital Signs  Vitals:    12/29/17 1815 12/29/17 1830 12/29/17 1845 12/29/17 1900   BP:       Pulse: 55 55 56 60   Patient Position - Orthostatic VS:           Physical Exam   Constitutional: She is oriented to person, place, and time  She appears well-nourished  She is cooperative  She has a sickly appearance  No distress  Obese  Has depends on with mild amt bright red blood-from 9:30   HENT:   Head: Normocephalic and atraumatic  Mouth/Throat: Oropharynx is clear and moist and mucous membranes are normal  Mucous membranes are not dry and not cyanotic  No posterior oropharyngeal edema or posterior oropharyngeal erythema  Eyes: Conjunctivae and EOM are normal  Pupils are equal, round, and reactive to light  Neck: Full passive range of motion without pain and phonation normal  Neck supple  No JVD present  Cardiovascular: Regular rhythm and intact distal pulses  No extrasystoles are present  Bradycardia present  Plus 3 edema right lower leg with no pain/homans sx  Trace edema left /no pain   Pulmonary/Chest: Effort normal  No stridor  No respiratory distress  She has decreased breath sounds  She has no wheezes  She has no rhonchi  She has no rales  Abdominal: Soft  Bowel sounds are normal  She exhibits no ascites  There is tenderness in the periumbilical area  There is no guarding and no CVA tenderness  Genitourinary: Rectal exam shows external hemorrhoid (non thrombosed) and guaiac positive stool (brown stool-positive result)  Rectal exam shows no tenderness and anal tone normal    Neurological: She is alert and oriented to person, place, and time  She displays atrophy  She displays no tremor  No cranial nerve deficit  She displays no seizure activity  Skin: Skin is warm and dry  No petechiae and no rash noted  She is not diaphoretic  No cyanosis  Psychiatric: She has a normal mood and affect  Her speech is normal and behavior is normal  Thought content normal    Vitals reviewed        ED Medications  Medications   sodium chloride 0 9 % bolus 1,000 mL (0 mL Intravenous Stopped 12/29/17 1833)   iohexol (OMNIPAQUE) 350 MG/ML injection (MULTI-DOSE) 100 mL (100 mL Intravenous Given 12/29/17 1448) Diagnostic Studies  Results Reviewed     Procedure Component Value Units Date/Time    Fingerstick Glucose (POCT) [[id number]]  (Normal) Collected:  12/29/17 1633    Lab Status:  Final result Updated:  12/29/17 1634     POC Glucose 107 mg/dl     CBC and differential [[id number]]  (Abnormal) Collected:  12/29/17 1256    Lab Status:  Final result Specimen:  Blood from Arm, Right Updated:  12/29/17 1337     WBC 7 04 Thousand/uL      RBC 3 87 Million/uL      Hemoglobin 11 0 (L) g/dL      Hematocrit 35 2 %      MCV 91 fL      MCH 28 4 pg      MCHC 31 3 (L) g/dL      RDW 17 1 (H) %      MPV 11 4 fL      Platelets 84 (L) Thousands/uL     Comprehensive metabolic panel [[id number]]  (Abnormal) Collected:  12/29/17 1256    Lab Status:  Final result Specimen:  Blood from Arm, Right Updated:  12/29/17 1331     Sodium 138 mmol/L      Potassium 4 7 mmol/L      Chloride 101 mmol/L      CO2 34 (H) mmol/L      Anion Gap 3 (L) mmol/L      BUN 7 mg/dL      Creatinine 1 02 mg/dL      Glucose 207 (H) mg/dL      Calcium 8 2 (L) mg/dL      AST 68 (H) U/L      ALT 40 U/L      Alkaline Phosphatase 174 (H) U/L      Total Protein 6 4 g/dL      Albumin 2 1 (L) g/dL      Total Bilirubin 0 40 mg/dL      eGFR 54 ml/min/1 73sq m     Narrative:         National Kidney Disease Education Program recommendations are as follows:  GFR calculation is accurate only with a steady state creatinine  Chronic Kidney disease less than 60 ml/min/1 73 sq  meters  Kidney failure less than 15 ml/min/1 73 sq  meters  Lactic acid, plasma [[id number]]  (Normal) Collected:  12/29/17 1256    Lab Status:  Final result Specimen:  Blood from Arm, Right Updated:  12/29/17 1319     LACTIC ACID 1 6 mmol/L     Narrative:         Result may be elevated if tourniquet was used during collection      Brina Manner [[id number]]  (Normal) Collected:  12/29/17 1256    Lab Status:  Final result Specimen:  Blood from Arm, Right Updated:  12/29/17 1309     Protime 14 3 seconds      INR 1 12 APTT [[id number]]  (Normal) Collected:  12/29/17 1256    Lab Status:  Final result Specimen:  Blood from Arm, Right Updated:  12/29/17 1309     PTT 26 seconds     Narrative: Therapeutic Heparin Range = 60-90 seconds                 VAS lower limb venous duplex study, complete bilateral   Final Result by David Orta DO (12/29 2156)      CTA chest ct abdomen pelvis w contrast   Final Result by Jase Rivera MD (12/29 1618)      1  Right upper lobe and lower lobe pulmonary emboli  2    RV/LV ratio of 0 9    3   Carcinoma of the pancreatic head with partial encasement of the portal vein and isai hepatis lymphadenopathy  4   Hepatic and left renal metastases  5   Progressive omental metastases with moderate amount of ascites developing since 12/5/2017  I have discussed my findings with Arpit [facility] ED physician, RADHA Yin  Workstation performed: [id number]                    Procedures  Procedures       Phone Contacts  ED Phone Contact    ED Course  ED Course as of Dec 31 1359   Fri Dec 29, 2017   1332 Glucose: (!) 207   1332 AST: (!) 68   1332 LACTIC ACID: 1 6   1332 PTT: 26   1332 INR: 1 12   1458 Verbal on VAS- Pt with bilateral DVT-has one vein in left calf and on right calf and behind knee    1624 Verbal with radiology-right upper and lower PEs   Abd with known pancreatic CA with omental and liver mets and new small ascities  1629 Iscussed results with pt -have call into transfer center    1657 No bleeding since initial episode    1658 Pt BS checked at her request "I haven't eaten and feel weak"- was 109    1741 Long discussion with Dr Tesfaye Benitez- will have discussion with pt on pros/con of starting heparin   Wants me to discuss same with pt Will accept transfer to [facility]  1742 I discussed with pt  bleeding effects with heparin and the dangers of PE/DVTs  Pt would like us to try Heparin  We discussed stopping if bleeding reoccurs        1925 No bleeding issues to this point  No abd pain  MDM  The patient presented with a condition in which there was a high probability of imminent or life-threatening deterioration, and critical care services (excluding separately billable procedures) totalled 30-74 minutes          Disposition  Final diagnoses:   Rectal bleeding   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 [address] )   Acute pulmonary embolism ([facility] [address] ) - right upper and lower     Time reflects when diagnosis was documented in both MDM as applicable and the Disposition within this note     Time User Action Codes Description Comment    12/29/2017  5:11 PM Hesham Shaper Add [K62 5] Rectal bleeding     12/29/2017  5:12 PM Hesham Shaper Add [C25 9,  C77 2] Pancreatic carcinoma metastatic to intra-abdominal lymph node ([facility] [address] )     12/29/2017  5:13 PM Hesham Shaper Add [C25 9,  C78 7] Pancreatic cancer metastasized to liver ([facility] [address] )     12/29/2017  5:13 PM Hesham Shaper Add [I82 4Z3] DVT, lower extremity, distal, acute, bilateral ([address] )     12/29/2017  5:14 PM Hesham Shaper Add [I26 99] Acute pulmonary embolism ([facility] [address] )     12/29/2017  5:15 PM Hesham Shaper Modify [I26 99] Acute pulmonary embolism ([facility] [address] ) right upper and lower      ED Disposition     ED Disposition Condition Comment    Transfer to Another [facility] [address] should be transferred out to Morris Spears MD Documentation    [address] Most Recent Value   Patient Condition  The patient has been stabilized such that within reasonable medical probability, no material deterioration of the patient condition or the condition of the unborn child(ghassan) is likely to result from the transfer   Reason for Transfer  Level of Care needed not available at this facility   Benefits of Transfer  Specialized equipment and/or services available at the receiving facility (Include comment)________________________   Risks of Transfer  Potential for delay in receiving treatment, Potential deterioration of medical condition, Loss of IV, Increased discomfort during transfer, Possible worsening of condition or death during transfer   Accepting Physician  Dr Shani Kumar Name, [address]     (Name & Tel number)  Delmy Adame   Sending MD  [facility]      RN Documentation    [address] Name, [address]   B   Bed Assignment  [id number] (Name & Tel number)  Delmy Adame   Report Given to  Mimi Lopez RN      Follow-up Information    None       Discharge Medication List as of 12/29/2017  8:44 PM      CONTINUE these medications which have NOT CHANGED    Details   acetaminophen (TYLENOL) 325 mg tablet Take 2 tablets by mouth every 6 (six) hours as needed for mild pain, headaches or fever, Starting Mon 8/28/2017, No Print      albuterol (2 5 mg/3 mL) 0 083 % nebulizer solution Take 2 5 mg by nebulization every 4 (four) hours as needed for wheezing, Historical Med      ALPRAZolam (XANAX) 0 25 mg tablet Take 1 tablet by mouth 3 (three) times a day as needed for anxiety for up to 10 days, Starting Mon 10/23/2017, Until Tue 12/5/2017, Print      ARTIFICIAL TEAR SOLUTION OP Apply to eye, Historical Med      aspirin (ECOTRIN LOW STRENGTH) 81 mg EC tablet Take 81 mg by mouth daily, Historical Med      bisacodyl (BISCOLAX) 10 mg suppository Insert 10 mg into the rectum daily as needed  , Historical Med      bisacodyl (FLEET) 10 MG/30ML ENEM Insert 10 mg into the rectum as needed for constipation, Historical Med      cyproheptadine (PERIACTIN) 4 mg tablet Take 4 mg by mouth daily at bedtime, Historical Med      docusate sodium (COLACE) 100 mg capsule Take 100 mg by mouth 2 (two) times a day as needed for constipation  , Until Discontinued, Historical Med      donepezil (ARICEPT) 5 mg tablet Take 1 tablet by mouth daily at bedtime, Starting Fri 12/8/2017, Normal      ferrous sulfate 325 (65 Fe) mg tablet Take 325 mg by mouth 2 (two) times a day with meals  , Historical Med      FLUoxetine (PROzac) 20 MG tablet Take 20 mg by mouth daily, Historical Med      hydrALAZINE (APRESOLINE) 50 mg tablet Take 1 tablet by mouth 3 (three) times a day, Starting Fri 12/8/2017, Normal      insulin glargine (LANTUS) 100 units/mL subcutaneous injection Inject 30 Units under the skin every morning, Starting Fri 12/8/2017, Normal      insulin lispro (HumaLOG) 100 units/mL injection Inject 5 Units under the skin 3 (three) times a day before meals, Starting Fri 12/8/2017, No Print      magnesium hydroxide (MILK OF MAGNESIA) 400 mg/5 mL oral suspension Take 30 mL by mouth as needed for constipation, Historical Med      metoprolol tartrate (LOPRESSOR) 50 mg tablet Take 1 tablet by mouth every 12 (twelve) hours, Starting Fri 12/8/2017, No Print      Multiple Vitamin (MULTIVITAMIN) tablet Take 1 tablet by mouth daily, Historical Med      Nutritional Supplements (HI-AZEB) LIQD Take 60 mL by mouth 2 (two) times a day, Historical Med      pantoprazole (PROTONIX) 40 mg tablet Take 1 tablet by mouth 2 (two) times a day before meals, Starting Mon 8/28/2017, No Print      predniSONE 1 mg tablet Take 3 tablets by mouth daily, Starting Tue 10/24/2017, No Print           No discharge procedures on file      ED Provider  Electronically Signed by           Samm uFnez DO  12/31/17 1400

## 2017-12-29 NOTE — EMTALA/ACUTE CARE TRANSFER
[address]  [address] [facility] [address]  Dept: [phone]      EMTALA TRANSFER CONSENT    NAME Lanette Henao                                         1940                              MRN [id number]    I have been informed of my rights regarding examination, treatment, and transfer   by Dr Garnica Every, DO    Benefits: Specialized equipment and/or services available at the receiving facility (Include comment)________________________ONCology,hematology,vascular,GI    Risks: Potential for delay in receiving treatment, Potential deterioration of medical condition, Loss of IV, Increased discomfort during transfer, Possible worsening of condition or death during transfer      Consent for Transfer:  I acknowledge that my medical condition has been evaluated and explained to me by the emergency department physician or other qualified medical person and/or my attending physician, who has recommended that I be transferred to the service of  Accepting Physician: Dr Cezar Wilkes at [address] [facility] [address], [address] : [facility]  The above potential benefits of such transfer, the potential risks associated with such transfer, and the probable risks of not being transferred have been explained to me, and I fully understand them  The doctor has explained that, in my case, the benefits of transfer outweigh the risks  I agree to be transferred  I authorize the performance of emergency medical procedures and treatments upon me in both transit and upon arrival at the receiving facility  Additionally, I authorize the release of any and all medical records to the receiving facility and request they be transported with me, if possible  I understand that the safest mode of transportation during a medical emergency is an ambulance and that the Hospital advocates the use of this mode of transport   Risks of traveling to the receiving facility by car, including absence of medical control, life sustaining equipment, such as oxygen, and medical personnel has been explained to me and I fully understand them  (CELINE CORRECT BOX BELOW)  [  x]  I consent to the stated transfer and to be transported by ambulance/helicopter  [  ]  I consent to the stated transfer, but refuse transportation by ambulance and accept full responsibility for my transportation by car  I understand the risks of non-ambulance transfers and I exonerate the Hospital and its staff from any deterioration in my condition that results from this refusal     X___________________________________________    DATE  17  TIME________  Signature of patient or legally responsible individual signing on patient behalf           RELATIONSHIP TO PATIENT_________________________          Provider Certification    NAME Albina Lucas                                         1940                              MRN [id number]    A medical screening exam was performed on the above named patient  Based on the examination:    Condition Necessitating Transfer The primary encounter diagnosis was Rectal bleeding  Diagnoses of Pancreatic carcinoma metastatic to intra-abdominal lymph node [facility]), Pancreatic cancer metastasized to liver [facility]), DVT, lower extremity, distal, acute, bilateral ([address] ), and Acute pulmonary embolism ([facility] [address] ) were also pertinent to this visit      Patient Condition: The patient has been stabilized such that within reasonable medical probability, no material deterioration of the patient condition or the condition of the unborn child(ghassan) is likely to result from the transfer    Reason for Transfer: Level of Care needed not available at this facility    Transfer Requirements: Facility [facility]   · Space available and qualified personnel available for treatment as acknowledged by Winter Grace  · Agreed to accept transfer and to provide appropriate medical treatment as acknowledged by       Dr Luann Gregory  · Appropriate medical records of the examination and treatment of the patient are provided at the time of transfer   [address] [facility], [address] _______  · Transfer will be performed by qualified personnel from    and appropriate transfer equipment as required, including the use of necessary and appropriate life support measures  Provider Certification: I have examined the patient and explained the following risks and benefits of being transferred/refusing transfer to the patient/family:         Based on these reasonable risks and benefits to the patient and/or the unborn child(ghassan), and based upon the information available at the time of the patients examination, I certify that the medical benefits reasonably to be expected from the provision of appropriate medical treatments at another medical facility outweigh the increasing risks, if any, to the individuals medical condition, and in the case of labor to the unborn child, from effecting the transfer      X____________________________________________ DATE 12/29/17        TIME_______      ORIGINAL - SEND TO MEDICAL RECORDS   COPY - SEND WITH PATIENT DURING TRANSFER

## 2017-12-29 NOTE — ED NOTES
Patient back from CT and [address] [facility] Christianne Reece RN  12/29/17 [id number]

## 2017-12-30 PROBLEM — F03.90 DEMENTIA (HCC): Status: ACTIVE | Noted: 2017-01-01

## 2017-12-30 PROBLEM — E11.9 DIABETES (HCC): Status: ACTIVE | Noted: 2017-01-01

## 2017-12-30 NOTE — RESPIRATORY THERAPY NOTE
RT Protocol Note  Cristian Muse 78 y o  female MRN: [id number]  Unit/Bed#: [facility] 606-01 Encounter: [id number]    Assessment    Principal Problem:    Rectal bleeding  Active Problems:    A-fib ([place] [address] )    Acute pulmonary embolism ([place] [address] )    Acute deep vein thrombosis (DVT) of both lower extremities ([place] [address] )    Primary pancreatic cancer with metastasis to other site [facility])      Home Pulmonary Medications:    Past Medical History:   Diagnosis Date    Anemia     Arthritis     Atrial fibrillation ([place] [address] )     Back pain     Bacteremia     Cancer ([place] [address] )     Pancreatic    Coronary artery disease     Depression with anxiety     Diabetes mellitus ([place] [address] )     DJD (degenerative joint disease)     Duodenal ulcer     Encephalopathy     Gait abnormality     Hyperlipidemia     Hypertension     Muscle weakness (generalized)     Non-STEMI (non-ST elevated myocardial infarction) ([place] [address] )     Obstructive sleep apnea     Osteoporosis     Pancreas cancer ([place] [address] )     Sepsis ([place] [address] )     Sleep apnea     UTI (urinary tract infection)      Social History     Social History    Marital status:      Spouse name: N/A    Number of children: N/A    Years of education: N/A     Social History Main Topics    Smoking status: Former Smoker     Types: Cigarettes     Quit date: 9/11/1999    Smokeless tobacco: Never Used    Alcohol use No    Drug use: No    Sexual activity: No     Other Topics Concern    None     Social History Narrative    None       Subjective         Objective    Physical Exam:   Assessment Type: Assess only  General Appearance: Alert, Awake  Respiratory Pattern: Normal  Chest Assessment: Chest expansion symmetrical  Bilateral Breath Sounds: Clear  Cough: Non-productive, Dry    Vitals:  Blood pressure 151/71, pulse 58, temperature 98 2 °F (36 8 °C), temperature source Oral, resp  rate 18, SpO2 97 %, not currently breastfeeding  Imaging and other studies: I have personally reviewed pertinent reports  Plan    Respiratory Plan: No distress/Pulmonary history, Discontinue Protocol        Resp Comments: Pt evaluated per protocol  She is here for rectal bleeding and does not have any pulmonary history  She doesnt take any bronchodilators at home however she complains of a dry cough which she thinks is cause by one of her medications   D/C protocol

## 2017-12-30 NOTE — ASSESSMENT & PLAN NOTE
Continue heparin IV given acute pulmonary and bilateral lower extremity VTE  Will monitor CBC  stop anticoagulation if any significant drop in Hb  Heme-Onc for further management

## 2017-12-30 NOTE — ASSESSMENT & PLAN NOTE
· Recently diagnosed dementia  On Aricept  · POA: Daughter, Kaitlin Castellon, and son, Ronni Wallace   Help patient to make decisions  · Lives at [address] [place] [address]

## 2017-12-30 NOTE — ASSESSMENT & PLAN NOTE
· Seems to have resolved  · Hgb stable  · Clear liquid diet  · Although bleeding has stopped, given need for long-term anticoagulation, may need to determine source of bleeding to determine her risk for re-bleeding  · Discussed with GI PA  To consider colonoscopy  Await recs from attending  · Prior EGD showed ulcerated nodule in duodenal bulb  Continue PPI

## 2017-12-30 NOTE — CONSULTS
Consultation - Jessica 78 y o  female MRN: [id number]  Unit/Bed#: [facility] 606-01 Encounter: [id number]      Assessment/Plan     Assessment:  Patient Active Problem List   Diagnosis    Hyperlipidemia    Diabetes mellitus with hyperglycemia ([place] [address] )    A-fib ([place] [address] )    Chronic back pain    Depression    SHANDA (obstructive sleep apnea)    RA (rheumatoid arthritis) ([place] [address] )    Duodenal ulcer    Pancreatic malignancy syndrome ([place] [address] )    Encephalopathy    Rectal bleeding    Acute pulmonary embolism (HCC)    Acute deep vein thrombosis (DVT) of both lower extremities ([place] [address] )    Primary pancreatic cancer with metastasis to other site [facility])   patient seen and examined  No family present  Reviewed and discussed with RAFA ACEVES and with Dr Digna Loving  Medical records reviewed including Dr Dl Whiteside most recent note  Patient hospitalized with bilateral lower extremitiy DVT's and 2 right sided PE's  She is on Heparin and has had GI bleeding which has now stopped  However she has a history of GI bleeding in the past  She had been on coumadin, however this had been stopped as well  She understands that she has cancer but feels that it is improved  She has poor recall of her medications and Dr Dl Whiteside note raised significant concerns for her competence to care for herself  She is unable to give the name of her facility  She does have 2 daughters who do not live close by  She is DNAR/DNI  She has some fullness in her lower abdomen with feeling of needing to have a bowel movement  She denies pain  She had some nausea which is improved with medication  Her ability to provide a consistent and coherent history is limited with obvious short term memory issues consistent with newly identified dementia  She is not oriented to time or place  She lacks full competence  CT shows increased omental mets and persistent but stable liver mets and pancreatic tumor    Plan:  She will need GI evaluation of bleeding and likely consideration for IVC filter placement  Her goal is to continue chemotherapy  She will likely need to return to SNF  There are no significant symptom issues  History of Present Illness   Physician Requesting Consult: Jessica Tamayo MD  Reason for Consult / Principal Problem: goals of care  Hx and PE limited by: likely dementia with lack of full competence for medical decision making  HPI: Jackie Haile is a 78y o  year old female who presents with stage 4 pancreatic cancer with encasement of portal vessels and with liver and left renal mets and omental mets with ascites  She feels that she is tolerating chemotherapy and that her cancer is stable and not progressing dramatically  She presents with bilateral leg swelling from DVT's and 2 right sided PE's  She is on Heparin and also had evidence of GI bleeding which patient feels is hemorrhoidal however she has a history of bleeding duodenal ulcer  She is confused as to medications and where she is living  She is emotionally labile regarding discussions regarding her cancer and long term goals  Inpatient consult to Palliative Care  Consult performed by: CEM Escobar  Consult ordered by: Elham Dietrich          Review of Systems   Constitutional: Positive for appetite change  Negative for activity change, chills, diaphoresis, fatigue, fever and unexpected weight change  HENT: Negative  Eyes: Negative  Respiratory: Positive for chest tightness and shortness of breath  Cardiovascular: Positive for leg swelling  Gastrointestinal: Positive for abdominal distention, anal bleeding, blood in stool and nausea  Endocrine: Negative  Genitourinary: Negative  Musculoskeletal: Negative  Skin: Negative  Allergic/Immunologic: Negative  Neurological: Negative  Hematological: Negative  Psychiatric/Behavioral: Positive for dysphoric mood         Historical Information   Past Medical History:   Diagnosis Date    Anemia     Arthritis     Atrial fibrillation ([place] [address] )     Back pain     Bacteremia     Cancer [facility])     Pancreatic    Coronary artery disease     Depression with anxiety     Diabetes mellitus ([place] [address] )     DJD (degenerative joint disease)     Duodenal ulcer     Encephalopathy     Gait abnormality     Hyperlipidemia     Hypertension     Muscle weakness (generalized)     Non-STEMI (non-ST elevated myocardial infarction) (HCC)     Obstructive sleep apnea     Osteoporosis     Pancreas cancer ([place] [address] )     Sepsis ([place] [address] )     Sleep apnea     UTI (urinary tract infection)      Past Surgical History:   Procedure Laterality Date    ESOPHAGOGASTRODUODENOSCOPY N/A 8/14/2017    Procedure: ESOPHAGOGASTRODUODENOSCOPY (EGD); Surgeon: Dianne Dorsey MD;  Location:  GI LAB; Service: Gastroenterology    JOINT REPLACEMENT Left     hip    KNEE SURGERY Bilateral     AK INSJ TUNNELED CTR VAD W/SUBQ PORT AGE 5 YR/> N/A 11/8/2017    Procedure: INSERTION VENOUS PORT (PORT-A-CATH);   Surgeon: Zhen Perez DO;  Location: [facility] MAIN OR;  Service: General    REVISION TOTAL HIP ARTHROPLASTY Right      Social History     Social History    Marital status:      Spouse name: N/A    Number of children: N/A    Years of education: N/A     Social History Main Topics    Smoking status: Former Smoker     Types: Cigarettes     Quit date: 9/11/1999    Smokeless tobacco: Never Used    Alcohol use No    Drug use: No    Sexual activity: No     Other Topics Concern    None     Social History Narrative    None     Family History   Problem Relation Age of Onset    Family history unknown: Yes       Meds/Allergies   all current active meds have been reviewed, current meds:   Current Facility-Administered Medications   Medication Dose Route Frequency    ALPRAZolam (XANAX) tablet 0 25 mg  0 25 mg Oral Q8H PRN    artificial tear (LUBRIFRESH P M ) ophthalmic ointment   Both Eyes HS    aspirin (ECOTRIN LOW STRENGTH) EC tablet 81 mg  81 mg Oral Daily    cyproheptadine (PERIACTIN) tablet 4 mg  4 mg Oral HS    docusate sodium (COLACE) capsule 100 mg  100 mg Oral BID    donepezil (ARICEPT) tablet 5 mg  5 mg Oral HS    ferrous sulfate tablet 325 mg  325 mg Oral BID With Meals    FLUoxetine (PROzac) capsule 20 mg  20 mg Oral Daily    heparin (porcine) 25,000 units in 250 mL infusion (premix)  3-30 Units/kg/hr (Order-Specific) Intravenous Titrated    hydrALAZINE (APRESOLINE) tablet 50 mg  50 mg Oral TID    [START ON 1/1/2018] influenza inactivated quadrivalent vaccine (FLULAVAL) IM injection 0 5 mL  0 5 mL Intramuscular Prior to discharge    insulin lispro (HumaLOG) 100 units/mL subcutaneous injection 1-6 Units  1-6 Units Subcutaneous Q6H [address]    metoprolol tartrate (LOPRESSOR) tablet 50 mg  50 mg Oral Q12H [address]    ondansetron (ZOFRAN) injection 4 mg  4 mg Intravenous Q6H PRN    pantoprazole (PROTONIX) injection 40 mg  40 mg Intravenous Q12H [address]    predniSONE tablet 1 mg  1 mg Oral TID    senna (SENOKOT) tablet 8 6 mg  1 tablet Oral Daily    and PTA meds:   Prior to Admission Medications   Prescriptions Last Dose Informant Patient Reported? Taking?    ALPRAZolam (XANAX) 0 25 mg tablet   No No   Sig: Take 1 tablet by mouth 3 (three) times a day as needed for anxiety for up to 10 days   Patient taking differently: Take 0 25 mg by mouth every 8 (eight) hours as needed for anxiety     ARTIFICIAL TEAR SOLUTION OP   Yes No   Sig: Apply to eye   FLUoxetine (PROzac) 20 MG tablet   Yes No   Sig: Take 20 mg by mouth daily   Multiple Vitamin (MULTIVITAMIN) tablet   Yes No   Sig: Take 1 tablet by mouth daily   Nutritional Supplements (HI-AZEB) LIQD   Yes No   Sig: Take 60 mL by mouth 2 (two) times a day   acetaminophen (TYLENOL) 325 mg tablet   No No   Sig: Take 2 tablets by mouth every 6 (six) hours as needed for mild pain, headaches or fever   albuterol (2 5 mg/3 mL) 0 083 % nebulizer solution   Yes No   Sig: Take 2 5 mg by nebulization every 4 (four) hours as needed for wheezing   aspirin (ECOTRIN LOW STRENGTH) 81 mg EC tablet   Yes No   Sig: Take 81 mg by mouth daily   bisacodyl (BISCOLAX) 10 mg suppository   Yes No   Sig: Insert 10 mg into the rectum daily as needed     bisacodyl (FLEET) 10 MG/30ML ENEM   Yes No   Sig: Insert 10 mg into the rectum as needed for constipation   cyproheptadine (PERIACTIN) 4 mg tablet   Yes No   Sig: Take 4 mg by mouth daily at bedtime   docusate sodium (COLACE) 100 mg capsule   Yes No   Sig: Take 100 mg by mouth 2 (two) times a day as needed for constipation  donepezil (ARICEPT) 5 mg tablet   No No   Sig: Take 1 tablet by mouth daily at bedtime   ferrous sulfate 325 (65 Fe) mg tablet   Yes No   Sig: Take 325 mg by mouth 2 (two) times a day with meals     hydrALAZINE (APRESOLINE) 50 mg tablet   No No   Sig: Take 1 tablet by mouth 3 (three) times a day   insulin glargine (LANTUS) 100 units/mL subcutaneous injection   No No   Sig: Inject 30 Units under the skin every morning   insulin lispro (HumaLOG) 100 units/mL injection   No No   Sig: Inject 5 Units under the skin 3 (three) times a day before meals   magnesium hydroxide (MILK OF MAGNESIA) 400 mg/5 mL oral suspension   Yes No   Sig: Take 30 mL by mouth as needed for constipation   metoprolol tartrate (LOPRESSOR) 50 mg tablet   No No   Sig: Take 1 tablet by mouth every 12 (twelve) hours   pantoprazole (PROTONIX) 40 mg tablet   No No   Sig: Take 1 tablet by mouth 2 (two) times a day before meals   predniSONE 1 mg tablet   No No   Sig: Take 3 tablets by mouth daily   Patient taking differently: Take 1 mg by mouth 3 (three) times a day        Facility-Administered Medications: None       Palliative Care Medications:      Allergies   Allergen Reactions    Amlodipine     Belladonna Alkaloids     Benazepril     Donnatal [Pb-Hyoscy-Atropine-Scopolamine]     Hyoscyamine     Lotrel [Amlodipine Besy-Benazepril Hcl]     Pamelor [Nortriptyline]     Procardia [Nifedipine]     Scopolamine     Simvastatin     Sulfa Antibiotics        Objective     Physical Exam   Constitutional: She appears well-developed and well-nourished  No distress  HENT:   Head: Normocephalic and atraumatic  Right Ear: External ear normal    Left Ear: External ear normal    Nose: Nose normal    Mouth/Throat: Oropharynx is clear and moist  No oropharyngeal exudate  Eyes: Conjunctivae and EOM are normal  Pupils are equal, round, and reactive to light  Right eye exhibits no discharge  Left eye exhibits no discharge  No scleral icterus  Neck: Normal range of motion  Neck supple  No JVD present  No tracheal deviation present  No thyromegaly present  Cardiovascular: Normal rate, normal heart sounds, intact distal pulses and normal pulses  An irregularly irregular rhythm present  PMI is not displaced  Exam reveals no gallop and no friction rub  No murmur heard  Pulmonary/Chest: Effort normal and breath sounds normal  No stridor  No respiratory distress  She has no wheezes  She has no rales  She exhibits no tenderness  Abdominal: Soft  Bowel sounds are normal  She exhibits distension  She exhibits no mass  There is tenderness  There is no rebound and no guarding  Musculoskeletal: Normal range of motion  She exhibits no edema, tenderness or deformity  Lymphadenopathy:     She has no cervical adenopathy  Neurological: She is alert  She has normal strength and normal reflexes  She is disoriented  She displays no atrophy and no tremor  No cranial nerve deficit or sensory deficit  She exhibits normal muscle tone  She displays a negative Romberg sign  She displays no seizure activity  Coordination and gait normal  GCS eye subscore is 4  GCS verbal subscore is 5  GCS motor subscore is 6  Skin: Skin is warm and dry  No rash noted  She is not diaphoretic  No erythema  No pallor  Psychiatric: Judgment normal  Her affect is blunt  Her speech is tangential  She is withdrawn   Thought content is delusional  Cognition and memory are impaired  She exhibits a depressed mood  She exhibits abnormal recent memory  She exhibits normal remote memory  Lab Results:   I have personally reviewed pertinent labs  , CBC:   Lab Results   Component Value Date    WBC 6 30 12/31/2017    HGB 10 1 (L) 12/31/2017    HCT 33 2 (L) 12/31/2017    MCV 92 12/31/2017    PLT 63 (L) 12/31/2017    MCH 27 8 12/31/2017    MCHC 30 4 (L) 12/31/2017    RDW 17 6 (H) 12/31/2017    MPV 10 3 12/31/2017    NRBC 0 12/31/2017   , CMP:   Lab Results   Component Value Date     12/31/2017    K 4 1 12/31/2017     12/31/2017    CO2 32 12/31/2017    ANIONGAP 5 12/31/2017    BUN 9 12/31/2017    CREATININE 0 91 12/31/2017    GLUCOSE 124 12/31/2017    CALCIUM 8 1 (L) 12/31/2017    EGFR 62 12/31/2017     Imaging Studies: I have personally reviewed pertinent reports  and I have personally reviewed pertinent films in PACS  EKG, Pathology, and Other Studies:     Code Status: Level 3 - DNAR and DNI  Advance Directive and Living Will: Yes    Power of :    POLST:      Counseling / Coordination of Care  Total floor / unit time spent today 60 minutes  Greater than 50% of total time was spent with the patient and / or family counseling and / or coordination of care   A description of the counseling / coordination of care: discussed with [facility] and with Oncology

## 2017-12-30 NOTE — H&P
H&P- Kirsty Hunter 1940, 78 y o  female MRN: [id number]    Unit/Bed#: [facility] 606-01 Encounter: [id number]    Primary Care Provider: Luis Rodriguez MD   Date and time admitted to hospital: 12/29/2017  9:51 PM        * Rectal bleeding   Assessment & Plan    NPO, ppi IV  Monitor hemoglobin, transfuse as needed  GI evaluation        Acute pulmonary embolism (HCC)   Assessment & Plan    Continue heparin IV given acute pulmonary and bilateral lower extremity VTE  Will monitor CBC  stop anticoagulation if any significant drop in Hb  Heme-Onc for further management        Acute deep vein thrombosis (DVT) of both lower extremities ([facility] [address] )   Assessment & Plan    See above        Primary pancreatic cancer with metastasis to other site [facility])   Assessment & Plan    Heme-Onc evaluation  Given evidence of disease progression Palliative care needs to be involved          VTE Prophylaxis: Heparin Drip  / sequential compression device   Code Status: DNR/DNI  POLST: There is no POLST form on file for this patient (pre-hospital)      Anticipated Length of Stay:  Patient will be admitted on an Inpatient basis with an anticipated length of stay of  > 2 midnights  Justification for Hospital Stay:  Hematology -oncology, palliative care consult    Total Time for Visit, including Counseling / Coordination of Care: 45 minutes  Greater than 50% of this total time spent on direct patient counseling and coordination of care  Chief Complaint:       History of Present Illness:    Kirsty Hunter is a 78 y o  female who originally presented to [facility] ER on 12/29 with rectal bleeding described as large bright red bowel movement at 9:00 p m  last night and right leg edema  Upon my assessment patient is complaining  on nonproductive cough x 1 week  She denies dizziness shortness of breath palpitation chest pain headache nausea vomiting abdominal pain diarrhea  For her  stage IV pancreatic cancer she follows  with oncologist Dr Krzysztof Cowart  Last Ct urine approximately 3 weeks ago  Further workup in the emergency room revealed bilateral lower extremity DVT  CTA PE studies reported right upper and lower lobe PE  Carcinoma of the pancreatic head with hepatic and left renal metastasis ,portal lymphadenopathy, progressive omental metastasis with moderate amount of ascites  Prior transfer to [facility]  a lengthy discussion with the patient took place regarding risk and benefits of anticoagulation in the setting of rectal bleeding ,given stable hemoglobin patient opted to proceed with anticoagulation  She remains hemodynamically stable  Admitted on an  inpatient basis      Review of Systems:    Review of Systems   Respiratory: Positive for cough  Past Medical and Surgical History:     Past Medical History:   Diagnosis Date    Anemia     Arthritis     Atrial fibrillation ([facility] [address] )     Back pain     Bacteremia     Cancer ([facility] )     Pancreatic    Coronary artery disease     Depression with anxiety     Diabetes mellitus ([facility] )     DJD (degenerative joint disease)     Duodenal ulcer     Encephalopathy     Gait abnormality     Hyperlipidemia     Hypertension     Muscle weakness (generalized)     Non-STEMI (non-ST elevated myocardial infarction) (HCC)     Obstructive sleep apnea     Osteoporosis     Pancreas cancer ([facility] )     Sepsis ([place] [address] )     Sleep apnea     UTI (urinary tract infection)        Past Surgical History:   Procedure Laterality Date    ESOPHAGOGASTRODUODENOSCOPY N/A 8/14/2017    Procedure: ESOPHAGOGASTRODUODENOSCOPY (EGD); Surgeon: Shahbaz Hylton MD;  [facility]; Service: Gastroenterology    JOINT REPLACEMENT Left     hip    KNEE SURGERY Bilateral     [place] INSJ TUNNELED CTR VAD W/SUBQ PORT AGE 5 YR/> N/A 11/8/2017    Procedure: INSERTION VENOUS PORT (PORT-A-CATH);   Surgeon: Jorge Alberto Canales DO;  Location: [facility];  Service: General    REVISION TOTAL HIP ARTHROPLASTY Right        Meds/Allergies:    Prior to Admission medications    Medication Sig Start Date End Date Taking? Authorizing Provider   acetaminophen (TYLENOL) 325 mg tablet Take 2 tablets by mouth every 6 (six) hours as needed for mild pain, headaches or fever 8/28/17   Gabriella Ly MD   albuterol (2 5 mg/3 mL) 0 083 % nebulizer solution Take 2 5 mg by nebulization every 4 (four) hours as needed for wheezing    Historical Provider, MD   ALPRAZolam Eloise Buys) 0 25 mg tablet Take 1 tablet by mouth 3 (three) times a day as needed for anxiety for up to 10 days  Patient taking differently: Take 0 25 mg by mouth every 8 (eight) hours as needed for anxiety   10/23/17 12/5/17  Sanket Webb,    ARTIFICIAL TEAR SOLUTION OP Apply to eye    Historical Provider, MD   aspirin (ECOTRIN LOW STRENGTH) 81 mg EC tablet Take 81 mg by mouth daily    Historical Provider, MD   bisacodyl (BISCOLAX) 10 mg suppository Insert 10 mg into the rectum daily as needed      Historical Provider, MD   bisacodyl (FLEET) 10 MG/30ML ENEM Insert 10 mg into the rectum as needed for constipation    Historical Provider, MD   cyproheptadine (PERIACTIN) 4 mg tablet Take 4 mg by mouth daily at bedtime    Historical Provider, MD   docusate sodium (COLACE) 100 mg capsule Take 100 mg by mouth 2 (two) times a day as needed for constipation      Historical Provider, MD   donepezil (ARICEPT) 5 mg tablet Take 1 tablet by mouth daily at bedtime 12/8/17   Porfirio Kelsey MD   ferrous sulfate 325 (65 Fe) mg tablet Take 325 mg by mouth 2 (two) times a day with meals      Historical Provider, MD   FLUoxetine (PROzac) 20 MG tablet Take 20 mg by mouth daily    Historical Provider, MD   hydrALAZINE (APRESOLINE) 50 mg tablet Take 1 tablet by mouth 3 (three) times a day 12/8/17   Porfirio Kelsey MD   insulin glargine (LANTUS) 100 units/mL subcutaneous injection Inject 30 Units under the skin every morning 12/8/17   Porfirio Kelsey MD   insulin lispro (HumaLOG) 100 units/mL injection Inject 5 Units under the skin 3 (three) times a day before meals 12/8/17   Saida Huff MD   magnesium hydroxide (MILK OF MAGNESIA) 400 mg/5 mL oral suspension Take 30 mL by mouth as needed for constipation    Historical Provider, MD   metoprolol tartrate (LOPRESSOR) 50 mg tablet Take 1 tablet by mouth every 12 (twelve) hours 12/8/17   Saida Huff MD   Multiple Vitamin (MULTIVITAMIN) tablet Take 1 tablet by mouth daily    Historical Provider, MD   Nutritional Supplements (HI-AZEB) LIQD Take 60 mL by mouth 2 (two) times a day    Historical Provider, MD   pantoprazole (PROTONIX) 40 mg tablet Take 1 tablet by mouth 2 (two) times a day before meals 8/28/17   Jacquie Veliz MD   predniSONE 1 mg tablet Take 3 tablets by mouth daily  Patient taking differently: Take 1 mg by mouth 3 (three) times a day   10/24/17   Kia Mcintosh DO     I have reviewed home medications with a medical source (PCP, Pharmacy, other)  Allergies:    Allergies   Allergen Reactions    Amlodipine     Belladonna Alkaloids     Benazepril     Donnatal [Pb-Hyoscy-Atropine-Scopolamine]     Hyoscyamine     Lotrel [Amlodipine Besy-Benazepril Hcl]     Pamelor [Nortriptyline]     Procardia [Nifedipine]     Scopolamine     Simvastatin     Sulfa Antibiotics        Social History:     Marital Status:    Occupation: none  Patient Pre-hospital Living Situation: NH  Patient Pre-hospital Level of Mobility:  Unknown  Patient Pre-hospital Diet Restrictions: reg  Substance Use History:   History   Alcohol Use No     History   Smoking Status    Former Smoker    Types: Cigarettes    Quit date: 9/11/1999   Smokeless Tobacco    Never Used     History   Drug Use No       Family History:    non-contributory    Physical Exam:     Vitals:   Blood Pressure: 151/71 (12/30/17 0008)  Pulse: 58 (12/30/17 0008)  Temperature: 98 2 °F (36 8 °C) (12/30/17 0008)  Temp Source: Oral (12/30/17 0008)  Respirations: 18 (12/30/17 0008)  SpO2: 97 % (12/30/17 0008)    Physical Exam   Constitutional: She appears well-developed  HENT:   Head: Normocephalic  Eyes: Pupils are equal, round, and reactive to light  Neck: Normal range of motion  Cardiovascular: Regular rhythm  Pulmonary/Chest: No respiratory distress  Abdominal: Soft  There is tenderness  Musculoskeletal: She exhibits edema  Neurological: She is alert  Skin: Skin is warm  Psychiatric: She has a normal mood and affect  Additional Data:     Lab Results: I have personally reviewed pertinent reports  Results from last 7 days  Lab Units 12/29/17  2313 12/29/17  1256   WBC Thousand/uL 7 03 7 04   HEMOGLOBIN g/dL 10 4* 11 0*   HEMATOCRIT % 33 7* 35 2   PLATELETS Thousands/uL 67* 84*   LYMPHO PCT %  --  20   MONO PCT MAN %  --  4   EOSINO PCT MANUAL %  --  2       Results from last 7 days  Lab Units 12/29/17  1256   SODIUM mmol/L 138   POTASSIUM mmol/L 4 7   CHLORIDE mmol/L 101   CO2 mmol/L 34*   BUN mg/dL 7   CREATININE mg/dL 1 02   CALCIUM mg/dL 8 2*   TOTAL PROTEIN g/dL 6 4   BILIRUBIN TOTAL mg/dL 0 40   ALK PHOS U/L 174*   ALT U/L 40   AST U/L 68*   GLUCOSE RANDOM mg/dL 207*       Results from last 7 days  Lab Units 12/29/17  2312   INR  1 17*       Imaging: I have personally reviewed pertinent reports  No orders to display         [employer] / [employer] Records Reviewed: Yes     ** Please Note: This note has been constructed using a voice recognition system   **

## 2017-12-30 NOTE — ASSESSMENT & PLAN NOTE
· Presently on heparin drip   Will continue this for now pending plan  · Given prior GI bleed, may need to consider IVC filter

## 2017-12-30 NOTE — CONSULTS
Consultation - [facility]) [facility]  Nina Raya 78 y o  female MRN: [id number]  Unit/Bed#: [facility] 606-01 Encounter: [id number]        Inpatient consult to gastroenterology  Consult performed by: Yarelis Mark  Consult ordered by: Donavan Garcia          Reason for Consult / Principal Problem:  Rectal bleeding    HPI: 82-year-old female with metastatic pancreatic cancer on chemotherapy, coronary artery disease, diabetes mellitus, and atrial fibrillation presenting with rectal bleeding  Patient resides at a nursing home and is a questionable historian  She was brought to [facility] ED yesterday morning after having a large amount of bright red blood per rectum  She believes her stool was brown but she saw red blood around the stool  She did not see any blood clots or black tarry looking stool  She does not take blood thinners  She attributes the bleeding to history of internal and external hemorrhoids  She also does report history of diverticulosis  She has had intermittent rectal bleeding for nearly 20 years  She reported some epigastric aching pain for the past day or two but this has resolved  She had some nausea last evening but no vomiting  This has also also resolved  No fevers or chills  Of note, she also complained of lower extremity swelling and cough  CTA chest abdomen pelvis showed right upper lobe and lower lobe pulmonary emboli along with carcinoma of the pancreatic head with partial encasement of the portal vein and isai hepatis lymphadenopathy with hepatic and renal mets and progressive omental metastases with moderate ascites  Lower limb duplex study showed bilateral DVTs  She was started on a heparin drip  Her last EGD was August 2017 for FOBT positive stool  EGD showed a 1-2 cm ulcerated nodule in the duodenal bulb with mildly nodular mucosa in the major papilla  Biopsies showed adenocarcinoma-extension of pancreatic cancer      She believes her last colonoscopy was 2-3 years ago with Dr Ana Laura Cross, a gastroenterologist from [place], [place]  He apparently told her that he needed to monitor something, and she should return in 1 year  She did not have a repeat colonoscopy as she could not get a ride to the procedure  REVIEW OF SYSTEMS:    CONSTITUTIONAL: Denies any fever, chills  Good appetite, and no recent weight loss  HEENT: No earache or tinnitus  Denies hearing loss or visual disturbances  CARDIOVASCULAR: No chest pain or palpitations  RESPIRATORY: + Cough  No shortness of breath  GASTROINTESTINAL: As noted in the History of Present Illness  GENITOURINARY: No problems with urination  Denies any hematuria or dysuria  NEUROLOGIC: No dizziness or vertigo, denies headaches  MUSCULOSKELETAL: Denies any muscle or joint pain  + LE swelling  SKIN: Denies skin rashes or itching  ENDOCRINE: Denies excessive thirst  Denies intolerance to heat or cold  PSYCHOSOCIAL: Denies depression or anxiety  Denies any recent memory loss  Historical Information   Past Medical History:   Diagnosis Date    Anemia     Arthritis     Atrial fibrillation ([facility] )     Back pain     Bacteremia     Cancer ([place] [address] )     Pancreatic    Coronary artery disease     Depression with anxiety     Diabetes mellitus (HCC)     DJD (degenerative joint disease)     Duodenal ulcer     Encephalopathy     Gait abnormality     Hyperlipidemia     Hypertension     Muscle weakness (generalized)     Non-STEMI (non-ST elevated myocardial infarction) (HCC)     Obstructive sleep apnea     Osteoporosis     Pancreas cancer ([facility] )     Sepsis ([facility] )     Sleep apnea     UTI (urinary tract infection)      Past Surgical History:   Procedure Laterality Date    ESOPHAGOGASTRODUODENOSCOPY N/A 8/14/2017    Procedure: ESOPHAGOGASTRODUODENOSCOPY (EGD); Surgeon: Belkis Leo MD;  [facility];   Service: Gastroenterology    JOINT REPLACEMENT Left     hip    KNEE SURGERY Bilateral     [place] INSJ TUNNELED CTR VAD W/SUBQ PORT AGE 5 YR/> N/A 11/8/2017    Procedure: INSERTION VENOUS PORT (PORT-A-CATH);   Surgeon: Glenroy Chong DO;  [facility]: [facility];  Service: General    REVISION TOTAL HIP ARTHROPLASTY Right      Social History   History   Alcohol Use No     History   Drug Use No     History   Smoking Status    Former Smoker    Types: Cigarettes    Quit date: 9/11/1999   Smokeless Tobacco    Never Used     Family History   Problem Relation Age of Onset    Family history unknown: Yes       Meds/Allergies     Prescriptions Prior to Admission   Medication    acetaminophen (TYLENOL) 325 mg tablet    albuterol (2 5 mg/3 mL) 0 083 % nebulizer solution    ALPRAZolam (XANAX) 0 25 mg tablet    ARTIFICIAL TEAR SOLUTION OP    aspirin (ECOTRIN LOW STRENGTH) 81 mg EC tablet    bisacodyl (BISCOLAX) 10 mg suppository    bisacodyl (FLEET) 10 MG/30ML ENEM    cyproheptadine (PERIACTIN) 4 mg tablet    docusate sodium (COLACE) 100 mg capsule    donepezil (ARICEPT) 5 mg tablet    ferrous sulfate 325 (65 Fe) mg tablet    FLUoxetine (PROzac) 20 MG tablet    hydrALAZINE (APRESOLINE) 50 mg tablet    insulin glargine (LANTUS) 100 units/mL subcutaneous injection    insulin lispro (HumaLOG) 100 units/mL injection    magnesium hydroxide (MILK OF MAGNESIA) 400 mg/5 mL oral suspension    metoprolol tartrate (LOPRESSOR) 50 mg tablet    Multiple Vitamin (MULTIVITAMIN) tablet    Nutritional Supplements (HI-AZEB) LIQD    pantoprazole (PROTONIX) 40 mg tablet    predniSONE 1 mg tablet     Current Facility-Administered Medications   Medication Dose Route Frequency    albuterol inhalation solution 2 5 mg  2 5 mg Nebulization Q4H PRN    ALPRAZolam (XANAX) tablet 0 25 mg  0 25 mg Oral Q8H PRN    artificial tear (LUBRIFRESH P M ) ophthalmic ointment   Both Eyes HS    aspirin (ECOTRIN LOW STRENGTH) EC tablet 81 mg  81 mg Oral Daily    cyproheptadine (PERIACTIN) tablet 4 mg  4 mg Oral HS    docusate sodium (COLACE) capsule 100 mg  100 mg Oral BID    donepezil (ARICEPT) tablet 5 mg  5 mg Oral HS    ferrous sulfate tablet 325 mg  325 mg Oral BID With Meals    FLUoxetine (PROzac) capsule 20 mg  20 mg Oral Daily    heparin (porcine) 25,000 units in 250 mL infusion (premix)  3-30 Units/kg/hr (Order-Specific) Intravenous Titrated    hydrALAZINE (APRESOLINE) tablet 50 mg  50 mg Oral TID    insulin lispro (HumaLOG) 100 units/mL subcutaneous injection 1-6 Units  1-6 Units Subcutaneous Q6H [facility]    metoprolol tartrate (LOPRESSOR) tablet 50 mg  50 mg Oral Q12H [facility]    ondansetron (ZOFRAN) injection 4 mg  4 mg Intravenous Q6H PRN    pantoprazole (PROTONIX) injection 40 mg  40 mg Intravenous Q12H [facility]IRA    predniSONE tablet 1 mg  1 mg Oral TID    senna (SENOKOT) tablet 8 6 mg  1 tablet Oral Daily       Allergies   Allergen Reactions    Amlodipine     Belladonna Alkaloids     Benazepril     Donnatal [Pb-Hyoscy-Atropine-Scopolamine]     Hyoscyamine     Lotrel [Amlodipine Besy-Benazepril Hcl]     Pamelor [Nortriptyline]     Procardia [Nifedipine]     Scopolamine     Simvastatin     Sulfa Antibiotics            Objective     Blood pressure 136/70, pulse 57, temperature 98 5 °F (36 9 °C), temperature source Oral, resp  rate 18, SpO2 98 %, not currently breastfeeding  No intake or output data in the 24 hours ending 12/30/17 1120      PHYSICAL EXAM:      General Appearance:   Alert, elderly female, cooperative, no distress, appears stated age    HEENT:   Normocephalic, atraumatic, anicteric      Neck:  Supple, symmetrical, trachea midline, no adenopathy   Lungs:   Clear to auscultation bilaterally    Heart[de-identified]   S1 and S2 normal; regular rate and rhythm; no murmur, rub, or gallop  Abdomen:   Obese  Normal bowel sounds  Soft  Mild epigastric tenderness to palpation   No rebound or guarding      Genitalia:   Deferred    Rectal:   Deferred    Extremities:  No cyanosis, clubbing or edema    Pulses:  2+ and symmetric all extremities    Skin:  Skin color, texture, turgor normal, no rashes or lesions    Lymph nodes:  No palpable cervical lymphadenopathy        Lab Results:     Results from last 7 days  Lab Units 12/30/17  0531  12/29/17  1256   WBC Thousand/uL 7 05  < > 7 04   HEMOGLOBIN g/dL 10 3*  < > 11 0*   HEMATOCRIT % 33 5*  < > 35 2   PLATELETS Thousands/uL 65*  < > 84*   LYMPHO PCT %  --   --  20   MONO PCT MAN %  --   --  4   EOSINO PCT MANUAL %  --   --  2   < > = values in this interval not displayed  Results from last 7 days  Lab Units 12/30/17  0531 12/29/17  1256   SODIUM mmol/L 139 138   POTASSIUM mmol/L 3 9 4 7   CHLORIDE mmol/L 103 101   CO2 mmol/L 32 34*   BUN mg/dL 9 7   CREATININE mg/dL 0 84 1 02   CALCIUM mg/dL 8 2* 8 2*   TOTAL PROTEIN g/dL  --  6 4   BILIRUBIN TOTAL mg/dL  --  0 40   ALK PHOS U/L  --  174*   ALT U/L  --  40   AST U/L  --  68*   GLUCOSE RANDOM mg/dL 124 207*       Results from last 7 days  Lab Units 12/29/17  2312   INR  1 17*           Imaging Studies: I have personally reviewed pertinent imaging studies  Cta Chest Ct Abdomen Pelvis W Contrast    Result Date: 12/29/2017  Impression: 1  Right upper lobe and lower lobe pulmonary emboli  2    RV/LV ratio of 0 9  3   Carcinoma of the pancreatic head with partial encasement of the portal vein and isai hepatis lymphadenopathy  4   Hepatic and left renal metastases  5   Progressive omental metastases with moderate amount of ascites developing since 12/5/2017  I have discussed my findings with [facility] ED physician, RADHA Carolina  Workstation performed: [id number]       ASSESSMENT and PLAN:      Discussed with SLIM    14-year-old female with metastatic pancreatic cancer on chemotherapy and atrial fibrillation not on anticoagulation presenting with rectal bleeding found to have acute PE and bilateral DVTs  1) Rectal bleeding: She reports intermittent rectal bleeding for over 20 years   She apparently passed a large amount of bright red blood per rectum yesterday  Upon presentation hemoglobin 11 0, down from baseline of approximately 12  Hemoglobin has been stable since at 10 3  No further episodes of overt bleeding in the hospital  Nursing reported a normal brown bowel movement this morning  She has been started on a heparin drip for acute PE and DVT  Last colonoscopy was 2-3 years ago and patient states she is overdue for repeat scope     -Okay to continue heparin drip   -Hold off on colonoscopy for now given acute PE/DVT and lack of overt bleeding   -If patient develops bleeding, we could perform urgent colonoscopy  -Patient with poor overall prognosis given progression of metastatic disease on CT scan  -Agree with Palliative Care and Oncology consultation     2) Acute PE and bilateral DVTs: Identified on CTA chest and duplex ultrasound yesterday    -Continue heparin drip   -Hold heparin drip if evidence of overt bleeding    Patient was seen and examined by Dr Patricio Ruelas  All taveras medical decisions were made by Dr Patricio Ruelas  Thank you for allowing us to participate in the care of this present patient  We will follow-up with you closely

## 2017-12-30 NOTE — PROGRESS NOTES
Progress Note - Sindy Pendleton 1940, 78 y o  female MRN: [id number]    Unit/Bed#: [place] 606-01 Encounter: [id number]    Primary Care Provider: Mario Peña MD   Date and time admitted to hospital: 12/29/2017  9:51 PM    Addendum: Discussed with Dr Alexandrea Orourke, heme/onc  Whether or not patient is able to be on long-term AC, she would still benefit from IVC filter  Will place IR consult  Colonoscopy would still be preferable to determine risk of re-bleeding to decide whether patient could be anticoagulated as well  Coumadin would be preferred agent for reversal     * Rectal bleeding   Assessment & Plan    · Seems to have resolved  · Hgb stable  · Clear liquid diet  · Although bleeding has stopped, given need for long-term anticoagulation, may need to determine source of bleeding to determine her risk for re-bleeding  · Discussed with GI PA  To consider colonoscopy  Await recs from attending  · Prior EGD showed ulcerated nodule in duodenal bulb  Continue PPI  Primary pancreatic cancer with metastasis to other site [facility])   Assessment & Plan    · Stage IV pancreatic cancer  · Follows with Dr Van Schuster  Currently on chemo  · Progressive on imaging  Per daughter, discussion with Dr Van Schuster from last visit was that cancer seemed relatively stable  Daughter says she tolerates chemo well without significant side effects and so she continues on chemotherapy  · Discussed with palliative  · Heme/onc consult pending          Acute pulmonary embolism ([address] )   Assessment & Plan    · Presently on heparin drip  Will continue this for now pending plan  · Given prior GI bleed, may need to consider IVC filter        Dementia   Assessment & Plan    · Recently diagnosed dementia  On Aricept  · POA: Daughter, Nighat Osuna, and son, Roseann Dykes   Help patient to make decisions  · Lives at [address]        Diabetes [facility])   Assessment & Plan    · Blood sugars stable  · Continue sliding scale insulin until diet advanced        Acute deep vein thrombosis (DVT) of both lower extremities (HCC)   Assessment & Plan    · Continue on heparin drip        RA (rheumatoid arthritis) ([address] )   Assessment & Plan    · On chronic prednisone        A-fib (HCC)   Assessment & Plan    · Was previously on anticoagulation but this was stopped due to prior GI bleed  · Continue on BB          VTE Pharmacologic Prophylaxis:   Pharmacologic: Heparin Drip  Mechanical VTE Prophylaxis in Place: Yes    Patient Centered Rounds: I have performed bedside rounds with nursing staff today  Discussions with Specialists or Other Care Team Provider: Discussed with palliative, GI  Education and Discussions with Family / Patient: Patient and daughter, Kaitlin Castellon  Time Spent for Care: 45 minutes  More than 50% of total time spent on counseling and coordination of care as described above  Current Length of Stay: 1 day(s)    Current Patient Status: Inpatient   Certification Statement: The patient will continue to require additional inpatient hospital stay due to GI bleed, acute PE  Discharge Plan: TBD    Code Status: Level 3 - DNAR and DNI      Subjective:   No more rectal bleeding  Nursing staff said she had normal BM this morning  Patient is hungry  Denies SOB  Objective:     Vitals:   Temp (24hrs), Av 1 °F (36 7 °C), Min:97 7 °F (36 5 °C), Max:98 5 °F (36 9 °C)    HR:  [51-80] 57  Resp:  [18-38] 18  BP: (136-161)/(66-71) 136/70  SpO2:  [97 %-99 %] 98 %  There is no height or weight on file to calculate BMI  Input and Output Summary (last 24 hours):     No intake or output data in the 24 hours ending 17 1139    Physical Exam:     Physical Exam   Constitutional: No distress  HENT:   Head: Normocephalic and atraumatic  Eyes: No scleral icterus  Neck: Normal range of motion  Neck supple  Cardiovascular: Normal rate, regular rhythm and normal heart sounds  Pulmonary/Chest: Effort normal and breath sounds normal  No respiratory distress   She has no wheezes  She has no rales  Abdominal: Soft  Bowel sounds are normal  She exhibits no distension  There is no tenderness  There is no rebound and no guarding  Musculoskeletal: She exhibits no edema  Neurological: She is alert  Forgetful   Skin: Skin is warm and dry  She is not diaphoretic  Psychiatric: She has a normal mood and affect  Her behavior is normal  Thought content normal        Additional Data:     Labs:      Results from last 7 days  Lab Units 12/30/17  0531  12/29/17  1256   WBC Thousand/uL 7 05  < > 7 04   HEMOGLOBIN g/dL 10 3*  < > 11 0*   HEMATOCRIT % 33 5*  < > 35 2   PLATELETS Thousands/uL 65*  < > 84*   LYMPHO PCT %  --   --  20   MONO PCT MAN %  --   --  4   EOSINO PCT MANUAL %  --   --  2   < > = values in this interval not displayed  Results from last 7 days  Lab Units 12/30/17  0531 12/29/17  1256   SODIUM mmol/L 139 138   POTASSIUM mmol/L 3 9 4 7   CHLORIDE mmol/L 103 101   CO2 mmol/L 32 34*   BUN mg/dL 9 7   CREATININE mg/dL 0 84 1 02   CALCIUM mg/dL 8 2* 8 2*   TOTAL PROTEIN g/dL  --  6 4   BILIRUBIN TOTAL mg/dL  --  0 40   ALK PHOS U/L  --  174*   ALT U/L  --  40   AST U/L  --  68*   GLUCOSE RANDOM mg/dL 124 207*       Results from last 7 days  Lab Units 12/29/17  2312   INR  1 17*       * I Have Reviewed All Lab Data Listed Above  * Additional Pertinent Lab Tests Reviewed:  All Labs Within Last 24 Hours Reviewed    Imaging:    Imaging Reports Reviewed Today Include: CTA  Imaging Personally Reviewed by Myself Includes:  None    Recent Cultures (last 7 days):           Last 24 Hours Medication List:     artificial tear  Both Eyes HS   aspirin 81 mg Oral Daily   cyproheptadine 4 mg Oral HS   docusate sodium 100 mg Oral BID   donepezil 5 mg Oral HS   ferrous sulfate 325 mg Oral BID With Meals   FLUoxetine 20 mg Oral Daily   hydrALAZINE 50 mg Oral TID   insulin lispro 1-6 Units Subcutaneous Q6H [address]   metoprolol tartrate 50 mg Oral Q12H SHIRA   pantoprazole 40 mg Intravenous Q12H [address] predniSONE 1 mg Oral TID   senna 1 tablet Oral Daily        Today, Patient Was Seen By: Suzanne Pop PA-C    ** Please Note: Dragon 360 Dictation voice to text software may have been used in the creation of this document   **

## 2017-12-30 NOTE — ED NOTES
Pt has no complaints at this time  Call bell in reach  No s/s of bleeding or SOB  Side rails up x2   Heparin running in right [facility]       Delfina Yan RN  12/29/17 1911

## 2017-12-30 NOTE — ED NOTES
Talked to Marisol from [facility]  Gave report and update on pts condition and transfer to [facility]  Family notified as well          Arleth Seals RN  12/29/17 5107

## 2017-12-30 NOTE — CONSULTS
Oncology Consult Note  Cyndi Nj 78 y o  female MRN: [id number]  Unit/Bed#: [facility] 606-01 Encounter: [id number]      Presenting Complaint:  Pancreatic cancer with DVT and PE    History of Presenting Illness: The patient is a pleasant 53-year-old female with metastatic pancreatic cancer to liver currently on gemcitabine and oxaliplatin under the care of Dr Guillermo Yuan min  She presented to the hospital with rectal bleeding  She was on Coumadin previously but apparently because of rectal bleeding this had been held  Workup revealed DVT and PE  She was started on heparin drip and transferred to [address] [place] [address] in [place]  Here she is doing well  She is stable  Her hemoglobin is stable on the heparin drip  As far symptoms or concerns she is upset that she is in the hospital   Denies any nausea denies any vomiting denies any diarrhea  States she does have a history of hemorrhoidal bleeding  She is alert and oriented to person and place  I suspect she has mild dementia  The rest of her 14 point review of systems today was negative  Review of Systems - As stated in the HPI otherwise the fourteen point review of systems was negative      Past Medical History:   Diagnosis Date    Anemia     Arthritis     Atrial fibrillation (HCC)     Back pain     Bacteremia     Cancer ([place] [id number] )     Pancreatic    Coronary artery disease     Depression with anxiety     Diabetes mellitus ([place] [address] )     DJD (degenerative joint disease)     Duodenal ulcer     Encephalopathy     Gait abnormality     Hyperlipidemia     Hypertension     Muscle weakness (generalized)     Non-STEMI (non-ST elevated myocardial infarction) (HCC)     Obstructive sleep apnea     Osteoporosis     Pancreas cancer ([place] [address] )     Sepsis ([place] [address] )     Sleep apnea     UTI (urinary tract infection)        Social History     Social History    Marital status:      Spouse name: N/A    Number of children: N/A    Years of education: N/A     Social History Main Topics    Smoking status: Former Smoker     Types: Cigarettes     Quit date: 9/11/1999    Smokeless tobacco: Never Used    Alcohol use No    Drug use: No    Sexual activity: No     Other Topics Concern    None     Social History Narrative    None       Family History   Problem Relation Age of Onset    Family history unknown:  Yes       Allergies   Allergen Reactions    Amlodipine     Belladonna Alkaloids     Benazepril     Donnatal [Pb-Hyoscy-Atropine-Scopolamine]     Hyoscyamine     Lotrel [Amlodipine Besy-Benazepril Hcl]     Pamelor [Nortriptyline]     Procardia [Nifedipine]     Scopolamine     Simvastatin     Sulfa Antibiotics          Current Facility-Administered Medications:     albuterol inhalation solution 2 5 mg, 2 5 mg, Nebulization, Q4H PRN, Shahid Deleon MD    ALPRAZolam [facility]) tablet 0 25 mg, 0 25 mg, Oral, Q8H PRN, Shahid Deleon MD, 0 25 mg at 12/30/17 0930    artificial tear (LUBRIFRESH P M ) ophthalmic ointment, , Both Eyes, HS, Shahid Deleon MD    aspirin (ECOTRIN LOW STRENGTH) EC tablet 81 mg, 81 mg, Oral, Daily, Shahid Deleon MD    cyproheptadine (PERIACTIN) tablet 4 mg, 4 mg, Oral, HS, Shahid Deleon MD    docusate sodium (COLACE) capsule 100 mg, 100 mg, Oral, BID, Shahid Deleon MD    donepezil (ARICEPT) tablet 5 mg, 5 mg, Oral, HS, Shahid Deleon MD, 5 mg at 12/29/17 2325    ferrous sulfate tablet 325 mg, 325 mg, Oral, BID With Meals, Shahid Deleon MD, 325 mg at 12/30/17 0930    FLUoxetine (PROzac) capsule 20 mg, 20 mg, Oral, Daily, Shahid Deleon MD, 20 mg at 12/30/17 0930    heparin (porcine) 25,000 units in 250 mL infusion (premix), 3-30 Units/kg/hr (Order-Specific), Intravenous, Titrated, Shahid Deleon MD, Stopped at 12/30/17 8657    hydrALAZINE (APRESOLINE) tablet 50 mg, 50 mg, Oral, TID, Shahid Deleon MD, 50 mg at 12/30/17 0930    insulin lispro (HumaLOG) 100 units/mL subcutaneous injection 1-6 Units, 1-6 Units, Subcutaneous, Q6H [address] **AND** Fingerstick Glucose (POCT), , , Q6H, Alex Aguilar MD    metoprolol tartrate (LOPRESSOR) tablet 50 mg, 50 mg, Oral, Q12H [address], Alex Aguilar MD, 50 mg at 12/29/17 2325    ondansetron [facility]) injection 4 mg, 4 mg, Intravenous, Q6H PRN, Alex Aguilar MD, 4 mg at 12/30/17 0255    pantoprazole (PROTONIX) injection 40 mg, 40 mg, Intravenous, Q12H [address], Alex Aguilar MD, 40 mg at 12/30/17 0931    predniSONE tablet 1 mg, 1 mg, Oral, TID, Alex Aguilar MD, 1 mg at 12/30/17 0930    senna (SENOKOT) tablet 8 6 mg, 1 tablet, Oral, Daily, Alex Aguilar MD      /70   Pulse 57   Temp 98 5 °F (36 9 °C) (Oral)   Resp 18   SpO2 98%     General Appearance:    Alert, oriented        Eyes:    PERRL   Ears:    Normal external ear canals, both ears   Nose:   Nares normal, septum midline   Throat:   Mucosa moist  Pharynx without injection  Neck:   Supple       Lungs:     Clear to auscultation bilaterally   Chest Wall:    No tenderness or deformity    Heart:    Regular rate and rhythm       Abdomen:     Soft, non-tender, bowel sounds +, no organomegaly           Extremities:   Extremities no cyanosis or edema       Skin:   no rash or icterus      Lymph nodes:   Cervical, supraclavicular, and axillary nodes normal   Neurologic:   CNII-XII intact, normal strength, sensation and reflexes     Throughout               Recent Results (from the past 48 hour(s))   CBC and differential    Collection Time: 12/29/17 12:56 PM   Result Value Ref Range    WBC 7 04 4 31 - 10 16 Thousand/uL    RBC 3 87 3 81 - 5 12 Million/uL    Hemoglobin 11 0 (L) 11 5 - 15 4 g/dL    Hematocrit 35 2 34 8 - 46 1 %    MCV 91 82 - 98 fL    MCH 28 4 26 8 - 34 3 pg    MCHC 31 3 (L) 31 4 - 37 4 g/dL    RDW 17 1 (H) 11 6 - 15 1 %    MPV 11 4 8 9 - 12 7 fL    Platelets 84 (L) 774 - 390 Thousands/uL   Protime-INR    Collection Time: 12/29/17 12:56 PM   Result Value Ref Range    Protime 14 3 12 1 - 14 4 seconds INR 1 12 0 86 - 1 16   APTT    Collection Time: 12/29/17 12:56 PM   Result Value Ref Range    PTT 26 23 - 35 seconds   Comprehensive metabolic panel    Collection Time: 12/29/17 12:56 PM   Result Value Ref Range    Sodium 138 136 - 145 mmol/L    Potassium 4 7 3 5 - 5 3 mmol/L    Chloride 101 100 - 108 mmol/L    CO2 34 (H) 21 - 32 mmol/L    Anion Gap 3 (L) 4 - 13 mmol/L    BUN 7 5 - 25 mg/dL    Creatinine 1 02 0 60 - 1 30 mg/dL    Glucose 207 (H) 65 - 140 mg/dL    Calcium 8 2 (L) 8 3 - 10 1 mg/dL    AST 68 (H) 5 - 45 U/L    ALT 40 12 - 78 U/L    Alkaline Phosphatase 174 (H) 46 - 116 U/L    Total Protein 6 4 6 4 - 8 2 g/dL    Albumin 2 1 (L) 3 5 - 5 0 g/dL    Total Bilirubin 0 40 0 20 - 1 00 mg/dL    eGFR 54 ml/min/1 73sq m   Lactic acid, plasma    Collection Time: 12/29/17 12:56 PM   Result Value Ref Range    LACTIC ACID 1 6 0 5 - 2 0 mmol/L   Manual Differential(PHLEBS Do Not Order)    Collection Time: 12/29/17 12:56 PM   Result Value Ref Range    Segmented % 68 43 - 75 %    Bands % 1 0 - 8 %    Lymphocytes % 20 14 - 44 %    Monocytes % 4 4 - 12 %    Eosinophils % 2 0 - 6 %    Basophils % 0 0 - 1 %    Atypical Lymphocytes % 5 (H) <=0 %    Absolute Neutrophils 4 86 1 85 - 7 62 Thousand/uL    Lymphocytes Absolute 1 41 0 60 - 4 47 Thousand/uL    Monocytes Absolute 0 28 0 00 - 1 22 Thousand/uL    Eosinophils Absolute 0 14 0 00 - 0 40 Thousand/uL    Basophils Absolute 0 00 0 00 - 0 10 Thousand/uL    Total Counted 100     Hypersegmented Neutrophils Present     RBC Morphology Present     Anisocytosis Present     [place] Cells Present     Polychromasia Present     Rouleaux Present     Schistocytes Present     Tear Drop Cells Present     Platelet Estimate Decreased (A) Adequate   Fingerstick Glucose (POCT)    Collection Time: 12/29/17  4:33 PM   Result Value Ref Range    POC Glucose 107 65 - 140 mg/dl   APTT    Collection Time: 12/29/17 11:12 PM   Result Value Ref Range    PTT 31 23 - 35 seconds   Protime-INR    Collection Time: 12/29/17 11:12 PM   Result Value Ref Range    Protime 15 0 (H) 12 1 - 14 4 seconds    INR 1 17 (H) 0 86 - 1 16   CBC (With Platelets)    Collection Time: 12/29/17 11:13 PM   Result Value Ref Range    WBC 7 03 4 31 - 10 16 Thousand/uL    RBC 3 72 (L) 3 81 - 5 12 Million/uL    Hemoglobin 10 4 (L) 11 5 - 15 4 g/dL    Hematocrit 33 7 (L) 34 8 - 46 1 %    MCV 91 82 - 98 fL    MCH 28 0 26 8 - 34 3 pg    MCHC 30 9 (L) 31 4 - 37 4 g/dL    RDW 17 2 (H) 11 6 - 15 1 %    Platelets 67 (L) 985 - 390 Thousands/uL    MPV 10 6 8 9 - 12 7 fL   Fingerstick Glucose (POCT)    Collection Time: 12/30/17  1:07 AM   Result Value Ref Range    POC Glucose 129 65 - 140 mg/dl   APTT    Collection Time: 12/30/17  5:31 AM   Result Value Ref Range     (HH) 23 - 35 seconds   Basic metabolic panel    Collection Time: 12/30/17  5:31 AM   Result Value Ref Range    Sodium 139 136 - 145 mmol/L    Potassium 3 9 3 5 - 5 3 mmol/L    Chloride 103 100 - 108 mmol/L    CO2 32 21 - 32 mmol/L    Anion Gap 4 4 - 13 mmol/L    BUN 9 5 - 25 mg/dL    Creatinine 0 84 0 60 - 1 30 mg/dL    Glucose 124 65 - 140 mg/dL    Calcium 8 2 (L) 8 3 - 10 1 mg/dL    eGFR 68 ml/min/1 73sq m   CBC (With Platelets)    Collection Time: 12/30/17  5:31 AM   Result Value Ref Range    WBC 7 05 4 31 - 10 16 Thousand/uL    RBC 3 67 (L) 3 81 - 5 12 Million/uL    Hemoglobin 10 3 (L) 11 5 - 15 4 g/dL    Hematocrit 33 5 (L) 34 8 - 46 1 %    MCV 91 82 - 98 fL    MCH 28 1 26 8 - 34 3 pg    MCHC 30 7 (L) 31 4 - 37 4 g/dL    RDW 17 4 (H) 11 6 - 15 1 %    Platelets 65 (L) 294 - 390 Thousands/uL    MPV 11 0 8 9 - 12 7 fL   Fingerstick Glucose (POCT)    Collection Time: 12/30/17  6:51 AM   Result Value Ref Range    POC Glucose 104 65 - 140 mg/dl   Fingerstick Glucose (POCT)    Collection Time: 12/30/17 12:31 PM   Result Value Ref Range    POC Glucose 139 65 - 140 mg/dl         X-ray Chest 2 Views    Result Date: 12/5/2017  Narrative: CHEST INDICATION: Headache, cough, vomiting COMPARISON: November 8, 2017 VIEWS:  AP semierect and lateral IMAGES:  2 FINDINGS: The patient is rotated to the left  Submaximal inspiration  The cardiomediastinal silhouette is unchanged  Power port overlies right chest wall with catheter tip at the atrial caval junction  Small left pleural effusion, similar to prior  No developing pulmonary consolidation or pneumothorax  Bony thorax unremarkable  Severe degenerative changes, right glenohumeral joint  Impression: Small left pleural effusion    Workstation performed: [id number]     Ct Head Without Contrast    Result Date: 12/5/2017  Narrative: CT BRAIN - WITHOUT CONTRAST INDICATION:  Headache COMPARISON:  October 17, 2017 TECHNIQUE:  CT examination of the brain was performed  In addition to axial images, coronal reformatted images were created and submitted for interpretation  Radiation dose length product (DLP) for this visit:  2024 77 mGy-cm   This examination, like all CT scans performed in the [facility], was performed utilizing techniques to minimize radiation dose exposure, including the use of iterative reconstruction and automated exposure control  IMAGE QUALITY:  Diagnostic  FINDINGS:  PARENCHYMA:  Decreased attenuation is noted in the supratentorial white matter demonstrating an appearance most consistent with severe microangiopathic change  No intracranial mass, mass effect or midline shift  Old appearing lacunar infarcts in the left thalamus and bilateral anterior limb internal capsules and left subinsular region     There is no parenchymal hemorrhage  VENTRICLES AND EXTRA-AXIAL SPACES:  Enlargement of ventricles and extra-axial CSF spaces consistent with cerebral and cerebellar atrophy  VISUALIZED ORBITS AND PARANASAL SINUSES:  Unremarkable  CALVARIUM AND EXTRACRANIAL SOFT TISSUES:  Unchanged partially calcified scalp nodules/lesions  Impression: No acute intracranial abnormality  Microangiopathic changes   Workstation performed: [id number]     Cta Chest Ct Abdomen Pelvis W Contrast    Result Date: 12/29/2017  Narrative: CT PULMONARY ANGIOGRAM OF THE CHEST AND CT ABDOMEN AND PELVIS WITH INTRAVENOUS CONTRAST INDICATION:  68-year-old woman with bright red blood per rectum  History of metastatic pancreatic carcinoma  Bilateral lower extremity DVTs  COMPARISON: CTs of the abdomen and pelvis from December 5, 2017 and August 18, 2017  TECHNIQUE:  CT examination of the chest, abdomen and pelvis was performed  Thin section CT angiographic technique was used in the chest in order to evaluate for pulmonary embolus and coronal 3D MIP postprocessing was performed on the acquisition scanner  Reformatted images were created in axial, sagittal, and coronal planes  Radiation dose length product (DLP) for this visit:  1956 4 mGy-cm   This examination, like all CT scans performed in the [facility], was performed utilizing techniques to minimize radiation dose exposure, including the use of iterative  reconstruction and automated exposure control  IV Contrast:  100 mL of iohexol (OMNIPAQUE)         Enteric Contrast:  Enteric contrast was not administered  FINDINGS: CHEST PULMONARY ARTERIAL TREE:  Multiple pulmonary emboli in the right upper and lower lobes no central pulmonary embolus  Main pulmonary artery normal in caliber  LUNGS:  Subsegmental atelectasis in the base of the left lower lobe  PLEURA:  Small left pleural effusion  No pneumothorax  HEART/AORTA:  Cardiomegaly  No abnormal right heart dilatation  Interventricular septum normal in shape and location  RV/LV ratio of 0 9  Coronary artery calcifications  Aorta normal in caliber  No thoracic aortic dissection  MEDIASTINUM AND REJI: No lymphadenopathy or mass  Esophagus unremarkable  Trachea and main stem bronchi normal  CHEST WALL AND LOWER NECK:       Normal  ABDOMEN LIVER/BILIARY TREE:  Multiple liver metastases, unchanged since the earlier CT    No intrahepatic or extrahepatic bile duct dilatation  GALLBLADDER:  No calcified gallstones  No pericholecystic inflammatory change  SPLEEN:  Enlarged, measuring 13 cm in length  PANCREAS:  Mass in the pancreatic head, approximately 6 cm in diameter  Atrophy of the body and tail the pancreas with dilatation of the main pancreatic duct  Encasement of a portion of the portal vein by the pancreatic mass  ADRENAL GLANDS: Right adrenal gland normal   0 9 x 1 3 cm low-attenuation mass in the left adrenal gland, gradually enlarging since August, most likely metastasis  KIDNEYS/URETERS:  Small renal cysts  No calculus, hydronephrosis or ureterectasis  STOMACH AND BOWEL:  Stomach and small and unremarkable  Colonic diverticulosis without evidence of diverticulitis  APPENDIX:  No findings to suggest appendicitis  ABDOMINOPELVIC CAVITY: Several enlarged partially necrotic isai hepatis lymph nodes  Multiple omental metastases  Moderate amount of ascites developing since the earlier CT  No extraluminal gas  VESSELS:  Portal vein partially encased by pancreatic tumor as described above  Aorta normal in caliber  PELVIS REPRODUCTIVE ORGANS:  Atrophic uterus with several calcified fibroids  No evidence of adnexal mass  URINARY BLADDER:  Unremarkable  ABDOMINAL WALL/INGUINAL REGIONS:  Small umbilical hernia  OSSEOUS STRUCTURES:  Right hip prosthesis  Degenerative disease in the spine  No recent fracture or destructive lesion  Impression: 1  Right upper lobe and lower lobe pulmonary emboli  2    RV/LV ratio of 0 9  3   Carcinoma of the pancreatic head with partial encasement of the portal vein and isai hepatis lymphadenopathy  4   Hepatic and left renal metastases  5   Progressive omental metastases with moderate amount of ascites developing since 12/5/2017  I have discussed my findings with [facility] ED physician, RADHA Mcarthur   Workstation performed: [id number]     Vas Lower Limb Venous Duplex Study, Complete Bilateral    Result Date: 12/29/2017  Narrative:  [facility] REPORT CLINICAL: Indications: Patient states right leg swelling after chemo treatment last week  She is on coumadin for Afib  She is undergoing chemotherapy treatments for pancreatic cancer  FINDINGS:  Segment    Right                   Left                  Impression              Impression              CFV        Normal (Patent)         Normal (Patent)         Popliteal  Non Occlusive Thrombus  Non Occlusive Thrombus  Peroneal   Occlusive Subsegmental  Occlusive Subsegmental     CONCLUSION:  Impression: RIGHT LOWER LIMB: There is evidence of acute non- occlusive deep vein thrombosis in the popliteal vein and occlusive thrombosis in the peroneal veins  No evidence of superficial thrombophlebitis noted  Doppler evaluation shows a normal response to augmentation maneuvers  Popliteal, posterior tibial and anterior tibial arterial Doppler waveforms are triphasic  LEFT LOWER LIMB: There is evidence of acute non- occlusive deep vein thrombosis in the popliteal vein and occlusive thrombosis in one of the peroneal veins  No evidence of superficial thrombophlebitis noted  Doppler evaluation shows a normal response to augmentation maneuvers  Popliteal, posterior tibial and anterior tibial arterial Doppler waveforms are triphasic  Technical findings were given to Dr Day Brito following the exam   Sarah Persons: Electronically Signed by: Maci Lancaster on 2017-12-29 09:56:22 PM    Ct Abdomen Pelvis With Contrast    Result Date: 12/5/2017  Narrative: CT ABDOMEN AND PELVIS WITH IV CONTRAST INDICATION:  Tenderness COMPARISON: September 25, 2017 TECHNIQUE:  CT examination of the abdomen and pelvis was performed  Reformatted images were created in axial, sagittal, and coronal planes  Radiation dose length product (DLP) for this visit:  1663 94 mGy-cm     This examination, like all CT scans performed in the [facility], was performed utilizing techniques to minimize radiation dose exposure, including the use of iterative reconstruction and automated exposure control  IV Contrast:  100 mL of iohexol (OMNIPAQUE)     Enteric Contrast:  Enteric contrast was not administered  FINDINGS: ABDOMEN LOWER CHEST:  Bibasilar subsegmental atelectasis  LIVER/BILIARY TREE:  Hepatic steatosis  Persistent numerous hepatic hypodensities suggesting metastatic disease as previously suggested  Right inferior liver lobe lesion appears to have mildly increased in size measuring 2 5 cm (axial image 37), previously measuring 2 3 cm  GALLBLADDER:  No calcified gallstones  No pericholecystic inflammatory change  SPLEEN:  Unremarkable  PANCREAS:  Similar-appearing heterogeneous pancreatic head mass causing pancreatic ductal dilation is unchanged in size and measures approximately 58 mm x 56 mm  Persistent pancreatic ductal dilation  ADRENAL GLANDS:  Unremarkable  KIDNEYS/URETERS:  One or more sharply circumscribed subcentimeter renal hypodensities are noted  These lesions are too small to accurately characterize, but are statistically most likely to represent benign cortical renal cyst(s)  According to the guidelines published in the [facility] Paper of the ACR Incidental Findings Committee (Radiology 2010), no further workup of these lesions is recommended  STOMACH AND BOWEL:  Colonic diverticulosis  APPENDIX:  No findings to suggest appendicitis  ABDOMINOPELVIC CAVITY:  No ascites or free intraperitoneal air  There is new soft tissue nodules along the left side of the omentum and also along the right paracolic gutter suggesting progression of metastatic disease  VESSELS:  Atherosclerotic changes are present  No evidence of aneurysm  The pancreatic mass in the pancreatic head encases greater than 50% of the portal vein in addition to compressing the portal vein  However, there is no evidence of pancreatic mass extension to the superior mesenteric artery   The pancreatic mass encases the hepatic artery 360 degrees  PELVIS REPRODUCTIVE ORGANS:  Unchanged  URINARY BLADDER:  Unremarkable  ABDOMINAL WALL/INGUINAL REGIONS:  Unremarkable  OSSEOUS STRUCTURES:  No acute fracture or destructive osseous lesion  Persistent total right hip arthroplasty  Lumbar spine degenerative changes are not significant change  Impression: 1  Persistent pancreatic head mass/neoplasm with liver metastatic disease  Some of the lesions in the liver have increased in size as described above  The pancreatic mass in the pancreatic head encases greater than 50% of the portal vein in addition to compressing the portal vein  However, there is no evidence of pancreatic mass extension to the superior mesenteric artery  The pancreatic mass encases greater than 75% of the hepatic artery  2  New soft tissue nodules along the left side of the omentum and also along the right paracolic gutter suggesting progression of metastatic disease  3  Colonic diverticulosis  4  No evidence of bowel obstruction  Workstation performed: [id number]       Assessment and Plan:  The patient is a pleasant 59-year-old female with a past medical history of pancreatic cancer who follows with Dr Yolande Duong  She was admitted to the hospital with rectal bleeding and a large bright red bowel movement  She was apparently supposed to be on Coumadin but this had been held because of a question of bleeding  Regardless her hemoglobin has been stable while here and she is on heparin drip  I would recommend placement of a filter  I would continue the heparin drip as long as her hemoglobin is stable  She does need a GI workup to make sure she does not have an actively bleeding ulcer or mass  If the GI workup does not reveal any bleeding lesions Coumadin can be considered with a goal INR on to    She does have a hypercoagulable state because of her malignancy so ideally she should be on anticoagulation peripherally something that can be reversed    If she is actively bleeding and anticoagulation cannot be considered at least she will have a filter in place for protection against further PE

## 2017-12-30 NOTE — ASSESSMENT & PLAN NOTE
· Stage IV pancreatic cancer  · Follows with Dr Tamera Driscoll  Currently on chemo  · Progressive on imaging  Per daughter, discussion with Dr Tamera Driscoll from last visit was that cancer seemed relatively stable  Daughter says she tolerates chemo well without significant side effects and so she continues on chemotherapy    · Discussed with palliative  · Heme/onc consult pending

## 2017-12-31 NOTE — PLAN OF CARE
Problem: DISCHARGE PLANNING - CARE MANAGEMENT  Goal: Discharge to post-acute care or home with appropriate resources  INTERVENTIONS:  - Conduct assessment to determine patient/family and health care team treatment goals, and need for post-acute services based on payer coverage, community resources, and patient preferences, and barriers to discharge  - Address psychosocial, clinical, and financial barriers to discharge as identified in assessment in conjunction with the patient/family and health care team  - Arrange appropriate level of post-acute services according to patient's   needs and preference and payer coverage in collaboration with the physician and health care team  - Communicate with and update the patient/family, physician, and health care team regarding progress on the discharge plan  - Arrange appropriate transportation to post-acute venues  - Discharge to facility when medically cleared   Outcome: Progressing

## 2017-12-31 NOTE — ASSESSMENT & PLAN NOTE
· Seems to have resolved  · Hgb stable  · Clear liquid diet  · Although bleeding has stopped, given need for long-term anticoagulation, may need to determine source of bleeding to determine her risk for re-bleeding  Heme/onc recommending Coumadin for anticoagulation in case this needs to be reversed  Await IVC filter prior to initiating  · GI following  Consider colonoscopy  · Prior EGD showed ulcerated nodule in duodenal bulb  Continue PPI

## 2017-12-31 NOTE — CASE MANAGEMENT
Initial Clinical Review    Admission: Date/Time/Statement: 12/29/17 @ 2151     Orders Placed This Encounter   Procedures    Inpatient Admission     Standing Status:   Standing     Number of Occurrences:   1     Order Specific Question:   Admitting Physician     Answer:   Shazia Collier     Order Specific Question:   Level of Care     Answer:   Med Surg [16]     Order Specific Question:   Estimated length of stay     Answer:   More than 2 Midnights     Order Specific Question:   Certification     Answer:   I certify that inpatient services are medically necessary for this patient for a duration of greater than two midnights  See H&P and MD Progress Notes for additional information about the patient's course of treatment  ED: Date/Time/Mode of Arrival:  Tx from [address] [facility] [facility] 12/29 @ 1900     Chief Complaint:  Rectal bleeding    History of Illness: 78 y o  female who originally presented to [facility] ER on 12/29 with rectal bleeding described as large bright red bowel movement at 9:00 p m  last night and right leg edema  Upon my assessment patient is complaining of nonproductive cough x 1 week  She denies dizziness shortness of breath palpitation chest pain headache nausea vomiting abdominal pain diarrhea  For her stage IV pancreatic cancer she follows with oncologist Dr Krzysztof Cowart  Last Ct urine approximately 3 weeks ago  Further workup in the emergency room revealed bilateral lower extremity DVT  CTA PE studies reported right upper and lower lobe PE  Carcinoma of the pancreatic head with hepatic and left renal metastasis ,portal lymphadenopathy, progressive omental metastasis with moderate amount of ascites    Prior transfer to [facility]  a lengthy discussion with the patient took place regarding risk and benefits of anticoagulation in the setting of rectal bleeding, given stable hemoglobin patient opted to proceed with anticoagulation    ED Vital Signs:   ED Triage Vitals   Temperature Pulse Respirations Blood Pressure SpO2   12/29/17 2220 12/29/17 2220 12/29/17 2220 12/29/17 2220 12/30/17 0008   97 7 °F (36 5 °C) 80 20 150/66 97 %      Temp Source Heart Rate Source Patient Position - Orthostatic VS BP Location FiO2 (%)   12/29/17 2220 12/29/17 2220 12/29/17 2220 12/29/17 2220 --   Oral Monitor Lying Left arm       Pain Score       12/29/17 2231       No Pain        Wt Readings from Last 1 Encounters:   12/29/17 88 5 kg (195 lb 1 7 oz)       Vital Signs:  12/30 0701  12/31 0700 12/31 0701  12/31 1431  Most Recent     Temperature (°F) 9898 5 98 498 7  98 7 (37 1)    Pulse 5798 5461  54    Respirations 1820 1820  18    Blood Pressure 131/52151/68 126/66130/66  130/66    SpO2 (%) 9398 9597  95        Abnormal Labs/Diagnostic Test Results:   Lab Units 12/29/17  2313 12/29/17  1256   WBC Thousand/uL 7 03 7 04   HEMOGLOBIN g/dL 10 4* 11 0*   HEMATOCRIT % 33 7* 35 2   PLATELETS Thousands/uL 67* 84*   LYMPHO PCT %  --  20   MONO PCT MAN %  --  4   EOSINO PCT MANUAL %  --  2       Lab Units 12/29/17  1256   SODIUM mmol/L 138   POTASSIUM mmol/L 4 7   CHLORIDE mmol/L 101   CO2 mmol/L 34*   BUN mg/dL 7   CREATININE mg/dL 1 02   CALCIUM mg/dL 8 2*   TOTAL PROTEIN g/dL 6 4   BILIRUBIN TOTAL mg/dL 0 40   ALK PHOS U/L 174*   ALT U/L 40   AST U/L 68*   GLUCOSE RANDOM mg/dL 207*      Lab Units 12/29/17  2312   INR   1 17*       Past Medical/Surgical History:    Active Ambulatory Problems     Diagnosis Date Noted    Hyperlipidemia 08/04/2016    Diabetes mellitus with hyperglycemia ([place] [address] ) 08/04/2016    A-fib ([place] [address] ) 08/04/2016    Chronic back pain 08/04/2016    Depression 08/04/2016    SHANDA (obstructive sleep apnea) 08/04/2016    RA (rheumatoid arthritis) ([place] [address] ) 08/04/2016    Duodenal ulcer 08/14/2017    Pancreat[facility] malignancy syndrome ([facility] ) 08/24/2017    Encephalopathy 10/19/2017       Past Medical History:   Diagnosis Date    Anemia     Arthritis     Atrial fibrillation (HCC)     Back pain     Bacteremia     Cancer ([place] [address] )     Coronary artery disease     Depression with anxiety     Diabetes mellitus ([place] [address] )     DJD (degenerative joint disease)     Duodenal ulcer     Encephalopathy     Gait abnormality     Hyperlipidemia     Hypertension     Muscle weakness (generalized)     Non-STEMI (non-ST elevated myocardial infarction) (HCC)     Obstructive sleep apnea     Osteoporosis     Pancreas cancer ([place] [address] )     Sepsis ([place] [address] )     Sleep apnea     UTI (urinary tract infection)        Admitting Diagnosis: Rectal bleeding [K62 5]    Age/Sex: 78 y o  female    Assessment/Plan:   * Rectal bleeding   Assessment & Plan     NPO, ppi IV  Monitor hemoglobin, transfuse as needed  GI evaluation       Acute pulmonary embolism (HCC)   Assessment & Plan     Continue heparin IV given acute pulmonary and bilateral lower extremity VTE  Will monitor CBC  stop anticoagulation if any significant drop in Hb  Heme-Onc for further management       Acute deep vein thrombosis (DVT) of both lower extremities (HCC)   Assessment & Plan     See above       Primary pancreatic cancer with metastasis to other site [facility])   Assessment & Plan     Heme-Onc evaluation  Given evidence of disease progression Palliative care needs to be involved          VTE Prophylaxis: Heparin Drip  / sequential compression device   Code Status: DNR/DNI  POLST: There is no POLST form on file for this patient (pre-hospital)        Anticipated Length of Stay:  Patient will be admitted on an Inpatient basis with an anticipated length of stay of  > 2 midnights     Justification for Hospital Stay:  Hematology -oncology, palliative care consult      Admission Orders:  M/S/Tele unit  Telem  Regular diet -->Clear liquid diet after mdn 12/31  accuchecks qid w/ ssi  venodynes b/l le  Consult Ir, Gi, hematology, palliative care  PT/OT evals    Scheduled Meds:   artificial tear  Both Eyes HS   aspirin 81 mg Oral Daily   cyproheptadine 4 mg Oral HS   docusate sodium 100 mg Oral BID   donepezil 5 mg Oral HS   ferrous sulfate 325 mg Oral BID With Meals   FLUoxetine 20 mg Oral Daily   hydrALAZINE 50 mg Oral TID   insulin lispro 1-6 Units Subcutaneous Q6H [facility]   metoprolol tartrate 50 mg Oral Q12H SHIRA   pantoprazole 40 mg Intravenous Q12H [facility]   predniSONE 1 mg Oral TID   senna 1 tablet Oral Daily     Continuous Infusions:   heparin (porcine) 3-30 Units/kg/hr (Order-Specific) Last Rate: 9 Units/kg/hr (12/31/17 0404)     PRN Meds:   ALPRAZolam    [START ON 1/1/2018] influenza vaccine    ondansetron    promethazine

## 2017-12-31 NOTE — PHYSICIAN ADVISOR
Current patient class: Inpatient  The patient is currently on Hospital Day: 3      The patient was admitted to the hospital  on 12/29/17 at 2151 for the following diagnosis:  Rectal bleeding [K62 5]       There is documentation in the medical record of an expected length of stay of at least 2 midnights  The patient is therefore expected to satisfy the 2 midnight benchmark and given the 2 midnight presumption is appropriate for INPATIENT ADMISSION  Given this expectation of a satisfying stay, CMS instructs us that the patient is most often appropriate for inpatient admission under part A provided medical necessity is documented in the chart  After review of the relevant documentation, labs, vital signs and test results, the patient is appropriate for INPATIENT ADMISSION  Admission to the hospital as an inpatient is a complex decision making process which requires the practitioner to consider the patients presenting complaint, history and physical examination and all relevant testing  With this in mind, in this case, the patient was deemed appropriate for INPATIENT ADMISSION  After review of the documentation and testing available at the time of the admission I concur with this clinical determination of medical necessity  The patient does have an inpatient admission within the previous 30 days  The patient was admitted on 12/5/17  and discharged on 12/8/17 as an inpatient  The patient therefore required readmission review  In this case the patient should be considered a SEPARATE and UNRELATED INPATIENT ADMISSION  The patient had been discharged in stable condition with a completed care plan  There were no unresolved acute medical issues at the time of discharged which would have reasonably been expected to prompt this readmission  Rationale is as follows:     The patient is a 78 yrs old  Female who presented to the ED at 12/29/2017  9:51 PM with a chief complaint of rectal bleeding - she was made NPO , started on iv fluids , intravenous proton pump inhibitors and seen in consultation by GI service   She also complained of lower extremity swelling and cough   underwent CTA chest pelvis and abdomen as well as lower extremity duplex which showed bilateral DVTs and right upper lobe and lower lobe pulmonary emboli - she was started on heparin drip -seen in consultation by oncology service and IVC filter placement suggested Also, colonoscopy is being planned given that she is hypercoagulable given history of metastatic pancreatic cancer and will need long term anticoagulation -need to determine source of bleeding to determine risk of re bleeding  Patient was also admitted from 12/5/17-12/8/17 for altered mental status and lethargy -placed on stroke pathway however lethargy was felt to be from haldol and  dementia given progressive decline in cognition over the past few years    She was also hypoglycemic and required adjustment in insulin regimen Also noted to have hypertensive urgency and requirement adjustment in antihypertensive medication     Current admission is therefore separate and unrelated to earlier admission this month    The patients vitals on arrival were ED Triage Vitals   Temperature Pulse Respirations Blood Pressure SpO2   12/29/17 2220 12/29/17 2220 12/29/17 2220 12/29/17 2220 12/30/17 0008   97 7 °F (36 5 °C) 80 20 150/66 97 %      Temp Source Heart Rate Source Patient Position - Orthostatic VS BP Location FiO2 (%)   12/29/17 2220 12/29/17 2220 12/29/17 2220 12/29/17 2220 --   Oral Monitor Lying Left arm       Pain Score       12/29/17 2231       No Pain           Past Medical History:   Diagnosis Date    Anemia     Arthritis     Atrial fibrillation (HCC)     Back pain     Bacteremia     [facility]     Pancreatic    Coronary artery disease     Depression with anxiety     Diabetes mellitus ([facility] )     DJD (degenerative joint disease)     Duodenal ulcer     Encephalopathy     Gait abnormality     Hyperlipidemia     Hypertension     Muscle weakness (generalized)     Non-STEMI (non-ST elevated myocardial infarction) (HCC)     Obstructive sleep apnea     Osteoporosis     [facility]     Sepsis ([facility] )     Sleep apnea     UTI (urinary tract infection)      Past Surgical History:   Procedure Laterality Date    ESOPHAGOGASTRODUODENOSCOPY N/A 8/14/2017    Procedure: ESOPHAGOGASTRODUODENOSCOPY (EGD); Surgeon: Alex Mendoza MD;  Location:  GI LAB; Service: Gastroenterology    JOINT REPLACEMENT Left     hip    KNEE SURGERY Bilateral     ND INSJ TUNNELED CTR VAD W/SUBQ PORT AGE 5 YR/> N/A 11/8/2017    Procedure: INSERTION VENOUS PORT (PORT-A-CATH); Surgeon: Riley Landeros DO;  Location: [facility] MAIN OR;  Service: General    REVISION TOTAL HIP ARTHROPLASTY Right            Consults have been placed to:   IP CONSULT TO HEMATOLOGY  IP CONSULT TO PALLIATIVE CARE  IP CONSULT TO GASTROENTEROLOGY    Vitals:    12/30/17 2252 12/31/17 0826 12/31/17 1114 12/31/17 1552   BP: 151/67 126/66 130/66 135/69   Pulse: 59 61 (!) 54 55   Resp: 20 20 18 18   Temp: 98 °F (36 7 °C) 98 4 °F (36 9 °C) 98 7 °F (37 1 °C) 98 3 °F (36 8 °C)   TempSrc: Oral Oral Oral Oral   SpO2: 98% 97% 95% 98%       Most recent labs:    Recent Labs      12/29/17   1256  12/29/17   2312   12/31/17   0337   WBC  7 04   --    < >  6 30   HGB  11 0*   --    < >  10 1*   HCT  35 2   --    < >  33 2*   PLT  84*   --    < >  63*   K  4 7   --    < >  4 1   NA  138   --    < >  138   CALCIUM  8 2*   --    < >  8 1*   BUN  7   --    < >  9   CREATININE  1 02   --    < >  0 91   INR  1 12  1 17*   --    --    AST  68*   --    --    --    ALT  40   --    --    --    ALKPHOS  174*   --    --    --    BILITOT  0 40   --    --    --     < > = values in this interval not displayed         Scheduled Meds:  artificial tear  Both Eyes HS   aspirin 81 mg Oral Daily   cyproheptadine 4 mg Oral HS   docusate sodium 100 mg Oral BID donepezil 5 mg Oral HS   ferrous sulfate 325 mg Oral BID With Meals   FLUoxetine 20 mg Oral Daily   hydrALAZINE 50 mg Oral TID   insulin lispro 1-6 Units Subcutaneous 4x Daily (AC & HS)   metoprolol tartrate 50 mg Oral Q12H SHIRA   pantoprazole 40 mg Intravenous Q12H [address]   predniSONE 1 mg Oral TID   senna 1 tablet Oral Daily     Continuous Infusions:  heparin (porcine) 3-30 Units/kg/hr (Order-Specific) Last Rate: 9 Units/kg/hr (12/31/17 0404)     PRN Meds:   ALPRAZolam    [START ON 1/1/2018] influenza vaccine    ondansetron    promethazine    Surgical procedures (if appropriate):

## 2017-12-31 NOTE — PROGRESS NOTES
Progress Note - Albina Lucas 1940, 78 y o  female MRN: [id number]    Unit/Bed#: [facility] 606-01 Encounter: [id number]    Primary Care Provider: Kathrine Miranda MD   Date and time admitted to hospital: 12/29/2017  9:51 PM    * Rectal bleeding   Assessment & Plan    · Seems to have resolved  · Hgb stable  · Clear liquid diet  · Although bleeding has stopped, given need for long-term anticoagulation, may need to determine source of bleeding to determine her risk for re-bleeding  · GI following  Consider colonoscopy  · Prior EGD showed ulcerated nodule in duodenal bulb  Continue PPI  Primary pancreatic cancer with metastasis to other site [facility])   Assessment & Plan    · Stage IV pancreatic cancer  · Follows with Dr Bernadette Patrick  Currently on chemo  · Progressive on imaging  Per daughter, discussion with Dr Bernadette Patrick from last visit was that cancer seemed relatively stable  Daughter says she tolerates chemo well without significant side effects and so she continues on chemotherapy  Acute pulmonary embolism (HCC)   Assessment & Plan    · Presently on heparin drip  Will continue this for now pending plan  · Discussed with hematology  They are recommending IVC filter regardless of whether or not she will be on anticoagulation  Discussed with patient  She is on the fence still  Discussed with her daughter who is going to come in and talk with her about it today to help her make decision  Dementia   Assessment & Plan    · Recently diagnosed dementia  On Aricept  · POA: Daughter, Germain Luke, and son, Ze Crisostomo   Help patient to make decisions  · Lives at [address]        Diabetes [facility])   Assessment & Plan    · Blood sugars stable  · Continue sliding scale insulin        Acute deep vein thrombosis (DVT) of both lower extremities (HCC)   Assessment & Plan    · Continue on heparin drip        RA (rheumatoid arthritis) ([facility] )   Assessment & Plan    · On chronic prednisone        A-fib (HCC)   Assessment & Plan    · Was previously on anticoagulation but this was stopped due to prior GI bleed  · Continue on BB  On heparin drip          VTE Pharmacologic Prophylaxis:   Pharmacologic: Heparin Drip  Mechanical VTE Prophylaxis in Place: Yes    Patient Centered Rounds: I have performed bedside rounds with nursing staff today  Discussions with Specialists or Other Care Team Provider: None today    Education and Discussions with Family / Patient: Patient and daughter, Tod Cha  Time Spent for Care: 30 minutes  More than 50% of total time spent on counseling and coordination of care as described above  Current Length of Stay: 2 day(s)    Current Patient Status: Inpatient   Certification Statement: The patient will continue to require additional inpatient hospital stay due to heparin drip, placement IVC filter  Discharge Plan: Pending IVC filter    Code Status: Level 3 - DNAR and DNI      Subjective:   Feels well today  Has some nausea  No more bloody bowel movements  Discussed IVC filter  Patient hesitant  She states she is anxious and overwhelmed with medical problems  No events overnight  Objective:     Vitals:   Temp (24hrs), Av 2 °F (36 8 °C), Min:98 °F (36 7 °C), Max:98 4 °F (36 9 °C)    HR:  [59-98] 61  Resp:  [18-20] 20  BP: (126-151)/(52-68) 126/66  SpO2:  [93 %-98 %] 97 %  There is no height or weight on file to calculate BMI  Input and Output Summary (last 24 hours): Intake/Output Summary (Last 24 hours) at 17 1103  Last data filed at 17 1036   Gross per 24 hour   Intake          2780 36 ml   Output              100 ml   Net          2680 36 ml       Physical Exam:     Physical Exam   Constitutional: She is oriented to person, place, and time  No distress  HENT:   Head: Normocephalic and atraumatic  Eyes: No scleral icterus  Neck: Normal range of motion  Neck supple  Cardiovascular: Normal rate, regular rhythm and normal heart sounds      Pulmonary/Chest: Effort normal and breath sounds normal  No respiratory distress  She has no wheezes  She has no rales  Abdominal: Soft  Bowel sounds are normal  She exhibits no distension  There is no tenderness  There is no rebound and no guarding  Musculoskeletal: She exhibits no edema  Neurological: She is alert and oriented to person, place, and time  Skin: Skin is warm and dry  She is not diaphoretic  Psychiatric: She has a normal mood and affect  Her behavior is normal  Thought content normal        Additional Data:     Labs:      Results from last 7 days  Lab Units 12/31/17  0337   WBC Thousand/uL 6 30   HEMOGLOBIN g/dL 10 1*   HEMATOCRIT % 33 2*   PLATELETS Thousands/uL 63*   NEUTROS PCT % 65   LYMPHS PCT % 24   MONOS PCT % 7   EOS PCT % 3       Results from last 7 days  Lab Units 12/31/17  0337  12/29/17  1256   SODIUM mmol/L 138  < > 138   POTASSIUM mmol/L 4 1  < > 4 7   CHLORIDE mmol/L 101  < > 101   CO2 mmol/L 32  < > 34*   BUN mg/dL 9  < > 7   CREATININE mg/dL 0 91  < > 1 02   CALCIUM mg/dL 8 1*  < > 8 2*   TOTAL PROTEIN g/dL  --   --  6 4   BILIRUBIN TOTAL mg/dL  --   --  0 40   ALK PHOS U/L  --   --  174*   ALT U/L  --   --  40   AST U/L  --   --  68*   GLUCOSE RANDOM mg/dL 124  < > 207*   < > = values in this interval not displayed  Results from last 7 days  Lab Units 12/29/17  2312   INR  1 17*       * I Have Reviewed All Lab Data Listed Above  * Additional Pertinent Lab Tests Reviewed:  All Labs Within Last 24 Hours Reviewed    Imaging:    Imaging Reports Reviewed Today Include: None  Imaging Personally Reviewed by Myself Includes:  None    Recent Cultures (last 7 days):           Last 24 Hours Medication List:     artificial tear  Both Eyes HS   aspirin 81 mg Oral Daily   cyproheptadine 4 mg Oral HS   docusate sodium 100 mg Oral BID   donepezil 5 mg Oral HS   ferrous sulfate 325 mg Oral BID With Meals   FLUoxetine 20 mg Oral Daily   hydrALAZINE 50 mg Oral TID   insulin lispro 1-6 Units Subcutaneous Q6H [facility] metoprolol tartrate 50 mg Oral Q12H SHIRA   pantoprazole 40 mg Intravenous Q12H [address]   predniSONE 1 mg Oral TID   senna 1 tablet Oral Daily        Today, Patient Was Seen By: Anabel Waller PA-C    ** Please Note: Dragon 360 Dictation voice to text software may have been used in the creation of this document   **

## 2017-12-31 NOTE — ASSESSMENT & PLAN NOTE
· Stage IV pancreatic cancer  · Follows with Dr Hue Rodriguez  Currently on chemo  · Progressive on imaging  Per daughter, discussion with Dr Hue Rodriguez from last visit was that cancer seemed relatively stable  Daughter says she tolerates chemo well without significant side effects and so she continues on chemotherapy

## 2017-12-31 NOTE — ASSESSMENT & PLAN NOTE
· Was previously on anticoagulation but this was stopped due to prior GI bleed  · Continue on BB   On heparin drip

## 2017-12-31 NOTE — ASSESSMENT & PLAN NOTE
· Recently diagnosed dementia  On Aricept  · POA: Daughter, Kyree Alas, and son, Beka Gonzalez   Help patient to make decisions  · Lives at [address] [place] [address]

## 2017-12-31 NOTE — PLAN OF CARE
Problem: Potential for Falls  Goal: Patient will remain free of falls  INTERVENTIONS:  - Assess patient frequently for physical needs  -  Identify cognitive and physical deficits and behaviors that affect risk of falls    -  [place] fall precautions as indicated by assessment   - Educate patient/family on patient safety including physical limitations  - Instruct patient to call for assistance with activity based on assessment  - Modify environment to reduce risk of injury  - Consider OT/PT consult to assist with strengthening/mobility   Outcome: Progressing      Problem: Prexisting or High Potential for Compromised Skin Integrity  Goal: Skin integrity is maintained or improved  INTERVENTIONS:  - Identify patients at risk for skin breakdown  - Assess and monitor skin integrity  - Assess and monitor nutrition and hydration status  - Monitor labs (i e  albumin)  - Assess for incontinence   - Turn and reposition patient  - Assist with mobility/ambulation  - Relieve pressure over bony prominences  - Avoid friction and shearing  - Provide appropriate hygiene as needed including keeping skin clean and dry  - Evaluate need for skin moisturizer/barrier cream  - Collaborate with interdisciplinary team (i e  Nutrition, Rehabilitation, etc )   - Patient/family teaching   Outcome: Progressing

## 2017-12-31 NOTE — SOCIAL WORK
Pt new admit to the floor  CM met with patient and explained cm role  Pt alert and oriented  Pt reports she is current w/[facility]  Pt reports DME: rw, cane, no Oxygen at home, previous VNA w/[facility], current rehab w/[facility]  Pts pharmacy is [facility] in [place], [place]  KELLI Kim [phone]  Pt denies hx/admission for drug/etoh and psych/mental health  Ref[facility] to [facility] via [facility]  CM reviewed d/c planning process including the following: identifying help at home, patient preference for d/c planning needs, Discharge Lounge, [facility] Meds to Bed program, availability of treatment team to discuss questions or concerns patient and/or family may have regarding understanding medications and recognizing signs and symptoms once discharged  CM also encouraged patient to follow up with all recommended appointments after discharge  Patient advised of importance for patient and family to participate in managing patients medical well being

## 2017-12-31 NOTE — ASSESSMENT & PLAN NOTE
· Presently on heparin drip  Will continue this for now pending plan  · Discussed with hematology  They are recommending IVC filter regardless of whether or not she will be on anticoagulation  Discussed with patient  She is on the fence still  Discussed with her daughter who is going to come in and talk with her about it today to help her make decision

## 2017-12-31 NOTE — PROGRESS NOTES
Progress Note - Heidi Weber 78 y o  female MRN: [id number]    Unit/Bed#: [facility] 606-01 Encounter: [id number]    Subjective:     Patient seen and examined at bedside  She is feeling anxious and frustrated today with her medical problems  She denies abdominal pain  No further bloody bowel movements  Objective:     Vitals: Blood pressure 130/66, pulse (!) 54, temperature 98 7 °F (37 1 °C), temperature source Oral, resp  rate 18, SpO2 95 %, not currently breastfeeding  ,There is no height or weight on file to calculate BMI        Intake/Output Summary (Last 24 hours) at 12/31/17 1356  Last data filed at 12/31/17 1036   Gross per 24 hour   Intake          2780 36 ml   Output              100 ml   Net          2680 36 ml       Physical Exam:     General Appearance: Alert, elderly female, appears stated age and cooperative  Lungs: Clear to auscultation bilaterally, no rales or rhonchi, no labored breathing/accessory muscle use  Heart: Regular rate and rhythm, S1, S2 normal, no murmur, click, rub or gallop  Abdomen: Soft, non-tender, non-distended; bowel sounds normal; no masses or no organomegaly  Extremities: No cyanosis, edema    Invasive Devices     Peripheral Intravenous Line            Peripheral IV 12/29/17 Right;Ventral (anterior) Antecubital 2 days                Lab Results:    Results from last 7 days  Lab Units 12/31/17  0337   WBC Thousand/uL 6 30   HEMOGLOBIN g/dL 10 1*   HEMATOCRIT % 33 2*   PLATELETS Thousands/uL 63*   NEUTROS PCT % 65   LYMPHS PCT % 24   MONOS PCT % 7   EOS PCT % 3       Results from last 7 days  Lab Units 12/31/17  0337  12/29/17  1256   SODIUM mmol/L 138  < > 138   POTASSIUM mmol/L 4 1  < > 4 7   CHLORIDE mmol/L 101  < > 101   CO2 mmol/L 32  < > 34*   BUN mg/dL 9  < > 7   CREATININE mg/dL 0 91  < > 1 02   CALCIUM mg/dL 8 1*  < > 8 2*   TOTAL PROTEIN g/dL  --   --  6 4   BILIRUBIN TOTAL mg/dL  --   --  0 40   ALK PHOS U/L  --   --  174*   ALT U/L  --   --  40   AST U/L  --   --  68* GLUCOSE RANDOM mg/dL 124  < > 207*   < > = values in this interval not displayed  Results from last 7 days  Lab Units 12/29/17  2312   INR  1 17*           Imaging Studies: I have personally reviewed pertinent imaging studies  Cta Chest Ct Abdomen Pelvis W Contrast    Result Date: 12/29/2017  Impression: 1  Right upper lobe and lower lobe pulmonary emboli  2    RV/LV ratio of 0 9  3   Carcinoma of the pancreatic head with partial encasement of the portal vein and isai hepatis lymphadenopathy  4   Hepatic and left renal metastases  5   Progressive omental metastases with moderate amount of ascites developing since 12/5/2017  I have discussed my findings with [facility] ED physician, RADHA Paredes  Workstation performed: [id number]       Assessment and Plan:     Discussed with Maria Luz Tello PA-C with [facility]     1) Rectal bleeding: Hemoglobin has been stable since at 10 1  No further episodes of overt bleeding in the hospital  She was started on a heparin drip for acute PE and DVT   Last colonoscopy was 2-3 years ago and patient states she is overdue for repeat scope      -Okay to continue heparin drip   -Reviewed note from hematology/oncology-- consider colonoscopy on Tuesday 1/2/18 however would need to further discuss this with  patient's daughter as patient is currently overwhelmed and having difficulty making care decisions  -Patient with poor overall prognosis given progression of metastatic disease on CT scan     2) Acute PE and bilateral DVTs: Identified on CTA chest and duplex ultrasound      -Continue heparin drip   -Plan for IVC filter as per hematology/oncology recommendations

## 2017-12-31 NOTE — SOCIAL WORK
CM contacted to speak with patient  Pt states she does not want to return to [facility], as she feels they are not providing good care  CM called [facility], left message for callback  CM gave pt SNF list  Pt stated she will give choices after she speaks with her family

## 2018-01-01 ENCOUNTER — GENERIC CONVERSION - ENCOUNTER (OUTPATIENT)
Dept: GASTROENTEROLOGY | Facility: CLINIC | Age: 78
End: 2018-01-01

## 2018-01-01 ENCOUNTER — HOSPITAL ENCOUNTER (EMERGENCY)
Facility: HOSPITAL | Age: 78
Discharge: HOME | End: 2018-02-23
Admitting: EMERGENCY MEDICINE
Payer: MEDICARE

## 2018-01-01 ENCOUNTER — APPOINTMENT (INPATIENT)
Dept: NON INVASIVE DIAGNOSTICS | Facility: HOSPITAL | Age: 78
DRG: 356 | End: 2018-01-01
Payer: MEDICARE

## 2018-01-01 ENCOUNTER — HOSPITAL ENCOUNTER (OUTPATIENT)
Dept: INFUSION CENTER | Facility: HOSPITAL | Age: 78
Discharge: HOME | End: 2018-02-15

## 2018-01-01 ENCOUNTER — ALLSCRIPTS OFFICE VISIT (OUTPATIENT)
Dept: OTHER | Facility: OTHER | Age: 78
End: 2018-01-01

## 2018-01-01 ENCOUNTER — APPOINTMENT (EMERGENCY)
Dept: CT IMAGING | Facility: HOSPITAL | Age: 78
End: 2018-01-01
Payer: MEDICARE

## 2018-01-01 ENCOUNTER — HOSPITAL ENCOUNTER (OUTPATIENT)
Dept: INFUSION CENTER | Facility: HOSPITAL | Age: 78
End: 2018-01-01

## 2018-01-01 ENCOUNTER — APPOINTMENT (INPATIENT)
Dept: RADIOLOGY | Facility: HOSPITAL | Age: 78
DRG: 356 | End: 2018-01-01
Attending: INTERNAL MEDICINE
Payer: MEDICARE

## 2018-01-01 ENCOUNTER — OFFICE VISIT (OUTPATIENT)
Dept: PULMONOLOGY | Facility: CLINIC | Age: 78
End: 2018-01-01
Payer: MEDICARE

## 2018-01-01 ENCOUNTER — HOSPITAL ENCOUNTER (EMERGENCY)
Facility: HOSPITAL | Age: 78
End: 2018-02-28
Attending: EMERGENCY MEDICINE | Admitting: EMERGENCY MEDICINE
Payer: MEDICARE

## 2018-01-01 ENCOUNTER — TELEPHONE (OUTPATIENT)
Dept: HEMATOLOGY ONCOLOGY | Facility: CLINIC | Age: 78
End: 2018-01-01

## 2018-01-01 ENCOUNTER — APPOINTMENT (EMERGENCY)
Dept: RADIOLOGY | Facility: HOSPITAL | Age: 78
End: 2018-01-01
Payer: MEDICARE

## 2018-01-01 ENCOUNTER — GENERIC CONVERSION - ENCOUNTER (OUTPATIENT)
Dept: OTHER | Facility: OTHER | Age: 78
End: 2018-01-01

## 2018-01-01 ENCOUNTER — HOSPITAL ENCOUNTER (OUTPATIENT)
Dept: INFUSION CENTER | Facility: HOSPITAL | Age: 78
Discharge: HOME | End: 2018-02-01
Payer: MEDICARE

## 2018-01-01 ENCOUNTER — APPOINTMENT (INPATIENT)
Dept: RADIOLOGY | Facility: HOSPITAL | Age: 78
DRG: 356 | End: 2018-01-01
Payer: MEDICARE

## 2018-01-01 ENCOUNTER — HOSPITAL ENCOUNTER (OUTPATIENT)
Dept: INFUSION CENTER | Facility: HOSPITAL | Age: 78
Discharge: HOME | End: 2018-01-18
Payer: MEDICARE

## 2018-01-01 ENCOUNTER — ANESTHESIA (INPATIENT)
Dept: GASTROENTEROLOGY | Facility: HOSPITAL | Age: 78
DRG: 356 | End: 2018-01-01
Payer: MEDICARE

## 2018-01-01 ENCOUNTER — ANESTHESIA EVENT (INPATIENT)
Dept: GASTROENTEROLOGY | Facility: HOSPITAL | Age: 78
DRG: 356 | End: 2018-01-01
Payer: MEDICARE

## 2018-01-01 ENCOUNTER — OFFICE VISIT (OUTPATIENT)
Dept: HEMATOLOGY ONCOLOGY | Facility: HOSPITAL | Age: 78
End: 2018-01-01
Payer: MEDICARE

## 2018-01-01 VITALS
WEIGHT: 192.13 LBS | TEMPERATURE: 97.5 F | DIASTOLIC BLOOD PRESSURE: 72 MMHG | HEIGHT: 56 IN | BODY MASS INDEX: 43.22 KG/M2 | SYSTOLIC BLOOD PRESSURE: 134 MMHG | HEART RATE: 71 BPM | OXYGEN SATURATION: 96 % | RESPIRATION RATE: 14 BRPM

## 2018-01-01 VITALS
OXYGEN SATURATION: 91 % | TEMPERATURE: 98.4 F | RESPIRATION RATE: 24 BRPM | HEIGHT: 56 IN | HEART RATE: 116 BPM | DIASTOLIC BLOOD PRESSURE: 116 MMHG | SYSTOLIC BLOOD PRESSURE: 151 MMHG

## 2018-01-01 VITALS
BODY MASS INDEX: 40.89 KG/M2 | WEIGHT: 181.8 LBS | HEART RATE: 68 BPM | DIASTOLIC BLOOD PRESSURE: 78 MMHG | SYSTOLIC BLOOD PRESSURE: 170 MMHG | HEIGHT: 56 IN | TEMPERATURE: 98.1 F | RESPIRATION RATE: 18 BRPM | OXYGEN SATURATION: 89 %

## 2018-01-01 VITALS
HEART RATE: 81 BPM | BODY MASS INDEX: 41.56 KG/M2 | WEIGHT: 198 LBS | HEIGHT: 58 IN | DIASTOLIC BLOOD PRESSURE: 70 MMHG | SYSTOLIC BLOOD PRESSURE: 160 MMHG

## 2018-01-01 VITALS
BODY MASS INDEX: 38.19 KG/M2 | SYSTOLIC BLOOD PRESSURE: 127 MMHG | RESPIRATION RATE: 18 BRPM | WEIGHT: 169.75 LBS | TEMPERATURE: 98.2 F | HEART RATE: 100 BPM | DIASTOLIC BLOOD PRESSURE: 60 MMHG | HEIGHT: 56 IN

## 2018-01-01 VITALS — DIASTOLIC BLOOD PRESSURE: 71 MMHG | HEART RATE: 77 BPM | SYSTOLIC BLOOD PRESSURE: 138 MMHG

## 2018-01-01 VITALS
HEART RATE: 79 BPM | HEIGHT: 56 IN | BODY MASS INDEX: 39.82 KG/M2 | SYSTOLIC BLOOD PRESSURE: 118 MMHG | WEIGHT: 177 LBS | TEMPERATURE: 98.9 F | RESPIRATION RATE: 20 BRPM | DIASTOLIC BLOOD PRESSURE: 56 MMHG | OXYGEN SATURATION: 95 %

## 2018-01-01 VITALS
RESPIRATION RATE: 18 BRPM | SYSTOLIC BLOOD PRESSURE: 100 MMHG | TEMPERATURE: 97.8 F | OXYGEN SATURATION: 98 % | DIASTOLIC BLOOD PRESSURE: 60 MMHG | HEART RATE: 122 BPM

## 2018-01-01 VITALS
HEIGHT: 56 IN | WEIGHT: 168.2 LBS | OXYGEN SATURATION: 93 % | HEART RATE: 66 BPM | RESPIRATION RATE: 18 BRPM | TEMPERATURE: 97.6 F | BODY MASS INDEX: 37.83 KG/M2 | DIASTOLIC BLOOD PRESSURE: 68 MMHG | SYSTOLIC BLOOD PRESSURE: 128 MMHG

## 2018-01-01 VITALS
SYSTOLIC BLOOD PRESSURE: 148 MMHG | BODY MASS INDEX: 39.42 KG/M2 | WEIGHT: 175.25 LBS | OXYGEN SATURATION: 89 % | TEMPERATURE: 97.6 F | DIASTOLIC BLOOD PRESSURE: 70 MMHG | HEIGHT: 56 IN | HEART RATE: 83 BPM | RESPIRATION RATE: 18 BRPM

## 2018-01-01 VITALS
SYSTOLIC BLOOD PRESSURE: 136 MMHG | HEIGHT: 56 IN | RESPIRATION RATE: 15 BRPM | BODY MASS INDEX: 42.74 KG/M2 | WEIGHT: 190 LBS | TEMPERATURE: 97.9 F | OXYGEN SATURATION: 98 % | HEART RATE: 71 BPM | DIASTOLIC BLOOD PRESSURE: 72 MMHG

## 2018-01-01 VITALS
WEIGHT: 184.97 LBS | HEART RATE: 57 BPM | DIASTOLIC BLOOD PRESSURE: 66 MMHG | OXYGEN SATURATION: 92 % | TEMPERATURE: 97.6 F | RESPIRATION RATE: 18 BRPM | HEIGHT: 56 IN | SYSTOLIC BLOOD PRESSURE: 154 MMHG | BODY MASS INDEX: 41.61 KG/M2

## 2018-01-01 VITALS
BODY MASS INDEX: 40.94 KG/M2 | OXYGEN SATURATION: 96 % | WEIGHT: 182 LBS | HEIGHT: 56 IN | HEART RATE: 60 BPM | DIASTOLIC BLOOD PRESSURE: 64 MMHG | SYSTOLIC BLOOD PRESSURE: 120 MMHG | RESPIRATION RATE: 18 BRPM | TEMPERATURE: 96 F

## 2018-01-01 VITALS
HEART RATE: 62 BPM | DIASTOLIC BLOOD PRESSURE: 71 MMHG | TEMPERATURE: 96.6 F | SYSTOLIC BLOOD PRESSURE: 160 MMHG | HEIGHT: 56 IN

## 2018-01-01 VITALS
BODY MASS INDEX: 39.13 KG/M2 | TEMPERATURE: 98 F | DIASTOLIC BLOOD PRESSURE: 70 MMHG | SYSTOLIC BLOOD PRESSURE: 158 MMHG | HEIGHT: 56 IN | RESPIRATION RATE: 18 BRPM | HEART RATE: 88 BPM | WEIGHT: 173.94 LBS

## 2018-01-01 DIAGNOSIS — R11.2 NAUSEA AND VOMITING, INTRACTABILITY OF VOMITING NOT SPECIFIED, UNSPECIFIED VOMITING TYPE: ICD-10-CM

## 2018-01-01 DIAGNOSIS — C78.6 SECONDARY MALIGNANT NEOPLASM OF RETROPERITONEUM AND PERITONEUM (HCC): ICD-10-CM

## 2018-01-01 DIAGNOSIS — R10.31 RIGHT LOWER QUADRANT ABDOMINAL PAIN: ICD-10-CM

## 2018-01-01 DIAGNOSIS — R19.5 LOOSE STOOLS: ICD-10-CM

## 2018-01-01 DIAGNOSIS — C78.7 SECONDARY MALIGNANT NEOPLASM OF LIVER AND INTRAHEPATIC BILE DUCT (HCC): ICD-10-CM

## 2018-01-01 DIAGNOSIS — D69.6 THROMBOCYTOPENIA (HCC): ICD-10-CM

## 2018-01-01 DIAGNOSIS — C25.9 PRIMARY PANCREATIC CANCER WITH METASTASIS TO OTHER SITE (HCC): Primary | ICD-10-CM

## 2018-01-01 DIAGNOSIS — R18.8 ASCITES: Primary | ICD-10-CM

## 2018-01-01 DIAGNOSIS — I46.9 CARDIORESPIRATORY ARREST (HCC): Primary | ICD-10-CM

## 2018-01-01 DIAGNOSIS — I26.99 OTHER ACUTE PULMONARY EMBOLISM WITHOUT ACUTE COR PULMONALE (HCC): Primary | ICD-10-CM

## 2018-01-01 LAB
ABO GROUP BLD: NORMAL
ALBUMIN SERPL BCP-MCNC: 1 G/DL (ref 3.5–5)
ALBUMIN SERPL BCP-MCNC: 1.6 G/DL (ref 3.5–5)
ALBUMIN SERPL BCP-MCNC: 2.1 G/DL (ref 3.5–5)
ALP SERPL-CCNC: 127 U/L (ref 46–116)
ALP SERPL-CCNC: 279 U/L (ref 46–116)
ALP SERPL-CCNC: 408 U/L (ref 46–116)
ALT SERPL W P-5'-P-CCNC: 20 U/L (ref 12–78)
ALT SERPL W P-5'-P-CCNC: 22 U/L (ref 12–78)
ALT SERPL W P-5'-P-CCNC: 39 U/L (ref 12–78)
ANION GAP SERPL CALCULATED.3IONS-SCNC: 11 MMOL/L (ref 4–13)
ANION GAP SERPL CALCULATED.3IONS-SCNC: 16 MMOL/L (ref 4–13)
ANION GAP SERPL CALCULATED.3IONS-SCNC: 6 MMOL/L (ref 4–13)
ANISOCYTOSIS BLD QL SMEAR: PRESENT
APTT PPP: 137 SECONDS (ref 23–35)
APTT PPP: 44 SECONDS (ref 23–35)
APTT PPP: 54 SECONDS (ref 23–35)
APTT PPP: 55 SECONDS (ref 23–35)
APTT PPP: 65 SECONDS (ref 23–35)
APTT PPP: 83 SECONDS (ref 23–35)
AST SERPL W P-5'-P-CCNC: 34 U/L (ref 5–45)
AST SERPL W P-5'-P-CCNC: 49 U/L (ref 5–45)
AST SERPL W P-5'-P-CCNC: 80 U/L (ref 5–45)
ATRIAL RATE: 104 BPM
BASOPHILS # BLD AUTO: 0.01 THOUSANDS/ΜL (ref 0–0.1)
BASOPHILS # BLD AUTO: 0.02 THOUSANDS/ΜL (ref 0–0.1)
BASOPHILS # BLD MANUAL: 0 THOUSAND/UL (ref 0–0.1)
BASOPHILS # BLD MANUAL: 0 THOUSAND/UL (ref 0–0.1)
BASOPHILS NFR BLD AUTO: 0 % (ref 0–1)
BASOPHILS NFR BLD AUTO: 0 % (ref 0–1)
BASOPHILS NFR MAR MANUAL: 0 % (ref 0–1)
BASOPHILS NFR MAR MANUAL: 0 % (ref 0–1)
BILIRUB SERPL-MCNC: 0.4 MG/DL (ref 0.2–1)
BILIRUB SERPL-MCNC: 0.49 MG/DL (ref 0.2–1)
BILIRUB SERPL-MCNC: 0.6 MG/DL (ref 0.2–1)
BLD GP AB SCN SERPL QL: NEGATIVE
BUN SERPL-MCNC: 10 MG/DL (ref 5–25)
BUN SERPL-MCNC: 19 MG/DL (ref 5–25)
BUN SERPL-MCNC: 28 MG/DL (ref 5–25)
BUN SERPL-MCNC: 8 MG/DL (ref 5–25)
BUN SERPL-MCNC: 9 MG/DL (ref 5–25)
CALCIUM SERPL-MCNC: 7.5 MG/DL (ref 8.3–10.1)
CALCIUM SERPL-MCNC: 7.8 MG/DL (ref 8.3–10.1)
CALCIUM SERPL-MCNC: 8.2 MG/DL (ref 8.3–10.1)
CALCIUM SERPL-MCNC: 8.3 MG/DL (ref 8.3–10.1)
CALCIUM SERPL-MCNC: 8.3 MG/DL (ref 8.3–10.1)
CHLORIDE SERPL-SCNC: 100 MMOL/L (ref 100–108)
CHLORIDE SERPL-SCNC: 102 MMOL/L (ref 100–108)
CHLORIDE SERPL-SCNC: 103 MMOL/L (ref 100–108)
CHLORIDE SERPL-SCNC: 103 MMOL/L (ref 100–108)
CHLORIDE SERPL-SCNC: 104 MMOL/L (ref 100–108)
CO2 SERPL-SCNC: 16 MMOL/L (ref 21–32)
CO2 SERPL-SCNC: 24 MMOL/L (ref 21–32)
CO2 SERPL-SCNC: 30 MMOL/L (ref 21–32)
CO2 SERPL-SCNC: 31 MMOL/L (ref 21–32)
CO2 SERPL-SCNC: 31 MMOL/L (ref 21–32)
CREAT SERPL-MCNC: 0.8 MG/DL (ref 0.6–1.3)
CREAT SERPL-MCNC: 0.9 MG/DL (ref 0.6–1.3)
CREAT SERPL-MCNC: 0.93 MG/DL (ref 0.6–1.3)
CREAT SERPL-MCNC: 1.59 MG/DL (ref 0.6–1.3)
CREAT SERPL-MCNC: 1.61 MG/DL (ref 0.6–1.3)
EOSINOPHIL # BLD AUTO: 0.1 THOUSAND/ΜL (ref 0–0.61)
EOSINOPHIL # BLD AUTO: 0.11 THOUSAND/ΜL (ref 0–0.61)
EOSINOPHIL # BLD MANUAL: 0 THOUSAND/UL (ref 0–0.4)
EOSINOPHIL # BLD MANUAL: 0 THOUSAND/UL (ref 0–0.4)
EOSINOPHIL NFR BLD AUTO: 2 % (ref 0–6)
EOSINOPHIL NFR BLD AUTO: 2 % (ref 0–6)
EOSINOPHIL NFR BLD MANUAL: 0 % (ref 0–6)
EOSINOPHIL NFR BLD MANUAL: 0 % (ref 0–6)
ERYTHROCYTE [DISTWIDTH] IN BLOOD BY AUTOMATED COUNT: 17.6 % (ref 11.6–15.1)
ERYTHROCYTE [DISTWIDTH] IN BLOOD BY AUTOMATED COUNT: 17.9 % (ref 11.6–15.1)
ERYTHROCYTE [DISTWIDTH] IN BLOOD BY AUTOMATED COUNT: 18.3 % (ref 11.6–15.1)
ERYTHROCYTE [DISTWIDTH] IN BLOOD BY AUTOMATED COUNT: 19.9 % (ref 11.6–15.1)
ERYTHROCYTE [DISTWIDTH] IN BLOOD BY AUTOMATED COUNT: 21.2 % (ref 11.6–15.1)
GFR SERPL CREATININE-BSD FRML MDRD: 31 ML/MIN/1.73SQ M
GFR SERPL CREATININE-BSD FRML MDRD: 32 ML/MIN/1.73SQ M
GFR SERPL CREATININE-BSD FRML MDRD: 60 ML/MIN/1.73SQ M
GFR SERPL CREATININE-BSD FRML MDRD: 63 ML/MIN/1.73SQ M
GFR SERPL CREATININE-BSD FRML MDRD: 72 ML/MIN/1.73SQ M
GLUCOSE SERPL-MCNC: 150 MG/DL (ref 65–140)
GLUCOSE SERPL-MCNC: 165 MG/DL (ref 65–140)
GLUCOSE SERPL-MCNC: 177 MG/DL (ref 65–140)
GLUCOSE SERPL-MCNC: 185 MG/DL (ref 65–140)
GLUCOSE SERPL-MCNC: 189 MG/DL (ref 65–140)
GLUCOSE SERPL-MCNC: 197 MG/DL (ref 65–140)
GLUCOSE SERPL-MCNC: 198 MG/DL (ref 65–140)
GLUCOSE SERPL-MCNC: 201 MG/DL (ref 65–140)
GLUCOSE SERPL-MCNC: 203 MG/DL (ref 65–140)
GLUCOSE SERPL-MCNC: 213 MG/DL (ref 65–140)
GLUCOSE SERPL-MCNC: 236 MG/DL (ref 65–140)
GLUCOSE SERPL-MCNC: 259 MG/DL (ref 65–140)
GLUCOSE SERPL-MCNC: 285 MG/DL (ref 65–140)
GLUCOSE SERPL-MCNC: 293 MG/DL (ref 65–140)
GLUCOSE SERPL-MCNC: 296 MG/DL (ref 65–140)
GLUCOSE SERPL-MCNC: 307 MG/DL (ref 65–140)
GLUCOSE SERPL-MCNC: 358 MG/DL (ref 65–140)
GLUCOSE SERPL-MCNC: 363 MG/DL (ref 65–140)
GLUCOSE SERPL-MCNC: 369 MG/DL (ref 65–140)
GLUCOSE SERPL-MCNC: 370 MG/DL (ref 65–140)
GLUCOSE SERPL-MCNC: 390 MG/DL (ref 65–140)
GLUCOSE SERPL-MCNC: 430 MG/DL (ref 65–140)
HCT VFR BLD AUTO: 26.4 % (ref 34.8–46.1)
HCT VFR BLD AUTO: 32.7 % (ref 34.8–46.1)
HCT VFR BLD AUTO: 32.8 % (ref 34.8–46.1)
HCT VFR BLD AUTO: 36.1 % (ref 34.8–46.1)
HCT VFR BLD AUTO: 42 % (ref 34.8–46.1)
HGB BLD-MCNC: 10 G/DL (ref 11.5–15.4)
HGB BLD-MCNC: 10.7 G/DL (ref 11.5–15.4)
HGB BLD-MCNC: 13.3 G/DL (ref 11.5–15.4)
HGB BLD-MCNC: 7.9 G/DL (ref 11.5–15.4)
HGB BLD-MCNC: 9.9 G/DL (ref 11.5–15.4)
HOLD SPECIMEN: NORMAL
INR PPP: 2.31 (ref 0.86–1.16)
LIPASE SERPL-CCNC: 46 U/L (ref 73–393)
LIPASE SERPL-CCNC: 47 U/L (ref 73–393)
LYMPHOCYTES # BLD AUTO: 0.66 THOUSAND/UL (ref 0.6–4.47)
LYMPHOCYTES # BLD AUTO: 0.78 THOUSANDS/ΜL (ref 0.6–4.47)
LYMPHOCYTES # BLD AUTO: 1.01 THOUSANDS/ΜL (ref 0.6–4.47)
LYMPHOCYTES # BLD AUTO: 16 % (ref 14–44)
LYMPHOCYTES # BLD AUTO: 2.41 THOUSAND/UL (ref 0.6–4.47)
LYMPHOCYTES # BLD AUTO: 5 % (ref 14–44)
LYMPHOCYTES NFR BLD AUTO: 14 % (ref 14–44)
LYMPHOCYTES NFR BLD AUTO: 20 % (ref 14–44)
MAGNESIUM SERPL-MCNC: 2.2 MG/DL (ref 1.6–2.6)
MCH RBC QN AUTO: 27.9 PG (ref 26.8–34.3)
MCH RBC QN AUTO: 28 PG (ref 26.8–34.3)
MCH RBC QN AUTO: 28.2 PG (ref 26.8–34.3)
MCH RBC QN AUTO: 31.1 PG (ref 26.8–34.3)
MCH RBC QN AUTO: 31.4 PG (ref 26.8–34.3)
MCHC RBC AUTO-ENTMCNC: 29.6 G/DL (ref 31.4–37.4)
MCHC RBC AUTO-ENTMCNC: 29.9 G/DL (ref 31.4–37.4)
MCHC RBC AUTO-ENTMCNC: 30.3 G/DL (ref 31.4–37.4)
MCHC RBC AUTO-ENTMCNC: 30.5 G/DL (ref 31.4–37.4)
MCHC RBC AUTO-ENTMCNC: 31.7 G/DL (ref 31.4–37.4)
MCV RBC AUTO: 104 FL (ref 82–98)
MCV RBC AUTO: 92 FL (ref 82–98)
MCV RBC AUTO: 93 FL (ref 82–98)
MCV RBC AUTO: 94 FL (ref 82–98)
MCV RBC AUTO: 99 FL (ref 82–98)
METAMYELOCYTES NFR BLD MANUAL: 1 % (ref 0–1)
MONOCYTES # BLD AUTO: 0.37 THOUSAND/ΜL (ref 0.17–1.22)
MONOCYTES # BLD AUTO: 0.56 THOUSAND/ΜL (ref 0.17–1.22)
MONOCYTES # BLD AUTO: 0.6 THOUSAND/UL (ref 0–1.22)
MONOCYTES # BLD AUTO: 0.8 THOUSAND/UL (ref 0–1.22)
MONOCYTES NFR BLD AUTO: 10 % (ref 4–12)
MONOCYTES NFR BLD AUTO: 7 % (ref 4–12)
MONOCYTES NFR BLD: 4 % (ref 4–12)
MONOCYTES NFR BLD: 6 % (ref 4–12)
NEUTROPHILS # BLD AUTO: 3.52 THOUSANDS/ΜL (ref 1.85–7.62)
NEUTROPHILS # BLD AUTO: 4.14 THOUSANDS/ΜL (ref 1.85–7.62)
NEUTROPHILS # BLD MANUAL: 11.53 THOUSAND/UL (ref 1.85–7.62)
NEUTROPHILS # BLD MANUAL: 11.61 THOUSAND/UL (ref 1.85–7.62)
NEUTS BAND NFR BLD MANUAL: 2 % (ref 0–8)
NEUTS SEG NFR BLD AUTO: 71 % (ref 43–75)
NEUTS SEG NFR BLD AUTO: 74 % (ref 43–75)
NEUTS SEG NFR BLD AUTO: 75 % (ref 43–75)
NEUTS SEG NFR BLD AUTO: 87 % (ref 43–75)
NRBC BLD AUTO-RTO: 0 /100 WBCS
NRBC BLD AUTO-RTO: 0 /100 WBCS
NT-PROBNP SERPL-MCNC: 3773 PG/ML
P AXIS: 69 DEGREES
PLATELET # BLD AUTO: 118 THOUSANDS/UL (ref 149–390)
PLATELET # BLD AUTO: 283 THOUSANDS/UL (ref 149–390)
PLATELET # BLD AUTO: 372 THOUSANDS/UL (ref 149–390)
PLATELET # BLD AUTO: 66 THOUSANDS/UL (ref 149–390)
PLATELET # BLD AUTO: 77 THOUSANDS/UL (ref 149–390)
PLATELET BLD QL SMEAR: ADEQUATE
PLATELET BLD QL SMEAR: ADEQUATE
PMV BLD AUTO: 10.4 FL (ref 8.9–12.7)
PMV BLD AUTO: 10.6 FL (ref 8.9–12.7)
PMV BLD AUTO: 10.7 FL (ref 8.9–12.7)
PMV BLD AUTO: 10.7 FL (ref 8.9–12.7)
PMV BLD AUTO: 11.4 FL (ref 8.9–12.7)
POLYCHROMASIA BLD QL SMEAR: PRESENT
POTASSIUM SERPL-SCNC: 4.1 MMOL/L (ref 3.5–5.3)
POTASSIUM SERPL-SCNC: 4.2 MMOL/L (ref 3.5–5.3)
POTASSIUM SERPL-SCNC: 4.3 MMOL/L (ref 3.5–5.3)
POTASSIUM SERPL-SCNC: 4.3 MMOL/L (ref 3.5–5.3)
POTASSIUM SERPL-SCNC: 6 MMOL/L (ref 3.5–5.3)
PR INTERVAL: 140 MS
PROT SERPL-MCNC: 4.8 G/DL (ref 6.4–8.2)
PROT SERPL-MCNC: 6.1 G/DL (ref 6.4–8.2)
PROT SERPL-MCNC: 7.3 G/DL (ref 6.4–8.2)
PROTHROMBIN TIME: 25.5 SECONDS (ref 12.1–14.4)
QRS AXIS: -57 DEGREES
QRSD INTERVAL: 126 MS
QT INTERVAL: 360 MS
QTC INTERVAL: 473 MS
RBC # BLD AUTO: 2.54 MILLION/UL (ref 3.81–5.12)
RBC # BLD AUTO: 3.53 MILLION/UL (ref 3.81–5.12)
RBC # BLD AUTO: 3.55 MILLION/UL (ref 3.81–5.12)
RBC # BLD AUTO: 3.84 MILLION/UL (ref 3.81–5.12)
RBC # BLD AUTO: 4.23 MILLION/UL (ref 3.81–5.12)
RH BLD: POSITIVE
SODIUM SERPL-SCNC: 135 MMOL/L (ref 136–145)
SODIUM SERPL-SCNC: 136 MMOL/L (ref 136–145)
SODIUM SERPL-SCNC: 139 MMOL/L (ref 136–145)
SODIUM SERPL-SCNC: 139 MMOL/L (ref 136–145)
SODIUM SERPL-SCNC: 140 MMOL/L (ref 136–145)
SPECIMEN EXPIRATION DATE: NORMAL
T WAVE AXIS: 106 DEGREES
TOTAL CELLS COUNTED SPEC: 100
TOTAL CELLS COUNTED SPEC: 100
TROPONIN I SERPL-MCNC: 0.05 NG/ML
VARIANT LYMPHS # BLD AUTO: 2 %
VARIANT LYMPHS # BLD AUTO: 2 %
VENTRICULAR RATE: 104 BPM
WBC # BLD AUTO: 13.25 THOUSAND/UL (ref 4.31–10.16)
WBC # BLD AUTO: 15.08 THOUSAND/UL (ref 4.31–10.16)
WBC # BLD AUTO: 4.68 THOUSAND/UL (ref 4.31–10.16)
WBC # BLD AUTO: 5.05 THOUSAND/UL (ref 4.31–10.16)
WBC # BLD AUTO: 5.62 THOUSAND/UL (ref 4.31–10.16)

## 2018-01-01 PROCEDURE — 83690 ASSAY OF LIPASE: CPT

## 2018-01-01 PROCEDURE — 93005 ELECTROCARDIOGRAM TRACING: CPT | Performed by: EMERGENCY MEDICINE

## 2018-01-01 PROCEDURE — 99285 EMERGENCY DEPT VISIT HI MDM: CPT

## 2018-01-01 PROCEDURE — 85027 COMPLETE CBC AUTOMATED: CPT | Performed by: PHYSICIAN ASSISTANT

## 2018-01-01 PROCEDURE — 97167 OT EVAL HIGH COMPLEX 60 MIN: CPT

## 2018-01-01 PROCEDURE — 96415 CHEMO IV INFUSION ADDL HR: CPT

## 2018-01-01 PROCEDURE — 82948 REAGENT STRIP/BLOOD GLUCOSE: CPT

## 2018-01-01 PROCEDURE — 96417 CHEMO IV INFUS EACH ADDL SEQ: CPT

## 2018-01-01 PROCEDURE — 80048 BASIC METABOLIC PNL TOTAL CA: CPT | Performed by: PHYSICIAN ASSISTANT

## 2018-01-01 PROCEDURE — G8978 MOBILITY CURRENT STATUS: HCPCS

## 2018-01-01 PROCEDURE — 93005 ELECTROCARDIOGRAM TRACING: CPT

## 2018-01-01 PROCEDURE — 99214 OFFICE O/P EST MOD 30 MIN: CPT | Performed by: INTERNAL MEDICINE

## 2018-01-01 PROCEDURE — 90686 IIV4 VACC NO PRSV 0.5 ML IM: CPT | Performed by: INTERNAL MEDICINE

## 2018-01-01 PROCEDURE — 37191 INS ENDOVAS VENA CAVA FILTR: CPT

## 2018-01-01 PROCEDURE — 96413 CHEMO IV INFUSION 1 HR: CPT

## 2018-01-01 PROCEDURE — G0008 ADMIN INFLUENZA VIRUS VAC: HCPCS | Performed by: INTERNAL MEDICINE

## 2018-01-01 PROCEDURE — C1880 VENA CAVA FILTER: HCPCS

## 2018-01-01 PROCEDURE — 83880 ASSAY OF NATRIURETIC PEPTIDE: CPT | Performed by: EMERGENCY MEDICINE

## 2018-01-01 PROCEDURE — 85007 BL SMEAR W/DIFF WBC COUNT: CPT

## 2018-01-01 PROCEDURE — C1894 INTRO/SHEATH, NON-LASER: HCPCS

## 2018-01-01 PROCEDURE — 85007 BL SMEAR W/DIFF WBC COUNT: CPT | Performed by: EMERGENCY MEDICINE

## 2018-01-01 PROCEDURE — 80053 COMPREHEN METABOLIC PANEL: CPT | Performed by: EMERGENCY MEDICINE

## 2018-01-01 PROCEDURE — 97163 PT EVAL HIGH COMPLEX 45 MIN: CPT

## 2018-01-01 PROCEDURE — 0DBK8ZX EXCISION OF ASCENDING COLON, VIA NATURAL OR ARTIFICIAL OPENING ENDOSCOPIC, DIAGNOSTIC: ICD-10-PCS | Performed by: INTERNAL MEDICINE

## 2018-01-01 PROCEDURE — 86850 RBC ANTIBODY SCREEN: CPT | Performed by: EMERGENCY MEDICINE

## 2018-01-01 PROCEDURE — 85027 COMPLETE CBC AUTOMATED: CPT

## 2018-01-01 PROCEDURE — 80048 BASIC METABOLIC PNL TOTAL CA: CPT | Performed by: INTERNAL MEDICINE

## 2018-01-01 PROCEDURE — 85610 PROTHROMBIN TIME: CPT | Performed by: EMERGENCY MEDICINE

## 2018-01-01 PROCEDURE — 96374 THER/PROPH/DIAG INJ IV PUSH: CPT

## 2018-01-01 PROCEDURE — 96367 TX/PROPH/DG ADDL SEQ IV INF: CPT

## 2018-01-01 PROCEDURE — 97535 SELF CARE MNGMENT TRAINING: CPT

## 2018-01-01 PROCEDURE — 74176 CT ABD & PELVIS W/O CONTRAST: CPT

## 2018-01-01 PROCEDURE — 96375 TX/PRO/DX INJ NEW DRUG ADDON: CPT

## 2018-01-01 PROCEDURE — G8987 SELF CARE CURRENT STATUS: HCPCS

## 2018-01-01 PROCEDURE — 85730 THROMBOPLASTIN TIME PARTIAL: CPT | Performed by: INTERNAL MEDICINE

## 2018-01-01 PROCEDURE — 0DBN8ZX EXCISION OF SIGMOID COLON, VIA NATURAL OR ARTIFICIAL OPENING ENDOSCOPIC, DIAGNOSTIC: ICD-10-PCS | Performed by: INTERNAL MEDICINE

## 2018-01-01 PROCEDURE — 36415 COLL VENOUS BLD VENIPUNCTURE: CPT

## 2018-01-01 PROCEDURE — 99214 OFFICE O/P EST MOD 30 MIN: CPT | Performed by: PHYSICIAN ASSISTANT

## 2018-01-01 PROCEDURE — C9113 INJ PANTOPRAZOLE SODIUM, VIA: HCPCS | Performed by: HOSPITALIST

## 2018-01-01 PROCEDURE — 80053 COMPREHEN METABOLIC PANEL: CPT | Performed by: PHYSICIAN ASSISTANT

## 2018-01-01 PROCEDURE — 84484 ASSAY OF TROPONIN QUANT: CPT | Performed by: EMERGENCY MEDICINE

## 2018-01-01 PROCEDURE — 0DBH8ZX EXCISION OF CECUM, VIA NATURAL OR ARTIFICIAL OPENING ENDOSCOPIC, DIAGNOSTIC: ICD-10-PCS | Performed by: INTERNAL MEDICINE

## 2018-01-01 PROCEDURE — 88305 TISSUE EXAM BY PATHOLOGIST: CPT | Performed by: INTERNAL MEDICINE

## 2018-01-01 PROCEDURE — 85730 THROMBOPLASTIN TIME PARTIAL: CPT | Performed by: EMERGENCY MEDICINE

## 2018-01-01 PROCEDURE — 83690 ASSAY OF LIPASE: CPT | Performed by: EMERGENCY MEDICINE

## 2018-01-01 PROCEDURE — 86901 BLOOD TYPING SEROLOGIC RH(D): CPT | Performed by: EMERGENCY MEDICINE

## 2018-01-01 PROCEDURE — 83735 ASSAY OF MAGNESIUM: CPT | Performed by: EMERGENCY MEDICINE

## 2018-01-01 PROCEDURE — 06H03DZ INSERTION OF INTRALUMINAL DEVICE INTO INFERIOR VENA CAVA, PERCUTANEOUS APPROACH: ICD-10-PCS | Performed by: RADIOLOGY

## 2018-01-01 PROCEDURE — 86900 BLOOD TYPING SEROLOGIC ABO: CPT | Performed by: EMERGENCY MEDICINE

## 2018-01-01 PROCEDURE — C1769 GUIDE WIRE: HCPCS

## 2018-01-01 PROCEDURE — G8988 SELF CARE GOAL STATUS: HCPCS

## 2018-01-01 PROCEDURE — 85025 COMPLETE CBC W/AUTO DIFF WBC: CPT | Performed by: INTERNAL MEDICINE

## 2018-01-01 PROCEDURE — 36415 COLL VENOUS BLD VENIPUNCTURE: CPT | Performed by: EMERGENCY MEDICINE

## 2018-01-01 PROCEDURE — 93010 ELECTROCARDIOGRAM REPORT: CPT | Performed by: INTERNAL MEDICINE

## 2018-01-01 PROCEDURE — 71045 X-RAY EXAM CHEST 1 VIEW: CPT

## 2018-01-01 PROCEDURE — 85025 COMPLETE CBC W/AUTO DIFF WBC: CPT | Performed by: PHYSICIAN ASSISTANT

## 2018-01-01 PROCEDURE — 85027 COMPLETE CBC AUTOMATED: CPT | Performed by: EMERGENCY MEDICINE

## 2018-01-01 PROCEDURE — G8979 MOBILITY GOAL STATUS: HCPCS

## 2018-01-01 PROCEDURE — 0DBM8ZX EXCISION OF DESCENDING COLON, VIA NATURAL OR ARTIFICIAL OPENING ENDOSCOPIC, DIAGNOSTIC: ICD-10-PCS | Performed by: INTERNAL MEDICINE

## 2018-01-01 PROCEDURE — 93308 TTE F-UP OR LMTD: CPT

## 2018-01-01 PROCEDURE — 0W3P8ZZ CONTROL BLEEDING IN GASTROINTESTINAL TRACT, VIA NATURAL OR ARTIFICIAL OPENING ENDOSCOPIC: ICD-10-PCS | Performed by: INTERNAL MEDICINE

## 2018-01-01 PROCEDURE — 80053 COMPREHEN METABOLIC PANEL: CPT

## 2018-01-01 RX ORDER — ENOXAPARIN SODIUM 300 MG/3ML
1 INJECTION INTRAVENOUS; SUBCUTANEOUS EVERY 12 HOURS SCHEDULED
Status: DISCONTINUED | OUTPATIENT
Start: 2018-01-01 | End: 2018-01-01

## 2018-01-01 RX ORDER — RIVASTIGMINE 4.6 MG/24H
1 PATCH, EXTENDED RELEASE TRANSDERMAL DAILY
COMMUNITY

## 2018-01-01 RX ORDER — FENTANYL CITRATE 50 UG/ML
25 INJECTION, SOLUTION INTRAMUSCULAR; INTRAVENOUS ONCE
Status: COMPLETED | OUTPATIENT
Start: 2018-01-01 | End: 2018-01-01

## 2018-01-01 RX ORDER — GUAIFENESIN 600 MG
600 TABLET, EXTENDED RELEASE 12 HR ORAL EVERY 12 HOURS SCHEDULED
Status: DISCONTINUED | OUTPATIENT
Start: 2018-01-01 | End: 2018-01-01 | Stop reason: HOSPADM

## 2018-01-01 RX ORDER — MORPHINE SULFATE 4 MG/ML
4 INJECTION, SOLUTION INTRAMUSCULAR; INTRAVENOUS EVERY 4 HOURS PRN
Status: DISCONTINUED | OUTPATIENT
Start: 2018-01-01 | End: 2018-01-01 | Stop reason: HOSPADM

## 2018-01-01 RX ORDER — SODIUM CHLORIDE 9 MG/ML
20 INJECTION, SOLUTION INTRAVENOUS CONTINUOUS
Status: DISCONTINUED | OUTPATIENT
Start: 2018-01-01 | End: 2018-01-01 | Stop reason: SDUPTHER

## 2018-01-01 RX ORDER — ACETAMINOPHEN 325 MG/1
650 TABLET ORAL EVERY 6 HOURS PRN
Status: DISCONTINUED | OUTPATIENT
Start: 2018-01-01 | End: 2018-01-01 | Stop reason: HOSPADM

## 2018-01-01 RX ORDER — TRAMADOL HYDROCHLORIDE 50 MG/1
50 TABLET ORAL EVERY 4 HOURS PRN
Qty: 30 TABLET | Refills: 0 | Status: SHIPPED | OUTPATIENT
Start: 2018-01-01 | End: 2018-01-01

## 2018-01-01 RX ORDER — SENNOSIDES 8.6 MG
1 TABLET ORAL DAILY
Qty: 120 EACH | Refills: 0 | Status: SHIPPED | OUTPATIENT
Start: 2018-01-01

## 2018-01-01 RX ORDER — FENTANYL CITRATE 50 UG/ML
INJECTION, SOLUTION INTRAMUSCULAR; INTRAVENOUS CODE/TRAUMA/SEDATION MEDICATION
Status: DISCONTINUED | OUTPATIENT
Start: 2018-01-01 | End: 2018-01-01 | Stop reason: HOSPADM

## 2018-01-01 RX ORDER — DEXTROSE MONOHYDRATE 50 MG/ML
20 INJECTION, SOLUTION INTRAVENOUS CONTINUOUS
Status: DISPENSED | OUTPATIENT
Start: 2018-01-01 | End: 2018-01-01

## 2018-01-01 RX ORDER — LORAZEPAM 2 MG/ML
0.5 INJECTION INTRAMUSCULAR ONCE
Status: COMPLETED | OUTPATIENT
Start: 2018-01-01 | End: 2018-01-01

## 2018-01-01 RX ORDER — SODIUM CHLORIDE 9 MG/ML
20 INJECTION, SOLUTION INTRAVENOUS CONTINUOUS
Status: DISPENSED | OUTPATIENT
Start: 2018-01-01 | End: 2018-01-01

## 2018-01-01 RX ORDER — SODIUM CHLORIDE 9 MG/ML
20 INJECTION, SOLUTION INTRAVENOUS CONTINUOUS
Status: DISCONTINUED | OUTPATIENT
Start: 2018-01-01 | End: 2018-01-01

## 2018-01-01 RX ORDER — PANTOPRAZOLE SODIUM 40 MG/1
40 TABLET, DELAYED RELEASE ORAL
Status: DISCONTINUED | OUTPATIENT
Start: 2018-01-01 | End: 2018-01-01 | Stop reason: HOSPADM

## 2018-01-01 RX ORDER — DEXTROSE MONOHYDRATE 50 MG/ML
20 INJECTION, SOLUTION INTRAVENOUS CONTINUOUS
Status: DISCONTINUED | OUTPATIENT
Start: 2018-01-01 | End: 2018-01-01 | Stop reason: SDUPTHER

## 2018-01-01 RX ORDER — INSULIN GLARGINE 100 [IU]/ML
15 INJECTION, SOLUTION SUBCUTANEOUS EVERY MORNING
Status: DISCONTINUED | OUTPATIENT
Start: 2018-01-01 | End: 2018-01-01 | Stop reason: HOSPADM

## 2018-01-01 RX ORDER — DEXTROSE MONOHYDRATE 50 MG/ML
20 INJECTION, SOLUTION INTRAVENOUS CONTINUOUS
Status: DISCONTINUED | OUTPATIENT
Start: 2018-01-01 | End: 2018-01-01

## 2018-01-01 RX ORDER — PROPOFOL 10 MG/ML
INJECTION, EMULSION INTRAVENOUS CONTINUOUS PRN
Status: DISCONTINUED | OUTPATIENT
Start: 2018-01-01 | End: 2018-01-01 | Stop reason: SURG

## 2018-01-01 RX ORDER — ONDANSETRON 2 MG/ML
4 INJECTION INTRAMUSCULAR; INTRAVENOUS ONCE
Status: DISCONTINUED | OUTPATIENT
Start: 2018-01-01 | End: 2018-01-01 | Stop reason: HOSPADM

## 2018-01-01 RX ORDER — TRAMADOL HYDROCHLORIDE 50 MG/1
50 TABLET ORAL EVERY 4 HOURS PRN
Status: DISCONTINUED | OUTPATIENT
Start: 2018-01-01 | End: 2018-01-01 | Stop reason: HOSPADM

## 2018-01-01 RX ORDER — SODIUM CHLORIDE 9 MG/ML
INJECTION, SOLUTION INTRAVENOUS CONTINUOUS PRN
Status: DISCONTINUED | OUTPATIENT
Start: 2018-01-01 | End: 2018-01-01 | Stop reason: SURG

## 2018-01-01 RX ORDER — RIVASTIGMINE 4.6 MG/24H
4.6 PATCH, EXTENDED RELEASE TRANSDERMAL DAILY
Status: DISCONTINUED | OUTPATIENT
Start: 2018-01-01 | End: 2018-01-01 | Stop reason: HOSPADM

## 2018-01-01 RX ORDER — ONDANSETRON 2 MG/ML
4 INJECTION INTRAMUSCULAR; INTRAVENOUS ONCE
Status: COMPLETED | OUTPATIENT
Start: 2018-01-01 | End: 2018-01-01

## 2018-01-01 RX ORDER — ALBUTEROL SULFATE 90 UG/1
2 AEROSOL, METERED RESPIRATORY (INHALATION) EVERY 4 HOURS PRN
Status: DISCONTINUED | OUTPATIENT
Start: 2018-01-01 | End: 2018-01-01 | Stop reason: HOSPADM

## 2018-01-01 RX ORDER — PROPOFOL 10 MG/ML
INJECTION, EMULSION INTRAVENOUS AS NEEDED
Status: DISCONTINUED | OUTPATIENT
Start: 2018-01-01 | End: 2018-01-01 | Stop reason: SURG

## 2018-01-01 RX ORDER — ASPIRIN 325 MG
325 TABLET ORAL DAILY
COMMUNITY

## 2018-01-01 RX ORDER — ALPRAZOLAM 0.25 MG/1
0.25 TABLET ORAL
COMMUNITY

## 2018-01-01 RX ORDER — DEXTROSE MONOHYDRATE 50 MG/ML
20 INJECTION, SOLUTION INTRAVENOUS CONTINUOUS
Status: DISCONTINUED | OUTPATIENT
Start: 2018-01-01 | End: 2018-01-01 | Stop reason: HOSPADM

## 2018-01-01 RX ORDER — SACCHAROMYCES BOULARDII 250 MG
250 CAPSULE ORAL DAILY
COMMUNITY

## 2018-01-01 RX ORDER — METOCLOPRAMIDE HYDROCHLORIDE 5 MG/ML
10 INJECTION INTRAMUSCULAR; INTRAVENOUS ONCE
Status: COMPLETED | OUTPATIENT
Start: 2018-01-01 | End: 2018-01-01

## 2018-01-01 RX ORDER — TRIAMTERENE AND HYDROCHLOROTHIAZIDE 37.5; 25 MG/1; MG/1
1 CAPSULE ORAL EVERY MORNING
COMMUNITY

## 2018-01-01 RX ORDER — GUAIFENESIN 600 MG
600 TABLET, EXTENDED RELEASE 12 HR ORAL EVERY 12 HOURS SCHEDULED
Qty: 30 TABLET | Refills: 0 | Status: SHIPPED | OUTPATIENT
Start: 2018-01-01 | End: 2018-01-01 | Stop reason: ALTCHOICE

## 2018-01-01 RX ORDER — HYDROCODONE BITARTRATE AND ACETAMINOPHEN 5; 325 MG/1; MG/1
1 TABLET ORAL EVERY 6 HOURS PRN
COMMUNITY

## 2018-01-01 RX ORDER — POTASSIUM CHLORIDE 750 MG/1
10 CAPSULE, EXTENDED RELEASE ORAL 2 TIMES DAILY
COMMUNITY
End: 2018-01-01 | Stop reason: ALTCHOICE

## 2018-01-01 RX ORDER — SODIUM CHLORIDE 9 MG/ML
125 INJECTION, SOLUTION INTRAVENOUS CONTINUOUS
Status: DISCONTINUED | OUTPATIENT
Start: 2018-01-01 | End: 2018-01-01 | Stop reason: HOSPADM

## 2018-01-01 RX ORDER — BUSPIRONE HYDROCHLORIDE 5 MG/1
5 TABLET ORAL 2 TIMES DAILY
COMMUNITY

## 2018-01-01 RX ADMIN — HYDRALAZINE HYDROCHLORIDE 50 MG: 25 TABLET, FILM COATED ORAL at 16:38

## 2018-01-01 RX ADMIN — MINERAL OIL AND WHITE PETROLATUM: 150; 830 OINTMENT OPHTHALMIC at 21:47

## 2018-01-01 RX ADMIN — PANTOPRAZOLE SODIUM 40 MG: 40 INJECTION, POWDER, FOR SOLUTION INTRAVENOUS at 08:15

## 2018-01-01 RX ADMIN — PREDNISONE 1 MG: 1 TABLET ORAL at 17:45

## 2018-01-01 RX ADMIN — ALPRAZOLAM 0.25 MG: 0.25 TABLET ORAL at 21:28

## 2018-01-01 RX ADMIN — MINERAL OIL AND WHITE PETROLATUM: 150; 830 OINTMENT OPHTHALMIC at 21:08

## 2018-01-01 RX ADMIN — HYDRALAZINE HYDROCHLORIDE 50 MG: 25 TABLET, FILM COATED ORAL at 20:50

## 2018-01-01 RX ADMIN — INSULIN GLARGINE 15 UNITS: 100 INJECTION, SOLUTION SUBCUTANEOUS at 10:02

## 2018-01-01 RX ADMIN — Medication 325 MG: at 16:39

## 2018-01-01 RX ADMIN — DOCUSATE SODIUM 100 MG: 100 CAPSULE, LIQUID FILLED ORAL at 10:13

## 2018-01-01 RX ADMIN — METOPROLOL TARTRATE 50 MG: 50 TABLET ORAL at 21:20

## 2018-01-01 RX ADMIN — INSULIN LISPRO 2 UNITS: 100 INJECTION, SOLUTION INTRAVENOUS; SUBCUTANEOUS at 08:20

## 2018-01-01 RX ADMIN — ENOXAPARIN SODIUM 80 MG: 80 INJECTION SUBCUTANEOUS at 06:35

## 2018-01-01 RX ADMIN — TRAMADOL HYDROCHLORIDE 50 MG: 50 TABLET ORAL at 21:08

## 2018-01-01 RX ADMIN — OXALIPLATIN 134 MG: 5 INJECTION, SOLUTION, CONCENTRATE INTRAVENOUS at 12:07

## 2018-01-01 RX ADMIN — PANTOPRAZOLE SODIUM 40 MG: 40 INJECTION, POWDER, FOR SOLUTION INTRAVENOUS at 10:14

## 2018-01-01 RX ADMIN — PANTOPRAZOLE SODIUM 40 MG: 40 INJECTION, POWDER, FOR SOLUTION INTRAVENOUS at 20:49

## 2018-01-01 RX ADMIN — FLUOXETINE 20 MG: 20 CAPSULE ORAL at 10:15

## 2018-01-01 RX ADMIN — INSULIN LISPRO 4 UNITS: 100 INJECTION, SOLUTION INTRAVENOUS; SUBCUTANEOUS at 10:15

## 2018-01-01 RX ADMIN — DONEPEZIL HYDROCHLORIDE 5 MG: 5 TABLET, FILM COATED ORAL at 21:00

## 2018-01-01 RX ADMIN — Medication 325 MG: at 17:40

## 2018-01-01 RX ADMIN — INSULIN LISPRO 6 UNITS: 100 INJECTION, SOLUTION INTRAVENOUS; SUBCUTANEOUS at 17:47

## 2018-01-01 RX ADMIN — SENNOSIDES 8.6 MG: 8.6 TABLET, FILM COATED ORAL at 16:05

## 2018-01-01 RX ADMIN — DEXAMETHASONE SODIUM PHOSPHATE: 10 INJECTION, SOLUTION INTRAMUSCULAR; INTRAVENOUS at 10:38

## 2018-01-01 RX ADMIN — ONDANSETRON 4 MG: 2 INJECTION INTRAMUSCULAR; INTRAVENOUS at 09:25

## 2018-01-01 RX ADMIN — CYPROHEPTADINE HYDROCHLORIDE 4 MG: 4 TABLET ORAL at 21:08

## 2018-01-01 RX ADMIN — CYPROHEPTADINE HYDROCHLORIDE 4 MG: 4 TABLET ORAL at 21:00

## 2018-01-01 RX ADMIN — ASPIRIN 81 MG: 81 TABLET, COATED ORAL at 10:03

## 2018-01-01 RX ADMIN — INSULIN LISPRO 1 UNITS: 100 INJECTION, SOLUTION INTRAVENOUS; SUBCUTANEOUS at 22:41

## 2018-01-01 RX ADMIN — GUAIFENESIN 600 MG: 600 TABLET, EXTENDED RELEASE ORAL at 16:05

## 2018-01-01 RX ADMIN — GUAIFENESIN 600 MG: 600 TABLET, EXTENDED RELEASE ORAL at 20:50

## 2018-01-01 RX ADMIN — INSULIN LISPRO 6 UNITS: 100 INJECTION, SOLUTION INTRAVENOUS; SUBCUTANEOUS at 21:48

## 2018-01-01 RX ADMIN — PREDNISONE 1 MG: 1 TABLET ORAL at 10:03

## 2018-01-01 RX ADMIN — GUAIFENESIN 600 MG: 600 TABLET, EXTENDED RELEASE ORAL at 21:47

## 2018-01-01 RX ADMIN — DONEPEZIL HYDROCHLORIDE 5 MG: 5 TABLET, FILM COATED ORAL at 21:04

## 2018-01-01 RX ADMIN — DEXTROSE 20 ML/HR: 5 SOLUTION INTRAVENOUS at 13:33

## 2018-01-01 RX ADMIN — Medication 325 MG: at 10:14

## 2018-01-01 RX ADMIN — FLUOXETINE 20 MG: 20 CAPSULE ORAL at 10:04

## 2018-01-01 RX ADMIN — SODIUM CHLORIDE: 0.9 INJECTION, SOLUTION INTRAVENOUS at 11:10

## 2018-01-01 RX ADMIN — ASPIRIN 81 MG: 81 TABLET, COATED ORAL at 10:15

## 2018-01-01 RX ADMIN — Medication 325 MG: at 17:46

## 2018-01-01 RX ADMIN — ALPRAZOLAM 0.25 MG: 0.25 TABLET ORAL at 06:35

## 2018-01-01 RX ADMIN — PANTOPRAZOLE SODIUM 40 MG: 40 INJECTION, POWDER, FOR SOLUTION INTRAVENOUS at 09:25

## 2018-01-01 RX ADMIN — PREDNISONE 1 MG: 1 TABLET ORAL at 16:05

## 2018-01-01 RX ADMIN — HYDRALAZINE HYDROCHLORIDE 50 MG: 25 TABLET, FILM COATED ORAL at 17:45

## 2018-01-01 RX ADMIN — METOCLOPRAMIDE 10 MG: 5 INJECTION, SOLUTION INTRAMUSCULAR; INTRAVENOUS at 14:09

## 2018-01-01 RX ADMIN — PANTOPRAZOLE SODIUM 40 MG: 40 INJECTION, POWDER, FOR SOLUTION INTRAVENOUS at 20:52

## 2018-01-01 RX ADMIN — PREDNISONE 1 MG: 1 TABLET ORAL at 20:50

## 2018-01-01 RX ADMIN — PREDNISONE 1 MG: 1 TABLET ORAL at 21:46

## 2018-01-01 RX ADMIN — ALPRAZOLAM 0.25 MG: 0.25 TABLET ORAL at 16:11

## 2018-01-01 RX ADMIN — FENTANYL CITRATE 25 MCG: 50 INJECTION, SOLUTION INTRAMUSCULAR; INTRAVENOUS at 01:35

## 2018-01-01 RX ADMIN — POLYETHYLENE GLYCOL 3350, SODIUM SULFATE ANHYDROUS, SODIUM BICARBONATE, SODIUM CHLORIDE, POTASSIUM CHLORIDE 4000 ML: 236; 22.74; 6.74; 5.86; 2.97 POWDER, FOR SOLUTION ORAL at 17:36

## 2018-01-01 RX ADMIN — ONDANSETRON 4 MG: 2 INJECTION INTRAMUSCULAR; INTRAVENOUS at 18:06

## 2018-01-01 RX ADMIN — ASPIRIN 81 MG: 81 TABLET, COATED ORAL at 09:24

## 2018-01-01 RX ADMIN — CYPROHEPTADINE HYDROCHLORIDE 4 MG: 4 TABLET ORAL at 21:48

## 2018-01-01 RX ADMIN — Medication 325 MG: at 09:24

## 2018-01-01 RX ADMIN — INSULIN LISPRO 6 UNITS: 100 INJECTION, SOLUTION INTRAVENOUS; SUBCUTANEOUS at 17:52

## 2018-01-01 RX ADMIN — PANTOPRAZOLE SODIUM 40 MG: 40 TABLET, DELAYED RELEASE ORAL at 16:06

## 2018-01-01 RX ADMIN — PREDNISONE 1 MG: 1 TABLET ORAL at 21:19

## 2018-01-01 RX ADMIN — ONDANSETRON 4 MG: 2 INJECTION INTRAMUSCULAR; INTRAVENOUS at 05:28

## 2018-01-01 RX ADMIN — INSULIN LISPRO 4 UNITS: 100 INJECTION, SOLUTION INTRAVENOUS; SUBCUTANEOUS at 17:59

## 2018-01-01 RX ADMIN — INSULIN LISPRO 2 UNITS: 100 INJECTION, SOLUTION INTRAVENOUS; SUBCUTANEOUS at 12:28

## 2018-01-01 RX ADMIN — FLUOXETINE 20 MG: 20 CAPSULE ORAL at 09:25

## 2018-01-01 RX ADMIN — PREDNISONE 1 MG: 1 TABLET ORAL at 08:16

## 2018-01-01 RX ADMIN — DOCUSATE SODIUM 100 MG: 100 CAPSULE, LIQUID FILLED ORAL at 08:26

## 2018-01-01 RX ADMIN — GUAIFENESIN 600 MG: 600 TABLET, EXTENDED RELEASE ORAL at 14:45

## 2018-01-01 RX ADMIN — PREDNISONE 1 MG: 1 TABLET ORAL at 17:35

## 2018-01-01 RX ADMIN — PREDNISONE 1 MG: 1 TABLET ORAL at 16:39

## 2018-01-01 RX ADMIN — METOPROLOL TARTRATE 50 MG: 50 TABLET ORAL at 10:03

## 2018-01-01 RX ADMIN — SENNOSIDES 8.6 MG: 8.6 TABLET, FILM COATED ORAL at 08:15

## 2018-01-01 RX ADMIN — RIVASTIGMINE TRANSDERMAL SYSTEM 4.6 MG: 4.6 PATCH, EXTENDED RELEASE TRANSDERMAL at 10:02

## 2018-01-01 RX ADMIN — DONEPEZIL HYDROCHLORIDE 5 MG: 5 TABLET, FILM COATED ORAL at 22:41

## 2018-01-01 RX ADMIN — HYDRALAZINE HYDROCHLORIDE 50 MG: 25 TABLET, FILM COATED ORAL at 16:04

## 2018-01-01 RX ADMIN — IOHEXOL 20 ML: 300 INJECTION, SOLUTION INTRAVENOUS at 14:23

## 2018-01-01 RX ADMIN — HEPARIN 3 UNITS: 100 SYRINGE at 14:15

## 2018-01-01 RX ADMIN — ASPIRIN 81 MG: 81 TABLET, COATED ORAL at 16:05

## 2018-01-01 RX ADMIN — SENNOSIDES 8.6 MG: 8.6 TABLET, FILM COATED ORAL at 10:03

## 2018-01-01 RX ADMIN — METOPROLOL TARTRATE 50 MG: 50 TABLET ORAL at 20:49

## 2018-01-01 RX ADMIN — PREDNISONE 1 MG: 1 TABLET ORAL at 10:15

## 2018-01-01 RX ADMIN — DONEPEZIL HYDROCHLORIDE 5 MG: 5 TABLET, FILM COATED ORAL at 21:46

## 2018-01-01 RX ADMIN — INSULIN LISPRO 6 UNITS: 100 INJECTION, SOLUTION INTRAVENOUS; SUBCUTANEOUS at 10:02

## 2018-01-01 RX ADMIN — GEMCITABINE 1400 MG: 1 INJECTION, POWDER, LYOPHILIZED, FOR SOLUTION INTRAVENOUS at 12:59

## 2018-01-01 RX ADMIN — HEPARIN 3 UNITS: 100 SYRINGE at 15:58

## 2018-01-01 RX ADMIN — RIVASTIGMINE TRANSDERMAL SYSTEM 4.6 MG: 4.6 PATCH, EXTENDED RELEASE TRANSDERMAL at 08:33

## 2018-01-01 RX ADMIN — GUAIFENESIN 600 MG: 600 TABLET, EXTENDED RELEASE ORAL at 10:02

## 2018-01-01 RX ADMIN — HYDRALAZINE HYDROCHLORIDE 50 MG: 25 TABLET, FILM COATED ORAL at 21:19

## 2018-01-01 RX ADMIN — DOCUSATE SODIUM 100 MG: 100 CAPSULE, LIQUID FILLED ORAL at 17:35

## 2018-01-01 RX ADMIN — PANTOPRAZOLE SODIUM 40 MG: 40 INJECTION, POWDER, FOR SOLUTION INTRAVENOUS at 21:19

## 2018-01-01 RX ADMIN — DEXTROSE 20 ML/HR: 5 SOLUTION INTRAVENOUS at 11:56

## 2018-01-01 RX ADMIN — HYDRALAZINE HYDROCHLORIDE 50 MG: 25 TABLET, FILM COATED ORAL at 21:46

## 2018-01-01 RX ADMIN — ENOXAPARIN SODIUM 80 MG: 80 INJECTION SUBCUTANEOUS at 16:06

## 2018-01-01 RX ADMIN — PANTOPRAZOLE SODIUM 40 MG: 40 TABLET, DELAYED RELEASE ORAL at 06:35

## 2018-01-01 RX ADMIN — HYDRALAZINE HYDROCHLORIDE 50 MG: 25 TABLET, FILM COATED ORAL at 09:24

## 2018-01-01 RX ADMIN — METOPROLOL TARTRATE 50 MG: 50 TABLET ORAL at 09:44

## 2018-01-01 RX ADMIN — SODIUM CHLORIDE 20 ML/HR: 0.9 INJECTION, SOLUTION INTRAVENOUS at 11:50

## 2018-01-01 RX ADMIN — PROPOFOL 40 MG: 10 INJECTION, EMULSION INTRAVENOUS at 11:34

## 2018-01-01 RX ADMIN — METOPROLOL TARTRATE 50 MG: 50 TABLET ORAL at 20:50

## 2018-01-01 RX ADMIN — DOCUSATE SODIUM 100 MG: 100 CAPSULE, LIQUID FILLED ORAL at 17:45

## 2018-01-01 RX ADMIN — OXALIPLATIN 134 MG: 5 INJECTION, SOLUTION, CONCENTRATE INTRAVENOUS at 13:42

## 2018-01-01 RX ADMIN — FLUOXETINE 20 MG: 20 CAPSULE ORAL at 16:05

## 2018-01-01 RX ADMIN — FLUOXETINE 20 MG: 20 CAPSULE ORAL at 08:16

## 2018-01-01 RX ADMIN — METOPROLOL TARTRATE 50 MG: 50 TABLET ORAL at 08:15

## 2018-01-01 RX ADMIN — INSULIN GLARGINE 15 UNITS: 100 INJECTION, SOLUTION SUBCUTANEOUS at 17:52

## 2018-01-01 RX ADMIN — HYDRALAZINE HYDROCHLORIDE 50 MG: 25 TABLET, FILM COATED ORAL at 08:15

## 2018-01-01 RX ADMIN — METOPROLOL TARTRATE 50 MG: 50 TABLET ORAL at 10:14

## 2018-01-01 RX ADMIN — HYDRALAZINE HYDROCHLORIDE 50 MG: 25 TABLET, FILM COATED ORAL at 10:14

## 2018-01-01 RX ADMIN — HYDRALAZINE HYDROCHLORIDE 50 MG: 25 TABLET, FILM COATED ORAL at 10:03

## 2018-01-01 RX ADMIN — DEXAMETHASONE SODIUM PHOSPHATE: 10 INJECTION, SOLUTION INTRAMUSCULAR; INTRAVENOUS at 12:20

## 2018-01-01 RX ADMIN — Medication 325 MG: at 06:35

## 2018-01-01 RX ADMIN — DOCUSATE SODIUM 100 MG: 100 CAPSULE, LIQUID FILLED ORAL at 10:03

## 2018-01-01 RX ADMIN — INSULIN LISPRO 6 UNITS: 100 INJECTION, SOLUTION INTRAVENOUS; SUBCUTANEOUS at 21:03

## 2018-01-01 RX ADMIN — RIVASTIGMINE TRANSDERMAL SYSTEM 4.6 MG: 4.6 PATCH, EXTENDED RELEASE TRANSDERMAL at 10:14

## 2018-01-01 RX ADMIN — ONDANSETRON 4 MG: 2 INJECTION INTRAMUSCULAR; INTRAVENOUS at 01:35

## 2018-01-01 RX ADMIN — LORAZEPAM 0.5 MG: 2 INJECTION INTRAMUSCULAR at 14:32

## 2018-01-01 RX ADMIN — INSULIN LISPRO 3 UNITS: 100 INJECTION, SOLUTION INTRAVENOUS; SUBCUTANEOUS at 08:06

## 2018-01-01 RX ADMIN — CYPROHEPTADINE HYDROCHLORIDE 4 MG: 4 TABLET ORAL at 22:40

## 2018-01-01 RX ADMIN — MINERAL OIL AND WHITE PETROLATUM: 150; 830 OINTMENT OPHTHALMIC at 21:00

## 2018-01-01 RX ADMIN — Medication 325 MG: at 16:05

## 2018-01-01 RX ADMIN — ALPRAZOLAM 0.25 MG: 0.25 TABLET ORAL at 09:24

## 2018-01-01 RX ADMIN — DOCUSATE SODIUM 100 MG: 100 CAPSULE, LIQUID FILLED ORAL at 16:05

## 2018-01-01 RX ADMIN — INSULIN LISPRO 6 UNITS: 100 INJECTION, SOLUTION INTRAVENOUS; SUBCUTANEOUS at 21:05

## 2018-01-01 RX ADMIN — PREDNISONE 1 MG: 1 TABLET ORAL at 09:25

## 2018-01-01 RX ADMIN — RIVASTIGMINE TRANSDERMAL SYSTEM 4.6 MG: 4.6 PATCH, EXTENDED RELEASE TRANSDERMAL at 12:24

## 2018-01-01 RX ADMIN — INFLUENZA VIRUS VACCINE 0.5 ML: 15; 15; 15; 15 SUSPENSION INTRAMUSCULAR at 11:01

## 2018-01-01 RX ADMIN — GEMCITABINE 1400 MG: 1 INJECTION, POWDER, LYOPHILIZED, FOR SOLUTION INTRAVENOUS at 11:21

## 2018-01-01 RX ADMIN — METOPROLOL TARTRATE 50 MG: 50 TABLET ORAL at 21:46

## 2018-01-01 RX ADMIN — FENTANYL CITRATE 25 MCG: 50 INJECTION, SOLUTION INTRAMUSCULAR; INTRAVENOUS at 13:49

## 2018-01-01 RX ADMIN — SENNOSIDES 8.6 MG: 8.6 TABLET, FILM COATED ORAL at 10:13

## 2018-01-01 RX ADMIN — SODIUM CHLORIDE 20 ML/HR: 0.9 INJECTION, SOLUTION INTRAVENOUS at 10:10

## 2018-01-01 RX ADMIN — INSULIN GLARGINE 15 UNITS: 100 INJECTION, SOLUTION SUBCUTANEOUS at 10:13

## 2018-01-01 RX ADMIN — INSULIN LISPRO 2 UNITS: 100 INJECTION, SOLUTION INTRAVENOUS; SUBCUTANEOUS at 09:18

## 2018-01-01 RX ADMIN — PROPOFOL 100 MCG/KG/MIN: 10 INJECTION, EMULSION INTRAVENOUS at 11:29

## 2018-01-01 NOTE — ASSESSMENT & PLAN NOTE
· Continue hydralazine t i d , blood pressure accelerated this morning, continue to monitor blood pressure after oral agents given    · Continue beta-blocker  · Medication list requested from [facility] to ensure on most updated medications, spoke with Lucy Sales at [place]

## 2018-01-01 NOTE — ASSESSMENT & PLAN NOTE
· Stage IV pancreatic cancer  · Follows with Dr Eyal Evangelista  Currently on chemo  · Progressive on imaging (worsening omental metastases)  Plan for continuing palliative chemotherapy  · Per palliative note, pt lacks full competence  Is DNR Level 3

## 2018-01-01 NOTE — ASSESSMENT & PLAN NOTE
· RUL and RLL PEs  Bilateral DVTs (bilateral popliteal and one of paired peroneal veins)  · Presently on heparin drip (being held for elevated PTT currently)  Once has colonoscopy, will determine ability to transition to oral anticoagulant (heme/onc recommending coumadin)  · ECHO ordered    · Hematology recommending IVC filter regardless of whether or not she will be on anticoagulation (concern in future may need to hold anticoagulation and thus want filter as well)

## 2018-01-01 NOTE — ASSESSMENT & PLAN NOTE
· Was previously on anticoagulation but this was stopped due to prior GI bleed  · Continue on BB   Will plan for heparin -->warfarin bridge tomorrow if all stable

## 2018-01-01 NOTE — PROGRESS NOTES
[facility]  Progress Note - Albina Lucas 78 y o  female MRN: [id number]    [facility] Encounter: [id number]    Assessment/Plan:  1) Rectal bleeding: She reports intermittent rectal bleeding for over 20 years  She apparently passed a large amount of bright red blood per rectum on Saturday  Upon presentation hemoglobin 11 0, down from baseline of approximately 12  Hemoglobin has been stable since at 10 0 today  No further episodes of overt bleeding in the hospital     -She has been started on a heparin drip for acute PE and DVT  Last colonoscopy was 2-3 years ago and patient states she is overdue for repeat scope     -Okay to continue heparin drip    -Discussed the risks and benefits of colonoscopy to evaluate for source of bleeding and patient and her daughter agree that they would like to proceed with colonoscopy  -Discussed with Rachel Sewell PA-C of [facility] from her standpoint to proceed with colonoscopy tomorrow    -Continue clear liquid diet today, bowel prep this evening, NPO after midnight        2) Acute PE and bilateral DVTs: Identified on CTA chest and duplex ultrasound yesterday     -Continue heparin drip   -Hold heparin drip 5 hours prior to colonoscopy tomorrow  Subjective:   Patient seen and examined at bedside  She denies any new complaints today  She states that she discussed the colonoscopy with her daughter and she would like to proceed  Objective:     Vitals: Blood pressure (!) 175/62, pulse 63, temperature 98 °F (36 7 °C), temperature source Oral, resp  rate 20, SpO2 97 %, not currently breastfeeding  ,There is no height or weight on file to calculate BMI        Intake/Output Summary (Last 24 hours) at 01/01/18 1201  Last data filed at 12/31/17 2001   Gross per 24 hour   Intake           532 22 ml   Output                0 ml   Net           532 22 ml       Review of Systems: as per HPI  Review of Systems   Constitutional: Negative for activity change, appetite change, chills, fatigue, fever and unexpected weight change  HENT: Negative for mouth sores, sore throat and trouble swallowing  Respiratory: Positive for cough  Negative for shortness of breath  Cardiovascular: Negative for chest pain  Gastrointestinal: Negative for abdominal distention, abdominal pain, blood in stool, constipation, diarrhea, nausea and vomiting  Skin: Negative for color change, pallor, rash and wound  Neurological: Negative for tremors and syncope  All other systems reviewed and are negative  Physical Exam:     Physical Exam   Constitutional: She is oriented to person, place, and time  She is cooperative  No distress  HENT:   Head: Normocephalic and atraumatic  Eyes: Right eye exhibits no discharge  Left eye exhibits no discharge  No scleral icterus  Neck: Neck supple  No tracheal deviation present  Cardiovascular: Normal rate, regular rhythm, normal heart sounds and intact distal pulses  Exam reveals no gallop and no friction rub  No murmur heard  Pulmonary/Chest: Effort normal and breath sounds normal  No respiratory distress  She has no wheezes  She has no rales  She exhibits no tenderness  Abdominal: Soft  Bowel sounds are normal  She exhibits no distension and no mass  There is no tenderness  There is no rebound and no guarding  Neurological: She is alert and oriented to person, place, and time  Skin: Skin is warm and dry  Psychiatric: She has a normal mood and affect           Invasive Devices     Peripheral Intravenous Line            Peripheral IV 12/29/17 Right;Ventral (anterior) Antecubital 2 days                        CBC: Lab Results   Component Value Date    WBC 5 05 01/01/2018    HGB 10 0 (L) 01/01/2018    HCT 32 8 (L) 01/01/2018    MCV 92 01/01/2018    PLT 66 (L) 01/01/2018    MCH 28 2 01/01/2018    MCHC 30 5 (L) 01/01/2018    RDW 17 6 (H) 01/01/2018    MPV 10 6 01/01/2018    NRBC 0 01/01/2018   ,   CMP: Lab Results   Component Value Date     01/01/2018    K 4 1 01/01/2018     01/01/2018    CO2 30 01/01/2018    ANIONGAP 6 01/01/2018    BUN 10 01/01/2018    CREATININE 0 93 01/01/2018    GLUCOSE 213 (H) 01/01/2018    CALCIUM 8 3 01/01/2018    EGFR 60 01/01/2018

## 2018-01-01 NOTE — PLAN OF CARE
Rounding with José Miguel Kramer PA-C, consulting IR for IVC filter, will see if GI will advance diet and continue on Heparin gtt until after procedure

## 2018-01-01 NOTE — ASSESSMENT & PLAN NOTE
· Recently diagnosed dementia   On Aricept and exelon patch  · Lives at [address] [facility]  · exelon patch on right shoulder with date 12/29- will reorder and remove this patch

## 2018-01-01 NOTE — ASSESSMENT & PLAN NOTE
· Holding heparin gtt for right now given findings on colonoscopy today  Per GI, risk > benefits for 24 hours  Can resume tomorrow if stable and no bleeding   · Hematology requesting IVC filter be placed given anticoagulation likely but future ability to remain on full AC questionable

## 2018-01-01 NOTE — ASSESSMENT & PLAN NOTE
· RUL and RLL PEs  Bilateral DVTs (bilateral popliteal and one of paired peroneal veins)  · Had been on heparin gtt until this AM  Spoke with GI this AM, pt had about 8 large polyps removed during colonoscopy and also was noted to have diverticulosis and hemorrhoids which were likely source of bleeding  · Given risk of bleeding today, GI recommendign holding all a/c for at least 24 hours and then can be started back on heparin --> warfarin bridge as recommended by heme/onc  · ECHO EF 55% with moderate pulmonary HTN  · Hematology recommending IVC filter regardless of whether or not she will be on anticoagulation   · (concern in future may need to hold anticoagulation and thus want filter as well)   IR c/s pending for this

## 2018-01-01 NOTE — ASSESSMENT & PLAN NOTE
· Seems to have resolved  · Hgb relatively stable  · Heme/onc recommending Coumadin for anticoagulation in case this needs to be reversed in future  · GI following  Given findings on colonoscopy, recommending holding heparin gtt today and tomorrow if all stable, can restart heparin bridge to warfarin with close monitoring  · Prior EGD showed ulcerated nodule in duodenal bulb  Continue PPI

## 2018-01-01 NOTE — ASSESSMENT & PLAN NOTE
· Continue on heparin drip  · Hematology requesting IVC filter be placed given anticoagulation likely but future ability to remain on full AC questionable

## 2018-01-01 NOTE — PROGRESS NOTES
[facility]  Progress Note - Amaya Core 1940, 78 y o  female MRN: [id number]  Unit/Bed#: [facility] 606-01 Encounter: [id number]  Primary Care Provider: Chidi Melara MD   Date and time admitted to hospital: 12/29/2017  9:51 PM    * Acute pulmonary embolism ([facility] [address] )   Assessment & Plan    · RUL and RLL PEs  Bilateral DVTs (bilateral popliteal and one of paired peroneal veins)  · Presently on heparin drip (being held for elevated PTT currently)  Once has colonoscopy, will determine ability to transition to oral anticoagulant (heme/onc recommending coumadin)  · ECHO ordered  · Hematology recommending IVC filter regardless of whether or not she will be on anticoagulation (concern in future may need to hold anticoagulation and thus want filter as well)        Acute deep vein thrombosis (DVT) of both lower extremities (HCC)   Assessment & Plan    · Continue on heparin drip  · Hematology requesting IVC filter be placed given anticoagulation likely but future ability to remain on full AC questionable  Primary pancreatic cancer with metastasis to other site [facility])   Assessment & Plan    · Stage IV pancreatic cancer  · Follows with Dr Ace Hernandez  Currently on chemo  · Progressive on imaging (worsening omental metastases)  Plan for continuing palliative chemotherapy  Rectal bleeding   Assessment & Plan    · Seems to have resolved, this AM brown/green stool  · Hgb stable  · Clear liquid diet  · Although bleeding has stopped, given need for long-term anticoagulation, may need to determine source of bleeding to determine her risk for re-bleeding  Heme/onc recommending Coumadin for anticoagulation in case this needs to be reversed  · GI following  Considering colonoscopy tomorrow  · Prior EGD showed ulcerated nodule in duodenal bulb  Continue PPI  Diabetes mellitus with hyperglycemia ([facility] )   Assessment & Plan    · Continue SSI coverage only  · A1C 7 8          A-fib [facility])   Assessment & Plan    · Was previously on anticoagulation but this was stopped due to prior GI bleed  · Continue on BB  On heparin drip        Dementia   Assessment & Plan    · Recently diagnosed dementia  On Aricept and exelon patch  · Lives at [address]  · exelon patch on right shoulder with date 12/29- will reorder and remove this patch        RA (rheumatoid arthritis) ([facility] )   Assessment & Plan    · On chronic prednisone (confirmed dose of 1 mg TID- unclear why this dosage, but noted to be on this as outpatient as well)        Thrombocytopenia ([facility] )   Assessment & Plan    · plts on admission 86, now in the 60s  · Was 86 prior to the use of heparin thus do not feel this is HIT  · Continue to trend CBC and plt count  · Hematology following  HTN (hypertension), benign   Assessment & Plan    · Continue hydralazine t i d , blood pressure accelerated this morning, continue to monitor blood pressure after oral agents given  · Continue beta-blocker  · Medication list requested from [facility] to ensure on most updated medications          VTE Pharmacologic Prophylaxis:   Pharmacologic: Heparin Drip  Mechanical VTE Prophylaxis in Place: No    Patient Centered Rounds: I have evaluated patient without nursing staff present due to unavailable during the time [address] with Specialists or Other Care Team Provider: palliative care    Education and Discussions with Family / Patient: daughter via phone    Time Spent for Care: 30 minutes  More than 50% of total time spent on counseling and coordination of care as described above  Current Length of Stay: 3 day(s)    Current Patient Status: Inpatient   Certification Statement: The patient will continue to require additional inpatient hospital stay due to plan for IVC filter and colonoscopy    Discharge Plan: plan for IVC filter and colonoscopy prior to d/c  Will need rehab upon d/c       Code Status: Level 3 - DNAR and DNI      Subjective: Ms Rae Perez is a 78year old female who is agreeable to colonoscopy and IVC filter placement  She has no known nausea, vomiting, diarrhea  She was noted to have a formed brown green bowel movement this morning  There has been no further episodes of bright red blood per rectum  She reports that she has some abdominal discomfort however this is improving  She was noted to tolerate a regular diet last evening without difficulty  She reports she has had a chronic cough which is nonproductive  She reports that this is at baseline  Objective:     Vitals:   Temp (24hrs), Av 3 °F (36 8 °C), Min:98 °F (36 7 °C), Max:98 7 °F (37 1 °C)    HR:  [54-66] 63  Resp:  [18-20] 20  BP: (122-180)/(56-89) 175/62  SpO2:  [93 %-98 %] 97 %  There is no height or weight on file to calculate BMI  Input and Output Summary (last 24 hours): Intake/Output Summary (Last 24 hours) at 18 0930  Last data filed at 17   Gross per 24 hour   Intake           732 22 ml   Output              100 ml   Net           632 22 ml       Physical Exam:     Physical Exam   Constitutional: She is oriented to person, place, and time  No distress  HENT:   Head: Normocephalic and atraumatic  Eyes: Conjunctivae are normal    Neck: No JVD present  Cardiovascular: Normal rate, regular rhythm, normal heart sounds and intact distal pulses  No murmur heard  Pulmonary/Chest: Effort normal  No respiratory distress  She has no wheezes  She has no rales  Decreased at bases   Abdominal: Soft  Bowel sounds are normal  She exhibits no distension  There is no tenderness  There is no rebound and no guarding  Musculoskeletal: She exhibits no edema  Neurological: She is alert and oriented to person, place, and time  Follows commands, speech clear  Skin: Skin is warm and dry  No rash noted  She is not diaphoretic  No erythema  There is pallor  Psychiatric: She has a normal mood and affect   Her behavior is normal  Nursing note and vitals reviewed  Additional Data:     Labs:      Results from last 7 days  Lab Units 01/01/18  0634   WBC Thousand/uL 5 05   HEMOGLOBIN g/dL 10 0*   HEMATOCRIT % 32 8*   PLATELETS Thousands/uL 66*   NEUTROS PCT % 71   LYMPHS PCT % 20   MONOS PCT % 7   EOS PCT % 2       Results from last 7 days  Lab Units 01/01/18  0634  12/29/17  1256   SODIUM mmol/L 139  < > 138   POTASSIUM mmol/L 4 1  < > 4 7   CHLORIDE mmol/L 103  < > 101   CO2 mmol/L 30  < > 34*   BUN mg/dL 10  < > 7   CREATININE mg/dL 0 93  < > 1 02   CALCIUM mg/dL 8 3  < > 8 2*   TOTAL PROTEIN g/dL  --   --  6 4   BILIRUBIN TOTAL mg/dL  --   --  0 40   ALK PHOS U/L  --   --  174*   ALT U/L  --   --  40   AST U/L  --   --  68*   GLUCOSE RANDOM mg/dL 213*  < > 207*   < > = values in this interval not displayed  Results from last 7 days  Lab Units 12/29/17  2312   INR  1 17*       * I Have Reviewed All Lab Data Listed Above  * Additional Pertinent Lab Tests Reviewed: All Labs Within Last 24 Hours Reviewed    Imaging:    Imaging Reports Reviewed Today Include: venous duplex, CTA  Imaging Personally Reviewed by Myself Includes:  none    Recent Cultures (last 7 days):           Last 24 Hours Medication List:     artificial tear  Both Eyes HS   aspirin 81 mg Oral Daily   cyproheptadine 4 mg Oral HS   docusate sodium 100 mg Oral BID   donepezil 5 mg Oral HS   ferrous sulfate 325 mg Oral BID With Meals   FLUoxetine 20 mg Oral Daily   hydrALAZINE 50 mg Oral TID   insulin lispro 1-6 Units Subcutaneous 4x Daily (AC & HS)   metoprolol tartrate 50 mg Oral Q12H SHIRA   pantoprazole 40 mg Intravenous Q12H [facility]   predniSONE 1 mg Oral TID   rivastigmine 4 6 mg Transdermal Daily   senna 1 tablet Oral Daily        Today, Patient Was Seen By: Savanna Solomon PA-C    ** Please Note: Dictation voice to text software may have been used in the creation of this document   **

## 2018-01-01 NOTE — ASSESSMENT & PLAN NOTE
· plts on admission 86, 79 yesterday, 60s today  · Was 86 prior to the use of heparin thus do not feel this is HIT  · Continue to trend CBC and plt count  · Hematology following

## 2018-01-01 NOTE — ASSESSMENT & PLAN NOTE
· plts on admission 86, now in the 60s  · Was 86 prior to the use of heparin thus do not feel this is HIT  · Continue to trend CBC and plt count  · Hematology following

## 2018-01-01 NOTE — ASSESSMENT & PLAN NOTE
· On chronic prednisone (confirmed dose of 1 mg TID- unclear why this dosage, but noted to be on this as outpatient as well)

## 2018-01-01 NOTE — ASSESSMENT & PLAN NOTE
· Stage IV pancreatic cancer  · Follows with Dr Tamera Driscoll  Currently on chemo  · Progressive on imaging (worsening omental metastases)  Plan for continuing palliative chemotherapy

## 2018-01-01 NOTE — ASSESSMENT & PLAN NOTE
· Seems to have resolved, this AM brown/green stool  · Hgb stable  · Clear liquid diet  · Although bleeding has stopped, given need for long-term anticoagulation, may need to determine source of bleeding to determine her risk for re-bleeding  Heme/onc recommending Coumadin for anticoagulation in case this needs to be reversed  · GI following  Considering colonoscopy tomorrow  · Prior EGD showed ulcerated nodule in duodenal bulb  Continue PPI

## 2018-01-02 PROBLEM — G93.40 ENCEPHALOPATHY: Status: RESOLVED | Noted: 2017-01-01 | Resolved: 2018-01-01

## 2018-01-02 PROBLEM — E11.9 DIABETES (HCC): Status: RESOLVED | Noted: 2017-01-01 | Resolved: 2018-01-01

## 2018-01-02 PROBLEM — C25.0: Status: RESOLVED | Noted: 2017-01-01 | Resolved: 2018-01-01

## 2018-01-02 NOTE — PLAN OF CARE
DISCHARGE PLANNING - CARE MANAGEMENT     Discharge to post-acute care or home with appropriate resources Progressing        HEMATOLOGIC - ADULT     Maintains hematologic stability Progressing        METABOLIC, FLUID AND ELECTROLYTES - ADULT     Glucose maintained within target range Progressing        Potential for Falls     Patient will remain free of falls Progressing        Prexisting or High Potential for Compromised Skin Integrity     Skin integrity is maintained or improved Progressing

## 2018-01-02 NOTE — OP NOTE
**** GI/ENDOSCOPY REPORT ****     PATIENT NAME: Levonia Landau ------ VISIT ID:  Patient ID:   [id number] YOB: 1940     INTRODUCTION: Colonoscopy - A 78 female patient presents for an inpatient   Colonoscopy at [address] [place] [address]  PREVIOUS COLONOSCOPY: Patient's last colonoscopy was 4 years ago  INDICATIONS: Rectal bleeding  Needs anticoagulation for DVT/PE  CONSENT:  The benefits, risks, and alternatives to the procedure were   discussed and informed consent was obtained from the patient  PREPARATION: EKG, pulse, pulse oximetry and blood pressure were monitored   throughout the procedure  The patient was identified by myself both   verbally and by visual inspection of ID band  Airway Assessment   Classification: Airway class 2 - Visualization of the soft palate, fauces   and uvula  ASA Classification: Class 3 - Patient has severe systemic   disturbance that may or may not be related to the disorder requiring   surgery  MEDICATIONS: Anesthesia-check records     PROCEDURE:  The endoscope was passed without difficulty through the anus   under direct visualization and advanced to the cecum, confirmed by   appendiceal orifice and ileocecal valve  The scope was withdrawn and the   mucosa was carefully examined  The quality of the preparation was  Retroflexion was performed  Cecal Intubation Time: 1 minute(s) Scope   Withdrawal Time: 43 minutes(s)     RECTAL EXAM: Normal rectal exam      FINDINGS:  Two sessile polyps, measuring less than 5 mm in size, were   found in the cecum  The polyps were removed by cold biopsy polypectomy  A single sessile polyp, measuring less than 5 mm in size, was found in the   ascending colon  The polyp was removed by cold biopsy polypectomy  A   single sessile polyp, measuring 6 mm in size, was found in the descending   colon  The polyp was removed by cold snare polypectomy   A single polyp,   measuring 20 mm in size, was found in the sigmoid colon  Saline was   injected at the base of each polyp to raise it prior to removal  The polyp   was removed by snare cautery polypectomy  To control bleeding, 2 clips was   applied, with success  A single pedunculated polyp, measuring 15 mm in   size, was found in the sigmoid colon  The polyp was removed by snare   cautery polypectomy  A single polyp, measuring 6 mm in size, was found in   the sigmoid colon  The polyp was removed by cold snare polypectomy  A   single sessile polyp, measuring less than 5 mm in size, was found in the   sigmoid colon  The polyp was removed by cold biopsy polypectomy  There   was evidence of mild diverticulosis in the colon  External hemorrhoids   were found  COMPLICATIONS: There were no complications  IMPRESSIONS: Two sessile polyps found in the cecum; removed by cold biopsy   polypectomy  A single sessile polyp found in the ascending colon; removed   by cold biopsy polypectomy  A single sessile polyp found in the descending   colon; removed by cold snare polypectomy  A single polyp found in the   sigmoid colon; removed by snare cautery polypectomy  Clips were applied to   control bleeding  A single pedunculated polyp found in the sigmoid colon;   removed by snare cautery polypectomy  A single polyp found in the sigmoid   colon; removed by cold snare polypectomy  A single sessile polyp found in   the sigmoid colon; removed by cold biopsy polypectomy  Mild diverticulosis   found in the colon  External hemorrhoids  RECOMMENDATIONS: Follow-up on the results of the biopsy specimens  Colonoscopy recommended in 1 year given the number and size of the polyps  Resume diet and return to floor  If no bleeding overnight, can restart   anticoagulation  ESTIMATED BLOOD LOSS:     PATHOLOGY SPECIMENS: Removed by cold biopsy polypectomy  Removed by cold   biopsy polypectomy  Removed by cold snare polypectomy   Removed by snare   cautery polypectomy  Removed by snare cautery polypectomy  Removed by cold   snare polypectomy  Removed by cold biopsy polypectomy  PROCEDURE CODES:     ICD-9 Codes: 569 3 Hemorrhage of rectum and anus 211 3 Benign neoplasm of   colon 211 3 Benign neoplasm of colon 211 3 Benign neoplasm of colon 211 3   Benign neoplasm of colon 211 3 Benign neoplasm of colon 211 3 Benign   neoplasm of colon 211 3 Benign neoplasm of colon 562 10 Diverticulosis of   colon (without mention of hemorrhage)     ICD-10 Codes: K62 5 Hemorrhage of anus and rectum K63 5 Polyp of colon   K63 5 Polyp of colon K63 5 Polyp of colon K63 5 Polyp of colon K63 5 Polyp   of colon K63 5 Polyp of colon K63 5 Polyp of colon K57 Diverticular   disease of intestine K64 9 Unspecified hemorrhoids     PERFORMED BY: LIA López  on 01/02/2018  Version 1, electronically signed by LIA Alex  on 01/02/2018   at 12:41

## 2018-01-02 NOTE — ANESTHESIA PREPROCEDURE EVALUATION
Review of Systems/Medical History  Patient summary reviewed  Chart reviewed  No history of anesthetic complications     Cardiovascular  EKG reviewed, Hyperlipidemia, Hypertension controlled, CAD, , DVT  Comment: On heparin gtt for acute PE/bilateral DVTs, found 1/1/18, PE,  Pulmonary  Negative pulmonary ROS Sleep apnea , ,        GI/Hepatic    Pancreatic problem (pancreatic CA with cesar),   Comment: BRBPR  Hb is 11     Negative  ROS        Endo/Other  Diabetes type 2 Insulin, Arthritis     GYN       Hematology    Immunocompromised state ,    Musculoskeletal  Obesity , Rheumatoid arthritis ,        Neurology  Negative neurology ROS      Psychology   Depression ,            Physical Exam    Airway    Mallampati score: II  TM Distance: >3 FB  Neck ROM: full     Dental   No notable dental hx     Cardiovascular      Pulmonary      Other Findings        Anesthesia Plan  ASA Score- 3 Emergent    Anesthesia Type- IV sedation with anesthesia with ASA Monitors  Additional Monitors:   Airway Plan:     Comment: Heparin gtt stopped at 5 am today    Plan Factors-    Induction- intravenous  Postoperative Plan-     Informed Consent- Anesthetic plan and risks discussed with patient  I personally reviewed this patient with the CRNA  Discussed and agreed on the Anesthesia Plan with the CLIVE Nugent

## 2018-01-02 NOTE — CASE MANAGEMENT
Continued Stay Review    Date: 1/2/2018    Vital Signs: /59   Pulse 55   Temp 98 5 °F (36 9 °C) (Oral)   Resp 20   SpO2 99%     Medications:   Scheduled Meds:   artificial tear  Both Eyes HS   aspirin 81 mg Oral Daily   cyproheptadine 4 mg Oral HS   docusate sodium 100 mg Oral BID   donepezil 5 mg Oral HS   ferrous sulfate 325 mg Oral BID With Meals   FLUoxetine 20 mg Oral Daily   hydrALAZINE 50 mg Oral TID   insulin lispro 1-6 Units Subcutaneous 4x Daily (AC & HS)   metoprolol tartrate 50 mg Oral Q12H SHIRA   pantoprazole 40 mg Intravenous Q12H [address]   predniSONE 1 mg Oral TID   rivastigmine 4 6 mg Transdermal Daily   senna 1 tablet Oral Daily     Continuous Infusions:   heparin (porcine) 3-30 Units/kg/hr (Order-Specific) Last Rate: Stopped (01/02/18 0449)     PRN Meds:   ALPRAZolam    influenza vaccine    ondansetron    promethazine    Abnormal Labs/Diagnostic Results:      Ref Range & Units 1/2/18 0557 1/1/18 0634 12/31/17 0337 12/30/17 0531 12/29/17 1256   Sodium 136 - 145 mmol/L 140  139  138  139  138    Potassium 3 5 - 5 3 mmol/L 4 2  4 1  4 1  3 9  4 7CM    Chloride 100 - 108 mmol/L 103  103  101  103  101    CO2 21 - 32 mmol/L 31  30  32  32  34     Anion Gap 4 - 13 mmol/L 6  6  5  4  3     BUN 5 - 25 mg/dL 8  10  9  9  7    Creatinine 0 60 - 1 30 mg/dL 0 80  0 93CM  0 91CM  0 84CM  1  02CM    Glucose 65 - 140 mg/dL 185   213CM   124CM  124CM  207CM     Calcium 8 3 - 10 1 mg/dL 8 2   8 3  8 1   8 2   8 2     AST 5 - 45 U/L 34     68CM     ALT 12 - 78 U/L 20     40CM    Alkaline Phosphatase 46 - 116 U/L 127      174     Total Protein 6 4 - 8 2 g/dL 6 1      6 4    Albumin 3 5 - 5 0 g/dL 2 1      2 1     Total Bilirubin 0 20 - 1 00 mg/dL 0 49     0 40    eGFR ml/min/1 73sq m 72  60  62  68  54               Ref Range & Units 1/2/18 0557 1/1/18 0634 12/31/17 0337 12/30/17 1404 12/30/17 0531   WBC 4 31 - 10 16 Thousand/uL 4 68  5 05  6 30   7 05    RBC 3 81 - 5 12 Million/uL 3 53   3 55   3 63    3 67 Hemoglobin 11 5 - 15 4 g/dL 9 9   10 0   10 1   10 5   10 3     Hematocrit 34 8 - 46 1 % 32 7   32 8   33 2   34 2   33 5     MCV 82 - 98 fL 93  92  92   91    MCH 26 8 - 34 3 pg 28 0  28 2  27 8   28 1    MCHC 31 4 - 37 4 g/dL 30 3   30 5   30 4    30 7     RDW 11 6 - 15 1 % 17 9   17 6   17 6    17 4     Platelets 897 - 515 Thousands/uL 77   66CM   63    65CM     MPV 8 9 - 12 7 fL 10 7  10 6  10 3   11 0            1/1 CXR - Bibasilar subsegmental atelectasis  Age/Sex: 78 y o  female     Assessment/Plan:   Acute pulmonary embolism (HCC)   Assessment & Plan     · RUL and RLL PEs  Bilateral DVTs (bilateral popliteal and one of paired peroneal veins)  · Presently on heparin drip (being held for elevated PTT currently)  Once has colonoscopy, will determine ability to transition to oral anticoagulant (heme/onc recommending coumadin)  · ECHO ordered  · Hematology recommending IVC filter regardless of whether or not she will be on anticoagulation (concern in future may need to hold anticoagulation and thus want filter as well)       Acute deep vein thrombosis (DVT) of both lower extremities (HCC)   Assessment & Plan     · Continue on heparin drip  · Hematology requesting IVC filter be placed given anticoagulation likely but future ability to remain on full AC questionable         Primary pancreatic cancer with metastasis to other site [facility])   Assessment & Plan     · Stage IV pancreatic cancer  · Follows with Dr Sarahi Marr  Currently on chemo  · Progressive on imaging (worsening omental metastases)  Plan for continuing palliative chemotherapy            Rectal bleeding   Assessment & Plan     · Seems to have resolved, this AM brown/green stool  · Hgb stable  · Clear liquid diet  · Although bleeding has stopped, given need for long-term anticoagulation, may need to determine source of bleeding to determine her risk for re-bleeding   Heme/onc recommending Coumadin for anticoagulation in case this needs to be reversed  · GI following  Considering colonoscopy tomorrow  · Prior EGD showed ulcerated nodule in duodenal bulb  Continue PPI        Diabetes mellitus with hyperglycemia ([facility] [address] )   Assessment & Plan     · Continue SSI coverage only  · A1C 7 8        A-fib ([facility])   Assessment & Plan     · Was previously on anticoagulation but this was stopped due to prior GI bleed  · Continue on BB  On heparin drip       Dementia   Assessment & Plan     · Recently diagnosed dementia  On Aricept and exelon patch  · Lives at [facility]  · exelon patch on right shoulder with date 12/29- will reorder and remove this patch       RA (rheumatoid arthritis) ([facility])   Assessment & Plan     · On chronic prednisone (confirmed dose of 1 mg TID- unclear why this dosage, but noted to be on this as outpatient as well)       Thrombocytopenia ([facility] [address] )   Assessment & Plan     · plts on admission 86, now in the 60s  · Was 86 prior to the use of heparin thus do not feel this is HIT  · Continue to trend CBC and plt count  · Hematology following         HTN (hypertension), benign   Assessment & Plan     · Continue hydralazine t i d , blood pressure accelerated this morning, continue to monitor blood pressure after oral agents given    · Continue beta-blocker  · Medication list requested from [facility] to ensure on most updated medications          to GI Lab  On 1/2/2018 - pending colonoscopy     Discharge Plan:  SNF - pt does not wish to return to [facility] - list provided SW to follow for choices

## 2018-01-02 NOTE — PHYSICAL THERAPY NOTE
Physical Therapy Cancellation Note:    Pt currently off floor   Cancel PT services for today and will cont to follow as able to initiate PT eval

## 2018-01-02 NOTE — PROGRESS NOTES
Progress Note - Kizzy Reyes 1940, 78 y o  female MRN: [id number]    Unit/Bed#: ENDO POOL Encounter: [id number]    Primary Care Provider: Beryle Simmers, MD   Date and time admitted to hospital: 12/29/2017  9:51 PM    * Acute pulmonary embolism ([facility] [address] )   Assessment & Plan    · RUL and RLL PEs  Bilateral DVTs (bilateral popliteal and one of paired peroneal veins)  · Had been on heparin gtt until this AM  Spoke with GI this AM, pt had about 8 large polyps removed during colonoscopy and also was noted to have diverticulosis and hemorrhoids which were likely source of bleeding  · Given risk of bleeding today, GI recommendign holding all a/c for at least 24 hours and then can be started back on heparin --> warfarin bridge as recommended by heme/onc  · ECHO EF 55% with moderate pulmonary HTN  · Hematology recommending IVC filter regardless of whether or not she will be on anticoagulation   · (concern in future may need to hold anticoagulation and thus want filter as well)  IR c/s pending for this        Primary pancreatic cancer with metastasis to other site [facility])   Assessment & Plan    · Stage IV pancreatic cancer  · Follows with Dr Stevo Cervantes  Currently on chemo  · Progressive on imaging (worsening omental metastases)  Plan for continuing palliative chemotherapy  · Per palliative note, pt lacks full competence  Is DNR Level 3  Acute deep vein thrombosis (DVT) of both lower extremities (HCC)   Assessment & Plan    · Holding heparin gtt for right now given findings on colonoscopy today  Per GI, risk > benefits for 24 hours  Can resume tomorrow if stable and no bleeding   · Hematology requesting IVC filter be placed given anticoagulation likely but future ability to remain on full AC questionable  Rectal bleeding   Assessment & Plan    · Seems to have resolved  · Hgb relatively stable  · Heme/onc recommending Coumadin for anticoagulation in case this needs to be reversed in future  · GI following  Given findings on colonoscopy, recommending holding heparin gtt today and tomorrow if all stable, can restart heparin bridge to warfarin with close monitoring  · Prior EGD showed ulcerated nodule in duodenal bulb  Continue PPI  Thrombocytopenia (HCC)   Assessment & Plan    · plts on admission 86, 79 yesterday, 60s today  · Was 86 prior to the use of heparin thus do not feel this is HIT  · Continue to trend CBC and plt count  · Hematology following  Dementia   Assessment & Plan    · Recently diagnosed dementia  On Aricept and exelon patch  · Lives at [address] [place] [address] but per CM note the other day pt may not want to return? CM to follow up  · exelon patch on right shoulder with date 12/29        RA (rheumatoid arthritis) ([facility])   Assessment & Plan    · On chronic prednisone (confirmed dose of 1 mg TID- unclear why this dosage, but noted to be on this as outpatient as well)        A-fib ([facility] [address] )   Assessment & Plan    · Was previously on anticoagulation but this was stopped due to prior GI bleed  · Continue on BB  Will plan for heparin -->warfarin bridge tomorrow if all stable         Diabetes mellitus with hyperglycemia ([facility] [address] )   Assessment & Plan    · Continue SSI coverage only  · A1C 7 8  HTN (hypertension), benign   Assessment & Plan    · Continue hydralazine t i d , blood pressure accelerated this morning, continue to monitor blood pressure after oral agents given  · Continue beta-blocker  · Medication list requested from [facility] to ensure on most updated medications, spoke with Stevo Kim at [facility]           VTE Pharmacologic Prophylaxis:   Pharmacologic: Pharmacologic VTE Prophylaxis contraindicated due to findings on c scope today  Mechanical VTE Prophylaxis in Place: Yes    Patient Centered Rounds: I have performed bedside rounds with nursing staff today      Discussions with Specialists or Other Care Team Provider: GI, Dr Vidhi Benjamin and Dr Jana Bal    Education and Discussions with Family / Patient: Patient only today  Time Spent for Care: 45 minutes  More than 50% of total time spent on counseling and coordination of care as described above  Current Length of Stay: 4 day(s)    Current Patient Status: Inpatient   Certification Statement: The patient will continue to require additional inpatient hospital stay due to montoring of H&H, brdige from heparin to coumadin    Discharge Plan: pt from [facility]? Reportedly did not want to return there based on a note a few dasy ago    Code Status: Level 3 - DNAR and DNI      Subjective:   Pt reports feeling well s/p colonoscopy  She is aware of what they found  She denies any CP or SOB  Has not had any BM today  C/O slight lower abdominal pain  No nausea or vomiting  Objective:     Vitals:   Temp (24hrs), Av °F (36 7 °C), Min:97 7 °F (36 5 °C), Max:98 5 °F (36 9 °C)    HR:  [48-62] 54  Resp:  [18-20] 20  BP: (128-181)/(59-76) 171/69  SpO2:  [91 %-100 %] 91 %  There is no height or weight on file to calculate BMI  Input and Output Summary (last 24 hours): Intake/Output Summary (Last 24 hours) at 18 1248  Last data filed at 18 1220   Gross per 24 hour   Intake           913 18 ml   Output                0 ml   Net           913 18 ml       Physical Exam:     Physical Exam   Constitutional: She is oriented to person, place, and time  No distress  Nasal cannula in place  Cardiovascular: Normal rate, regular rhythm and intact distal pulses  Pulmonary/Chest: Effort normal and breath sounds normal  No respiratory distress  Dec slightly at b/l bases    Abdominal: Soft  Bowel sounds are normal  She exhibits no distension  There is no tenderness  Musculoskeletal: She exhibits no edema  Neurological: She is alert and oriented to person, place, and time  Appropriate    Skin: She is not diaphoretic  There is pallor  Nursing note and vitals reviewed        Additional Data:     Labs:      Results from last 7 days  Lab Units 01/02/18  0557 01/01/18  0634   WBC Thousand/uL 4 68 5 05   HEMOGLOBIN g/dL 9 9* 10 0*   HEMATOCRIT % 32 7* 32 8*   PLATELETS Thousands/uL 77* 66*   NEUTROS PCT %  --  71   LYMPHS PCT %  --  20   MONOS PCT %  --  7   EOS PCT %  --  2       Results from last 7 days  Lab Units 01/02/18  0557   SODIUM mmol/L 140   POTASSIUM mmol/L 4 2   CHLORIDE mmol/L 103   CO2 mmol/L 31   BUN mg/dL 8   CREATININE mg/dL 0 80   CALCIUM mg/dL 8 2*   TOTAL PROTEIN g/dL 6 1*   BILIRUBIN TOTAL mg/dL 0 49   ALK PHOS U/L 127*   ALT U/L 20   AST U/L 34   GLUCOSE RANDOM mg/dL 185*       Results from last 7 days  Lab Units 12/29/17  2312   INR  1 17*       * I Have Reviewed All Lab Data Listed Above  * Additional Pertinent Lab Tests Reviewed: All Labs Within Last 24 Hours Reviewed    Imaging:    Imaging Reports Reviewed Today Include: all   Imaging Personally Reviewed by Myself Includes:  none    Recent Cultures (last 7 days):           Last 24 Hours Medication List:     artificial tear  Both Eyes HS   aspirin 81 mg Oral Daily   cyproheptadine 4 mg Oral HS   docusate sodium 100 mg Oral BID   donepezil 5 mg Oral HS   ferrous sulfate 325 mg Oral BID With Meals   FLUoxetine 20 mg Oral Daily   hydrALAZINE 50 mg Oral TID   insulin lispro 1-6 Units Subcutaneous 4x Daily (AC & HS)   metoprolol tartrate 50 mg Oral Q12H SHIRA   pantoprazole 40 mg Intravenous Q12H [address]   predniSONE 1 mg Oral TID   rivastigmine 4 6 mg Transdermal Daily   senna 1 tablet Oral Daily        Today, Patient Was Seen By: Jayleen Rush PA-C    ** Please Note: Dictation voice to text software may have been used in the creation of this document   **

## 2018-01-02 NOTE — PROGRESS NOTES
Pt rounds with RAFA Anguiano: Heparin Drip and anticoagulants to be held until tomorrow  Monitor pt for bleeding  Pt to also have an IVC filter placed by IR

## 2018-01-02 NOTE — RESTORATIVE TECHNICIAN NOTE
Restorative Specialist Mobility Note       Activity: Ambulate in rosas, Ambulate in room, Bathroom privileges, Chair, Dangle, Stand at bedside (Educated/encouraged pt to ambulate w/assistance 3-4 x's/day  Bed alarm on   Pt callbell, phone/tray within reach )     Assistive Device: Front wheel walker          No MENDIOLA, Restorative Technician, [employer] [facility]

## 2018-01-02 NOTE — PROGRESS NOTES
01/02/18 0200   Plan of Care   Comments  visited with pt  Assessment Completed by:  Other (Comment)  (Pt called  in room for assistance  )

## 2018-01-02 NOTE — ANESTHESIA POSTPROCEDURE EVALUATION
Post-Op Assessment Note      CV Status:  Stable    Mental Status:  Alert and awake    Hydration Status:  Euvolemic    PONV Controlled:  Controlled    Airway Patency:  Patent    Post Op Vitals Reviewed: Yes          Staff: CRNA           /59 (01/02/18 1225)    Temp      Pulse 55 (01/02/18 1225)   Resp 20 (01/02/18 1225)    SpO2 95 % (01/02/18 1225)

## 2018-01-03 NOTE — PROGRESS NOTES
01/03/18 [facility] not active in support   Spiritual Beliefs/Perceptions   Concept of God Punishing   Relationship with God Ambivalent  (patient communicated fear related to guilt)   Conflicts/Concerns Patient communicated illness is God's punishment   Support Systems Children   Stress Factors   Patient Stress Factors None identified   Coping Responses   Patient Coping Open/discussion;Denial  (Patient communicated some denial of dementia)   Patient Spiritual Assessment   Feelings of Guilt/Regret Patient communicated guilt over leaving  and father's death   Feelings of Unfinished Business Patient talked a lot about early [place] and death of father   Plan of Care   Comments Provided grief counseling related to death of father  Provided education regarding spiritual practices to support patient to work through guilt  Reframed experience of patient in relation to early marriage and death of father  Explored emotional needs and resources in relation to patient's struggle with negative thoughts      Assessment Completed by: Unit visit

## 2018-01-03 NOTE — SOCIAL WORK
Spoke to pt at bedside and explained that PT is still recommending STR and pt states she is agreeable to go back to [facility] and referral already made

## 2018-01-03 NOTE — PHYSICAL THERAPY NOTE
Physical Therapy Evaluation    Patient's Name: Albina Lucas    Admitting Diagnosis  Rectal bleeding [K62 5]    Problem List  Patient Active Problem List   Diagnosis    HTN (hypertension), benign    Hyperlipidemia    Diabetes mellitus with hyperglycemia ([place] [address] )    A-fib (HCC)    SHANDA (obstructive sleep apnea)    RA (rheumatoid arthritis) ([place] [address] )    Duodenal ulcer    Rectal bleeding    Acute pulmonary embolism (HCC)    Acute deep vein thrombosis (DVT) of both lower extremities ([place] [address] )    Primary pancreatic cancer with metastasis to other site ([place] [address] )    Dementia    Thrombocytopenia ([place] [address] )       Past Medical History  Past Medical History:   Diagnosis Date    Anemia     Arthritis     Atrial fibrillation (HCC)     Back pain     Bacteremia     Cancer ([place] [address] )     Pancreatic    Coronary artery disease     Depression with anxiety     Diabetes mellitus ([place] [address] )     DJD (degenerative joint disease)     Duodenal ulcer     Encephalopathy     Gait abnormality     Hyperlipidemia     Hypertension     Muscle weakness (generalized)     Non-STEMI (non-ST elevated myocardial infarction) ([place] [address] )     Obstructive sleep apnea     Osteoporosis     Pancreas cancer ([place] [address] )     Sepsis ([place] [address] )     Sleep apnea     UTI (urinary tract infection)        Past Surgical History  Past Surgical History:   Procedure Laterality Date    COLONOSCOPY N/A 1/2/2018    Procedure: COLONOSCOPY;  Surgeon: Joe Hendrickson MD;  Location:  GI LAB; Service: Gastroenterology    ESOPHAGOGASTRODUODENOSCOPY N/A 8/14/2017    Procedure: ESOPHAGOGASTRODUODENOSCOPY (EGD); Surgeon: Renée Tavares MD;  Location:  GI LAB; Service: Gastroenterology    JOINT REPLACEMENT Left     hip    KNEE SURGERY Bilateral     AR INSJ TUNNELED CTR VAD W/SUBQ PORT AGE 5 YR/> N/A 11/8/2017    Procedure: INSERTION VENOUS PORT (PORT-A-CATH);   Surgeon: Juan Crowley DO;  Location: [facility];  Service: General    REVISION TOTAL HIP ARTHROPLASTY Right 01/03/18 1000   Note Type   Note type Eval only   Pain Assessment   Pain Assessment 0-10   Pain Score 3   Pain Type Chronic pain   Pain Location Back   Pain Orientation Mid;Lower  (at rest)   Hospital Pain Intervention(s) Repositioned; Ambulation/increased activity; Elevated; Emotional support; Environmental changes; Rest   Home Living   Type of Home SNF  ([facility] for rehab per pt)   [address] [facility]  (use of RW for mobility)   Additional Comments pt reports being at [facility] for the past 3 months and receiving PT and OT services;use of RW for mobility   Prior Function   Level of [place] Needs assistance with ADLs and functional mobility   Lives With Facility staff   Receives Help From Personal care attendant   ADL Assistance Needs assistance   IADLs Needs assistance   Falls in the last 6 months 0   Restrictions/Precautions   Other Precautions Pain; Fall Risk;Multiple lines; Chair Alarm;Agitated   General   Additional Pertinent History rectal bleeding,stage IV pancreatic CA,acute PE and BLE DVT   Family/Caregiver Present No   Cognition   Overall Cognitive Status Impaired  (arguementative at times)   Arousal/Participation Cooperative  (with inc motivation and encouragement)   Orientation Level Oriented to person;Oriented to place;Oriented to time;Oriented to situation   Following Commands Follows one step commands with increased time or repetition  (2* arguementative at times,slow mobility)   RLE Assessment   RLE Assessment (4/5 grossly throughout)   LLE Assessment   LLE Assessment (4/5 grossly throughout)   Coordination   Movements are Fluid and Coordinated 0   Coordination and Movement Description ataxic and unsteady,forward flexed posture,dec BLE step length   Sensation WFL   Light Touch   RLE Light Touch Grossly intact   LLE Light Touch Grossly intact   Bed Mobility   Supine to Sit 3  Moderate assistance   Additional items Assist x 1;Bedrails;HOB elevated; Increased time required;Verbal cues;LE management   Transfers   Sit to Stand 3  Moderate assistance   Additional items Assist x 1;Bedrails; Increased time required;Verbal cues   Stand to Sit 3  Moderate assistance   Additional items Assist x 1; Armrests; Increased time required;Verbal cues  (for safety,education and control descent)   Toilet transfer 3  Moderate assistance   Additional items Assist x 1; Increased time required;Verbal cues;Standard toilet  (use of GB)   Ambulation/Elevation   Gait pattern Poor UE support; Improper Weight shift; Antalgic;Narrow WILMAN; Forward Flexion; Shuffling; Inconsistent rickie; Foward flexed; Short stride; Ataxia; Step to;Excessively slow   Gait Assistance 4  Minimal assist   Additional items Assist x 1;Verbal cues; Tactile cues   Assistive Device Rolling walker   Distance 30 feetx2 with use of RW on tile surface   Balance   Static Sitting (chair alarm intact prior to leaving pt;s room)   Dynamic Sitting Poor   Static Standing Poor +   Dynamic Standing Poor +   Ambulatory Poor +   Endurance Deficit   Endurance Deficit Yes   Endurance Deficit Description weakness,pain,deconditioning   Activity Tolerance   Activity Tolerance Patient limited by fatigue;Patient limited by pain;Treatment limited secondary to agitation  (poor)   Medical Staff Made Aware CM Héctor Gonzalez)   Nurse Made Aware yes Traci De La Cruz)   Assessment   Prognosis Good   Problem List Decreased strength;Decreased endurance; Impaired balance;Decreased mobility; Decreased cognition;Decreased safety awareness; Obesity; Decreased skin integrity;Pain   Assessment Pt is a 77 y/o female admitted to [facility]* stage IV pancreatic CA,acute PE and BLE DVT  Pt resides at [address] [place], [place] [address] Floor Unit facility recieving rehab services per pt PTA  Pt reports ambulates with RW PTA with Ax1  Pt currently is not at functional mobility baseline,use of DME (RW) for mobility,inc chronic back pain,incontinent of urine upon arrival,IV medicine management,and needs Ax1 for mobility,ongoing medical care   Pt demonstrates moderate to minimal deficits during functional mobility and gait including dec endurance,dec balance,dec BLE strength,ataxic and unsteady gait,inc ABD pain and needs modAx1 for transfers and BM,minAx1 for gait with use of RW  Pt would cont to benefit from skilled inpt PT services to maximize functional independence   Goals   Patient Goals to get moving   STG Expiration Date 01/13/18   Short Term Goal #1 in 7-10 days:pt will be able to ambulate >150 feet with use of RW on various surfaces minAx1 and chair follow as needed,activity tolerance:45mins/45mins,inc balance 1 grade,BM and transfers minAx1 consistently to and from various surfaces,I with BLE HEP   Treatment Day 0   Plan   Treatment/Interventions Functional transfer training; Endurance training;Patient/family training;Equipment eval/education; Bed mobility;Gait training;Spoke to case management;Spoke to nursing;OT   PT Frequency 2-3x/wk   Recommendation   Recommendation (inpt rhb)   Equipment Recommended Walker  (RW)   Barthel Index   Feeding 10   Bathing 0   Grooming Score 0   Dressing Score 5   Bladder Score 0   Bowels Score 5   Toilet Use Score 5   Transfers (Bed/Chair) Score 5   Mobility (Level Surface) Score 0   Stairs Score 0   Barthel Index Score 30   Skilled PT recommended while in hospital and upon DC to progress pt toward treatment goals           Carmen Maldonado, PT

## 2018-01-03 NOTE — PROGRESS NOTES
45-year-old female with pulmonary embolism and rectal bleeding on anticoagulants, referred for IVC filter placement

## 2018-01-03 NOTE — BRIEF OP NOTE (RAD/CATH)
INTERVENTIONAL RADIOLOGY PROCEDURE NOTE    Preoperative diagnosis:  DVT with PE  GI bleeding on anticoagulation  Postoperative diagnosis: Same    Procedure:  Surgeon: Henrry Larson MD     Assistant: None  No qualified resident was available  Blood loss:  Minimal    Specimens:  None    Findings:  Normal IVC  Vena Tech filter placed below the renal veins      Complications:  None    Anesthesia:  1 percent lidocaine

## 2018-01-03 NOTE — PLAN OF CARE
Problem: PHYSICAL THERAPY ADULT  Goal: Performs mobility at highest level of function for planned discharge setting  See evaluation for individualized goals  Treatment/Interventions: Functional transfer training, Endurance training, Patient/family training, Equipment eval/education, Bed mobility, Gait training, Spoke to case management, Spoke to nursing, OT  Equipment Recommended: Callie Jon (ERNESTO)       See flowsheet documentation for full assessment, interventions and recommendations  Prognosis: Good  Problem List: Decreased strength, Decreased endurance, Impaired balance, Decreased mobility, Decreased cognition, Decreased safety awareness, Obesity, Decreased skin integrity, Pain  Assessment: Pt is a 77 y/o female admitted to [facility] 2* stage IV pancreatic CA,acute PE and BLE DVT  Pt resides at [address] [place], [place] [address] Floor Unit facility recieving rehab services per pt PTA  Pt reports ambulates with RW PTA with Ax1  Pt currently is not at functional mobility baseline,use of DME (RW) for mobility,inc chronic back pain,incontinent of urine upon arrival,IV medicine management,and needs Ax1 for mobility,ongoing medical care  Pt demonstrates moderate to minimal deficits during functional mobility and gait including dec endurance,dec balance,dec BLE strength,ataxic and unsteady gait,inc ABD pain and needs modAx1 for transfers and BM,minAx1 for gait with use of RW  Pt would cont to benefit from skilled inpt PT services to maximize functional independence        Recommendation:  (inpt rhb)          See flowsheet documentation for full assessment

## 2018-01-03 NOTE — PROGRESS NOTES
Progress Note - Dexter Jeffries 78 y o  female MRN: [id number]    Unit/Bed#: [facility] 606-01 Encounter: [id number]    Subjective:     Patient seen and examined at bedside  She denies abdominal pain  She had a small BM without blood  Objective:     Vitals: Blood pressure 140/63, pulse (!) 54, temperature 97 8 °F (36 6 °C), temperature source Oral, resp  rate 20, SpO2 100 %, not currently breastfeeding  ,There is no height or weight on file to calculate BMI        Intake/Output Summary (Last 24 hours) at 01/03/18 1122  Last data filed at 01/03/18 1052   Gross per 24 hour   Intake             1000 ml   Output                0 ml   Net             1000 ml       Physical Exam:     General Appearance: Alert, appears stated age and cooperative  Lungs: Clear to auscultation bilaterally, no rales or rhonchi, no labored breathing/accessory muscle use  Heart: Regular rate and rhythm, S1, S2 normal, no murmur, click, rub or gallop  Abdomen: Soft, non-tender, non-distended; bowel sounds normal; no masses or no organomegaly  Extremities: No cyanosis, edema    Invasive Devices     Peripheral Intravenous Line            Peripheral IV 01/02/18 Right Forearm less than 1 day                Lab Results:    Results from last 7 days  Lab Units 01/03/18  0614   WBC Thousand/uL 5 62   HEMOGLOBIN g/dL 10 7*   HEMATOCRIT % 36 1   PLATELETS Thousands/uL 118*   NEUTROS PCT % 74   LYMPHS PCT % 14   MONOS PCT % 10   EOS PCT % 2       Results from last 7 days  Lab Units 01/03/18  0614 01/02/18  0557   SODIUM mmol/L 139 140   POTASSIUM mmol/L 4 3 4 2   CHLORIDE mmol/L 102 103   CO2 mmol/L 31 31   BUN mg/dL 9 8   CREATININE mg/dL 0 90 0 80   CALCIUM mg/dL 8 3 8 2*   TOTAL PROTEIN g/dL  --  6 1*   BILIRUBIN TOTAL mg/dL  --  0 49   ALK PHOS U/L  --  127*   ALT U/L  --  20   AST U/L  --  34   GLUCOSE RANDOM mg/dL 307* 185*       Results from last 7 days  Lab Units 12/29/17  2312   INR  1 17*           Imaging Studies: I have personally reviewed pertinent imaging studies  Xr Chest Portable    Result Date: 1/1/2018  Impression: Bibasilar subsegmental atelectasis  Workstation performed: [id number]     Cta Chest Ct Abdomen Pelvis W Contrast    Result Date: 12/29/2017  Impression: 1  Right upper lobe and lower lobe pulmonary emboli  2    RV/LV ratio of 0 9  3   Carcinoma of the pancreatic head with partial encasement of the portal vein and isai hepatis lymphadenopathy  4   Hepatic and left renal metastases  5   Progressive omental metastases with moderate amount of ascites developing since 12/5/2017  I have discussed my findings with [facility] ED physician, Kandice Brunner, D O  Workstation performed: [id number]       Assessment and Plan:    Discussed plan with Dr Hemanth Connor with [facility]    1) Rectal bleeding: Status post colonoscopy yesterday with removal of multiple polyps, largest polyp measuring 20 mm in size which required clip placement x 2 to control bleeding  She was also found to have mild diverticulosis and external hemorrhoids  Hemoglobin stable at 10 7   No further episodes of overt bleeding       -Await biopsy results  -Recommend repeat colonoscopy in 1 year given the number and size of polyps removed     2) Acute PE and bilateral DVTs: Identified on CTA chest and duplex ultrasound      -OK to resume anticoagulation

## 2018-01-03 NOTE — OCCUPATIONAL THERAPY NOTE
OccupationalTherapy Evaluation     Patient Name: Jackie MCLAIN Date: 1/3/2018  Problem List  Patient Active Problem List   Diagnosis    HTN (hypertension), benign    Hyperlipidemia    Diabetes mellitus with hyperglycemia ([place] [address] )    A-fib ([place] [address] )    SHANDA (obstructive sleep apnea)    RA (rheumatoid arthritis) ([place] [address] )    Duodenal ulcer    Rectal bleeding    Acute pulmonary embolism (HCC)    Acute deep vein thrombosis (DVT) of both lower extremities ([place] [address] )    Primary pancreatic cancer with metastasis to other site ([place] [address] )    Dementia    Thrombocytopenia ([place] [address] )     Past Medical History  Past Medical History:   Diagnosis Date    Anemia     Arthritis     Atrial fibrillation (HCC)     Back pain     Bacteremia     Cancer ([place] [address] )     Pancreatic    Coronary artery disease     Depression with anxiety     Diabetes mellitus ([place] [address] )     DJD (degenerative joint disease)     Duodenal ulcer     Encephalopathy     Gait abnormality     Hyperlipidemia     Hypertension     Muscle weakness (generalized)     Non-STEMI (non-ST elevated myocardial infarction) ([place] [address] )     Obstructive sleep apnea     Osteoporosis     Pancreas cancer ([place] [address] )     Sepsis ([place] [address] )     Sleep apnea     UTI (urinary tract infection)      Past Surgical History  Past Surgical History:   Procedure Laterality Date    COLONOSCOPY N/A 1/2/2018    Procedure: COLONOSCOPY;  Surgeon: Amarjit Goodwin MD;  [facility]; Service: Gastroenterology    ESOPHAGOGASTRODUODENOSCOPY N/A 8/14/2017    Procedure: ESOPHAGOGASTRODUODENOSCOPY (EGD); Surgeon: Luis Bhandari MD;  Location:  GI LAB; Service: Gastroenterology    JOINT REPLACEMENT Left     hip    KNEE SURGERY Bilateral     MT INSJ TUNNELED CTR VAD W/SUBQ PORT AGE 5 YR/> N/A 11/8/2017    Procedure: INSERTION VENOUS PORT (PORT-A-CATH);   Surgeon: Lance Albrecht DO;  Location: [facility];  Service: General    REVISION TOTAL HIP ARTHROPLASTY Right       01/03/18 0953   Note Type   Note type Eval/Treat   Restrictions/Precautions   Weight Bearing Precautions Per Order No   Other Precautions Fall Risk;Pain;Bed Alarm; Chair Alarm   Pain Assessment   Pain Assessment 0-10   Pain Score No Pain   Home Living   Type of Home SNF  (Pt from [facility] for rehab )   Home Layout One level   Bathroom Shower/Tub None  (pt reports only sponge baths at [facility])   Bathroom Toilet Raised   Bathroom Accessibility Accessible   Additional Comments Pt transferred from [facility]  Pt stated that prior to her 3 month stay in [facility] she lived in an apt, however her family has emptied/sold her apt at this time   Prior Function   Level of [place] Needs assistance with ADLs and functional mobility; Needs assistance with IADLs   Lives With Alone   Receives Help From Personal care attendant  (staff at [facility])   ADL Assistance Needs assistance   IADLs Needs assistance   Falls in the last 6 months 0  (none reported)   Comments At [facility], pt reported she was receiving assist from staff for ADLs/IADLs  Prior to [facility], pt was independent w/ ADLs   Lifestyle   Autonomy At [facility], pt reported she was receiving assist from staff for ADLs/IADLs  Prior to [facility], pt was independent w/ ADLs   Service to Others Retired   Intrinsic Gratification Pt enjoys watching TV   Psychosocial   Psychosocial (WDL) X   Patient Behaviors/Mood Anxious   Needs Expressed Emotional;Physical   Ability to Express Feelings Able to express   Ability to Express Needs Able to express   Ability to Express Thoughts Able to express   Ability to Understand Others Usually understands   Subjective   Subjective "I need help washing myself up"   ADL   Where Assessed Edge of bed   Eating Assistance 5  Supervision/Setup   Grooming Assistance 5  Supervision/Setup   UB Bathing Assistance 5  Supervision/Setup   LB Bathing Assistance 3  Moderate Assistance   LB Bathing Deficit Buttocks; Perineal area   UB Dressing Assistance 5  Supervision/Setup   LB Dressing Assistance 3  Moderate Assistance   Toileting Assistance  3  Moderate Assistance   Toileting Deficit Increased time to complete;Verbal cueing;Steadying;Perineal hygiene   Bed Mobility   Supine to Sit 3  Moderate assistance   Additional items Assist x 1;Bedrails;HOB elevated; Increased time required;Verbal cues;LE management   Additional Comments Pt OOB and in chair at end of session w/ chair alarm activated and phone/call bell w/i reach  All needs met and no further questionst at this time   Transfers   Sit to Stand 3  Moderate assistance   Additional items Assist x 1;HOB elevated; Bedrails; Increased time required;Verbal cues   Stand to Sit 3  Moderate assistance   Additional items Assist x 1; Armrests; Increased time required;Verbal cues   Toilet transfer 3  Moderate assistance   Additional items Assist x 1; Increased time required;Verbal cues;Standard toilet  (use of grab bars)   Functional Mobility   Functional Mobility 4  Minimal assistance   Additional items Rolling walker  (Assist x 1, short in-room distances)   Balance   Static Sitting Poor +   Dynamic Sitting Poor   Static Standing Poor +   Dynamic Standing Poor +   Ambulatory Poor +   RUE Assessment   RUE Assessment X  (Pt reported decreased (R) shlder/elbow  flexion & extension)   LUE Assessment   LUE Assessment WFL   Hand Function   Gross Motor Coordination Functional   Cognition   Overall Cognitive Status Impaired  (argumentative in beginning of session)   Arousal/Participation Responsive; Alert   Attention Attends with cues to redirect   Orientation Level Oriented X4   Following Commands Follows one step commands with increased time or repetition   Assessment   Limitation Decreased ADL status; Decreased UE ROM; Decreased UE strength;Decreased Safe judgement during ADL;Decreased endurance;Decreased self-care trans;Decreased high-level ADLs;Mood limitation   Prognosis Good   Assessment Pt is a 78 y o  female seen for OT evaluation s/p adm to [facility] on 12/29/2017 w/ rectal bleeding, acute pulmonary embolism, and  acute DVT of (B) LE  Comorbidities affecting pts functional performance include a significant PMH of Anemia, arthritis, A-fib, back pain, bacteremia, pancreatic cancer, CAD, Depression w/ anxiety, DM, DJD, duodenal ulcer, encephalopathy, HLD, HTN, and osteoporosis  Pt with active OT orders and activity orders for up w/ assist  Pt transferred from [facility] for rehab  Prior to [facility], pt resided in [facility], however pt reports family moved her belongings out of [facility], so questionable home arrangements in future  Pt reports staff at [facility] was providing assist w/ ADLs and IADLs, and she was completing functional mobility/transfers w/ use of Rw  However, prior to admission to [place], pt was completing ADLs/IADLs independently  Upon evaluation, pt currently requires Mod A for overall ADLs and Mod A for functional mobility/transfers 2* the following deficits impacting occupational performance: weakness, decreased ROM, decreased strength, decreased balance, decreased tolerance, impaired problem solving, decreased safety awareness and increased pain  These impairments, as well at pts limited home support, difficulty performing ADLS, difficulty performing IADLS , health management  and environment limit pts ability to safely engage in all baseline areas of occupation, including grooming, bathing, dressing, toileting, functional mobility/transfers, community mobility, laundry , house maintenance, meal prep, cleaning and social participation   Pt scored overall 35/100 on the Barthel Index  Based on the aforementioned OT evaluation, functional performance deficits, and assessments, pt has been identified as a high complexity evaluation   Pt to continue to benefit from continued acute OT services during hospital stay to address defined deficits and to maximize level of functional independence in the following Occupational Performance areas: grooming, bathing/shower, toilet hygiene, dressing, health maintenance, functional mobility, community mobility, clothing management, cleaning, household maintenance and social participation  From OT standpoint, recommend STR upon D/C w/ new housing arrangements and greater assist in future pending progress at STR  OT will continue to follow pt 2-3x/wk to address the following goals to  w/in 7-10 days:   Goals   Patient Goals To return to rehab and get stronger   LTG Time Frame 7-10   Long Term Goal Please refer to goals listed below   Plan   Treatment Interventions ADL retraining;Functional transfer training;UE strengthening/ROM; Endurance training;Patient/family training;Equipment evaluation/education; Compensatory technique education;Continued evaluation; Energy conservation; Activityengagement   Goal Expiration Date 18   OT Frequency 2-3x/wk   Recommendation   OT Discharge Recommendation Short Term Rehab   OT - OK to Discharge Yes  (when medically cleared)   Barthel Index   Feeding 5   Bathing 0   Grooming Score 5   Dressing Score 5   Bladder Score 5   Bowels Score 5   Toilet Use Score 5   Transfers (Bed/Chair) Score 5   Mobility (Level Surface) Score 0   Stairs Score 0   Barthel Index Score 35   Modified Morning Sun Scale   Modified Morning Sun Scale 4     GOALS    1) Pt will improve activity tolerance to G for min 30 min txment sessions  2) Pt will complete UB/LB dressing/self care w/ mod I using adaptive device and DME as needed  3) Pt will complete bathing w/ Min A I w/ use of AE and DME as needed  4) Pt will complete toileting w/ Supervision w/ G hygiene/thoroughness and G balance demonstrated during clothing management up/down using DME as needed  5) Pt will improve functional transfers to Mod I on/off all surfaces using DME as needed w/ G balance/safety   6) Pt will improve functional mobility during ADL/IADL/leisure tasks to Mod I using DME as needed w/ G balance/safety   7) Pt will engage in ongoing cognitive assessment w/ G participation w/ mod I to assist w/ safe d/c planning/recommendations  8) Pt will demonstrate G carryover of pt/caregiver education and training as appropriate w/ mod I w/o cues w/ good tolerance  9) Pt will demonstrate 100% carryover of energy conservation techniques w/ mod I t/o functional I/ADL/leisure tasks w/o cues s/p skilled education    Antoine Wright OTR/L

## 2018-01-03 NOTE — PLAN OF CARE
Problem: OCCUPATIONAL THERAPY ADULT  Goal: Performs self-care activities at highest level of function for planned discharge setting  See evaluation for individualized goals  Treatment Interventions: ADL retraining, Functional transfer training, UE strengthening/ROM, Endurance training, Patient/family training, Equipment evaluation/education, Compensatory technique education, Continued evaluation, Energy conservation, Activityengagement          See flowsheet documentation for full assessment, interventions and recommendations  Limitation: Decreased ADL status, Decreased UE ROM, Decreased UE strength, Decreased Safe judgement during ADL, Decreased endurance, Decreased self-care trans, Decreased high-level ADLs, Mood limitation  Prognosis: Good  Assessment: Pt is a 78 y o  female seen for OT evaluation s/p adm to [facility] on 12/29/2017 w/ rectal bleeding, acute pulmonary embolism, and  acute DVT of (B) LE  Comorbidities affecting pts functional performance include a significant PMH of Anemia, arthritis, A-fib, back pain, bacteremia, pancreatic cancer, CAD, Depression w/ anxiety, DM, DJD, duodenal ulcer, encephalopathy, HLD, HTN, and osteoporosis  Pt with active OT orders and activity orders for up w/ assist  Pt transferred from [facility] for rehab  Prior to [facility], pt resided in apt, however pt reports family moved her belongings out of apt, so questionable home arrangements in future  Pt reports staff at [facility] was providing assist w/ ADLs and IADLs, and she was completing functional mobility/transfers w/ use of Rw  However, prior to admission to [facility], pt was completing ADLs/IADLs independently   Upon evaluation, pt currently requires Mod A for overall ADLs and Mod A for functional mobility/transfers 2* the following deficits impacting occupational performance: weakness, decreased ROM, decreased strength, decreased balance, decreased tolerance, impaired problem solving, decreased safety awareness and increased pain  These impairments, as well at pts limited home support, difficulty performing ADLS, difficulty performing IADLS , health management  and environment limit pts ability to safely engage in all baseline areas of occupation, including grooming, bathing, dressing, toileting, functional mobility/transfers, community mobility, laundry , house maintenance, meal prep, cleaning and social participation   Pt scored overall 35/100 on the Barthel Index  Based on the aforementioned OT evaluation, functional performance deficits, and assessments, pt has been identified as a high complexity evaluation  Pt to continue to benefit from continued acute OT services during hospital stay to address defined deficits and to maximize level of functional independence in the following Occupational Performance areas: grooming, bathing/shower, toilet hygiene, dressing, health maintenance, functional mobility, community mobility, clothing management, cleaning, household maintenance and social participation  From OT standpoint, recommend STR upon D/C w/ new housing arrangements and greater assist in future pending progress at STR   OT will continue to follow pt 2-3x/wk to address the following goals to  w/in 7-10 days:     OT Discharge Recommendation: Short Term Rehab  OT - OK to Discharge: Yes (when medically cleared)

## 2018-01-03 NOTE — RESTORATIVE TECHNICIAN NOTE
Restorative Specialist Mobility Note       Activity: Other (Comment) (Pt currently off the unit )          Deric MENDIOLA, Restorative Technician, [employer]

## 2018-01-04 NOTE — PROGRESS NOTES
Progress Note - Cyndi Nj 1940, 78 y o  female MRN: [id number]    Unit/Bed#: [facility] 606-01 Encounter: [id number]    Primary Care Provider: Clinton Paredes MD   Date and time admitted to hospital: 12/29/2017  9:51 PM        Dementia   Assessment & Plan    · Recently diagnosed dementia  On Aricept and exelon patch  · Lives at [address] [facility] but per CM note the other day pt may not want to return? CM to follow up  · exelon patch on right shoulder with date 12/29        Primary pancreatic cancer with metastasis to other site [facility])   Assessment & Plan    · Stage IV pancreatic cancer  · Follows with Dr Julián Kim  Currently on chemo  · Progressive on imaging (worsening omental metastases)  Plan for continuing palliative chemotherapy  · Per palliative note, pt lacks full competence  Is DNR Level 3  Acute deep vein thrombosis (DVT) of both lower extremities (HCC)   Assessment & Plan    · Holding heparin gtt for right now given findings on colonoscopy today  Per GI, risk > benefits for 24 hours  Can resume tomorrow as patient has IVC filter placed today  · Hematology requested IVC filter be placed given anticoagulation likely but future ability to remain on full AC questionable  A-fib [facility])   Assessment & Plan    · Was previously on anticoagulation but this was stopped due to prior GI bleed  · Continue on BB  Will plan for lovenox  -->warfarin bridge tomorrow if all stable         Diabetes mellitus with hyperglycemia ([facility] [address] )   Assessment & Plan    · Continue SSI coverage only  · A1C 7 8  HTN (hypertension), benign   Assessment & Plan    · Continue hydralazine t i d , blood pressure accelerated this morning, continue to monitor blood pressure after oral agents given    · Continue beta-blocker  · Medication list requested from [facility] to ensure on most updated medications, spoke with Nani Sibley at [facility]         * Acute pulmonary embolism [facility])   Assessment & Plan · RUL and RLL PEs  Bilateral DVTs (bilateral popliteal and one of paired peroneal veins)  · Had been on heparin gtt is on hold  Pt had about 8 large polyps removed during colonoscopy and also was noted to have diverticulosis and hemorrhoids which were likely source of bleeding  · She has no bleeding last night with BM noted  · Given risk of bleeding today, GI recommendign holding all a/c for at least 24 hours and then can be started back on heparin --> warfarin bridge as recommended by heme/onc  · ECHO EF 55% with moderate pulmonary HTN  · Hematology recommending IVC filter regardless of whether or not she will be on anticoagulation   · (concern in future may need to hold anticoagulation and thus want filter as well)  IR c/s pending for this              VTE Pharmacologic Prophylaxis:   Pharmacologic: Other Medication: off a/c due to ivc filter placement today  Mechanical VTE Prophylaxis in Place: Yes    Patient Centered Rounds: I have performed bedside rounds with nursing staff today  Discussions with Specialists or Other Care Team Provider: oncology, IR    Education and Discussions with Family / Patient: patient    Time Spent for Care: 45 minutes  More than 50% of total time spent on counseling and coordination of care as described above  Current Length of Stay: 5 day(s)    Current Patient Status: Inpatient   Certification Statement: The patient will continue to require additional inpatient hospital stay due to may be d/c'd tomorrow    Discharge Plan: tomorrow    Code Status: Level 3 - DNAR and DNI      Subjective:   I am still coughing, why do I have I feel like I have a lump in my throat  Objective:     Vitals:   Temp (24hrs), Av 9 °F (36 6 °C), Min:97 5 °F (36 4 °C), Max:98 3 °F (36 8 °C)    HR:  [52-59] 52  Resp:  [18-20] 20  BP: (120-169)/(50-72) 137/50  SpO2:  [94 %-100 %] 100 %  There is no height or weight on file to calculate BMI       Input and Output Summary (last 24 hours): Intake/Output Summary (Last 24 hours) at 01/03/18 1906  Last data filed at 01/03/18 1547   Gross per 24 hour   Intake              720 ml   Output                0 ml   Net              720 ml       Physical Exam:     Physical Exam   Constitutional: She is oriented to person, place, and time  She appears well-developed and well-nourished  HENT:   Head: Normocephalic and atraumatic  Left Ear: External ear normal    Nose: Nose normal    Mouth/Throat: Oropharynx is clear and moist  No oropharyngeal exudate  Eyes: Conjunctivae are normal  Pupils are equal, round, and reactive to light  Right eye exhibits no discharge  Left eye exhibits no discharge  No scleral icterus  Neck: Normal range of motion  Neck supple  No JVD present  No tracheal deviation present  No thyromegaly present  Cardiovascular: Normal rate, regular rhythm, normal heart sounds and intact distal pulses  Exam reveals no gallop and no friction rub  No murmur heard  Pulmonary/Chest: Effort normal and breath sounds normal  No respiratory distress  She has no wheezes  She has no rales  She exhibits no tenderness  Abdominal: Soft  Bowel sounds are normal  She exhibits no distension and no mass  There is no tenderness  There is no rebound and no guarding  Musculoskeletal: Normal range of motion  She exhibits no edema, tenderness or deformity  Lymphadenopathy:     She has no cervical adenopathy  Neurological: She is alert and oriented to person, place, and time  She has normal reflexes  She displays normal reflexes  No cranial nerve deficit  She exhibits normal muscle tone  Coordination normal    Skin: Skin is warm and dry  No rash noted  No erythema  No pallor  Psychiatric: She has a normal mood and affect  Her behavior is normal  Judgment and thought content normal    Nursing note and vitals reviewed        Additional Data:     Labs:      Results from last 7 days  Lab Units 01/03/18  0614   WBC Thousand/uL 5 62   HEMOGLOBIN g/dL 10 7*   HEMATOCRIT % 36 1   PLATELETS Thousands/uL 118*   NEUTROS PCT % 74   LYMPHS PCT % 14   MONOS PCT % 10   EOS PCT % 2       Results from last 7 days  Lab Units 01/03/18  0614 01/02/18  0557   SODIUM mmol/L 139 140   POTASSIUM mmol/L 4 3 4 2   CHLORIDE mmol/L 102 103   CO2 mmol/L 31 31   BUN mg/dL 9 8   CREATININE mg/dL 0 90 0 80   CALCIUM mg/dL 8 3 8 2*   TOTAL PROTEIN g/dL  --  6 1*   BILIRUBIN TOTAL mg/dL  --  0 49   ALK PHOS U/L  --  127*   ALT U/L  --  20   AST U/L  --  34   GLUCOSE RANDOM mg/dL 307* 185*       Results from last 7 days  Lab Units 12/29/17  2312   INR  1 17*       * I Have Reviewed All Lab Data Listed Above  * Additional Pertinent Lab Tests Reviewed: Kirstie 68 Admission Reviewed    Imaging:    Imaging Reports Reviewed Today Include:    Imaging Personally Reviewed by Myself Includes:       Recent Cultures (last 7 days):           Last 24 Hours Medication List:     artificial tear  Both Eyes HS   aspirin 81 mg Oral Daily   cyproheptadine 4 mg Oral HS   docusate sodium 100 mg Oral BID   donepezil 5 mg Oral HS   ferrous sulfate 325 mg Oral BID With Meals   FLUoxetine 20 mg Oral Daily   guaiFENesin 600 mg Oral Q12H SHIRA   hydrALAZINE 50 mg Oral TID   insulin glargine 15 Units Subcutaneous QAM   insulin lispro 1-6 Units Subcutaneous 4x Daily (AC & HS)   metoprolol tartrate 50 mg Oral Q12H SHIRA   pantoprazole 40 mg Intravenous Q12H [facility]   predniSONE 1 mg Oral TID   rivastigmine 4 6 mg Transdermal Daily   senna 1 tablet Oral Daily        Today, Patient Was Seen By: Angelica Mena MD    ** Please Note: Dictation voice to text software may have been used in the creation of this document   **

## 2018-01-04 NOTE — ASSESSMENT & PLAN NOTE
· Stage IV pancreatic cancer  · Follows with Dr Gaurav Torres  Currently on chemo  · Progressive on imaging (worsening omental metastases)  Plan for continuing palliative chemotherapy  · Per palliative note, pt lacks full competence  Is DNR Level 3

## 2018-01-04 NOTE — PROGRESS NOTES
The Protonix 40 mg IV BID has / have been converted to Protonix 40 mg oral BID per [facility] IV-to-PO Auto-Conversion Protocol for Adults as approved by the Pharmacy and Therapeutics Committee  The patient met all eligible criteria:  3 Age = 25years old   2) Received at least one dose of the IV form   3) Receiving at least one other scheduled oral/enteral medication   4) Tolerating an oral/enteral diet   and did not have any exclusions:   1) Critical care patient   2) Active GI bleed (IF assessing H2RAs or PPIs)   3) Continuous tube feeding (IF assessing cipro, doxycycline, levofloxacin, minocycline, rifampin, or voriconazole)   4) Receiving PO vancomycin (IF assessing metronidazole)   5) Persistent nausea and/or vomiting   6) Ileus or gastrointestinal obstruction   7) Lynne/nasogastric tube set for continuous suction   8) Specific order not to automatically convert to PO (in the order's comments or if discussed in the most recent Infectious Disease or primary team's progress notes)

## 2018-01-04 NOTE — ASSESSMENT & PLAN NOTE
· RUL and RLL PEs  Bilateral DVTs (bilateral popliteal and one of paired peroneal veins)  · Had been on heparin gtt is on hold  Pt had about 8 large polyps removed during colonoscopy and also was noted to have diverticulosis and hemorrhoids which were likely source of bleeding  · She has no bleeding last night with BM noted  · Given risk of bleeding today, GI recommendign holding all a/c for at least 24 hours and then can be started back on heparin --> warfarin bridge as recommended by heme/onc  · ECHO EF 55% with moderate pulmonary HTN  · Hematology recommending IVC filter regardless of whether or not she will be on anticoagulation   · (concern in future may need to hold anticoagulation and thus want filter as well)   IR c/s pending for this

## 2018-01-04 NOTE — OCCUPATIONAL THERAPY NOTE
OccupationalTherapy Progress Note     Patient Name: Albina Lucas  IEQNE'U Date: 1/4/2018  Problem List  Patient Active Problem List   Diagnosis    HTN (hypertension), benign    Hyperlipidemia    Diabetes mellitus with hyperglycemia ([place] [address] )    A-fib ([place] [address] )    SHANDA (obstructive sleep apnea)    RA (rheumatoid arthritis) ([place] [address] )    Duodenal ulcer    Rectal bleeding    Acute pulmonary embolism (HCC)    Acute deep vein thrombosis (DVT) of both lower extremities ([place] [address] )    Primary pancreatic cancer with metastasis to other site ([place] [address] )    Dementia    Thrombocytopenia ([place] [address] )           01/04/18 1600   Restrictions/Precautions   Weight Bearing Precautions Per Order No   Other Precautions Fall Risk;Agitated;O2;Multiple lines   Pain Assessment   Pain Assessment 0-10   Pain Score No Pain   ADL   Grooming Assistance 5  Supervision/Setup   Grooming Deficit Supervision/safety; Increased time to complete   Grooming Comments wash/dry (B) hands, clean/brush teeth and dentures   Toileting Assistance  3  Moderate Assistance   Toileting Deficit Steadying; Increased time to complete;Use of bedpan/urinal setup;Grab bar use;Perineal hygiene   Toileting Comments Pt on bed pan upon entrance into the room; pt agreeable to walk to toilet and use bathroom; Mod A required for perineal hygiene and steadying   Functional Standing Tolerance   Time 4 mins   Activity light grooming (clean dentures/teeth, wash hands) while standing at the sink   Comments no complaints of fatigue while standing   Bed Mobility   Rolling R 6  Modified independent   Additional items Assist x 1;Bedrails; Increased time required;Verbal cues   Rolling L 6  Modified independent   Additional items Assist x 1;Bedrails; Increased time required;Verbal cues   Supine to Sit 4  Minimal assistance   Additional items Assist x 1;HOB elevated; Bedrails; Increased time required;Verbal cues   Sit to Supine 4  Minimal assistance   Additional items Assist x 1;Bedrails; Increased time required;Verbal cues;LE management   Transfers   Sit to Stand 5  Supervision   Additional items Assist x 1;Bedrails; Increased time required;Verbal cues   Stand to Sit 4  Minimal assistance   Additional items Assist x 1;Bedrails; Increased time required;Verbal cues; Impulsive   Toilet transfer 3  Moderate assistance   Additional items Assist x 1; Increased time required;Standard toilet  (use of GBs)   Functional Mobility   Functional Mobility 5  Supervision   Additional Comments assist x1 for short in-room distances   Additional items Rolling walker   Toilet Transfers   Toilet Transfer From Rolling walker   Toilet Transfer Type To and from   Toilet Transfer to Standard toilet   Toilet Transfer Technique Ambulating   Toilet Transfers Moderate assistance   Toilet Transfers Comments Mod A x1 to raise from toilet 2* decreased ability to initiate forward weight shift from low height   Cognition   Overall Cognitive Status [facility]   Arousal/Participation Alert; Responsive; Cooperative   Attention Attends with cues to redirect   Orientation Level Oriented X4   Memory Within functional limits   Following Commands Follows one step commands with increased time or repetition   Activity Tolerance   Activity Tolerance Patient tolerated treatment well   Assessment   Assessment Pt seen for OT treatment session this pm focusing on functional mobility/transfers, toileting, light grooming, activity tolerance, and bed mobility  Pt was alert, responsive, and cooperative t/o majority of session (pt agitated towards end of session 2* low toilet height and increased difficult standing from standard toilet)  Pt requiring Min-Mod A for sit<>stand transfers, w/ increased assist required to stand from standard toilet 2* decreased ability to initiate forward transition of weight   Increased impulsivity noted w/ stand >sit transfers, therefore pt educated on safe transfer techniques (feel surface on back of BLE prior to sitting, reach for surface prior to sitting)  Mod A required during toileting for perineal hygiene 2* pt requiring BUEs on Rw/grab bars for steadying and support  Min Assist needed for bed mobility (supine<>sit) for LE management at this time and trunk control when sitting EOB  Pt is progressing towards LTGs, however continues to be limited by decreased activity tolerance, agitation, impulsivity, decreased balance, and generalized weakness  Continue to recommend d/c to STR at this time as pt is not functioning at baseline level  OT continue to follow pt on caseload to increase safety/functional performance in areas of occupation upon d/c     Plan   Treatment Interventions ADL retraining;Functional transfer training;UE strengthening/ROM; Endurance training;Equipment evaluation/education; Compensatory technique education; Energy conservation; Activityengagement   Goal Expiration Date 01/13/18   Treatment Day 1   OT Frequency 2-3x/wk   Recommendation   OT Discharge Recommendation Short Term Rehab   OT - OK to Discharge (when medically cleard)   Barthel Index   Feeding 10   Bathing 0   Grooming Score 5   Dressing Score 5   Bladder Score 0   Bowels Score 5   Toilet Use Score 5   Transfers (Bed/Chair) Score 10   Mobility (Level Surface) Score 0   Stairs Score 0   Barthel Index Score 40   Modified [place] Scale   Modified [place] Scale 4     Hali Carbajal, OTR/L

## 2018-01-04 NOTE — PROGRESS NOTES
01/04/18 1100 [facility]   Spiritual Beliefs/Perceptions   Concept of God Accepting   Relationship with God Close   Spiritual Strengths [place]   Plan of Care   Comments  encourages the pt to find things to engage in when discharged that nourish and sustain emotional and physical health  Assessment Completed by:  Other (Comment)  (Follow up from  )

## 2018-01-04 NOTE — ASSESSMENT & PLAN NOTE
· Recently diagnosed dementia  On Aricept and exelon patch  · Lives at [address] [place] [address] but per CM note the other day pt may not want to return?  CM to follow up  · exelon patch on right shoulder with date 12/29

## 2018-01-04 NOTE — ASSESSMENT & PLAN NOTE
· Holding heparin gtt for right now given findings on colonoscopy today  Per GI, risk > benefits for 24 hours  Can resume tomorrow as patient has IVC filter placed today  · Hematology requested IVC filter be placed given anticoagulation likely but future ability to remain on full AC questionable

## 2018-01-04 NOTE — PLAN OF CARE
Problem: OCCUPATIONAL THERAPY ADULT  Goal: Performs self-care activities at highest level of function for planned discharge setting  See evaluation for individualized goals  Treatment Interventions: ADL retraining, Functional transfer training, UE strengthening/ROM, Endurance training, Patient/family training, Equipment evaluation/education, Compensatory technique education, Continued evaluation, Energy conservation, Activityengagement          See flowsheet documentation for full assessment, interventions and recommendations  Outcome: Progressing  Limitation: Decreased ADL status, Decreased UE ROM, Decreased UE strength, Decreased Safe judgement during ADL, Decreased endurance, Decreased self-care trans, Decreased high-level ADLs, Mood limitation  Prognosis: Good  Assessment: Pt seen for OT treatment session this pm focusing on functional mobility/transfers, toileting, light grooming, activity tolerance, and bed mobility  Pt was alert, responsive, and cooperative t/o majority of session (pt agitated towards end of session 2* low toilet height and increased difficult standing from standard toilet)  Pt requiring Min-Mod A for sit<>stand transfers, w/ increased assist required to stand from standard toilet 2* decreased ability to initiate forward transition of weight  Increased impulsivity noted w/ stand >sit transfers, therefore pt educated on safe transfer techniques (feel surface on back of BLE prior to sitting, reach for surface prior to sitting)  Mod A required during toileting for perineal hygiene 2* pt requiring BUEs on Rw/grab bars for steadying and support  Min Assist needed for bed mobility (supine<>sit) for LE management at this time and trunk control when sitting EOB  Pt is progressing towards LTGs, however continues to be limited by decreased activity tolerance, agitation, impulsivity, decreased balance, and generalized weakness   Continue to recommend d/c to STR at this time as pt is not functioning at baseline level   OT continue to follow pt on caseload to increase safety/functional performance in areas of occupation upon d/c       OT Discharge Recommendation: Short Term Rehab  OT - OK to Discharge:  (when medically cleard)

## 2018-01-04 NOTE — MEDICAL STUDENT
Consultation - Pulmonary Medicine   Stiven Meza 78 y o  female MRN: [id number]  Unit/Bed#: [facility] 606-01 Encounter: [id number]      Assessment: This is a 77 y/o F who presented to [facility] ED 2/2 rectal bleeding and was incidentally found to have RUL and RLL PE on imaging  Pulmonology was consulted for management of her segmental PE's  Plan:   Segmental PE:  -Pt denies any symptoms at this time; however, O2 sat has recently decreased  Would recommend continued O2 sat monitoring and titration of supplemental O2 to maintain sats >96%  -Would recommend lifelong Lovenox anticoagulation once GI deems appropriate from a rectal bleeding standpoint  Would not recommend any other anticoagulation agent at this time  -IVC filter does not preclude need for lifelong coagulation in the setting of a cancer pt w/ hx of DVTs and PEs    New onset cough   -Pt has 45 pack/year smoking history, might have a component of mild COPD   -Cough has been persistent since port placement several months ago   -Would recommend albuterol inhalor 1-2 puffs q4h prn    History of Present Illness   Physician Requesting Consult: Rhonda Montgomery MD  Reason for Consult / Principal Problem: Pulmonary Embolism    HPI: Stiven Meza is a 78y o  year old female w/ a PMHx significant for Stage IV pancreatic cancer who presented to [facility] ED on 29 DEC due to rectal bleeding  While being worked up for the source of her bleeding, the pt was found to have multiple segmental PEs on the R side  Pt denies any chest pain, dyspnea, or SOB  Vitals have been stable throughout this admission with the exception that she has required supplemental O2 in order to maintain adequate oxygenation  Pt had been on heparin throughout the hospitalization until her colonoscopy on 02JAN and has remained off anticoagulation since that time  Pt had multiple polyps removed during her colonoscopy and had an IVC filter placed on 03JAN    Pt denies any rectal bleeding or blood in her stool since her colonoscopy  Pt denies any chest pain, SOB, or lightheadedness  Pt does complain of a persistent cough since her port placement several months ago  She has a 45 pack/year smoking history  Consults    Review of Systems   Constitutional: Negative for chills, diaphoresis, fatigue and fever  HENT: Negative for congestion, rhinorrhea, sinus pain and sinus pressure  Respiratory: Positive for cough  Negative for apnea, chest tightness and shortness of breath  Cardiovascular: Negative for chest pain and leg swelling  Gastrointestinal: Negative for abdominal distention, abdominal pain, anal bleeding, blood in stool, nausea and vomiting  Neurological: Negative for dizziness, syncope, weakness and light-headedness  All other systems reviewed and are negative  Historical Information   Past Medical History:   Diagnosis Date    Anemia     Arthritis     Atrial fibrillation ([place] [address] )     Back pain     Bacteremia     Cancer ([place] [address] )     Pancreatic    Coronary artery disease     Depression with anxiety     Diabetes mellitus (HCC)     DJD (degenerative joint disease)     Duodenal ulcer     Encephalopathy     Gait abnormality     Hyperlipidemia     Hypertension     Muscle weakness (generalized)     Non-STEMI (non-ST elevated myocardial infarction) (HCC)     Obstructive sleep apnea     Osteoporosis     Pancreas cancer ([place] [address] )     Sepsis ([place] [address] )     Sleep apnea     UTI (urinary tract infection)      Past Surgical History:   Procedure Laterality Date    COLONOSCOPY N/A 1/2/2018    Procedure: COLONOSCOPY;  Surgeon: Marcos Chacko MD;  Location: [facility]; Service: Gastroenterology    ESOPHAGOGASTRODUODENOSCOPY N/A 8/14/2017    Procedure: ESOPHAGOGASTRODUODENOSCOPY (EGD); Surgeon: Adela Vargas MD;  Location: [facility];   Service: Gastroenterology    JOINT REPLACEMENT Left     hip    KNEE SURGERY Bilateral     IL INSJ TUNNELED CTR VAD W/SUBQ PORT AGE 5 YR/> N/A 11/8/2017    Procedure: INSERTION VENOUS PORT (PORT-A-CATH);   Surgeon: Ron Gottlieb DO;  Location: [facility];  Service: General    REVISION TOTAL HIP ARTHROPLASTY Right      Social History   History   Alcohol Use No     History   Drug Use No     History   Smoking Status    Former Smoker    Types: Cigarettes    Quit date: 9/11/1999   Smokeless Tobacco    Never Used       Meds/Allergies   current meds:   Current Facility-Administered Medications   Medication Dose Route Frequency    acetaminophen (TYLENOL) tablet 650 mg  650 mg Oral Q6H PRN    ALPRAZolam (XANAX) tablet 0 25 mg  0 25 mg Oral Q8H PRN    artificial tear (LUBRIFRESH P M ) ophthalmic ointment   Both Eyes HS    aspirin (ECOTRIN LOW STRENGTH) EC tablet 81 mg  81 mg Oral Daily    cyproheptadine (PERIACTIN) tablet 4 mg  4 mg Oral HS    docusate sodium (COLACE) capsule 100 mg  100 mg Oral BID    donepezil (ARICEPT) tablet 5 mg  5 mg Oral HS    enoxaparin (LOVENOX) subcutaneous injection 80 mg  80 mg Subcutaneous Q24H    fentanyl citrate (PF) 100 MCG/2ML   Intravenous Code/Trauma/Sedation Med    ferrous sulfate tablet 325 mg  325 mg Oral BID With Meals    FLUoxetine (PROzac) capsule 20 mg  20 mg Oral Daily    guaiFENesin (MUCINEX) 12 hr tablet 600 mg  600 mg Oral Q12H SHIRA    hydrALAZINE (APRESOLINE) tablet 50 mg  50 mg Oral TID    HYDROmorphone (DILAUDID) 1 mg/mL injection 0 5 mg  0 5 mg Intravenous Q1H PRN    influenza inactivated quadrivalent vaccine (FLULAVAL) IM injection 0 5 mL  0 5 mL Intramuscular Prior to discharge    insulin glargine (LANTUS) subcutaneous injection 15 Units  15 Units Subcutaneous QAM    insulin lispro (HumaLOG) 100 units/mL subcutaneous injection 1-6 Units  1-6 Units Subcutaneous 4x Daily (AC & HS)    metoprolol tartrate (LOPRESSOR) tablet 50 mg  50 mg Oral Q12H SHIRA    morphine (PF) 4 mg/mL injection 4 mg  4 mg Intravenous Q4H PRN    naloxone (NARCAN) 0 04 mg/mL syringe 0 04 mg  0 04 mg Intravenous Q1MIN PRN  ondansetron (ZOFRAN) injection 4 mg  4 mg Intravenous Q6H PRN    pantoprazole (PROTONIX) EC tablet 40 mg  40 mg Oral BID AC    predniSONE tablet 1 mg  1 mg Oral TID    promethazine (PHENERGAN) injection 25 mg  25 mg Intramuscular Q6H PRN    rivastigmine (EXELON) 4 6 mg/24 hr TD 24 hr patch 4 6 mg  4 6 mg Transdermal Daily    senna (SENOKOT) tablet 8 6 mg  1 tablet Oral Daily    traMADol (ULTRAM) tablet 50 mg  50 mg Oral Q4H PRN       Allergies   Allergen Reactions    Amlodipine     Belladonna Alkaloids     Benazepril     Donnatal [Pb-Hyoscy-Atropine-Scopolamine]     Hyoscyamine     Lotrel [Amlodipine Besy-Benazepril Hcl]     Pamelor [Nortriptyline]     Procardia [Nifedipine]     Scopolamine     Simvastatin     Sulfa Antibiotics        Objective   Vitals: Blood pressure 146/50, pulse 61, temperature 98 °F (36 7 °C), temperature source Oral, resp  rate 20, SpO2 90 %, not currently breastfeeding  ,There is no height or weight on file to calculate BMI  Intake/Output Summary (Last 24 hours) at 01/04/18 1247  Last data filed at 01/03/18 1900   Gross per 24 hour   Intake              360 ml   Output                0 ml   Net              360 ml     Invasive Devices     Peripheral Intravenous Line            Peripheral IV 01/02/18 Right Forearm 1 day                Physical Exam   Constitutional: She is oriented to person, place, and time  She appears well-developed and well-nourished  No distress  HENT:   Head: Normocephalic and atraumatic  Eyes: Conjunctivae and EOM are normal  Pupils are equal, round, and reactive to light  Neck: Normal range of motion  Neck supple  No JVD present  Cardiovascular: Exam reveals no friction rub  No murmur heard  Pulmonary/Chest: Effort normal  No respiratory distress  She has no wheezes  She exhibits no tenderness  Mild R basilar crackles   Abdominal: Soft  Bowel sounds are normal  She exhibits no distension  There is no tenderness     Lymphadenopathy: She has no cervical adenopathy  Neurological: She is alert and oriented to person, place, and time  Skin: Skin is dry  Psychiatric: She has a normal mood and affect  Her behavior is normal  Thought content normal        Lab Results: CBC: No results found for: WBC, HGB, HCT, MCV, PLT, ADJUSTEDWBC, MCH, MCHC, RDW, MPV, NRBC, CMP: No results found for: NA, K, CL, CO2, ANIONGAP, BUN, CREATININE, GLUCOSE, CALCIUM, AST, ALT, ALKPHOS, PROT, ALBUMIN, BILITOT, EGFR, PT/INR: No results found for: PT, INR  Imaging Studies: I have personally reviewed pertinent films in PACS  EKG, Pathology, and Other Studies: I have personally reviewed pertinent reports        Code Status: Level 3 - DNAR and DNI    Divine Rogel, MS4

## 2018-01-04 NOTE — ASSESSMENT & PLAN NOTE
· Was previously on anticoagulation but this was stopped due to prior GI bleed  · Continue on BB   Will plan for lovenox  -->warfarin bridge tomorrow if all stable

## 2018-01-04 NOTE — RESTORATIVE TECHNICIAN NOTE
Restorative Specialist Mobility Note       Activity:  (Educated/encouraged pt to ambulate w/assistance 3-4 x's/day, pt refused 2* having a very important phone call  Bed alarm on   Pt callbell, phone/tray within reach )          [employer] BS, Restorative Technician, [employer]

## 2018-01-04 NOTE — ASSESSMENT & PLAN NOTE
· Continue hydralazine t i d , blood pressure accelerated this morning, continue to monitor blood pressure after oral agents given    · Continue beta-blocker  · Medication list requested from [facility] to ensure on most updated medications, spoke with Ashley Mensah at [place]

## 2018-01-05 NOTE — PROGRESS NOTES
Progress Note - Pulmonary   Jaylin Barraza 78 y o  female MRN: [id number]  Unit/Bed#: [facility] 606-01 Encounter: [id number]      Assessment:  1  Pulmonary embolus right upper lobe and right lower lobe  2  Pancreatic carcinoma  3  DVT bilateral lower extremities    Plan:  1  Continue Lovenox  2  IVC filter placement  3  Titrate O2 to maintain O2 saturation greater than 90%  4   Incentive spirometry q 1 hour  5   Albuterol p r n   6   Outpatient Pulmonary follow-up as per discharge instructions    Subjective:   Ms Serena Cavazos, sitting up in bed eating her breakfast no apparent distress  Occasional nonproductive cough  Denies any chest pain, shortness of breath, fevers, chills or calf pain  Objective:   Vitals: Blood pressure 154/66, pulse 57, temperature 97 6 °F (36 4 °C), temperature source Oral, resp  rate 18, height 4' 8" (1 422 m), weight 83 9 kg (184 lb 15 5 oz), SpO2 92 %, not currently breastfeeding  , 2 L nasal cannula, Body mass index is 41 47 kg/m²  Intake/Output Summary (Last 24 hours) at 01/05/18 0956  Last data filed at 01/05/18 0728   Gross per 24 hour   Intake              840 ml   Output              200 ml   Net              640 ml         Physical Exam  Gen: Awake, alert, oriented x 3, no acute distress  HEENT: Mucous membranes moist, no oral lesions, no thrush  NECK: No accessory muscle use  Cardiac: Regular, single S1, single S2, no murmurs, no rubs, no gallops  Lungs:  Faint right basilar crackles  Abdomen:  Obese, normoactive bowel sounds, soft nontender  Extremities: no cyanosis, no clubbing, no edema  No calf pain with palpation    Labs: I have personally reviewed pertinent lab results  , ABG: No results found for: PHART, FPU1CYC, PO2ART, IXR5WGT, K7VMOVTI, BEART, SOURCE, BNP: No results found for: BNP, CBC: No results found for: WBC, HGB, HCT, MCV, PLT, ADJUSTEDWBC, MCH, MCHC, RDW, MPV, NRBC, CMP: No results found for: NA, K, CL, CO2, ANIONGAP, BUN, CREATININE, GLUCOSE, CALCIUM, AST, ALT, ALKPHOS, PROT, ALBUMIN, BILITOT, EGFR, PT/INR: No results found for: PT, INR, Troponin: No results found for: TROPONINI  Imaging and other studies: I have personally reviewed pertinent films in PACS      Adelaide Davila PA-C

## 2018-01-05 NOTE — DISCHARGE SUMMARY
Discharge Summary - Remigio June 78 y o  female MRN: [id number]    Unit/Bed#: [facility] 606-01 Encounter: [id number]    Admission Date: 12/29/2017     Admitting Diagnosis: Rectal bleeding [K62 5]    HPI:  Remigio June is a 78 y o  female who originally presented to [facility] ER on 12/29 with rectal bleeding described as large bright red bowel movement at 9:00 p m  last night and right leg edema  Upon my assessment patient is complaining  on nonproductive cough x 1 week  She denies dizziness shortness of breath palpitation chest pain headache nausea vomiting abdominal pain diarrhea  For her  stage IV pancreatic cancer she follows  with oncologist Dr Tennille Steele  Last Ct urine approximately 3 weeks ago  Further workup in the emergency room revealed bilateral lower extremity DVT  CTA PE studies reported right upper and lower lobe PE  Carcinoma of the pancreatic head with hepatic and left renal metastasis ,portal lymphadenopathy, progressive omental metastasis with moderate amount of ascites  Prior transfer to \[facility]\""  a lengthy discussion with the patient took place regarding risk and benefits of anticoagulation in the setting of rectal bleeding ,given stable hemoglobin patient opted to proceed with anticoagulation  She remains hemodynamically stable  Admitted on an  inpatient basis       Procedures Performed: No orders of the defined types were placed in this encounter  Hospital Course:  Patient has dementia and on aricept and exelon patch  [facility] but per CM note  She has rectal bleeding and colonoscopy done  Most likely extrenal hemorrhoids, diverticular bleeding and tubular adenomas  She  She has primary pancreatic cancer with metastasis to other site, stage IV pancreatic cancer  Follows with Tennille Steele, currently on chemo  She has progressive with worsening omental metastasis, plan for continuing palliative care chemotherapy   She has acute DVT of both lower extremities, Afib on BB and she is now on lovenox  IVC filter recommended as she might not be able to take lovenox  She has diabetes with hyperglycemia, she is on SSI and a1C of 7 8  Se has hypertension continued with hydralazine tid, bb,  Most updated medication list is sent  [facility] ahs acute PE, RUL and RLL PE  Bilateral DVTs she is on heparin drip initially   She has blood in stool  Colonoscopy showed polyps and they are removed and she was placed on lovenox for a/c  ECHO showed pulmonary HTN  SHe has follow up with hematology oncology and primary care doctors, as well as pulmonary  Significant Findings, Care, Treatment and Services Provided: as above    Complications: none    Discharge Diagnosis: acute pulmonary embolism    Condition at Discharge: stable     Discharge instructions/Information to patient and family:   See after visit summary for information provided to patient and family  Provisions for Follow-Up Care:  See after visit summary for information related to follow-up care and any pertinent home health orders  Disposition: Extended care facility home care    Planned Readmission: No    Discharge Statement   I spent 35 minutes discharging the patient  This time was spent on the day of discharge  I had direct contact with the patient on the day of discharge  Additional documentation is required if more than 30 minutes were spent on discharge  Discharge Medications:  See after visit summary for reconciled discharge medications provided to patient and family

## 2018-01-05 NOTE — PROGRESS NOTES
Progress Note - Nina Raya 1940, 78 y o  female MRN: [id number]    Unit/Bed#: [facility] 606-01 Encounter: [id number]    Primary Care Provider: Joseph Menjivar MD   Date and time admitted to hospital: 12/29/2017  9:51 PM        Dementia   Assessment & Plan    · Recently diagnosed dementia  On Aricept and exelon patch  · Lives at [facility] but per CM note the other day pt may not want to return? CM to follow up  · exelon patch on right shoulder with date 12/29        Primary pancreatic cancer with metastasis to other site [facility])   Assessment & Plan    · Stage IV pancreatic cancer  · Follows with Dr Amador Ayala  Currently on chemo  · Progressive on imaging (worsening omental metastases)  Plan for continuing palliative chemotherapy  · Per palliative note, pt lacks full competence  Is DNR Level 3  Acute deep vein thrombosis (DVT) of both lower extremities (HCC)   Assessment & Plan    · Holding heparin gtt for right now given findings on colonoscopy today  Per GI, risk > benefits for 24 hours  Can resume tomorrow as patient has IVC filter placed today  · Hematology requested IVC filter be placed given anticoagulation likely but future ability to remain on full AC questionable  Rectal bleeding   Assessment & Plan    · Seems to have resolved  · Hgb relatively stable  · Heme/onc recommending Coumadin for anticoagulation in case this needs to be reversed in future  · GI following  Given findings on colonoscopy, recommending holding heparin gtt today and tomorrow if all stable, can restart heparin bridge to warfarin with close monitoring  · Prior EGD showed ulcerated nodule in duodenal bulb  Continue PPI  A-fib [facility])   Assessment & Plan    · Was previously on anticoagulation but this was stopped due to prior GI bleed  · Continue on BB  Will plan for lovenox  -->warfarin bridge today         Diabetes mellitus with hyperglycemia ([facility] [address] )   Assessment & Plan    · Continue SSI coverage only  · A1C 7 8  * Acute pulmonary embolism (HCC)   Assessment & Plan    · RUL and RLL PEs  Bilateral DVTs (bilateral popliteal and one of paired peroneal veins)  · Had been on heparin gtt is on hold  Pt had about 8 large polyps removed during colonoscopy and also was noted to have diverticulosis and hemorrhoids which were likely source of bleeding  · She has no bleeding last night with BM noted  · Given risk of bleeding today, GI recommendign holding all a/c for at least 24 hours and then can be started back on heparin --> warfarin bridge as recommended by heme/onc  · ECHO EF 55% with moderate pulmonary HTN  · Hematology recommending IVC filter regardless of whether or not she will be on anticoagulation   · (concern in future may need to hold anticoagulation and thus want filter as well)  IR c/s pending for this               VTE Pharmacologic Prophylaxis:   Pharmacologic: Warfarin (Coumadin)  Mechanical VTE Prophylaxis in Place: Yes    Patient Centered Rounds: I have performed bedside rounds with nursing staff today  Discussions with Specialists or Other Care Team Provider: PT, OT    Education and Discussions with Family / Patient: patient    Time Spent for Care: 45 minutes  More than 50% of total time spent on counseling and coordination of care as described above  Current Length of Stay: 6 day(s)    Current Patient Status: Inpatient   Certification Statement: The patient will continue to require additional inpatient hospital stay due to she will be discharged tomorrow    Discharge Plan: tomorrow and does not need oxygen, need coumadin adjustment at the facility    Code Status: Level 3 - DNAR and DNI      Subjective:   I dont want to go    Objective:     Vitals:   Temp (24hrs), Av 4 °F (36 9 °C), Min:98 °F (36 7 °C), Max:98 9 °F (37 2 °C)    HR:  [61-73] 73  Resp:  [20-22] 20  BP: (117-174)/(50-68) 174/68  SpO2:  [90 %-100 %] 91 %  Body mass index is 41 47 kg/m²       Input and Output Summary (last 24 hours): Intake/Output Summary (Last 24 hours) at 01/04/18 2316  Last data filed at 01/04/18 2100   Gross per 24 hour   Intake              480 ml   Output                0 ml   Net              480 ml       Physical Exam:     Physical Exam    Additional Data:     Labs:      Results from last 7 days  Lab Units 01/03/18  0614   WBC Thousand/uL 5 62   HEMOGLOBIN g/dL 10 7*   HEMATOCRIT % 36 1   PLATELETS Thousands/uL 118*   NEUTROS PCT % 74   LYMPHS PCT % 14   MONOS PCT % 10   EOS PCT % 2       Results from last 7 days  Lab Units 01/03/18  0614 01/02/18  0557   SODIUM mmol/L 139 140   POTASSIUM mmol/L 4 3 4 2   CHLORIDE mmol/L 102 103   CO2 mmol/L 31 31   BUN mg/dL 9 8   CREATININE mg/dL 0 90 0 80   CALCIUM mg/dL 8 3 8 2*   TOTAL PROTEIN g/dL  --  6 1*   BILIRUBIN TOTAL mg/dL  --  0 49   ALK PHOS U/L  --  127*   ALT U/L  --  20   AST U/L  --  34   GLUCOSE RANDOM mg/dL 307* 185*       Results from last 7 days  Lab Units 12/29/17  2312   INR  1 17*       * I Have Reviewed All Lab Data Listed Above  * Additional Pertinent Lab Tests Reviewed:  [address] Admission Reviewed    Imaging:    Imaging Reports Reviewed Today Include:    Imaging Personally Reviewed by Myself Includes:       Recent Cultures (last 7 days):           Last 24 Hours Medication List:     artificial tear  Both Eyes HS   aspirin 81 mg Oral Daily   cyproheptadine 4 mg Oral HS   docusate sodium 100 mg Oral BID   donepezil 5 mg Oral HS   enoxaparin 1 mg/kg (Order-Specific) Subcutaneous Q12H [address]   ferrous sulfate 325 mg Oral BID With Meals   FLUoxetine 20 mg Oral Daily   guaiFENesin 600 mg Oral Q12H SHIRA   hydrALAZINE 50 mg Oral TID   insulin glargine 15 Units Subcutaneous QAM   insulin lispro 1-6 Units Subcutaneous 4x Daily (AC & HS)   metoprolol tartrate 50 mg Oral Q12H SHIRA   pantoprazole 40 mg Oral BID AC   predniSONE 1 mg Oral TID   rivastigmine 4 6 mg Transdermal Daily   senna 1 tablet Oral Daily        Today, Patient Was Seen By: Westley Mars MD    ** Please Note: Dictation voice to text software may have been used in the creation of this document   **

## 2018-01-05 NOTE — ASSESSMENT & PLAN NOTE
· Was previously on anticoagulation but this was stopped due to prior GI bleed  · Continue on BB   Will plan for lovenox  -->warfarin bridge today

## 2018-01-05 NOTE — ASSESSMENT & PLAN NOTE
· Recently diagnosed dementia  On Aricept and exelon patch  · Lives at [address] [place] [address] but per CM note the other day pt may not want to return?  CM to follow up  · exelon patch on right shoulder with date 12/29

## 2018-01-05 NOTE — ASSESSMENT & PLAN NOTE
· Stage IV pancreatic cancer  · Follows with Dr Marcie Roberts  Currently on chemo  · Progressive on imaging (worsening omental metastases)  Plan for continuing palliative chemotherapy  · Per palliative note, pt lacks full competence  Is DNR Level 3

## 2018-01-05 NOTE — SOCIAL WORK
Bed available for pt at [facility] today  Transport arranged via [employer] [facility] at 12:30pm to [facility]  Pt, bedside RN, and Aly[facility]-admissions [place] notified of transport time  Chart copy requested  Transfer to facility and CMN forms completed

## 2018-01-12 NOTE — MISCELLANEOUS
Message   Recorded as Task   Date: 10/17/2017 10:00 AM, Created By: Bradly Hill   Task Name: Care Coordination   Assigned To: Cayla Tao   Regarding Patient: Sylvia Sport, Status: Active   Lui Leyva - 17 Oct 2017 10:00 AM     TASK CREATED  Caller: Birgit Alas , Other; Care Coordination; [phone]  Birgit Alas from [address] would like to know if patient can have a flu shot due to the fact that she is on chemo  Spoke with [place] - patient ok for Flu shot      Active Problems    1  Abnormal ECG (794 31) (R94 31)   2  Acute hip pain (719 45) (M25 559)   3  Adhesive capsulitis of right shoulder (726 0) (M75 01)   4  Arthritis (716 90) (M19 90)   5  Atrial fibrillation (427 31) (I48 91)   6  Chronic low back pain (724 2,338 29) (M54 5,G89 29)   7  Degenerative joint disease (715 90) (M19 90)   8  Depression with anxiety (300 4) (F41 8)   9  Diabetes mellitus (250 00) (E11 9)   10  Dyslipidemia (272 4) (E78 5)   11  Edema extremities (782 3) (R60 0)   12  Hypertension (401 9) (I10)   13  Liver metastases (197 7) (C78 7)   14  Localized, primary osteoarthritis of shoulder region (715 11) (M19 019)   15  Obstructive sleep apnea (327 23) (G47 33)   16  Osteoporosis (733 00) (M81 0)   17  Palpitations (785 1) (R00 2)   18  Pancreatic cancer (157 9) (C25 9)   19  Rheumatoid arthritis (714 0) (M06 9)   20  Right shoulder pain (719 41) (M25 511)   21  Rotator cuff tendinitis, right (726 10) (M75 81)   22  Trochanteric bursitis (726 5) (M70 60)    Current Meds   1  Acetaminophen 325 MG Oral Tablet; TAKE 1 TO 2 TABLETS EVERY 6 HOURS AS   NEEDED; Therapy: (Recorded:01Sep2017) to Recorded   2  Albuterol Sulfate 108 (90 Base) MCG/ACT AERS; Therapy: (Recorded:01Sep2017) to Recorded   3  Aspirin 81 MG TABS; Therapy: (Recorded:01Sep2017) to Recorded   4  Colace 100 MG Oral Capsule; Therapy: (Recorded:01Sep2017) to Recorded   5  Cyproheptadine HCl - 4 MG Oral Tablet;  Take 1 tablet daily; Therapy: (Recorded:06Oct2017) to Recorded   6  Ferrous Sulfate 325 (65 Fe) MG Oral Tablet; Therapy: (Recorded:01Sep2017) to Recorded   7  HumaLOG 100 UNIT/ML Subcutaneous Solution; Therapy: (Recorded:01Sep2017) to Recorded   8  Lantus 100 UNIT/ML Subcutaneous Solution; USE AS DIRECTED; Therapy: (Recorded:57Lpi2378) to Recorded   9  Metoprolol Tartrate 25 MG Oral Tablet; Therapy: (Recorded:01Sep2017) to Recorded   10  Milk of Magnesia SUSP; Therapy: (Recorded:01Sep2017) to Recorded   11  Norco 5-325 MG Oral Tablet (Hydrocodone-Acetaminophen); TAKE 1 TABLET EVERY 4    TO 6 HOURS AS NEEDED FOR PAIN;    Therapy: (Recorded:01Sep2017) to Recorded   12  NovoLOG FlexPen 100 UNIT/ML Subcutaneous Solution Pen-injector; USE AS    DIRECTED; Therapy: (Jennifer Arango) to Recorded   13  Oxazepam 10 MG Oral Capsule; Therapy: (Recorded:01Sep2017) to Recorded   14  PredniSONE 1 MG Oral Tablet; 1 TAB THREE TIMES DAILY; Therapy: (Recorded:01Sep2017) to Recorded   15  Protonix 40 MG Oral Tablet Delayed Release (Pantoprazole Sodium); TAKE 1 TABLET    TWICE DAILY 30 MINUTES BEFORE BREAKFAST AND DINNER; Therapy: (Recorded:01Sep2017) to Recorded   16  Remeron 15 MG Oral Tablet (Mirtazapine); TAKE 1 TABLET AT BEDTIME; Therapy: (Recorded:01Sep2017) to Recorded   17  Voltaren 1 % Transdermal Gel (Diclofenac Sodium); apply sparingly to affected area    twice daily on as needed basis for pain; Therapy: [id number] to (Last Rx:13Mar2015) Ordered   18  Xanax 0 5 MG Oral Tablet (ALPRAZolam); Therapy: (Recorded:01Sep2017) to Recorded    Allergies    1  AmLODIPine Besylate TABS   2  Atropine Sulfate SOLN   3  belladonna   4  benazepril   5  Donnatal TABS   6  Hyoscyamine POWD   7  Lotrel CAPS   8  Pamelor CAPS   9  Procardia CAPS   10  Scopolamine HBr CARLTON   11   Simvastatin TABS   12  Sulfa Drugs    Signatures   Electronically signed by : Destiny Nguyen, ; Oct 17 2017 10:11AM EST                       (Author)

## 2018-01-13 NOTE — PROGRESS NOTES
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; UXJ:[id number];WOWLEHV;ERIK:UEJTCNL metastasis; Ordered By:Roxane Hirsch;   · (1) CBC/PLT/DIFF; Status: In Progress - Order Generated; Requested for:Recurring    Schedule: 1/12/2018; 1/26/2018; 2/9/2018; 2/23/2018; 3/9/2018; 3/23/2018; 4/6/2018;    4/20/2   ; Perform:[facility]; CPI:[id number];ZDFBCKB;AFZ:MUWGVRC metastasis; Ordered By:Roxane Hirsch;   · (1) COMPREHENSIVE METABOLIC PANEL; Status:Active; Requested FIC:[id number]; Perform:[facility]; IUV:[id number];CTAKBQU;ASE:QKDVSWI metastasis; Ordered By:Roxane Hirsch;   · (1) COMPREHENSIVE METABOLIC PANEL; Status: In Progress - Order Generated; Requested for:Recurring Schedule: 1/12/2018; 1/26/2018; 2/9/2018; 2/23/2018;    3/9/2018; 3/23/2018; 4/6/2018; 4/20/2   ; Perform:[facility]; FQI:[id number];REPSJNC;JORDAN:FDKCSFP metastasis; Ordered By:Roxane Hirsch;    Discussion/Summary   Discussion Summary:    In summary, this is a 82-year-old female history of advanced pancreatic cancer, as outlined  had been planned to start in September 2017  She was admitted to the hospital several times resulting in a sequence of delays with treatment initiation starting in December 2017  she was in the emergency room about 2 weeks ago and found to have pulmonary emboli and right leg swelling  She was started on Lovenox  CT scan at that time showed progression of disease in the omentum   Hepatic metastases are essentially stable  Left adrenal shows gradual improvement over a span of 4 months  reviewed the above considerations  She did not have enough therapy to determine efficacy  We reviewed that further treatment could be offered with the hope that she will have some improvement or supportive care, comfort measures, hospice could be considered as an alternative  patient wishes to pursue further therapy at this time  We will make arrangements for 2 more treatments over the next month and re-stage thereafter  her disease is progressive hospice would be recommended  reviewed the above with the patient  She voiced understanding and agreement  Counseling Documentation With Imm: The patient was counseled regarding diagnostic results,-- instructions for management,-- patient and family education,-- impressions  total time of encounter was 40 minutes  Chief Complaint   Chief Complaint Free Text Note Form: Follow-up regarding pancreas cancer  History of Present Illness   HPI: August 2017âpatient was admitted to the hospital with severe hyperglycemia  Initially, this was attributed to urinary tract infection  She was treated, diabetic medications were adjusted and she returned home  A few days later she returned to the hospital with severe hyperglycemia  She was found to have positive Hemoccult at that time and was in A  fib  She was found to have a troponin elevation without EKG changes  Echocardiogram showed an ejection fraction 55%  14, 2017  EGD showed a duodenal ulcer with nodularity  Biopsy confirmed the presence of adenocarcinoma  CT of the abdomen and pelvis showed a mass in the pancreatic head, 5 4 cm, as well as multiple hepatic lesions  11, 2017- started Gemzar 800 milligrams/meter squared, oxaliplatin 80 milligrams/meter squared, both given IV Q 2 weeks          Current Therapy: September 11, 2017- started Gemzar 800 milligrams/meter squared, oxaliplatin 80 milligrams/meter squared, both given IV Q 2 weeks  Review of Systems   Complete-Female:      Constitutional: feeling poorly-- and-- feeling tired  Eyes: No complaints of eye pain, no red eyes, no eyesight problems, no discharge, no dry eyes, no itching of eyes  ENT: no complaints of earache, no loss of hearing, no nose bleeds, no nasal discharge, no sore throat, no hoarseness  Cardiovascular: No complaints of slow heart rate, no fast heart rate, no chest pain, no palpitations, no leg claudication, no lower extremity edema  Respiratory: No complaints of shortness of breath, no wheezing, no cough, no SOB on exertion, no orthopnea, no PND  Gastrointestinal: No complaints of abdominal pain, no constipation, no nausea or vomiting, no diarrhea, no bloody stools  Genitourinary: No complaints of dysuria, no incontinence, no pelvic pain, no dysmenorrhea, no vaginal discharge or bleeding  Musculoskeletal: No complaints of arthralgias, no myalgias, no joint swelling or stiffness, no limb pain or swelling  Integumentary: No complaints of skin rash or lesions, no itching, no skin wounds, no breast pain or lump  Neurological: No complaints of headache, no confusion, no convulsions, no numbness, no dizziness or fainting, no tingling, no limb weakness, no difficulty walking  Psychiatric: Not suicidal, no sleep disturbance, no anxiety or depression, no change in personality, no emotional problems  Endocrine: No complaints of proptosis, no hot flashes, no muscle weakness, no deepening of the voice, no feelings of weakness  Hematologic/Lymphatic: No complaints of swollen glands, no swollen glands in the neck, does not bleed easily, does not bruise easily  Active Problems   1  Abnormal ECG (794 31) (R94 31)   2  Acute hip pain (719 45) (M25 559)   3  Adhesive capsulitis of right shoulder (726 0) (M75 01)   4  Admission for fitting of port-a-cath (V58 81) (Z45 2)   5   Arthritis (465 90) (M19 90)   6  Atrial fibrillation (427 31) (I48 91)   7  Chronic low back pain (724 2,338 29) (M54 5,G89 29)   8  Degenerative joint disease (715 90) (M19 90)   9  Depression with anxiety (300 4) (F41 8)   10  Diabetes mellitus (250 00) (E11 9)   11  Dyslipidemia (272 4) (E78 5)   12  Edema extremities (782 3) (R60 0)   13  Hypertension (401 9) (I10)   14  Liver metastases (197 7) (C78 7)   15  Localized, primary osteoarthritis of shoulder region (715 11) (M19 019)   16  Obstructive sleep apnea (327 23) (G47 33)   17  Osteoporosis (733 00) (M81 0)   18  Palpitations (785 1) (R00 2)   19  Pancreatic cancer (157 9) (C25 9)   20  Rheumatoid arthritis (714 0) (M06 9)   21  Right shoulder pain (719 41) (M25 511)   22  Rotator cuff tendinitis, right (726 10) (M75 81)   23  Trochanteric bursitis (726 5) (M70 60)    Past Medical History   1  History of Pericardial effusion (423 9) (I31 3)    Surgical History   1  History of Hip Replacement   2  History of Knee Replacement    Family History   Mother    1  Family history of diabetes mellitus (V18 0) (Z83 3)  Father    2  Family history of Coronary artery disease, occlusive   3  Family history of myocardial infarction (V17 3) (Z82 49)   4  Family history of sudden cardiac death (SCD) (V17 41) (Z82 41)  Brother    5  Family history of coronary artery disease (V17 3) (Z82 49)   6  Family history of myocardial infarction (V17 3) (Z82 49)  Uncle    7  Family history of myocardial infarction (V17 3) (Z82 49)    Social History    · Denied: History of Alcohol use   ·    · Denied: History of Drug use   · Never a smoker    Current Meds    1  Acetaminophen 325 MG Oral Tablet; TAKE 1 TO 2 TABLETS EVERY 6 HOURS AS     NEEDED; Therapy: (Recorded:01Sep2017) to Recorded   2  Albuterol Sulfate 108 (90 Base) MCG/ACT AERS; Therapy: (Recorded:01Sep2017) to Recorded   3  Aspirin 81 MG TABS; Therapy: (Recorded:25Div1691) to Recorded   4  Bacid CAPS;      Therapy: (HWIMYLSX:11ZKX0788) to Recorded   5  Bisacodyl 10 MG/30ML ENEM; Therapy: (Gabino Comer) to Recorded   6  Bisco-Lax 10 MG SUPP; Therapy: (Gabino Comer) to Recorded   7  Cyproheptadine HCl - 4 MG Oral Tablet; Take 1 tablet daily; Therapy: (Recorded:06Oct2017) to Recorded   8  Docusate Sodium 100 MG Oral Capsule; Therapy: (Gabino Comer) to Recorded   9  Donepezil HCl - 5 MG Oral Tablet; TAKE 1 TABLET AT BEDTIME; Therapy: (Recorded:17Beh6973) to Recorded   10  Ferrous Sulfate 325 (65 Fe) MG Oral Tablet; Therapy: (Recorded:01Sep2017) to Recorded   11  HumaLOG 100 UNIT/ML Subcutaneous Solution; Therapy: (Recorded:01Sep2017) to Recorded   12  Lantus 100 UNIT/ML Subcutaneous Solution; USE AS DIRECTED; Therapy: (Recorded:97Glp7910) to Recorded   13  Metoprolol Tartrate 25 MG Oral Tablet; Therapy: (Recorded:01Sep2017) to Recorded   14  Milk of Magnesia SUSP; Therapy: (Recorded:01Sep2017) to Recorded   15  Multivitamins TABS; Therapy: (Gabino Comer) to Recorded   16  Norco 5-325 MG Oral Tablet; TAKE 1 TABLET EVERY 4 TO 6 HOURS AS NEEDED FOR      PAIN;      Therapy: (Recorded:01Sep2017) to Recorded   17  Oxazepam 10 MG Oral Capsule; Therapy: (Recorded:01Sep2017) to Recorded   18  PredniSONE 1 MG Oral Tablet; 1 TAB THREE TIMES DAILY; Therapy: (Recorded:01Sep2017) to Recorded   19  Protonix 40 MG Oral Tablet Delayed Release; TAKE 1 TABLET TWICE DAILY 30 MINUTES      BEFORE BREAKFAST AND DINNER; Therapy: (Recorded:01Sep2017) to Recorded   20  TraMADol HCl - 50 MG Oral Tablet; Therapy: (Recorded:12Jan2018) to Recorded   21  Xanax 0 5 MG Oral Tablet; Therapy: (Recorded:01Sep2017) to Recorded    Allergies   1  AmLODIPine Besylate TABS   2  Atropine Sulfate SOLN   3  belladonna   4  benazepril   5  Donnatal TABS   6  Hyoscyamine POWD   7  Lotrel CAPS   8  Pamelor CAPS   9  Procardia CAPS   10  Scopolamine HBr CARLTON   11   Simvastatin TABS   12  Sulfa Drugs    Vitals   Vital Signs    Recorded: 12Jan2018 11:35AM   Temperature 96 F   Heart Rate 60   Respiration 18   Systolic 563   Diastolic 64   Height 4 ft 8 in   Weight 182 lb    BMI Calculated 40 8   BSA Calculated 1 71   O2 Saturation 96   Pain Scale 5     Physical Exam        Constitutional      General appearance: No acute distress, well appearing and well nourished  Eyes      Conjunctiva and lids: No swelling, erythema or discharge  Ears, Nose, Mouth, and Throat      External inspection of ears and nose: Normal        Oropharynx: Normal with no erythema, edema, exudate or lesions  Pulmonary      Auscultation of lungs: Clear to auscultation  Cardiovascular      Auscultation of heart: Normal rate and rhythm, normal S1 and S2, without murmurs  Examination of extremities for edema and/or varicosities: Normal        Abdomen      Abdomen: Non-tender, no masses  Liver and spleen: No hepatomegaly or splenomegaly  Lymphatic      Palpation of lymph nodes in neck: No lymphadenopathy  Musculoskeletal      Gait and station: Normal        Skin      Skin and subcutaneous tissue: Normal without rashes or lesions  Neurologic      Cranial nerves: Cranial nerves 2-12 intact  Psychiatric      Orientation to person, place, and time: Normal           Future Appointments      Date/Time Provider Specialty Site   01/25/2018 11:20 AM Ale Gabriel MD Pulmonary Medicine [address] [place] [address]     Signatures    Electronically signed by : LIA Garnett  Jan 12 2018 12:23PM EST                       (Author)

## 2018-01-15 NOTE — RESULT NOTES
Verified Results  ECHO COMPLETE WITH CONTRAST IF INDICATED 2016 12:48PM Yuko Ramachandran Order Number: [id number]    Order Number: [id number]     Test Name Result Flag Reference   ECHO COMPLETE WITH CONTRAST IF INDICATED (Report)     [address] [facility] [address]   [phone]     Transthoracic Echocardiogram   2D, M-mode, Doppler, and Color Doppler     Study date: 22-Sep-2016     Patient: Ej Tran   MR number: [id number]   Account number: [de-identified]   : 1940   Age: 78 years   Gender: Female   Status: Outpatient   Location: Echo lab   Height: 58 in   Weight: 210 lb   BP: 126/ 80 mmHg     Indications: Abnormal EKG     Diagnoses: R94 31 - Abnormal electrocardiogram [ECG] [EKG]     Sonographer: Billie Falcon RDCS, CCT   Primary Physician: Marco Lin DO   Referring Physician: Jordon Gutierrez MD   Group: [facility]   Interpreting Physician: Marni Fernández MD     SUMMARY     LEFT VENTRICLE:   Size was normal    Systolic function was normal  Ejection fraction was estimated to be 60 %  There were no regional wall motion abnormalities  Wall thickness was increased  There was mild concentric hypertrophy  MITRAL VALVE:   There was mild annular calcification  There was no evidence for stenosis  There was trace regurgitation  AORTIC VALVE:   The valve was trileaflet  Leaflets exhibited sclerosis  Cannot exclude mild   Aortic Stenosis, peak gradient across the AV is 15 mmHg  TRICUSPID VALVE:   There was mild regurgitation  HISTORY: PRIOR HISTORY: Hypertension     PROCEDURE: The procedure was performed in the echo lab  This was a routine   study  The transthoracic approach was used  The study included complete 2D   imaging, M-mode, complete spectral Doppler, and color Doppler  The heart rate   was 75 bpm, at the start of the study  Echocardiographic views were limited due   to obesity   This was a technically difficult study  LEFT VENTRICLE: Size was normal  Systolic function was normal  Ejection   fraction was estimated to be 60 %  There were no regional wall motion   abnormalities  Wall thickness was increased  There was mild concentric   hypertrophy  RIGHT VENTRICLE: The size was normal  Systolic function was normal  Wall   thickness was normal      LEFT ATRIUM: Size was normal      RIGHT ATRIUM: Size was normal      MITRAL VALVE: There was mild annular calcification  DOPPLER: There was no   evidence for stenosis  There was trace regurgitation  AORTIC VALVE: The valve was trileaflet  Leaflets exhibited sclerosis  Cannot   exclude mild Aortic Stenosis, peak gradient across the AV is 15 mmHg  TRICUSPID VALVE: DOPPLER: There was mild regurgitation  PULMONIC VALVE: Not well visualized  PERICARDIUM: There was no pericardial effusion  The pericardium was normal in   appearance  AORTA: The root exhibited normal size       SYSTEM MEASUREMENT TABLES     2D   %FS: 39 5 %   AV Diam: 3 5 cm   EDV(Teich): 69 46 ml   EF(Teich): 70 6 %   ESV(Teich): 20 42 ml   IVSd: 1 49 cm   LA Area: 15 31 cm2   LA Diam: 3 12 cm   LVEDV MOD A4C: 49 08 ml   LVEF MOD A4C: 66 78 %   LVESV MOD A4C: 16 3 ml   LVIDd: 3 99 cm   LVIDs: 2 41 cm   LVLd A4C: 6 67 cm   LVLs A4C: 6 42 cm   LVPWd: 1 22 cm   RA Area: 12 4 cm2   RV DIAM: 2 57 cm   SV MOD A4C: 32 78 ml   SV(Teich): 49 04 ml     CW   AV VTI: 35 33 cm   AV Vmax: 1 99 m/s   AV Vmax: 1 96 m/s   AV Vmean: 1 11 m/s   AV maxPG: 15 87 mmHg   AV maxPG: 15 34 mmHg   AV meanP 37 mmHg   TR Vmax: 2 35 m/s   TR maxP 03 mmHg     MM   TAPSE: 3 14 cm     PW   E': 0 07 m/s   E/E': 15 58   LVOT Vmax: 1 15 m/s   LVOT maxP 33 mmHg   MV A Ialn: 1 21 m/s   MV Dec Ciales: 3 64 m/s2   MV DecT: 289 42 ms   MV E Ilan: 1 05 m/s   MV E/A Ratio: 0 87     [employer] Accredited Echocardiography Laboratory     Prepared and electronically signed by     Dipika Matta YANIV MCKEON [facility], MD   Signed [id number] 75:25:66

## 2018-01-16 NOTE — MISCELLANEOUS
Message  Left Message to have patient call us back to inform her that she needs to get a CMP blood test completed before she comes in for her appointment on 11/03/17 at 1:30 pm       Active Problems    1  Abnormal ECG (794 31) (R94 31)   2  Acute hip pain (719 45) (M25 559)   3  Adhesive capsulitis of right shoulder (726 0) (M75 01)   4  Arthritis (716 90) (M19 90)   5  Atrial fibrillation (427 31) (I48 91)   6  Chronic low back pain (724 2,338 29) (M54 5,G89 29)   7  Degenerative joint disease (715 90) (M19 90)   8  Depression with anxiety (300 4) (F41 8)   9  Diabetes mellitus (250 00) (E11 9)   10  Dyslipidemia (272 4) (E78 5)   11  Edema extremities (782 3) (R60 0)   12  Hypertension (401 9) (I10)   13  Liver metastases (197 7) (C78 7)   14  Localized, primary osteoarthritis of shoulder region (715 11) (M19 019)   15  Obstructive sleep apnea (327 23) (G47 33)   16  Osteoporosis (733 00) (M81 0)   17  Palpitations (785 1) (R00 2)   18  Pancreatic cancer (157 9) (C25 9)   19  Rheumatoid arthritis (714 0) (M06 9)   20  Right shoulder pain (719 41) (M25 511)   21  Rotator cuff tendinitis, right (726 10) (M75 81)   22  Trochanteric bursitis (726 5) (M70 60)    Current Meds   1  Acetaminophen 325 MG Oral Tablet; TAKE 1 TO 2 TABLETS EVERY 6 HOURS AS   NEEDED; Therapy: (Recorded:01Sep2017) to Recorded   2  Albuterol Sulfate 108 (90 Base) MCG/ACT AERS; Therapy: (Recorded:01Sep2017) to Recorded   3  Aspirin 81 MG TABS; Therapy: (Recorded:01Sep2017) to Recorded   4  Colace 100 MG Oral Capsule; Therapy: (Recorded:01Sep2017) to Recorded   5  Cyproheptadine HCl - 4 MG Oral Tablet; Take 1 tablet daily; Therapy: (Recorded:06Oct2017) to Recorded   6  Ferrous Sulfate 325 (65 Fe) MG Oral Tablet; Therapy: (Recorded:01Sep2017) to Recorded   7  HumaLOG 100 UNIT/ML Subcutaneous Solution; Therapy: (Recorded:01Sep2017) to Recorded   8  Lantus 100 UNIT/ML Subcutaneous Solution; USE AS DIRECTED;    Therapy: ([place]rock[place] Deep) to Recorded   9  Metoprolol Tartrate 25 MG Oral Tablet; Therapy: (Recorded:01Sep2017) to Recorded   10  Milk of Magnesia SUSP; Therapy: (Recorded:01Sep2017) to Recorded   11  Norco 5-325 MG Oral Tablet (Hydrocodone-Acetaminophen); TAKE 1 TABLET EVERY 4    TO 6 HOURS AS NEEDED FOR PAIN;    Therapy: (Recorded:01Sep2017) to Recorded   12  NovoLOG FlexPen 100 UNIT/ML Subcutaneous Solution Pen-injector; USE AS    DIRECTED; Therapy: (Keny Prather) to Recorded   13  Oxazepam 10 MG Oral Capsule; Therapy: (Recorded:01Sep2017) to Recorded   14  PredniSONE 1 MG Oral Tablet; 1 TAB THREE TIMES DAILY; Therapy: (Recorded:01Sep2017) to Recorded   15  Protonix 40 MG Oral Tablet Delayed Release (Pantoprazole Sodium); TAKE 1 TABLET    TWICE DAILY 30 MINUTES BEFORE BREAKFAST AND DINNER; Therapy: (Recorded:01Sep2017) to Recorded   16  Remeron 15 MG Oral Tablet (Mirtazapine); TAKE 1 TABLET AT BEDTIME; Therapy: (Recorded:01Sep2017) to Recorded   17  Voltaren 1 % Transdermal Gel (Diclofenac Sodium); apply sparingly to affected area    twice daily on as needed basis for pain; Therapy: 02CGU4611 to (Last Rx:13Mar2015) Ordered   18  Xanax 0 5 MG Oral Tablet (ALPRAZolam); Therapy: (Recorded:01Sep2017) to Recorded    Allergies    1  AmLODIPine Besylate TABS   2  Atropine Sulfate SOLN   3  belladonna   4  benazepril   5  Donnatal TABS   6  Hyoscyamine POWD   7  Lotrel CAPS   8  Pamelor CAPS   9  Procardia CAPS   10  Scopolamine HBr CARLTON   11   Simvastatin TABS   12  Sulfa Drugs    Signatures   Electronically signed by : Lindy Hill, ; Nov 1 2017  2:29PM EST                       (Author)

## 2018-01-16 NOTE — PROCEDURES
Procedures by Lewis Velásquez MD at 10/23/2017   2:25 PM      Author:  Lewis Velásquez MD Service:  Neurology Author Type:  Physician    Filed:  10/24/2017 12:30 PM Date of Service:  10/23/2017  2:25 PM Status:  Signed    :  Lewis Velásquez MD (Physician)        Procedure Orders:       1  EEG awake or drowsy routine [[id number]] ordered by Jeff Nichols DO at 10/19/17 0705                  ELECTROENCEPHALOGRAM (EEG)      Patient Name:  Dulce Dent  MRN: [id number]   :  1940 File #: [place] 16 - 1   Age: 78 y o  Encounter #: [id number]   Date performed: 10/20/2017           Report date: 10/24/2017          Study type: Routine EEG    ICD 10 diagnosis: Transient alteration of awareness R40 4    Start time: 11:28 End time: 11:58     -------------------------------------------------------------------------------------------------------------------   Patient History: This recording was observed in a 78 y o  female to determine whether  transient alteration of consciousness was due to seizure    Medications include: no AEDs    -------------------------------------------------------------------------------------------------------------------   Description of Procedure:  ·  32 channel digital recording with electrodes placed according to the International 10-20 system with additional  T1/T2 electrodes, EOG, EKG, and simultaneous  video  The recording was technically satisfactory  -------------------------------------------------------------------------------------------------------------------   EEG Description:    The recording was performed with the patient awake and drowsy  She was oriented  to self and location  During wakefulness, there were long runs of well regulated,  low amplitude, posteriorly dominant, symmetric 9 cps alpha rhythm that attenuate d with eye opening  There were symmetric low amplitude, frontally dominant beta activities       With drowsiness, alpha activity attenuated and was replaced by diffusely distributed theta activities  Deeper sleep  was not attained  There was nearly persistent low amplitude and slow respiratory artifact      -------------------------------------------------------------------------------------------------------------------   Activation Procedures:  Hyperventilation was performed for 3-4  minutes and did not produce any changes  Stepped photic stimulation between 1-20 cps was performed and induced symmetric  photic driving  Other findings: The single lead ECG demonstrated normal sinus rhythm    -------------------------------------------------------------------------------------------------------------------   EEG Interpretation: This Routine EEG recorded during wakefulness and drowsiness is normal     Manjit Leon MD   [address] [facility]               Received for:Rohit GARCIA    Oct 24 2017 12:31PM [facility] Standard Time

## 2018-01-18 NOTE — PLAN OF CARE
Problem: Potential for Falls  Goal: Patient will remain free of falls  INTERVENTIONS:  - Assess patient frequently for physical needs  -  Identify cognitive and physical deficits and behaviors that affect risk of falls    -  [place] fall precautions as indicated by assessment   - Educate patient/family on patient safety including physical limitations  - Instruct patient to call for assistance with activity based on assessment  - Modify environment to reduce risk of injury  - Consider OT/PT consult to assist with strengthening/mobility   Outcome: Progressing

## 2018-01-23 NOTE — MISCELLANEOUS
Message  GI Reminder Recall Armida Hawkins:   Date: 01/19/2018   Dear Cris Claire:     Review of our records shows you are due for the following: Our office has tried to contact you  Please call us at [phone] for your results  Please call the following office to schedule your appointment:     We look forward to hearing from you!      Sincerely,     [facility]      Signatures   Electronically signed by : Apryl Huston, ; Jan 19 2018 10:24AM EST                       (Author)

## 2018-01-23 NOTE — MISCELLANEOUS
Message  Left msg for patients Daughter Robert Perez to return my call to discuss patient infusion appointments     Christine Amaya       Active Problems    1  Abnormal ECG (794 31) (R94 31)   2  Acute hip pain (719 45) (M25 559)   3  Adhesive capsulitis of right shoulder (726 0) (M75 01)   4  Admission for fitting of port-a-cath (V58 81) (Z45 2)   5  Arthritis (716 90) (M19 90)   6  Atrial fibrillation (427 31) (I48 91)   7  Chronic low back pain (724 2,338 29) (M54 5,G89 29)   8  Degenerative joint disease (715 90) (M19 90)   9  Depression with anxiety (300 4) (F41 8)   10  Diabetes mellitus (250 00) (E11 9)   11  Dyslipidemia (272 4) (E78 5)   12  Edema extremities (782 3) (R60 0)   13  Hypertension (401 9) (I10)   14  Liver metastases (197 7) (C78 7)   15  Localized, primary osteoarthritis of shoulder region (715 11) (M19 019)   16  Obstructive sleep apnea (327 23) (G47 33)   17  Osteoporosis (733 00) (M81 0)   18  Palpitations (785 1) (R00 2)   19  Pancreatic cancer (157 9) (C25 9)   20  Rheumatoid arthritis (714 0) (M06 9)   21  Right shoulder pain (719 41) (M25 511)   22  Rotator cuff tendinitis, right (726 10) (M75 81)   23  Trochanteric bursitis (726 5) (M70 60)    Current Meds   1  Acetaminophen 325 MG Oral Tablet; TAKE 1 TO 2 TABLETS EVERY 6 HOURS AS   NEEDED; Therapy: (Recorded:01Sep2017) to Recorded   2  Albuterol Sulfate 108 (90 Base) MCG/ACT AERS; Therapy: (Recorded:01Sep2017) to Recorded   3  Aspirin 81 MG TABS; Therapy: (Recorded:01Sep2017) to Recorded   4  Bacid CAPS; Therapy: (450 45 295) to Recorded   5  Bisacodyl 10 MG/30ML ENEM; Therapy: (450 45 295) to Recorded   6  Bisco-Lax 10 MG SUPP; Therapy: (450 45 295) to Recorded   7  Cyproheptadine HCl - 4 MG Oral Tablet; Take 1 tablet daily; Therapy: (Recorded:06Oct2017) to Recorded   8  Docusate Sodium 100 MG Oral Capsule; Therapy: (450 45 346) to Recorded   9   Donepezil HCl - 5 MG Oral Tablet; TAKE 1 TABLET AT BEDTIME; Therapy: (Recorded:[id number]) to Recorded   10  Ferrous Sulfate 325 (65 Fe) MG Oral Tablet; Therapy: (Recorded:01Sep2017) to Recorded   11  HumaLOG 100 UNIT/ML Subcutaneous Solution; Therapy: (Recorded:01Sep2017) to Recorded   12  Lantus 100 UNIT/ML Subcutaneous Solution; USE AS DIRECTED; Therapy: (Recorded:[id number]) to Recorded   13  Metoprolol Tartrate 25 MG Oral Tablet; Therapy: (Recorded:01Sep2017) to Recorded   14  Milk of Magnesia SUSP; Therapy: (Recorded:01Sep2017) to Recorded   15  Multivitamins TABS; Therapy: ([phone]) to Recorded   16  Norco 5-325 MG Oral Tablet (Hydrocodone-Acetaminophen); TAKE 1 TABLET EVERY 4    TO 6 HOURS AS NEEDED FOR PAIN;    Therapy: (Recorded:01Sep2017) to Recorded   17  Oxazepam 10 MG Oral Capsule; Therapy: (Recorded:01Sep2017) to Recorded   18  PredniSONE 1 MG Oral Tablet; 1 TAB THREE TIMES DAILY; Therapy: (Recorded:01Sep2017) to Recorded   19  Protonix 40 MG Oral Tablet Delayed Release (Pantoprazole Sodium); TAKE 1 TABLET    TWICE DAILY 30 MINUTES BEFORE BREAKFAST AND DINNER; Therapy: (Recorded:01Sep2017) to Recorded   20  Xanax 0 5 MG Oral Tablet (ALPRAZolam); Therapy: (Recorded:01Sep2017) to Recorded    Allergies    1  AmLODIPine Besylate TABS   2  Atropine Sulfate SOLN   3  belladonna   4  benazepril   5  Donnatal TABS   6  Hyoscyamine POWD   7  Lotrel CAPS   8  Pamelor CAPS   9  Procardia CAPS   10  Scopolamine HBr CARLTON   11   Simvastatin TABS   12  Sulfa Drugs    Signatures   Electronically signed by : Erika Cifuentes, ; Dec 14 2017 11:53AM EST                       (Author)

## 2018-01-23 NOTE — MISCELLANEOUS
Message   Recorded as Task   Date: 01/02/2018 12:42 PM, Created By: Nenita Carrington   Task Name: Call Back   Assigned To: Rolanad [employer]   Regarding Patient: Robb Pabon, Status: In Progress   Comment:    Saran Garciaelle - 02 Jan 2018 12:42 PM     TASK CREATED  Please contact patients daughter  They were supposed to get back to us with a decision on continuing with treatment  She is scheduled for 1/4  We just want to confirm the decision to continue  Rolanda Weinstein - 03 Jan 2018 4:10 PM     TASK IN PROGRESS   Leticia Gutierrez - 03 Jan 2018 4:12 PM     TASK EDITED  Called patient's daughter she informed me that they are going to continue treatment  Her mother is currently admitted to [facility] and has been since 12-29-17, Ines Jimenez will not be at her appt  on 01-04-18  Please call daughter, Tod Cha, if you need further information  Noted      Active Problems    1  Abnormal ECG (794 31) (R94 31)   2  Acute hip pain (719 45) (M25 559)   3  Adhesive capsulitis of right shoulder (726 0) (M75 01)   4  Admission for fitting of port-a-cath (V58 81) (Z45 2)   5  Arthritis (716 90) (M19 90)   6  Atrial fibrillation (427 31) (I48 91)   7  Chronic low back pain (724 2,338 29) (M54 5,G89 29)   8  Degenerative joint disease (715 90) (M19 90)   9  Depression with anxiety (300 4) (F41 8)   10  Diabetes mellitus (250 00) (E11 9)   11  Dyslipidemia (272 4) (E78 5)   12  Edema extremities (782 3) (R60 0)   13  Hypertension (401 9) (I10)   14  Liver metastases (197 7) (C78 7)   15  Localized, primary osteoarthritis of shoulder region (715 11) (M19 019)   16  Obstructive sleep apnea (327 23) (G47 33)   17  Osteoporosis (733 00) (M81 0)   18  Palpitations (785 1) (R00 2)   19  Pancreatic cancer (157 9) (C25 9)   20  Rheumatoid arthritis (714 0) (M06 9)   21  Right shoulder pain (719 41) (M25 511)   22  Rotator cuff tendinitis, right (726 10) (M49 81)   23  Trochanteric bursitis (726 5) (M70 60)    Current Meds   1  Acetaminophen 325 MG Oral Tablet; TAKE 1 TO 2 TABLETS EVERY 6 HOURS AS   NEEDED; Therapy: (Recorded:01Sep2017) to Recorded   2  Albuterol Sulfate 108 (90 Base) MCG/ACT AERS; Therapy: (Recorded:01Sep2017) to Recorded   3  Aspirin 81 MG TABS; Therapy: (Recorded:01Sep2017) to Recorded   4  Bacid CAPS; Therapy: (679 738 973) to Recorded   5  Bisacodyl 10 MG/30ML ENEM; Therapy: (679 738 973) to Recorded   6  Bisco-Lax 10 MG SUPP; Therapy: (679 738 973) to Recorded   7  Cyproheptadine HCl - 4 MG Oral Tablet; Take 1 tablet daily; Therapy: (Recorded:06Oct2017) to Recorded   8  Docusate Sodium 100 MG Oral Capsule; Therapy: (679 738 973) to Recorded   9  Donepezil HCl - 5 MG Oral Tablet; TAKE 1 TABLET AT BEDTIME; Therapy: (Recorded:82Oxp1943) to Recorded   10  Ferrous Sulfate 325 (65 Fe) MG Oral Tablet; Therapy: (Recorded:01Sep2017) to Recorded   11  HumaLOG 100 UNIT/ML Subcutaneous Solution; Therapy: (Recorded:01Sep2017) to Recorded   12  Lantus 100 UNIT/ML Subcutaneous Solution; USE AS DIRECTED; Therapy: (Recorded:[id number]) to Recorded   13  Metoprolol Tartrate 25 MG Oral Tablet; Therapy: (Recorded:01Sep2017) to Recorded   14  Milk of Magnesia SUSP; Therapy: (Recorded:01Sep2017) to Recorded   15  Multivitamins TABS; Therapy: (679 738 973) to Recorded   16  Norco 5-325 MG Oral Tablet (Hydrocodone-Acetaminophen); TAKE 1 TABLET EVERY 4    TO 6 HOURS AS NEEDED FOR PAIN;    Therapy: (Recorded:01Sep2017) to Recorded   17  Oxazepam 10 MG Oral Capsule; Therapy: (Recorded:01Sep2017) to Recorded   18  PredniSONE 1 MG Oral Tablet; 1 TAB THREE TIMES DAILY; Therapy: (Recorded:01Sep2017) to Recorded   19  Protonix 40 MG Oral Tablet Delayed Release (Pantoprazole Sodium); TAKE 1 TABLET    TWICE DAILY 30 MINUTES BEFORE BREAKFAST AND DINNER; Therapy: (Recorded:01Sep2017) to Recorded   20  Xanax 0 5 MG Oral Tablet (ALPRAZolam);     Therapy: (Recorded:01Sep2017) to Recorded    Allergies    1  AmLODIPine Besylate TABS   2  Atropine Sulfate SOLN   3  belladonna   4  benazepril   5  Donnatal TABS   6  Hyoscyamine POWD   7  Lotrel CAPS   8  Pamelor CAPS   9  Procardia CAPS   10  Scopolamine HBr CARLTON   11   Simvastatin TABS   12  Sulfa Drugs    Signatures   Electronically signed by : Sal Jett, ; Baron  3 2018  4:13PM EST                       (Author)

## 2018-01-25 NOTE — PROGRESS NOTES
Progress Note - Pulmonary   Cyndi Nj 78 y o  female MRN: [id number]  Unit/Bed#:  Encounter: [id number]      Assessment:  · Acute DVT/PE  · Stage IV pancreatic cancer  Plan:  · Discontinue Mucinex  · Continue Lovenox as long as the cancer is active  · Follow-up in 5 months  · Use the oxygen only as needed for O2 saturation less than 90%  Discussion:  The patient's symptoms of acute PE and DVT have improved  Her cough also has resolved  I have maintained her on the Lovenox 80 mg subcu b i d  as long as her cancer is active  She has 1 more cycle of chemo followed by CT scan of the abdomen and pelvis  She should use the oxygen only as needed if her O2 saturation drops less than 90%  I will see her in 5 months in a follow-up visit  Subjective: The patient is here for a follow-up visit  In the beginning of January she was admitted to [facility]  She had initially diverticular bleed  Also was found to have lower extremities swelling which was due to DVT  She had a CT angio and that showed acute PE  Patient was placed on Lovenox 80 mg subcu b i d   It was felt he her cancer is due to the stage IV pancreatic cancer  She also has an IVC filter placed in anticipation that she may not tolerate anticoagulation long-term  The patient is here today for a follow-up visit  She stated that her cough has resolved  The she denies shortness of breath  Denies any chest pain or palpitations  Denies any dizziness  She has no nocturnal symptoms  Vitals: Blood pressure 128/68, pulse 66, temperature 97 6 °F (36 4 °C), temperature source Tympanic, resp   rate 18, height 4' 8" (1 422 m), weight 76 3 kg (168 lb 3 2 oz), SpO2 93 %, not currently breastfeeding ,       Physical Exam  Gen: Awake, alert, oriented x 3, no acute distress  HEENT: Mucous membranes moist, no oral lesions, no thrush  NECK: No accessory muscle use, JVP not elevated  Cardiac: Regular, single S1, single S2, no murmurs, no rubs, no gallops  Lungs:  Clear breath sounds  Abdomen: normoactive bowel sounds, soft nontender, nondistended, no rebound or rigidity, no guarding  Extremities: no cyanosis, no clubbing    1+ edema      Cisco Griffin MD

## 2018-02-01 NOTE — PROGRESS NOTES
Patient tolerated infusion well she was d/c from unit in stable condition with transportation staff she refused AVS

## 2018-02-23 NOTE — DISCHARGE INSTRUCTIONS
Ascites   WHAT YOU NEED TO KNOW:   Ascites is excess fluid in the lower abdomen  The fluid causes swelling in the abdomen  DISCHARGE INSTRUCTIONS:   Medicines:   · Diuretics: This medicine helps decrease the fluid in your abdomen  You may urinate more often when you take diuretics  You will lose weight as the fluid decreases  · Take your medicine as directed  Contact your healthcare provider if you think your medicine is not helping or if you have side effects  Tell him of her if you are allergic to any medicine  Keep a list of the medicines, vitamins, and herbs you take  Include the amounts, and when and why you take them  Bring the list or the pill bottles to follow-up visits  Carry your medicine list with you in case of an emergency  Follow up with your healthcare provider as directed: Your healthcare provider will want to see you again in 1 to 2 weeks to measure your weight and electrolyte (body chemical) levels  He will check how your body has responded to treatment  Write down your questions so you remember to ask them during your visits  Self-care:   · Do not drink alcohol:  Alcohol worsens the damage to your liver  Your ascites may improve or even go away after you stop drinking  You must also avoid medicines that contain alcohol  Ask your healthcare provider for information if you need help to stop drinking  · Follow your low-sodium diet:  A dietitian can help you create a low-sodium diet  He may suggest lemon juice or herbs to flavor your food  Avoid salted butter or margarine, milk, cheese, and canned or frozen foods that are not made for low-salt diets  Ask your healthcare provider before you use salt substitutes  · Write down your daily weight:  Your healthcare provider will need you to track your weight at home to see if the diet changes and medicines are working  Weigh yourself each day  Ask how much weight you should be losing   He will want to know if you are losing too much or too little weight  · Ask about NSAIDs:  NSAIDs are over-the-counter pain and fever medicines  You may not urinate enough to take NSAIDs safely  Ask your healthcare provider if NSAIDs are safe for you  Follow directions carefully  These medicines can cause stomach bleeding or kidney problems if they are not taken correctly  · Limit activity as directed: Too much physical activity may make your ascites worse  Ask your healthcare provider if you have any limits to your normal daily activities  Rarely, bedrest is needed if other health problems develop with ascites  For more information:   · [employer] [facility]  [address] , [address]  Phone: [phone]  Web Address: [employer]  Contact your healthcare provider if:   · You are losing more or less weight than expected  · You are urinating less than usual      · You feel dizzy or lightheaded  · You develop tiredness, dry mouth, nausea, or vomiting  · You have muscle cramps or twitches  · You have questions or concerns about your condition or care  Return to the emergency department if:   · You have a fever  · You feel pain in your abdomen  · You have trouble breathing  · You feel confused, faint, or lose consciousness  · You vomit blood or see blood in your bowel movement  © 2017 [address] Information is for End User's use only and may not be sold, redistributed or otherwise used for commercial purposes  All illustrations and images included in CareNotes® are the copyrighted property of A D A M , Inc  or Carlito Baker  The above information is an  only  It is not intended as medical advice for individual conditions or treatments  Talk to your doctor, nurse or pharmacist before following any medical regimen to see if it is safe and effective for you

## 2018-02-23 NOTE — PROGRESS NOTES
Hematology/Oncology Outpatient Follow-up  Madi Edmond 78 y o  female 1940 [id number]    Date:  2/23/2018      Assessment and Plan:  1  Primary pancreatic cancer with metastasis to other site [facility]), stage IV  - Planned to start chemotherapy in Sept 2017  This was not executed on schedule due to hospitalizations  - She most recently completed 2 cycles of chemotherapy,  1/18/18 and 2/1/18  She was scheduled to have chemotherapy this week but this was unable to be completed due to a platelet count of [phone]  She was planning to have chemotherapy next week, 2/26/18  - She is going to ED for evaluation  If progression of disease, hospice is recommended  Will contact daughter regarding this if this is outcome  2  Nausea and vomiting, intractability of vomiting not specified, unspecified vomiting type, 3  Loose stools, 4  Right lower quadrant abdominal pain   - She states above has been happening for the past 1 5 week  She states that she has been given Zofran without relief  She feels increase in anxiety  Reviewing medication from facility, she has Xanax 0 25 mg PRN  Discussed with patient that these symptoms likely signify that her malignancy is worsening  Discussed need for evaluation in the ED  5  Thrombocytopenia ([address] )  -secondary to chemotherapy       HPI:  August 2017-patient was admitted to the hospital with severe hyperglycemia  Initially, this was attributed to urinary tract infection  She was treated, diabetic medications were adjusted and she returned home  A few days later she returned to the hospital with severe hyperglycemia  She was found to have positive Hemoccult at that time and was in A  fib  She was found to have a troponin elevation without EKG changes  Echocardiogram showed an ejection fraction 55%  August 14, 2017  EGD showed a duodenal ulcer with nodularity  Biopsy confirmed the presence of adenocarcinoma   CT of the abdomen and pelvis showed a mass in the pancreatic head, 5 4 cm, as well as multiple hepatic lesions  September 11, 2017- started Gemzar 800 milligrams/meter squared, oxaliplatin 80 milligrams/meter squared, both given IV Q 2 weeks  treatment had been planned to start in September 2017  She was admitted to the hospital several times resulting in a sequence of delays with treatment initiation starting in December 2017  additionally, she was in the emergency room about 2 weeks ago and found to have pulmonary emboli and right leg swelling  She was started on Lovenox  CT scan at that time showed progression of disease in the omentum  Current therapy: Gemzar 800 milligrams/meter squared, oxaliplatin 80 milligrams/meter squared, both given IV Q 2 weeks  Interval history: nausea, vomiting, after taking pills/after eating nausea is worse, right lower abdominal pain,3 loose stools last night in diaper -- this has been happening for the past week     ROS: Review of Systems   Constitutional: Positive for activity change and appetite change  Respiratory: Negative for cough and shortness of breath  Gastrointestinal: Positive for abdominal pain (RLQ) and vomiting  Negative for constipation  Loose stools     Genitourinary: Negative  Musculoskeletal: Negative  Skin: Negative  Neurological: Negative  Hematological: Negative          Past Medical History:   Diagnosis Date    Anemia     Arthritis     Atrial fibrillation (HCC)     Back pain     Bacteremia     Cancer ([facility] )     Pancreatic    Coronary artery disease     Depression with anxiety     Diabetes mellitus ([facility] )     DJD (degenerative joint disease)     Duodenal ulcer     Encephalopathy     Gait abnormality     Hyperlipidemia     Hypertension     Muscle weakness (generalized)     Non-STEMI (non-ST elevated myocardial infarction) (HCC)     Obstructive sleep apnea     Osteoporosis     Pancreas cancer ([facility] )     Sepsis ([facility] )     Sleep apnea     UTI (urinary tract infection)        Past Surgical History:   Procedure Laterality Date    COLONOSCOPY N/A 1/2/2018    Procedure: COLONOSCOPY;  Surgeon: Mesha Cristina MD;  Location: [facility]; Service: Gastroenterology    ESOPHAGOGASTRODUODENOSCOPY N/A 8/14/2017    Procedure: ESOPHAGOGASTRODUODENOSCOPY (EGD); Surgeon: Dianne Dorsey MD;  Location: [facility]; Service: Gastroenterology    JOINT REPLACEMENT Left     hip    KNEE SURGERY Bilateral     [facility] INSJ TUNNELED CTR VAD W/SUBQ PORT AGE 5 YR/> N/A 11/8/2017    Procedure: INSERTION VENOUS PORT (PORT-A-CATH); Surgeon: Zhen Perez DO;  Location: [facility];  Service: General    REVISION TOTAL HIP ARTHROPLASTY Right        Social History     Social History    Marital status:      Spouse name: N/A    Number of children: N/A    Years of education: N/A     Social History Main Topics    Smoking status: Former Smoker     Types: Cigarettes     Quit date: 9/11/1999    Smokeless tobacco: Never Used    Alcohol use No    Drug use: No    Sexual activity: No     Other Topics Concern    Not on file     Social History Narrative    No narrative on file       Family History   Problem Relation Age of Onset    Family history unknown:  Yes       Allergies   Allergen Reactions    Amlodipine     Belladonna Alkaloids     Benazepril     Donnatal [Pb-Hyoscy-Atropine-Scopolamine]     Hyoscyamine     Lotrel [Amlodipine Besy-Benazepril Hcl]     Pamelor [Nortriptyline]     Procardia [Nifedipine]     Scopolamine     Simvastatin     Sulfa Antibiotics          Current Outpatient Prescriptions:     acetaminophen (TYLENOL) 325 mg tablet, Take 2 tablets by mouth every 6 (six) hours as needed for mild pain, headaches or fever, Disp: 30 tablet, Rfl: 0    albuterol (2 5 mg/3 mL) 0 083 % nebulizer solution, Take 2 5 mg by nebulization every 4 (four) hours as needed for wheezing, Disp: , Rfl:     ALPRAZolam (XANAX) 0 25 mg tablet, Take 1 tablet by mouth 3 (three) times a day as needed for anxiety for up to 10 days (Patient taking differently: Take 0 25 mg by mouth every 8 (eight) hours as needed for anxiety  ), Disp: 20 tablet, Rfl: 0    ARTIFICIAL TEAR SOLUTION OP, Apply to eye, Disp: , Rfl:     aspirin (ECOTRIN LOW STRENGTH) 81 mg EC tablet, Take 81 mg by mouth daily, Disp: , Rfl:     bisacodyl (BISCOLAX) 10 mg suppository, Insert 10 mg into the rectum daily as needed  , Disp: , Rfl:     bisacodyl (FLEET) 10 MG/30ML ENEM, Insert 10 mg into the rectum as needed for constipation, Disp: , Rfl:     busPIRone (BUSPAR) 5 mg tablet, Take 5 mg by mouth 2 (two) times a day, Disp: , Rfl:     cyproheptadine (PERIACTIN) 4 mg tablet, Take 4 mg by mouth daily at bedtime, Disp: , Rfl:     docusate sodium (COLACE) 100 mg capsule, Take 100 mg by mouth 2 (two) times a day as needed for constipation  , Disp: , Rfl:     donepezil (ARICEPT) 5 mg tablet, Take 1 tablet by mouth daily at bedtime, Disp: 30 tablet, Rfl: 0    enoxaparin (LOVENOX) 80 mg/0 8 mL, Inject 0 8 mL under the skin every 12 (twelve) hours, Disp: 0 8 mL, Rfl: 0    ferrous sulfate 325 (65 Fe) mg tablet, Take 325 mg by mouth 2 (two) times a day with meals  , Disp: , Rfl:     FLUoxetine (PROzac) 20 MG tablet, Take 20 mg by mouth daily, Disp: , Rfl:     hydrALAZINE (APRESOLINE) 50 mg tablet, Take 1 tablet by mouth 3 (three) times a day, Disp: 90 tablet, Rfl: 0    insulin glargine (LANTUS) 100 units/mL subcutaneous injection, Inject 30 Units under the skin every morning, Disp: 10 mL, Rfl: 0    insulin lispro (HumaLOG) 100 units/mL injection, Inject 5 Units under the skin 3 (three) times a day before meals, Disp: , Rfl: 0    magnesium hydroxide (MILK OF MAGNESIA) 400 mg/5 mL oral suspension, Take 30 mL by mouth as needed for constipation, Disp: , Rfl:     metoprolol tartrate (LOPRESSOR) 50 mg tablet, Take 1 tablet by mouth every 12 (twelve) hours, Disp: , Rfl: 0    Multiple Vitamin (MULTIVITAMIN) tablet, Take 1 tablet by mouth daily, Disp: , Rfl:    Nutritional Supplements (HI-AZEB) LIQD, Take 60 mL by mouth 2 (two) times a day, Disp: , Rfl:     pantoprazole (PROTONIX) 40 mg tablet, Take 1 tablet by mouth 2 (two) times a day before meals, Disp: , Rfl: 0    potassium chloride (MICRO-K) 10 MEQ CR capsule, Take 10 mEq by mouth 2 (two) times a day, Disp: , Rfl:     predniSONE 1 mg tablet, Take 3 tablets by mouth daily (Patient taking differently: Take 1 mg by mouth 3 (three) times a day  ), Disp: , Rfl: 0    rivastigmine (EXELON) 4 6 mg/24 hr TD 24 hr patch, Place 1 patch on the skin daily, Disp: , Rfl:     saccharomyces boulardii (FLORASTOR) 250 mg capsule, Take 250 mg by mouth daily, Disp: , Rfl:     senna (SENOKOT) 8 6 mg, Take 1 tablet by mouth daily, Disp: 120 each, Rfl: 0    triamterene-hydrochlorothiazide (DYAZIDE) 37 5-25 mg per capsule, Take 1 capsule by mouth every morning, Disp: , Rfl:   No current facility-administered medications for this visit  Facility-Administered Medications Ordered in Other Visits:     [START ON 2/26/2018] dextrose 5 % infusion, 20 mL/hr, Intravenous, Continuous, Caleen Jaiden, DO    [START ON 2/26/2018] heparin lock flush 100 units/mL injection 300 Units, 300 Units, Intracatheter, Q1H PRN, Caleen Jaiden, DO    [START ON 2/26/2018] ondansetron (ZOFRAN) 16 mg, dexamethasone (DECADRON) 10 mg in sodium chloride 0 9 % 50 mL IVPB, , Intravenous, Once, Caleen Jaiden, DO    [START ON 2/26/2018] sodium chloride 0 9 % infusion, 20 mL/hr, Intravenous, Continuous, Caleen Jaiden, DO      Physical Exam:  There were no vitals taken for this visit  Physical Exam   Constitutional: She is oriented to person, place, and time  She appears well-developed and well-nourished  She appears distressed  HENT:   Head: Normocephalic and atraumatic  Eyes: Conjunctivae are normal  No scleral icterus  Neck: Normal range of motion  Neck supple  Cardiovascular: Regular rhythm and normal heart sounds  Tachycardia present      No murmur heard   Pulmonary/Chest: Effort normal and breath sounds normal  No respiratory distress  Abdominal: Soft  There is tenderness (RLQ)  Musculoskeletal: Normal range of motion  She exhibits no edema or tenderness  Lymphadenopathy:     She has no cervical adenopathy  Neurological: She is alert and oriented to person, place, and time  No cranial nerve deficit  Skin: Skin is warm and dry  Psychiatric: Her mood appears anxious  Cognition and memory are impaired  Has dementia   Vitals reviewed  Labs:  Lab Results   Component Value Date    WBC 5 62 01/03/2018    HGB 10 7 (L) 01/03/2018    HCT 36 1 01/03/2018    MCV 94 01/03/2018     (L) 01/03/2018     Lab Results   Component Value Date     01/03/2018    K 4 3 01/03/2018     01/03/2018    CO2 31 01/03/2018    ANIONGAP 6 01/03/2018    BUN 9 01/03/2018    CREATININE 0 90 01/03/2018    GLUCOSE 307 (H) 01/03/2018    GLUF 279 (H) 10/18/2017    CALCIUM 8 3 01/03/2018    AST 34 01/02/2018    ALT 20 01/02/2018    ALKPHOS 127 (H) 01/02/2018    PROT 6 1 (L) 01/02/2018    BILITOT 0 49 01/02/2018    EGFR 63 01/03/2018       Patient voiced understanding and agreement in the above discussion  Aware to contact our office with questions/symptoms in the interim

## 2018-02-23 NOTE — ED NOTES
Pt changed for incontinent dark malodorous kiara urine   Pt placed on bedpan for attempt at collecting a urine sample     Eugenia Blanco RN  02/23/18 [id number]

## 2018-02-23 NOTE — ED PROVIDER NOTES
History  Chief Complaint   Patient presents with    Abdominal Pain     generalized abdominal pain  for 2 days  History of Cancer of pancreas     Patient presents to the emergency department today offering a chief complaint of nausea and vomiting for about a week as well as some lower right-sided abdominal pain for about a week as well  Patient is able to keep down some [employer] products  Denies any changes in bowel movements  She denies fever or headache  She denies chest pain or shortness of breath  Prior to Admission Medications   Prescriptions Last Dose Informant Patient Reported? Taking?    ALPRAZolam (XANAX) 0 25 mg tablet   No Yes   Sig: Take 1 tablet by mouth 3 (three) times a day as needed for anxiety for up to 10 days   Patient taking differently: Take 0 25 mg by mouth every 8 (eight) hours as needed for anxiety     ALPRAZolam (XANAX) 0 25 mg tablet  Outside Facility (Specify) Yes No   Sig: Take 0 25 mg by mouth daily at bedtime as needed for anxiety   ARTIFICIAL TEAR SOLUTION OP  Outside Facility (Specify) Yes Yes   Sig: Apply to eye   FLUoxetine (PROzac) 20 MG tablet  Outside Facility (Specify) Yes No   Sig: Take 20 mg by mouth daily   HYDROcodone-acetaminophen (NORCO) 5-325 mg per tablet   Yes Yes   Sig: Take 1 tablet by mouth every 6 (six) hours as needed for pain   Multiple Vitamin (MULTIVITAMIN) tablet  Outside Facility (Specify) Yes Yes   Sig: Take 1 tablet by mouth daily   acetaminophen (TYLENOL) 325 mg tablet  Outside Facility (Specify) No Yes   Sig: Take 2 tablets by mouth every 6 (six) hours as needed for mild pain, headaches or fever   albuterol (2 5 mg/3 mL) 0 083 % nebulizer solution  Outside Facility (Specify) Yes Yes   Sig: Take 2 5 mg by nebulization every 4 (four) hours as needed for wheezing   aspirin (ECOTRIN LOW STRENGTH) 81 mg EC tablet  Outside Facility (Specify) Yes No   Sig: Take 81 mg by mouth daily   aspirin 325 mg tablet   Yes Yes   Sig: Take 325 mg by mouth daily   bisacodyl (BISCOLAX) 10 mg suppository  Outside Facility (Specify) Yes Yes   Sig: Insert 10 mg into the rectum daily as needed     bisacodyl (FLEET) 10 MG/30ML ENEM  Outside Facility (Specify) Yes No   Sig: Insert 10 mg into the rectum as needed for constipation   busPIRone (BUSPAR) 5 mg tablet  Outside Facility (Specify) Yes Yes   Sig: Take 5 mg by mouth 2 (two) times a day   cyproheptadine (PERIACTIN) 4 mg tablet  Outside Facility (Specify) Yes Yes   Sig: Take 4 mg by mouth daily at bedtime   docusate sodium (COLACE) 100 mg capsule  Outside Facility (Specify) Yes Yes   Sig: Take 100 mg by mouth 2 (two) times a day as needed for constipation     donepezil (ARICEPT) 5 mg tablet  Outside Facility (Specify) No Yes   Sig: Take 1 tablet by mouth daily at bedtime   enoxaparin (LOVENOX) 80 mg/0 8 mL  Outside Facility (Specify) No No   Sig: Inject 0 8 mL under the skin every 12 (twelve) hours   ferrous sulfate 325 (65 Fe) mg tablet  Outside Facility (Specify) Yes Yes   Sig: Take 325 mg by mouth 2 (two) times a day with meals     hydrALAZINE (APRESOLINE) 50 mg tablet  Outside Facility (Specify) No Yes   Sig: Take 1 tablet by mouth 3 (three) times a day   hydrocortisone (ANUSOL-HC, PROCTOSOL HC,) 2 5 % rectal cream   Yes Yes   Sig: Insert 1 application into the rectum 2 (two) times a day   insulin glargine (LANTUS) 100 units/mL subcutaneous injection  Outside Facility (Specify) No Yes   Sig: Inject 30 Units under the skin every morning   insulin lispro (HumaLOG) 100 units/mL injection  Outside Facility (Specify) No Yes   Sig: Inject 5 Units under the skin 3 (three) times a day before meals   Patient taking differently: Inject 5 Units under the skin 3 (three) times a day before meals And sliding scale    magnesium hydroxide (MILK OF MAGNESIA) 400 mg/5 mL oral suspension  Outside Facility (Specify) Yes Yes   Sig: Take 30 mL by mouth as needed for constipation   metoprolol tartrate (LOPRESSOR) 50 mg tablet  Outside Facility (Specify) No Yes   Sig: Take 1 tablet by mouth every 12 (twelve) hours   pantoprazole (PROTONIX) 40 mg tablet  Outside Facility (Specify) No Yes   Sig: Take 1 tablet by mouth 2 (two) times a day before meals   predniSONE 1 mg tablet  Outside Facility (Specify) No Yes   Sig: Take 3 tablets by mouth daily   Patient taking differently: Take 1 mg by mouth 3 (three) times a day     rivastigmine (EXELON) 4 6 mg/24 hr TD 24 hr patch  Outside Facility (Specify) Yes Yes   Sig: Place 1 patch on the skin daily   saccharomyces boulardii (FLORASTOR) 250 mg capsule  Outside Facility (Specify) Yes Yes   Sig: Take 250 mg by mouth daily   senna (SENOKOT) 8 6 mg  Outside Facility (Specify) No Yes   Sig: Take 1 tablet by mouth daily   triamterene-hydrochlorothiazide (DYAZIDE) 37 5-25 mg per capsule  Outside Facility (Specify) Yes Yes   Sig: Take 1 capsule by mouth every morning      Facility-Administered Medications: None       Past Medical History:   Diagnosis Date    Anemia     Arthritis     Atrial fibrillation (HCC)     Back pain     Bacteremia     Cancer ([place] [address] )     Pancreatic    Coronary artery disease     Depression with anxiety     Diabetes mellitus ([place] [address] )     DJD (degenerative joint disease)     Duodenal ulcer     Encephalopathy     Gait abnormality     Hyperlipidemia     Hypertension     Muscle weakness (generalized)     Non-STEMI (non-ST elevated myocardial infarction) (HCC)     Obstructive sleep apnea     Osteoporosis     Pancreas cancer ([place] [address] )     Sepsis ([place] [address] )     Sleep apnea     UTI (urinary tract infection)        Past Surgical History:   Procedure Laterality Date    COLONOSCOPY N/A 1/2/2018    Procedure: COLONOSCOPY;  Surgeon: Margoth Nolan MD;  Location: [facility]; Service: Gastroenterology    ESOPHAGOGASTRODUODENOSCOPY N/A 8/14/2017    Procedure: ESOPHAGOGASTRODUODENOSCOPY (EGD); Surgeon: Camron Marcos MD;  Location: [facility];   Service: Gastroenterology    JOINT REPLACEMENT Left     hip    KNEE SURGERY Bilateral     NM INSJ TUNNELED CTR VAD W/SUBQ PORT AGE 5 YR/> N/A 11/8/2017    Procedure: INSERTION VENOUS PORT (PORT-A-CATH); Surgeon: Elpidio Krueger DO;  Location: [facility];  Service: General    REVISION TOTAL HIP ARTHROPLASTY Right        Family History   Problem Relation Age of Onset    Family history unknown: Yes     I have reviewed and agree with the history as documented  Social History   Substance Use Topics    Smoking status: Former Smoker     Types: Cigarettes     Quit date: 9/11/1999    Smokeless tobacco: Never Used    Alcohol use No        Review of Systems   Constitutional: Negative  HENT: Negative  Eyes: Negative  Respiratory: Negative  Cardiovascular: Negative  Gastrointestinal: Positive for abdominal pain, nausea and vomiting  Negative for abdominal distention, anal bleeding, blood in stool, constipation, diarrhea and rectal pain  Endocrine: Negative  Genitourinary: Negative  Musculoskeletal: Negative  Skin: Negative  Allergic/Immunologic: Negative  Neurological: Negative  Hematological: Negative  Psychiatric/Behavioral: Negative  All other systems reviewed and are negative  Physical Exam  ED Triage Vitals [02/23/18 1159]   Temperature Pulse Respirations Blood Pressure SpO2   98 9 °F (37 2 °C) (!) 115 20 164/86 96 %      Temp Source Heart Rate Source Patient Position - Orthostatic VS BP Location FiO2 (%)   Temporal Monitor Lying Left arm --      Pain Score       9           Orthostatic Vital Signs  Vitals:    02/23/18 1215 02/23/18 1330 02/23/18 1415 02/23/18 1430   BP:  116/58 101/58 135/75   Pulse: 104 90 (!) 109 83   Patient Position - Orthostatic VS:           Physical Exam   Constitutional: She is oriented to person, place, and time  She appears well-developed and well-nourished  No distress  HENT:   Head: Normocephalic  Eyes: Pupils are equal, round, and reactive to light  Neck: Normal range of motion  Cardiovascular: Normal rate  Pulmonary/Chest: Effort normal and breath sounds normal    Abdominal: Soft  She exhibits distension  There is tenderness  Right lower quadrant tenderness   Musculoskeletal: Normal range of motion  Neurological: She is alert and oriented to person, place, and time  Skin: Skin is warm  Capillary refill takes less than 2 seconds  She is not diaphoretic  Psychiatric: She has a normal mood and affect  Vitals reviewed  ED Medications  Medications   ondansetron (ZOFRAN) injection 4 mg (4 mg Intravenous Not Given 2/23/18 1341)   metoclopramide (REGLAN) injection 10 mg (10 mg Intravenous Given 2/23/18 1409)   LORazepam (ATIVAN) 2 mg/mL injection 0 5 mg (0 5 mg Intravenous Given 2/23/18 1432)       Diagnostic Studies  Results Reviewed     Procedure Component Value Units Date/Time    UA w Reflex to Microscopic w Reflex to Culture [[id number]]     Lab Status:  No result Specimen:  Urine     CBC and differential [[id number]]  (Abnormal) Collected:  02/23/18 1208    Lab Status:  Final result Specimen:  Blood from Arm, Right Updated:  02/23/18 1242     WBC 13 25 (H) Thousand/uL      RBC 4 23 Million/uL      Hemoglobin 13 3 g/dL      Hematocrit 42 0 %      MCV 99 (H) fL      MCH 31 4 pg      MCHC 31 7 g/dL      RDW 21 2 (H) %      MPV 10 4 fL      Platelets 912 Thousands/uL     Narrative: This is an appended report  These results have been appended to a previously verified report      Comprehensive metabolic panel [[id number]]  (Abnormal) Collected:  02/23/18 1208    Lab Status:  Final result Specimen:  Blood from Arm, Right Updated:  02/23/18 1234     Sodium 135 (L) mmol/L      Potassium 4 3 mmol/L      Chloride 100 mmol/L      CO2 24 mmol/L      Anion Gap 11 mmol/L      BUN 19 mg/dL      Creatinine 1 59 (H) mg/dL      Glucose 165 (H) mg/dL      Calcium 7 8 (L) mg/dL      AST 80 (H) U/L      ALT 39 U/L      Alkaline Phosphatase 408 (H) U/L      Total Protein 7 3 g/dL      Albumin 1 6 (L) g/dL      Total Bilirubin 0 60 mg/dL      eGFR 32 ml/min/1 73sq m     Narrative:         National Kidney Disease Education Program recommendations are as follows:  GFR calculation is accurate only with a steady state creatinine  Chronic Kidney disease less than 60 ml/min/1 73 sq  meters  Kidney failure less than 15 ml/min/1 73 sq  meters  Lipase [[id number]]  (Abnormal) Collected:  02/23/18 1208    Lab Status:  Final result Specimen:  Blood from Arm, Right Updated:  02/23/18 1234     Lipase 46 (L) u/L                  CT abdomen pelvis wo contrast   Final Result by Janet Stone MD (02/23 1512)      Significantly interval worsening of liver metastases, left adrenal metastasis and diffuse omental metastases with large volume of ascites  Patient may benefit from therapeutic paracentesis  Enlarging pancreatic mass  New subcutaneous nodular densities in the anterior pelvic wall could represent metastatic disease or injection sites  Clinical correlation necessary  Stable cardiomegaly and small left pleural effusion  Workstation performed: [id number]                    Procedures  Procedures       Phone Contacts  ED Phone Contact    ED Course  ED Course as of Feb 23 1547   Fri Feb 23, 2018   1502 Awaiting CT read    2720 Impression       Significantly interval worsening of liver metastases, left adrenal metastasis and diffuse omental metastases with large volume of ascites   Patient may benefit from therapeutic paracentesis  Enlarging pancreatic mass  New subcutaneous nodular densities in the anterior pelvic wall could represent metastatic disease or injection sites   Clinical correlation necessary  Stable cardiomegaly and small left pleural effusion  [id number] I had an extensive conversation with oncology provider ZAHIRA Mendes,    She looked at the lab result as well as the CAT scan results and stated the patient is suitable for discharge back to the nursing home, she will speak with the patient's daughter to potentially have a paracentesis set up early next week  In there is no plans for aggressive treatment  HEART Risk Score    Flowsheet Row Most Recent Value   History  0 Filed at: 02/23/2018 1406   ECG  1 Filed at: 02/23/2018 1406   Age  2 Filed at: 02/23/2018 1406   Risk Factors  1 Filed at: 02/23/2018 1406   Troponin  0 Filed at: 02/23/2018 1406   Heart Score Risk Calculator   History  0 Filed at: 02/23/2018 1406   ECG  1 Filed at: 02/23/2018 1406   Age  2 Filed at: 02/23/2018 1406   Risk Factors  1 Filed at: 02/23/2018 1406   Troponin  0 Filed at: 02/23/2018 1406   HEART Score  4 Filed at: 02/23/2018 1406   HEART Score  4 Filed at: 02/23/2018 1406                            MDM  CritCare Time    Disposition  Final diagnoses:   Ascites     Time reflects when diagnosis was documented in both MDM as applicable and the Disposition within this note     Time User Action Codes Description Comment    2/23/2018  3:46 PM Malick GARCIA Add [R18 8] Ascites       ED Disposition     ED Disposition Condition Comment    Discharge  Laron Nipper discharge to home/self care  Condition at discharge: Good        Follow-up Information     Follow up With Specialties Details Why [address], [place] Oncology   [address]  [phone]          Patient's Medications   Discharge Prescriptions    No medications on file     No discharge procedures on file      ED Provider  Electronically Signed by           Melvina Marcelino PA-C  02/23/18 0101

## 2018-02-23 NOTE — ED PROCEDURE NOTE
Procedure  ECG 12 Lead Documentation  Date/Time: 2/23/2018 2:05 PM  Performed by: Jeri Saenz by: Tyler Hawkins     Indications / Diagnosis:  Abd pain  ECG reviewed by me, the ED Provider: yes    Patient location:  ED  Previous ECG:     Previous ECG:  Compared to current    Comparison ECG info:  No change from 12/2017    Similarity:  No change    Comparison to cardiac monitor: Yes    Interpretation:     Interpretation: non-specific    Rate:     ECG rate:  104    ECG rate assessment: tachycardic    Rhythm:     Rhythm: sinus rhythm    Ectopy:     Ectopy: none    QRS:     QRS axis:  Left    QRS intervals:  Normal  Conduction:     Conduction: abnormal      Abnormal conduction: complete LBBB    ST segments:     ST segments:  Normal  T waves:     T waves: normal                       Jannet Perrin PA-C  02/23/18 1404

## 2018-02-23 NOTE — ED NOTES
Pt c/o feeling anxious and requesting something for her nerves  Janine Horowitz PA-C made aware  Orders for 0 5mg IV ativan received       Sahra Gruber RN  02/23/18 8897

## 2018-02-28 NOTE — ED NOTES
Spoke with albert from [facility], was advised to transport pt to [facility] with saline eye patches in place     Sai Verduzco RN  02/28/18 3060

## 2018-02-28 NOTE — ED NOTES
Pt found bradycardic with agonal respirations, bvm initiated, pt rate changed to sinus 90's, family present at bedside, comfirmed dnr/dni status, requesting comfort care only       Duyen Suggs RN  02/28/18 1371

## 2018-02-28 NOTE — TELEPHONE ENCOUNTER
CALL FROM JIMMY AT [facility] 5 TO LET   [facility] [place] KNOW HIS PT HAS PASSED AWAY

## 2018-02-28 NOTE — ED NOTES
[facility] returned call, not suitable for donation, pt transported to [address] [place] [address], , and [facility] [place] aware     Eveline Valenzuela RN  [phone]

## 2018-02-28 NOTE — ED PROVIDER NOTES
History  Chief Complaint   Patient presents with    Rectal Bleeding     Patient: Phan armstrong o /female  YOB: 1940  MRN: [id number]  PCP: Joseph Menjivar MD  Date of evaluation: 2/27/2018    (N B  [address] may have been used in the preparation of this document )    History provided by:  Medical records, EMS personnel and nursing home  Rectal Bleeding - Minor   Quality:  Maroon  Amount:  Copious  Timing:  Intermittent  Chronicity:  New  Relieved by:  Nothing  Worsened by:  Nothing  Associated symptoms: abdominal pain, hematemesis (red blood) and vomiting (red blood)    Risk factors comment:  Prophylactic Lovenox      Prior to Admission Medications   Prescriptions Last Dose Informant Patient Reported? Taking?    ALPRAZolam (XANAX) 0 25 mg tablet   No No   Sig: Take 1 tablet by mouth 3 (three) times a day as needed for anxiety for up to 10 days   Patient taking differently: Take 0 25 mg by mouth every 8 (eight) hours as needed for anxiety     ALPRAZolam (XANAX) 0 25 mg tablet  Outside Facility (Specify) Yes No   Sig: Take 0 25 mg by mouth daily at bedtime as needed for anxiety   ARTIFICIAL TEAR SOLUTION OP  Outside Facility (Specify) Yes No   Sig: Apply to eye   FLUoxetine (PROzac) 20 MG tablet  Outside Facility (Specify) Yes No   Sig: Take 20 mg by mouth daily   HYDROcodone-acetaminophen (NORCO) 5-325 mg per tablet   Yes No   Sig: Take 1 tablet by mouth every 6 (six) hours as needed for pain   Multiple Vitamin (MULTIVITAMIN) tablet  Outside Facility (Specify) Yes No   Sig: Take 1 tablet by mouth daily   acetaminophen (TYLENOL) 325 mg tablet  Outside Facility (Specify) No No   Sig: Take 2 tablets by mouth every 6 (six) hours as needed for mild pain, headaches or fever   albuterol (2 5 mg/3 mL) 0 083 % nebulizer solution  Outside Facility (Specify) Yes No   Sig: Take 2 5 mg by nebulization every 4 (four) hours as needed for wheezing   aspirin (ECOTRIN LOW STRENGTH) 81 mg EC tablet  Outside Facility (Specify) Yes No   Sig: Take 81 mg by mouth daily   aspirin 325 mg tablet   Yes No   Sig: Take 325 mg by mouth daily   bisacodyl (BISCOLAX) 10 mg suppository  Outside Facility (Specify) Yes No   Sig: Insert 10 mg into the rectum daily as needed     bisacodyl (FLEET) 10 MG/30ML ENEM  Outside Facility (Specify) Yes No   Sig: Insert 10 mg into the rectum as needed for constipation   busPIRone (BUSPAR) 5 mg tablet  Outside Facility (Specify) Yes No   Sig: Take 5 mg by mouth 2 (two) times a day   cyproheptadine (PERIACTIN) 4 mg tablet  Outside Facility (Specify) Yes No   Sig: Take 4 mg by mouth daily at bedtime   docusate sodium (COLACE) 100 mg capsule  Outside Facility (Specify) Yes No   Sig: Take 100 mg by mouth 2 (two) times a day as needed for constipation     donepezil (ARICEPT) 5 mg tablet  Outside Facility (Specify) No No   Sig: Take 1 tablet by mouth daily at bedtime   enoxaparin (LOVENOX) 80 mg/0 8 mL  Outside Facility (Specify) No No   Sig: Inject 0 8 mL under the skin every 12 (twelve) hours   ferrous sulfate 325 (65 Fe) mg tablet  Outside Facility (Specify) Yes No   Sig: Take 325 mg by mouth 2 (two) times a day with meals     hydrALAZINE (APRESOLINE) 50 mg tablet  Outside Facility (Specify) No No   Sig: Take 1 tablet by mouth 3 (three) times a day   hydrocortisone (ANUSOL-HC, PROCTOSOL HC,) 2 5 % rectal cream   Yes No   Sig: Insert 1 application into the rectum 2 (two) times a day   insulin glargine (LANTUS) 100 units/mL subcutaneous injection  Outside Facility (Specify) No No   Sig: Inject 30 Units under the skin every morning   insulin lispro (HumaLOG) 100 units/mL injection  Outside Facility (Specify) No No   Sig: Inject 5 Units under the skin 3 (three) times a day before meals   Patient taking differently: Inject 5 Units under the skin 3 (three) times a day before meals And sliding scale    magnesium hydroxide (MILK OF MAGNESIA) 400 mg/5 mL oral suspension  Outside Facility (Specify) Yes No   Sig: Take 30 mL by mouth as needed for constipation   metoprolol tartrate (LOPRESSOR) 50 mg tablet  Outside Facility (Specify) No No   Sig: Take 1 tablet by mouth every 12 (twelve) hours   pantoprazole (PROTONIX) 40 mg tablet  Outside Facility (Specify) No No   Sig: Take 1 tablet by mouth 2 (two) times a day before meals   predniSONE 1 mg tablet  Outside Facility (Specify) No No   Sig: Take 3 tablets by mouth daily   Patient taking differently: Take 1 mg by mouth 3 (three) times a day     rivastigmine (EXELON) 4 6 mg/24 hr TD 24 hr patch  Outside Facility (Specify) Yes No   Sig: Place 1 patch on the skin daily   saccharomyces boulardii (FLORASTOR) 250 mg capsule  Outside Facility (Specify) Yes No   Sig: Take 250 mg by mouth daily   senna (SENOKOT) 8 6 mg  Outside Facility (Specify) No No   Sig: Take 1 tablet by mouth daily   triamterene-hydrochlorothiazide (DYAZIDE) 37 5-25 mg per capsule  Outside Facility (Specify) Yes No   Sig: Take 1 capsule by mouth every morning      Facility-Administered Medications: None       Past Medical History:   Diagnosis Date    Anemia     Arthritis     Atrial fibrillation (HCC)     Back pain     Bacteremia     Cancer ([facility] )     Pancreatic    Coronary artery disease     Depression with anxiety     Diabetes mellitus (HCC)     DJD (degenerative joint disease)     Duodenal ulcer     Encephalopathy     Gait abnormality     Hyperlipidemia     Hypertension     Muscle weakness (generalized)     Non-STEMI (non-ST elevated myocardial infarction) (HCC)     Obstructive sleep apnea     Osteoporosis     Pancreas cancer ([facility] )     Sepsis ([facility] )     Sleep apnea     UTI (urinary tract infection)        Past Surgical History:   Procedure Laterality Date    COLONOSCOPY N/A 1/2/2018    Procedure: COLONOSCOPY;  Surgeon: Hair Rod MD;  Location: [facility];   Service: Gastroenterology    ESOPHAGOGASTRODUODENOSCOPY N/A 8/14/2017    Procedure: ESOPHAGOGASTRODUODENOSCOPY (EGD); Surgeon: Lupe Fernandez MD;  Location:  GI LAB; Service: Gastroenterology    JOINT REPLACEMENT Left     hip    KNEE SURGERY Bilateral     RI INSJ TUNNELED CTR VAD W/SUBQ PORT AGE 5 YR/> N/A 11/8/2017    Procedure: INSERTION VENOUS PORT (PORT-A-CATH); Surgeon: [facility], DO;  Location: [facility];  Service: General    REVISION TOTAL HIP ARTHROPLASTY Right        Family History   Problem Relation Age of Onset    Family history unknown: Yes     I have reviewed and agree with the history as documented  Social History   Substance Use Topics    Smoking status: Former Smoker     Types: Cigarettes     Quit date: 9/11/1999    Smokeless tobacco: Never Used    Alcohol use No        Review of Systems   Unable to perform ROS: Mental status change   Gastrointestinal: Positive for abdominal pain, anal bleeding, hematemesis (red blood), hematochezia and vomiting (red blood)  Physical Exam  ED Triage Vitals [02/28/18 0008]   Temperature Pulse Respirations Blood Pressure SpO2   98 4 °F (36 9 °C) (!) 116 (!) 24 (!) 151/116 91 %      Temp Source Heart Rate Source Patient Position - Orthostatic VS BP Location FiO2 (%)   Temporal Monitor Lying Left arm --      Pain Score       Worst Possible Pain           Orthostatic Vital Signs  Vitals:    02/28/18 0008   BP: (!) 151/116   Pulse: (!) 116   Patient Position - Orthostatic VS: Lying       Physical Exam   Constitutional: She is oriented to person, place, and time  She appears well-developed and well-nourished  She appears ill  She appears distressed  HENT:   Head: Normocephalic and atraumatic  Mouth/Throat: Oropharynx is clear and moist and mucous membranes are normal    Voice normal   Eyes: EOM are normal  Pupils are equal, round, and reactive to light  Cardiovascular: Normal rate and regular rhythm  Pulmonary/Chest: Accessory muscle usage present  She has decreased breath sounds  Abdominal: Soft   Bowel sounds are normal  She exhibits distension  There is generalized tenderness  Neurological: She is alert and oriented to person, place, and time  GCS eye subscore is 4  GCS verbal subscore is 5  GCS motor subscore is 6  Skin: Skin is warm and dry  Psychiatric: Her speech is normal and behavior is normal  Her mood appears anxious  She is attentive  Nursing note and vitals reviewed  ED Medications  Medications   sodium chloride 0 9 % infusion (not administered)   ondansetron (ZOFRAN) injection 4 mg (4 mg Intravenous Given 2/28/18 0135)   fentanyl citrate (PF) 100 MCG/2ML 25 mcg (25 mcg Intravenous Given 2/28/18 0135)       Diagnostic Studies  Results Reviewed     Procedure Component Value Units Date/Time    CBC and differential [[id number]]  (Abnormal) Collected:  02/28/18 0005    Lab Status:  Final result Specimen:  Blood from Central Venous Line Updated:  02/28/18 0111     WBC 15 08 (H) Thousand/uL      RBC 2 54 (L) Million/uL      Hemoglobin 7 9 (L) g/dL      Hematocrit 26 4 (L) %       (H) fL      MCH 31 1 pg      MCHC 29 9 (L) g/dL      RDW 19 9 (H) %      MPV 10 7 fL      Platelets 204 Thousands/uL     Narrative: This is an appended report  These results have been appended to a previously verified report  Troponin I [[id number]]  (Abnormal) Collected:  02/28/18 0005    Lab Status:  Final result Specimen:  Blood from Central Venous Line Updated:  02/28/18 0056     Troponin I 0 05 (HH) ng/mL     Narrative:         [employer] Chemistry analyzer 99% cutoff is > 0 04 ng/mL in network labs    o cTnI 99% cutoff is useful only when applied to patients in the clinical setting of myocardial ischemia  o cTnI 99% cutoff should be interpreted in the context of clinical history, ECG findings and possibly cardiac imaging to establish correct diagnosis  o cTnI 99% cutoff may be suggestive but clearly not indicative of a coronary event without the clinical setting of myocardial ischemia      Comprehensive metabolic panel [[id number]]  (Abnormal) Collected:  02/28/18 0005    Lab Status:  Final result Specimen:  Blood from Central Venous Line Updated:  02/28/18 0044     Sodium 136 mmol/L      Potassium 6 0 (H) mmol/L      Chloride 104 mmol/L      CO2 16 (L) mmol/L      Anion Gap 16 (H) mmol/L      BUN 28 (H) mg/dL      Creatinine 1 61 (H) mg/dL      Glucose 236 (H) mg/dL      Calcium 7 5 (L) mg/dL      AST 49 (H) U/L      ALT 22 U/L      Alkaline Phosphatase 279 (H) U/L      Total Protein 4 8 (L) g/dL      Albumin 1 0 (L) g/dL      Total Bilirubin 0 40 mg/dL      eGFR 31 ml/min/1 73sq m     Narrative:         National Kidney Disease Education Program recommendations are as follows:  GFR calculation is accurate only with a steady state creatinine  Chronic Kidney disease less than 60 ml/min/1 73 sq  meters  Kidney failure less than 15 ml/min/1 73 sq  meters  Lipase [[id number]]  (Abnormal) Collected:  02/28/18 0005    Lab Status:  Final result Specimen:  Blood from Central Venous Line Updated:  02/28/18 0042     Lipase 47 (L) u/L     Magnesium [[id number]]  (Normal) Collected:  02/28/18 0005    Lab Status:  Final result Specimen:  Blood from Central Venous Line Updated:  02/28/18 0042     Magnesium 2 2 mg/dL     B-type natriuretic peptide [[id number]]  (Abnormal) Collected:  02/28/18 0005    Lab Status:  Final result Specimen:  Blood from Central Venous Line Updated:  02/28/18 0042     NT-proBNP 3,773 (H) pg/mL     APTT [[id number]]  (Abnormal) Collected:  02/28/18 0006    Lab Status:  Final result Specimen:  Blood from Central Venous Line Updated:  02/28/18 0033     PTT 55 (H) seconds     Narrative:          Therapeutic Heparin Range = 60-90 seconds    Protime-INR [[id number]]  (Abnormal) Collected:  02/28/18 0006    Lab Status:  Final result Specimen:  Blood from Central Venous Line Updated:  02/28/18 0033     Protime 25 5 (H) seconds      INR 2 31 (H)    Blood culture #2 [[id number]]     Lab Status:  No result Specimen:  Blood Blood culture #1 [[id number]]     Lab Status:  No result Specimen:  Blood     Lactic acid, plasma [[id number]]     Lab Status:  No result Specimen:  Blood                  No orders to display              Procedures  CriticalCare Time  Performed by: Liliana Reardon by: Gelacio Eubanks     Critical care provider statement:     Critical care time (minutes):  35    Critical care time was exclusive of:  Separately billable procedures and treating other patients and teaching time    Critical care was necessary to treat or prevent imminent or life-threatening deterioration of the following conditions:  Circulatory failure    Critical care was time spent personally by me on the following activities:  Obtaining history from patient or surrogate, development of treatment plan with patient or surrogate, evaluation of patient's response to treatment, examination of patient, ordering and review of laboratory studies, ordering and review of radiographic studies and re-evaluation of patient's condition (end-of -life discussion with pt's family at bedside, treating pain)           Phone Contacts  ED Phone Contact    ED Course  ED Course as of 435   Wed 2018   0200 Pt                                 MDM  The patient presented with a condition in which there was a high probability of imminent or life-threatening deterioration, and critical care services (excluding separately billable procedures) totalled 30-74 minutes  Disposition  Final diagnoses:   None     ED Disposition     ED Disposition Condition Comment    Discharge  Pearl Marcos discharge to home/self care      Condition at discharge:         Follow-up Information    None       Discharge Medication List as of 2018  4:16 AM      CONTINUE these medications which have NOT CHANGED    Details   acetaminophen (TYLENOL) 325 mg tablet Take 2 tablets by mouth every 6 (six) hours as needed for mild pain, headaches or fever, Starting Mon 8/28/2017, No Print      albuterol (2 5 mg/3 mL) 0 083 % nebulizer solution Take 2 5 mg by nebulization every 4 (four) hours as needed for wheezing, Historical Med      ALPRAZolam (XANAX) 0 25 mg tablet Take 0 25 mg by mouth daily at bedtime as needed for anxiety, Historical Med      ARTIFICIAL TEAR SOLUTION OP Apply to eye, Historical Med      aspirin (ECOTRIN LOW STRENGTH) 81 mg EC tablet Take 81 mg by mouth daily, Historical Med      aspirin 325 mg tablet Take 325 mg by mouth daily, Historical Med      bisacodyl (BISCOLAX) 10 mg suppository Insert 10 mg into the rectum daily as needed  , Historical Med      bisacodyl (FLEET) 10 MG/30ML ENEM Insert 10 mg into the rectum as needed for constipation, Historical Med      busPIRone (BUSPAR) 5 mg tablet Take 5 mg by mouth 2 (two) times a day, Historical Med      cyproheptadine (PERIACTIN) 4 mg tablet Take 4 mg by mouth daily at bedtime, Historical Med      docusate sodium (COLACE) 100 mg capsule Take 100 mg by mouth 2 (two) times a day as needed for constipation  , Historical Med      donepezil (ARICEPT) 5 mg tablet Take 1 tablet by mouth daily at bedtime, Starting Fri 12/8/2017, Normal      enoxaparin (LOVENOX) 80 mg/0 8 mL Inject 0 8 mL under the skin every 12 (twelve) hours, Starting Fri 1/5/2018, Normal      ferrous sulfate 325 (65 Fe) mg tablet Take 325 mg by mouth 2 (two) times a day with meals  , Historical Med      FLUoxetine (PROzac) 20 MG tablet Take 20 mg by mouth daily, Historical Med      hydrALAZINE (APRESOLINE) 50 mg tablet Take 1 tablet by mouth 3 (three) times a day, Starting Fri 12/8/2017, Normal      HYDROcodone-acetaminophen (NORCO) 5-325 mg per tablet Take 1 tablet by mouth every 6 (six) hours as needed for pain, Historical Med      hydrocortisone (ANUSOL-HC, PROCTOSOL HC,) 2 5 % rectal cream Insert 1 application into the rectum 2 (two) times a day, Historical Med      insulin glargine (LANTUS) 100 units/mL subcutaneous injection Inject 30 Units under the skin every morning, Starting Fri 12/8/2017, Normal      insulin lispro (HumaLOG) 100 units/mL injection Inject 5 Units under the skin 3 (three) times a day before meals, Starting Fri 12/8/2017, No Print      magnesium hydroxide (MILK OF MAGNESIA) 400 mg/5 mL oral suspension Take 30 mL by mouth as needed for constipation, Historical Med      metoprolol tartrate (LOPRESSOR) 50 mg tablet Take 1 tablet by mouth every 12 (twelve) hours, Starting Fri 12/8/2017, No Print      Multiple Vitamin (MULTIVITAMIN) tablet Take 1 tablet by mouth daily, Historical Med      pantoprazole (PROTONIX) 40 mg tablet Take 1 tablet by mouth 2 (two) times a day before meals, Starting Mon 8/28/2017, No Print      predniSONE 1 mg tablet Take 3 tablets by mouth daily, Starting Tue 10/24/2017, No Print      rivastigmine (EXELON) 4 6 mg/24 hr TD 24 hr patch Place 1 patch on the skin daily, Historical Med      saccharomyces boulardii (FLORASTOR) 250 mg capsule Take 250 mg by mouth daily, Historical Med      senna (SENOKOT) 8 6 mg Take 1 tablet by mouth daily, Starting Fri 1/5/2018, Normal      triamterene-hydrochlorothiazide (DYAZIDE) 37 5-25 mg per capsule Take 1 capsule by mouth every morning, Historical Med           No discharge procedures on file      ED Provider  Electronically Signed by           Adela Rascon MD  02/28/18 5272

## 2018-03-01 LAB
ATRIAL RATE: 120 BPM
P AXIS: 32 DEGREES
PR INTERVAL: 142 MS
QRS AXIS: -54 DEGREES
QRSD INTERVAL: 122 MS
QT INTERVAL: 346 MS
QTC INTERVAL: 489 MS
T WAVE AXIS: 110 DEGREES
VENTRICULAR RATE: 120 BPM

## 2019-01-06 NOTE — MISCELLANEOUS
Message   Recorded as Task   Date: 11/17/2017 10:49 AM, Created By: ISAEL YI   Task Name: Call Back   Assigned To: Mary Black   Regarding Patient: Chinyere Shi, Status: In Progress   Comment:    Cuca Liu - 17 Nov 2017 10:49 AM     TASK CREATED  Sandhya from the pt's home called and wanted to know if a Ct can be ordered  The pt is experiencing increased confusion  #: [phone] ext [phone]   Mary Black - 17 Nov 2017 10:55 AM     TASK IN PROGRESS   Spoke with [facility] - They noticed patient has been more confused over the past few weeks  She did have a recent MRI of the brain 1 month ago which was normal  Repeat CMP and Ammonia level ordered  If levels are abnormal or symptoms get worse over the weekend patient will be transferred to the ER  Active Problems    1  Abnormal ECG (794 31) (R94 31)   2  Acute hip pain (719 45) (M25 559)   3  Adhesive capsulitis of right shoulder (726 0) (M75 01)   4  Admission for fitting of port-a-cath (V58 81) (Z45 2)   5  Arthritis (716 90) (M19 90)   6  Atrial fibrillation (427 31) (I48 91)   7  Chronic low back pain (724 2,338 29) (M54 5,G89 29)   8  Degenerative joint disease (715 90) (M19 90)   9  Depression with anxiety (300 4) (F41 8)   10  Diabetes mellitus (250 00) (E11 9)   11  Dyslipidemia (272 4) (E78 5)   12  Edema extremities (782 3) (R60 0)   13  Hypertension (401 9) (I10)   14  Liver metastases (197 7) (C78 7)   15  Localized, primary osteoarthritis of shoulder region (715 11) (M19 019)   16  Obstructive sleep apnea (327 23) (G47 33)   17  Osteoporosis (733 00) (M81 0)   18  Palpitations (785 1) (R00 2)   19  Pancreatic cancer (157 9) (C25 9)   20  Rheumatoid arthritis (714 0) (M06 9)   21  Right shoulder pain (719 41) (M25 511)   22  Rotator cuff tendinitis, right (726 10) (M75 81)   23  Trochanteric bursitis (726 5) (M70 60)    Current Meds   1  Acetaminophen 325 MG Oral Tablet; TAKE 1 TO 2 TABLETS EVERY 6 HOURS AS   NEEDED;    Therapy: (Recorded:01Sep2017) to Recorded   2  Albuterol Sulfate 108 (90 Base) MCG/ACT AERS; Therapy: (Recorded:01Sep2017) to Recorded   3  Aspirin 81 MG TABS; Therapy: (Recorded:01Sep2017) to Recorded   4  Bacid CAPS; Therapy: [phone]) to Recorded   5  Bisacodyl 10 MG/30ML ENEM; Therapy: [phone]) to Recorded   6  Bisco-Lax 10 MG SUPP; Therapy: [phone]) to Recorded   7  Colace 100 MG Oral Capsule; Therapy: (Recorded:01Sep2017) to Recorded   8  Cyproheptadine HCl - 4 MG Oral Tablet; Take 1 tablet daily; Therapy: (Recorded:06Oct2017) to Recorded   9  Docusate Sodium 100 MG Oral Capsule; Therapy: [phone]) to Recorded   10  Ferrous Sulfate 325 (65 Fe) MG Oral Tablet; Therapy: (Recorded:01Sep2017) to Recorded   11  HumaLOG 100 UNIT/ML Subcutaneous Solution; Therapy: (Recorded:01Sep2017) to Recorded   12  Lantus 100 UNIT/ML Subcutaneous Solution; USE AS DIRECTED; Therapy: (Recorded:[id number]) to Recorded   13  Metoprolol Tartrate 25 MG Oral Tablet; Therapy: (Recorded:01Sep2017) to Recorded   14  Milk of Magnesia SUSP; Therapy: (Recorded:01Sep2017) to Recorded   15  Multivitamins TABS; Therapy: [phone]) to Recorded   16  Norco 5-325 MG Oral Tablet (Hydrocodone-Acetaminophen); TAKE 1 TABLET EVERY 4    TO 6 HOURS AS NEEDED FOR PAIN;    Therapy: (Recorded:01Sep2017) to Recorded   17  Oxazepam 10 MG Oral Capsule; Therapy: (Recorded:01Sep2017) to Recorded   18  PredniSONE 1 MG Oral Tablet; 1 TAB THREE TIMES DAILY; Therapy: (Recorded:01Sep2017) to Recorded   19  Protonix 40 MG Oral Tablet Delayed Release (Pantoprazole Sodium); TAKE 1 TABLET    TWICE DAILY 30 MINUTES BEFORE BREAKFAST AND DINNER; Therapy: (Recorded:01Sep2017) to Recorded   20  PROzac 20 MG Oral Capsule (FLUoxetine HCl); Therapy: [phone]) to Recorded   21  Remeron 15 MG Oral Tablet (Mirtazapine); TAKE 1 TABLET AT BEDTIME;     Therapy: (Recorded:01Sep2017) to Recorded   22  Voltaren 1 % Transdermal Gel (Diclofenac Sodium); apply sparingly to affected area    twice daily on as needed basis for pain; Therapy: [id number] to (Last Rx:13Mar2015) Ordered   23  Xanax 0 5 MG Oral Tablet (ALPRAZolam); Therapy: (Recorded:01Sep2017) to Recorded    Allergies    1  AmLODIPine Besylate TABS   2  Atropine Sulfate SOLN   3  belladonna   4  benazepril   5  Donnatal TABS   6  Hyoscyamine POWD   7  Lotrel CAPS   8  Pamelor CAPS   9  Procardia CAPS   10  Scopolamine HBr CARLTON   11   Simvastatin TABS   12  Sulfa Drugs    Signatures   Electronically signed by : Doron Blevins, ; Nov 17 2017 12:09PM EST                       (Author) (4) no impairment

## 2025-06-25 NOTE — PLAN OF CARE
Problem: DISCHARGE PLANNING - CARE MANAGEMENT  Goal: Discharge to post-acute care or home with appropriate resources  INTERVENTIONS:  - Conduct assessment to determine patient/family and health care team treatment goals, and need for post-acute services based on payer coverage, community resources, and patient preferences, and barriers to discharge  - Address psychosocial, clinical, and financial barriers to discharge as identified in assessment in conjunction with the patient/family and health care team  - Arrange appropriate level of post-acute services according to patient's   needs and preference and payer coverage in collaboration with the physician and health care team  - Communicate with and update the patient/family, physician, and health care team regarding progress on the discharge plan  - Arrange appropriate transportation to post-acute venues   Outcome: Progressing  -possible dc 10/21 back to [facility] Patient

## (undated) DEVICE — ADHESIVE SKN CLSR HISTOACRYL FLEX 0.5ML LF

## (undated) DEVICE — INTENDED FOR TISSUE SEPARATION, AND OTHER PROCEDURES THAT REQUIRE A SHARP SURGICAL BLADE TO PUNCTURE OR CUT.: Brand: BARD-PARKER SAFETY BLADES SIZE 15, STERILE

## (undated) DEVICE — REM POLYHESIVE ADULT PATIENT RETURN ELECTRODE: Brand: VALLEYLAB

## (undated) DEVICE — GLOVE SRG BIOGEL ECLIPSE 7

## (undated) DEVICE — SUT SILK 2-0 TIES 144 IN LA55G

## (undated) DEVICE — NEEDLE 25G X 1 1/2

## (undated) DEVICE — SUT PROLENE 2-0 CT-2 30 IN 8411H

## (undated) DEVICE — TUBING SUCTION 5MM X 12 FT

## (undated) DEVICE — SUT MONOCRYL 4-0 PS-2 27 IN Y426H

## (undated) DEVICE — SYRINGE 10ML LL

## (undated) DEVICE — 2000CC GUARDIAN II: Brand: GUARDIAN

## (undated) DEVICE — PROVE COVER: Brand: UNBRANDED

## (undated) DEVICE — SUT VICRYL 3-0 SH 27 IN J416H

## (undated) DEVICE — STRL UNIVERSAL MINOR GENERAL: Brand: CARDINAL HEALTH

## (undated) DEVICE — MEDI-VAC YANK SUCT HNDL W/TPRD BULBOUS TIP: Brand: CARDINAL HEALTH

## (undated) DEVICE — BAG DECANTER

## (undated) DEVICE — DRAPE C-ARM X-RAY

## (undated) DEVICE — GLOVE INDICATOR PI UNDERGLOVE SZ 7 BLUE

## (undated) DEVICE — CHLORAPREP HI-LITE 26ML ORANGE

## (undated) DEVICE — SYRINGE 10ML SLIP TIP LF

## (undated) DEVICE — WORKING LENGTH 235CM, WORKING CHANNEL 2.8MM: Brand: RESOLUTION 360 CLIP